# Patient Record
Sex: FEMALE | Race: WHITE | NOT HISPANIC OR LATINO | Employment: UNEMPLOYED | URBAN - METROPOLITAN AREA
[De-identification: names, ages, dates, MRNs, and addresses within clinical notes are randomized per-mention and may not be internally consistent; named-entity substitution may affect disease eponyms.]

---

## 2017-03-13 ENCOUNTER — ALLSCRIPTS OFFICE VISIT (OUTPATIENT)
Dept: OTHER | Facility: OTHER | Age: 37
End: 2017-03-13

## 2017-03-13 DIAGNOSIS — N83.201 CYST OF RIGHT OVARY: ICD-10-CM

## 2017-03-13 DIAGNOSIS — N94.6 DYSMENORRHEA: ICD-10-CM

## 2017-03-13 DIAGNOSIS — N83.202 CYST OF LEFT OVARY: ICD-10-CM

## 2018-01-02 ENCOUNTER — ALLSCRIPTS OFFICE VISIT (OUTPATIENT)
Dept: OTHER | Facility: OTHER | Age: 38
End: 2018-01-02

## 2018-01-02 DIAGNOSIS — Z12.31 ENCOUNTER FOR SCREENING MAMMOGRAM FOR MALIGNANT NEOPLASM OF BREAST: ICD-10-CM

## 2018-01-03 NOTE — PROGRESS NOTES
Assessment   1  Encounter for cervical Pap smear with pelvic exam (V76 2,V72 31) (Z01 419)   2  Encounter for preventive health examination (V70 0) (Z00 00)   3  Female pelvic pain (625 9) (R10 2)   4  Encounter for screening mammogram for breast cancer (V76 12) (Z12 31)   5  Cysts of both ovaries (620 2) (N83 201,N83 202)    Plan   Cysts of both ovaries, Dysmenorrhea, Female pelvic pain    · Norethindrone Acetate 5 MG Oral Tablet   Rx By: Wesley Muse; Dispense: 30 Days ; #:30 Tablet; Refill: 11; For: Cysts of both ovaries, Dysmenorrhea, Female pelvic pain; JUN = N; Sent To: [facility]/PHARMACY #[id number] Cysts of both ovaries, Female pelvic pain    · Norethindrone Acetate 5 MG Oral Tablet; TAKE 1/2 TABLET DAILY   Rx By: Wesley Muse; Dispense: 30 Days ; #:30 Tablet; Refill: 2;For: Cysts of both ovaries, Female pelvic pain; JUN = N; Verified Transmission to [facility]/PHARMACY #[id number]; Last Updated By: System, Yassets; 1/2/2018 4:35:58 PM  Encounter for cervical Pap smear with pelvic exam    · (Q) THINPREP PAP REFLEX HPV mRNA E6/E7; Status:Active - Retrospective By    Protocol Authorization; Requested CRISTOFER:[id number]; Perform:[facility]; UCN:[id number]; Last Updated By:Sonia [facility]; 1/2/2018 4:26:01 PM;Ordered;For:Encounter for cervical Pap smear with pelvic exam; Ordered By:Riedel, C William;  : ON NORETHINDRONE  Encounter for screening mammogram for breast cancer    · MAMMO SCREENING BILATERAL W 3D & CAD; Status:Hold For - Scheduling; Requested    for:02Jan2018; Perform:[facility]; QGI:[id number];BHMPGCF;PLO:[id number] for screening mammogram for breast cancer; Ordered By:Riedel, C [place];    Discussion/Summary   health maintenance visit healthy adult female Currently, she eats an adequate diet and has an inadequate exercise regimen   the risks and benefits of cervical cancer screening were discussed cervical cancer screening is current Pap test was done today Pap test with reflex HPV testing was done today Breast cancer screening: the risks and benefits of breast cancer screening were discussed, self breast exam technique was taught, monthly self breast exam was advised and mammogram is current  Colorectal cancer screening: the risks and benefits of colorectal cancer screening were discussed and colorectal cancer screening is current  Osteoporosis screening: the risks and benefits of osteoporosis screening were discussed  Advice and education were given regarding nutrition, aerobic exercise and weight loss  Patient discussion: discussed with the patient, 30 minute visit, greater than half of the time was spent on counseling  As noted above patient here for annual exam Pap pelvic and breast exam  Her exam today was normal the  She has no pelvic pain symptoms or bleeding symptoms or concerns  Discussed recommendation to decrease norethindrone down to 2 5 mg a day patient agrees that she will try this  Prescriptions authorized follow-up in 3 months and p r n  no other concerns addressed today  Chief Complaint   1  Visit For: Preventive General Multisystem Exam  PATIENT IS HERE FOR ANNUAL GYN EXAM, PATIENT HAS NO CONCERNS/COMPLAINTS AT THIS TIME      History of Present Illness   HPI: 78-year-old female  1 para 1 here for annual exam Pap pelvic and breast exam  Patient has had a history of ovarian cyst and pelvic pain in the past  Currently she is on norethindrone 5 mg daily to maintain control or pain symptoms and avoid recurrent ovarian cysts  Patient is not currently sexually active, states she has no pelvic pain symptoms or blue any bleeding symptoms occasional spotting  GYN HM, Adult Female [facility]: The patient is being seen for a health maintenance and gynecology evaluation  The last health maintenance visit was 2016 year(s) ago  General Health: The patient's health since the last visit is described as good  She has regular dental visits  -- She denies vision problems  -- She denies hearing loss  Immunizations status: up to date  Lifestyle:  She does not have a healthy diet  -- She has weight concerns  -- She does not exercise regularly  -- She does not use tobacco -- She denies alcohol use  -- She denies drug use  Reproductive health: the patient is perimenopausal     Screening: cancer screening reviewed and current  Active Problems   1  Cysts of both ovaries (620 2) (N83 201,N83 202)   2  Dysmenorrhea (625 3) (N94 6)   3  Encounter for cervical Pap smear with pelvic exam (V76 2,V72 31) (Z01 419)   4  Female pelvic pain (625 9) (R10 2)   5  Post-operative state (V45 89) (Z98 890)    Past Medical History    · No pertinent past medical history    Surgical History    · History of  Section   · History of Laparoscopy (Diagnostic)    Family History   Mother    · Family history of hypertension (V17 49) (Z82 49)  Father    · Family history of hypertension (V17 49) (Z82 49)   · Family history of High cholesterol    Social History    · Caffeine use (V49 89) (F15 90)   · Current every day smoker (305 1) (F17 200)   · No alcohol use   · No drug use   · One child    Current Meds    1  Ibuprofen 800 MG Oral Tablet; TAKE 1 TABLET 3 TIMES DAILY WITH FOOD AS     NEEDED; Therapy: 98Rho2136 to (Evaluate:95Fwj9044)  Requested for: 08Baq1238; Last     Rx:[id number] Ordered   2  Norethindrone Acetate 5 MG Oral Tablet; TAKE 1 TABLET DAILY; Therapy: 94HYB3109 to (Evaluate:52Xui0950)  Requested for: 52Ufk7268; Last     Rx:[id number] Ordered   3  TraMADol HCl - 50 MG Oral Tablet; TAKE 1 TO 2 TABLETS EVERY 6 HOURS AS     NEEDED FOR PAIN;     Therapy: 08Frg5308 to (Evaluate:80Caw1904); Last Rx:[id number] Ordered    Allergies   1   No Known Drug Allergies  2  Eggs    Vitals    Recorded: 18ZPR3195 58:88HK   Systolic 109, RUE, Sitting   Diastolic 86, RUE, Sitting   Weight 160 lb 8 oz   BMI Calculated 31 35   BSA Calculated 1 7   LMP ON NORETHINDRONE     Physical Exam        Constitutional      General appearance: No acute distress, well appearing and well nourished  Neck      Neck: Normal, supple, trachea midline, no masses  Thyroid: Normal, no thyromegaly  Pulmonary      Respiratory effort: No increased work of breathing or signs of respiratory distress  Auscultation of lungs: Clear to auscultation  Cardiovascular      Auscultation of heart: Normal rate and rhythm, normal S1 and S2, no murmurs  Peripheral vascular exam: Normal pulses Throughout  Genitourinary      External genitalia: Normal and no lesions appreciated  Vagina: Normal, no lesions or dryness appreciated  Urethra: Normal        Urethral meatus: Normal        Bladder: Normal, soft, non-tender and no prolapse or masses appreciated  Cervix: Normal, no palpable masses  Uterus: Normal, non-tender, not enlarged, and no palpable masses  Adnexa/parametria: Normal, non-tender and no fullness or masses appreciated  -- No pelvic masses  Anus, perineum, and rectum: Normal sphincter tone, no masses, and no prolapse  Chest      Breasts: Normal and no dimpling or skin changes noted  Abdomen      Abdomen: Normal, non-tender, and no organomegaly noted  Liver and spleen: No hepatomegaly or splenomegaly  Examination for hernias: No hernias appreciated  Lymphatic      Palpation of lymph nodes in neck, axillae, groin and/or other locations: No lymphadenopathy or masses noted  Skin      Skin and subcutaneous tissue: Normal skin turgor and no rashes         Palpation of skin and subcutaneous tissue: Normal        Psychiatric      Orientation to person, place, and time: Normal        Mood and affect: Normal        Signatures    Electronically signed by : Maricruz Rose DO; Jan 2 2018  7:03PM EST                       (Author)

## 2018-01-04 LAB
ADDITIONAL INFORMATION (HISTORICAL): NORMAL
ADEQUACY: (HISTORICAL): NORMAL
CYTOTECHNOLOGIST: (HISTORICAL): NORMAL
INTERPRETATION (HISTORICAL): NORMAL
LMP (HISTORICAL): NORMAL
PREV. BX: (HISTORICAL): NORMAL
PREV. PAP (HISTORICAL): NORMAL
REVIEWED BY (HISTORICAL): NORMAL
SOURCE (HISTORICAL): NORMAL

## 2018-01-13 VITALS
HEIGHT: 60 IN | BODY MASS INDEX: 34.55 KG/M2 | DIASTOLIC BLOOD PRESSURE: 85 MMHG | SYSTOLIC BLOOD PRESSURE: 120 MMHG | WEIGHT: 176 LBS

## 2018-01-16 NOTE — MISCELLANEOUS
Provider Comments  Provider Comments:   PATIENT NO SHOWED FOR FOLLOW UP APPOINTMENT TODAY, I CALLED AND LEFT MESSAGE ON PATIENTS VOICE MAIL TO CALL OFFICE      Signatures   Electronically signed by : Keyonna Enriquez, ; Nov 29 2016  1:44PM EST                       (Author)

## 2018-01-22 VITALS — DIASTOLIC BLOOD PRESSURE: 86 MMHG | BODY MASS INDEX: 31.35 KG/M2 | SYSTOLIC BLOOD PRESSURE: 120 MMHG | WEIGHT: 160.5 LBS

## 2018-07-25 DIAGNOSIS — Z87.42 HISTORY OF DYSMENORRHEA: Primary | ICD-10-CM

## 2018-10-02 ENCOUNTER — OFFICE VISIT (OUTPATIENT)
Dept: FAMILY MEDICINE CLINIC | Facility: CLINIC | Age: 38
End: 2018-10-02
Payer: COMMERCIAL

## 2018-10-02 ENCOUNTER — TELEPHONE (OUTPATIENT)
Dept: FAMILY MEDICINE CLINIC | Facility: CLINIC | Age: 38
End: 2018-10-02

## 2018-10-02 ENCOUNTER — APPOINTMENT (OUTPATIENT)
Dept: LAB | Facility: IMAGING CENTER | Age: 38
End: 2018-10-02
Payer: COMMERCIAL

## 2018-10-02 ENCOUNTER — TRANSCRIBE ORDERS (OUTPATIENT)
Dept: ADMINISTRATIVE | Facility: HOSPITAL | Age: 38
End: 2018-10-02

## 2018-10-02 VITALS
WEIGHT: 149.2 LBS | BODY MASS INDEX: 26.44 KG/M2 | HEART RATE: 73 BPM | DIASTOLIC BLOOD PRESSURE: 90 MMHG | OXYGEN SATURATION: 98 % | HEIGHT: 63 IN | TEMPERATURE: 99.1 F | RESPIRATION RATE: 16 BRPM | SYSTOLIC BLOOD PRESSURE: 122 MMHG

## 2018-10-02 DIAGNOSIS — G63 NEUROPATHY ASSOCIATED WITH POLYNEUROPATHY, ORGANOMEGALY, ENDOCRINOPATHY, MONOCLONAL GAMMOPATHY, AND SKIN CHANGES SYNDROME (HCC): ICD-10-CM

## 2018-10-02 DIAGNOSIS — L70.0 ACNE VULGARIS: ICD-10-CM

## 2018-10-02 DIAGNOSIS — G63 NEUROPATHY ASSOCIATED WITH POLYNEUROPATHY, ORGANOMEGALY, ENDOCRINOPATHY, MONOCLONAL GAMMOPATHY, AND SKIN CHANGES SYNDROME (HCC): Primary | ICD-10-CM

## 2018-10-02 DIAGNOSIS — E88.09 NEUROPATHY ASSOCIATED WITH POLYNEUROPATHY, ORGANOMEGALY, ENDOCRINOPATHY, MONOCLONAL GAMMOPATHY, AND SKIN CHANGES SYNDROME (HCC): Primary | ICD-10-CM

## 2018-10-02 DIAGNOSIS — E88.09 NEUROPATHY ASSOCIATED WITH POLYNEUROPATHY, ORGANOMEGALY, ENDOCRINOPATHY, MONOCLONAL GAMMOPATHY, AND SKIN CHANGES SYNDROME (HCC): ICD-10-CM

## 2018-10-02 PROBLEM — J45.20 MILD INTERMITTENT ASTHMA WITHOUT COMPLICATION: Status: ACTIVE | Noted: 2018-10-02

## 2018-10-02 PROBLEM — I10 ESSENTIAL HYPERTENSION: Status: ACTIVE | Noted: 2018-10-02

## 2018-10-02 PROBLEM — G62.9 NEUROPATHY: Status: ACTIVE | Noted: 2018-10-02

## 2018-10-02 PROBLEM — N83.209 CYSTIC DISEASE OF OVARIES: Status: ACTIVE | Noted: 2018-10-02

## 2018-10-02 PROBLEM — F41.9 ANXIETY: Status: ACTIVE | Noted: 2018-10-02

## 2018-10-02 LAB
ALBUMIN SERPL BCP-MCNC: 3.9 G/DL (ref 3.5–5)
ALP SERPL-CCNC: 93 U/L (ref 46–116)
ALT SERPL W P-5'-P-CCNC: 59 U/L (ref 12–78)
ANION GAP SERPL CALCULATED.3IONS-SCNC: 6 MMOL/L (ref 4–13)
AST SERPL W P-5'-P-CCNC: 39 U/L (ref 5–45)
BASOPHILS # BLD AUTO: 0.02 THOUSANDS/ΜL (ref 0–0.1)
BASOPHILS NFR BLD AUTO: 0 % (ref 0–1)
BILIRUB SERPL-MCNC: 0.56 MG/DL (ref 0.2–1)
BUN SERPL-MCNC: 12 MG/DL (ref 5–25)
CALCIUM SERPL-MCNC: 9.2 MG/DL (ref 8.3–10.1)
CHLORIDE SERPL-SCNC: 107 MMOL/L (ref 100–108)
CO2 SERPL-SCNC: 26 MMOL/L (ref 21–32)
CREAT SERPL-MCNC: 0.83 MG/DL (ref 0.6–1.3)
EOSINOPHIL # BLD AUTO: 0.1 THOUSAND/ΜL (ref 0–0.61)
EOSINOPHIL NFR BLD AUTO: 2 % (ref 0–6)
ERYTHROCYTE [DISTWIDTH] IN BLOOD BY AUTOMATED COUNT: 13.6 % (ref 11.6–15.1)
ERYTHROCYTE [SEDIMENTATION RATE] IN BLOOD: 21 MM/HOUR (ref 0–20)
GFR SERPL CREATININE-BSD FRML MDRD: 88 ML/MIN/1.73SQ M
GLUCOSE P FAST SERPL-MCNC: 95 MG/DL (ref 65–99)
HCT VFR BLD AUTO: 43 % (ref 34.8–46.1)
HGB BLD-MCNC: 14.2 G/DL (ref 11.5–15.4)
IMM GRANULOCYTES # BLD AUTO: 0.01 THOUSAND/UL (ref 0–0.2)
IMM GRANULOCYTES NFR BLD AUTO: 0 % (ref 0–2)
LYMPHOCYTES # BLD AUTO: 1.98 THOUSANDS/ΜL (ref 0.6–4.47)
LYMPHOCYTES NFR BLD AUTO: 40 % (ref 14–44)
MCH RBC QN AUTO: 31.9 PG (ref 26.8–34.3)
MCHC RBC AUTO-ENTMCNC: 33 G/DL (ref 31.4–37.4)
MCV RBC AUTO: 97 FL (ref 82–98)
MONOCYTES # BLD AUTO: 0.74 THOUSAND/ΜL (ref 0.17–1.22)
MONOCYTES NFR BLD AUTO: 15 % (ref 4–12)
NEUTROPHILS # BLD AUTO: 2.08 THOUSANDS/ΜL (ref 1.85–7.62)
NEUTS SEG NFR BLD AUTO: 43 % (ref 43–75)
NRBC BLD AUTO-RTO: 0 /100 WBCS
PLATELET # BLD AUTO: 238 THOUSANDS/UL (ref 149–390)
PMV BLD AUTO: 9.9 FL (ref 8.9–12.7)
POTASSIUM SERPL-SCNC: 4.2 MMOL/L (ref 3.5–5.3)
PROT SERPL-MCNC: 7.9 G/DL (ref 6.4–8.2)
RBC # BLD AUTO: 4.45 MILLION/UL (ref 3.81–5.12)
SODIUM SERPL-SCNC: 139 MMOL/L (ref 136–145)
WBC # BLD AUTO: 4.93 THOUSAND/UL (ref 4.31–10.16)

## 2018-10-02 PROCEDURE — 86038 ANTINUCLEAR ANTIBODIES: CPT

## 2018-10-02 PROCEDURE — 99204 OFFICE O/P NEW MOD 45 MIN: CPT | Performed by: NURSE PRACTITIONER

## 2018-10-02 PROCEDURE — 36415 COLL VENOUS BLD VENIPUNCTURE: CPT

## 2018-10-02 PROCEDURE — 85652 RBC SED RATE AUTOMATED: CPT

## 2018-10-02 PROCEDURE — 86618 LYME DISEASE ANTIBODY: CPT

## 2018-10-02 PROCEDURE — 86039 ANTINUCLEAR ANTIBODIES (ANA): CPT

## 2018-10-02 PROCEDURE — 85025 COMPLETE CBC W/AUTO DIFF WBC: CPT

## 2018-10-02 PROCEDURE — 80053 COMPREHEN METABOLIC PANEL: CPT

## 2018-10-02 RX ORDER — GABAPENTIN 300 MG/1
300 CAPSULE ORAL 3 TIMES DAILY
Qty: 90 CAPSULE | Refills: 1 | Status: SHIPPED | OUTPATIENT
Start: 2018-10-02 | End: 2018-12-28 | Stop reason: SDUPTHER

## 2018-10-02 RX ORDER — IBUPROFEN 800 MG/1
800 TABLET ORAL EVERY 6 HOURS PRN
Qty: 90 TABLET | Refills: 1 | Status: SHIPPED | OUTPATIENT
Start: 2018-10-02 | End: 2018-12-04 | Stop reason: SDUPTHER

## 2018-10-02 RX ORDER — TETRACYCLINE HYDROCHLORIDE 250 MG/1
250 CAPSULE ORAL DAILY
Qty: 30 CAPSULE | Refills: 1 | Status: SHIPPED | OUTPATIENT
Start: 2018-10-02 | End: 2018-10-16

## 2018-10-02 NOTE — ASSESSMENT & PLAN NOTE
Within the last several months patient has had a sudden onset of severe cystic acne  The sister was able to resolve a lot of it with over-the-counter medication and very conscientious cleaning  Will put the patient on daily tetracyclines and benzyl peroxide to facial wash

## 2018-10-02 NOTE — TELEPHONE ENCOUNTER
Pharmacy called they do not have benzoyl peroxide 4 % in stock  They wanted to know if it could be changed to 5 %   [phone]

## 2018-10-02 NOTE — ASSESSMENT & PLAN NOTE
Within the last couple years this patient has developed multiple multiple issues that include but are not limited to cystic ovaries, adult onset of cystic acne,neuropathy multiple joint pain, weakness, memory impairment, strange behavior, slurring of speech, had fogginess and severe periodontal disease  Will do testing to include blood work, EMG, MRI and referred to Neurology for a workup for possible MS  Will renew her Motrin 800 mg for pain and increase her Neurontin from 100 mg to 300 mg up to 3 times a day

## 2018-10-02 NOTE — PROGRESS NOTES
Assessment/Plan:    Cystic acne  Within the last several months patient has had a sudden onset of severe cystic acne  The sister was able to resolve a lot of it with over-the-counter medication and very conscientious cleaning  Will put the patient on daily tetracyclines and benzyl peroxide to facial wash  Neuropathy associated with polyneuropathy, organomegaly, endocrinopathy, monoclonal gammopathy, and skin changes syndrome (HCC)  Within the last couple years this patient has developed multiple multiple issues that include but are not limited to cystic ovaries, adult onset of cystic acne,neuropathy multiple joint pain, weakness, memory impairment, strange behavior, slurring of speech, had fogginess and severe periodontal disease  Will do testing to include blood work, EMG, MRI and referred to Neurology for a workup for possible MS  Will renew her Motrin 800 mg for pain and increase her Neurontin from 100 mg to 300 mg up to 3 times a day  Diagnoses and all orders for this visit:    Neuropathy associated with polyneuropathy, organomegaly, endocrinopathy, monoclonal gammopathy, and skin changes syndrome (HCC)  -     EMG 2 limb lower extremity; Future  -     CBC and differential; Future  -     Comprehensive metabolic panel; Future  -     Sedimentation rate, automated; Future  -     DAMIEN Screen w/ Reflex to Titer/Pattern; Future  -     Lyme Ab/Western Blot Reflex; Future  -     MRI brain wo contrast; Future  -     Ambulatory referral to Neurology; Future  -     gabapentin (NEURONTIN) 300 mg capsule; Take 1 capsule (300 mg total) by mouth 3 (three) times a day  -     ibuprofen (MOTRIN) 800 mg tablet; Take 1 tablet (800 mg total) by mouth every 6 (six) hours as needed for mild pain    Acne vulgaris  -     tetracycline (ACHROMYCIN,SUMYCIN) 250 MG capsule; Take 1 capsule (250 mg total) by mouth daily for 30 days  -     benzoyl peroxide (BREVOXYL) 4 % external liquid;  Apply topically 2 (two) times a day Subjective:      Patient ID: Milena Canales is a 34 y o  female chronic pain    HPI  Has been going to doctors for joint pain but no one tries to find a causes  Feels her body and mind are slower, general body pain with hand numbness  Speech is slurred  Has some good days  Refuses to drive due to poor focus  Leg give out after standing for a few seconds  Sister is with her and states she has seen a huge change in the patient in a short time  The following portions of the patient's history were reviewed and updated as appropriate:   She  has a past medical history of Arthritis; bleeding disorder; and Nephrolithiasis  She   Patient Active Problem List    Diagnosis Date Noted    Cystic acne 10/02/2018    Anxiety 10/02/2018    Neuropathy associated with polyneuropathy, organomegaly, endocrinopathy, monoclonal gammopathy, and skin changes syndrome ([address] ) 10/02/2018    Mild intermittent asthma without complication     Cystic disease of ovaries 10/02/2018    Essential hypertension 10/02/2018     She  has a past surgical history that includes  section; [place] tooth extraction; Diagnostic laparoscopy; pr lap,diagnostic abdomen (N/A, 2016); and pr lap,rmv  adnexal structure (Bilateral, 2016)  Her family history is not on file  She  reports that she has been smoking  She has a 23 00 pack-year smoking history  She has never used smokeless tobacco  She reports that she uses drugs, including Marijuana  She reports that she does not drink alcohol    Current Outpatient Prescriptions   Medication Sig Dispense Refill    albuterol (PROVENTIL HFA,VENTOLIN HFA) 90 mcg/act inhaler 2 puffs po q 4-6 hours prn coughing, wheezing or shortness of breath      amLODIPine (NORVASC) 5 mg tablet Take 5 mg by mouth daily  5    fluticasone-salmeterol (ADVAIR DISKUS) 250-50 mcg/dose inhaler Inhale 1 puff      ibuprofen (MOTRIN) 800 mg tablet Take 1 tablet (800 mg total) by mouth every 6 (six) hours as needed for mild pain 90 tablet 1    norethindrone (AYGESTIN) 5 mg tablet TAKE 1/2 TABLET BY MOUTH DAILY 30 tablet 3    omeprazole (PriLOSEC) 20 mg delayed release capsule 1 CAPSULE 30 MINUTES BEFORE FIRST MEAL OF THE DAY EVERY DAY  12    sertraline (ZOLOFT) 100 mg tablet TAKE 1 TABLET DAILY WITH 50MG  5    benzoyl peroxide (BREVOXYL) 4 % external liquid Apply topically 2 (two) times a day 170 1 g 0    gabapentin (NEURONTIN) 300 mg capsule Take 1 capsule (300 mg total) by mouth 3 (three) times a day 90 capsule 1    norethindrone-ethinyl estradiol (JUNEL FE 1/20) 1-20 MG-MCG per tablet Take 1 tablet by mouth daily Indications: pt unsure of dosage   tetracycline (ACHROMYCIN,SUMYCIN) 250 MG capsule Take 1 capsule (250 mg total) by mouth daily for 30 days 30 capsule 1    traMADol (ULTRAM) 50 mg tablet Take 50 mg by mouth every 6 (six) hours as needed for moderate pain (1 or 2 tablets)  No current facility-administered medications for this visit  Current Outpatient Prescriptions on File Prior to Visit   Medication Sig    albuterol (PROVENTIL HFA,VENTOLIN HFA) 90 mcg/act inhaler 2 puffs po q 4-6 hours prn coughing, wheezing or shortness of breath    amLODIPine (NORVASC) 5 mg tablet Take 5 mg by mouth daily    fluticasone-salmeterol (ADVAIR DISKUS) 250-50 mcg/dose inhaler Inhale 1 puff    norethindrone (AYGESTIN) 5 mg tablet TAKE 1/2 TABLET BY MOUTH DAILY    omeprazole (PriLOSEC) 20 mg delayed release capsule 1 CAPSULE 30 MINUTES BEFORE FIRST MEAL OF THE DAY EVERY DAY    sertraline (ZOLOFT) 100 mg tablet TAKE 1 TABLET DAILY WITH 50MG   [DISCONTINUED] ibuprofen (MOTRIN) 800 mg tablet Take 800 mg by mouth every 6 (six) hours as needed for mild pain   norethindrone-ethinyl estradiol (JUNEL FE 1/20) 1-20 MG-MCG per tablet Take 1 tablet by mouth daily Indications: pt unsure of dosage      traMADol (ULTRAM) 50 mg tablet Take 50 mg by mouth every 6 (six) hours as needed for moderate pain (1 or 2 tablets)   [DISCONTINUED] gabapentin (NEURONTIN) 100 mg capsule TAKE 1 CAPSULE DAILY     No current facility-administered medications on file prior to visit  She is allergic to eggs or egg-derived products       Review of Systems   Constitutional: Positive for activity change, appetite change and fatigue  HENT: Negative  Teeth became infected in her 30's and had to have them all removed  Eyes: Positive for visual disturbance  Respiratory: Positive for cough and shortness of breath  Cardiovascular: Negative  Endocrine: Negative  Genitourinary: Negative  Musculoskeletal: Positive for arthralgias, back pain, gait problem and myalgias  Skin:        Sudden onset of adult cystic acne with deep scarring  Allergic/Immunologic: Positive for food allergies  Neurological: Positive for speech difficulty, weakness and numbness  Hematological: Bruises/bleeds easily  Psychiatric/Behavioral: The patient is nervous/anxious  Objective:      /90   Pulse 73   Temp 99 1 °F (37 3 °C) (Tympanic)   Resp 16   Ht 5' 3" (1 6 m)   Wt 67 7 kg (149 lb 3 2 oz)   SpO2 98%   BMI 26 43 kg/m²          Physical Exam   Constitutional: She is oriented to person, place, and time  She appears well-developed and well-nourished  She appears distressed  Having difficulty with talking about what she is feeling  Sister had to talk for her  HENT:   Head: Normocephalic  Right Ear: External ear normal    Left Ear: External ear normal    Nose: Nose normal    Mouth/Throat: Oropharynx is clear and moist    Eyes: Pupils are equal, round, and reactive to light  Conjunctivae and EOM are normal    Neck: Normal range of motion  Neck supple  Cardiovascular: Normal rate, regular rhythm, normal heart sounds and intact distal pulses  Pulmonary/Chest: Effort normal and breath sounds normal    Abdominal: Soft  Bowel sounds are normal    Musculoskeletal:   Tenderness in her back   Has wide spread muscle/joint pain  Lymphadenopathy:     She has no cervical adenopathy  Neurological: She is alert and oriented to person, place, and time  She has normal reflexes  Coordination (Difficulty with maneuvering in the exam room  ) abnormal    Poor ability to think without prompting at times  Decrease sensation in hand and feet  Psychiatric: Her mood appears anxious  Her speech is delayed  She is slowed  Cognition and memory are impaired  She expresses inappropriate judgment  She exhibits a depressed mood  She expresses suicidal ideation  She expresses no suicidal plans  Sister states she has so much pain, the patient was thinking of harming herself  The sister is now with the patient daily

## 2018-10-03 LAB
ANA HOMOGEN SER QL IF: NORMAL
ANA HOMOGEN TITR SER: NORMAL {TITER}
RYE IGE QN: POSITIVE

## 2018-10-04 ENCOUNTER — TELEPHONE (OUTPATIENT)
Dept: FAMILY MEDICINE CLINIC | Facility: CLINIC | Age: 38
End: 2018-10-04

## 2018-10-04 LAB
B BURGDOR IGG SER IA-ACNC: 0.35
B BURGDOR IGM SER IA-ACNC: 0.18

## 2018-10-05 DIAGNOSIS — M25.50 ARTHRALGIA, UNSPECIFIED JOINT: Primary | ICD-10-CM

## 2018-10-09 ENCOUNTER — TRANSCRIBE ORDERS (OUTPATIENT)
Dept: ADMINISTRATIVE | Facility: HOSPITAL | Age: 38
End: 2018-10-09

## 2018-10-09 ENCOUNTER — OFFICE VISIT (OUTPATIENT)
Dept: FAMILY MEDICINE CLINIC | Facility: CLINIC | Age: 38
End: 2018-10-09
Payer: COMMERCIAL

## 2018-10-09 ENCOUNTER — APPOINTMENT (OUTPATIENT)
Dept: LAB | Facility: IMAGING CENTER | Age: 38
End: 2018-10-09
Payer: COMMERCIAL

## 2018-10-09 VITALS
HEART RATE: 68 BPM | OXYGEN SATURATION: 99 % | HEIGHT: 63 IN | WEIGHT: 148.8 LBS | RESPIRATION RATE: 16 BRPM | SYSTOLIC BLOOD PRESSURE: 138 MMHG | TEMPERATURE: 99.1 F | DIASTOLIC BLOOD PRESSURE: 94 MMHG | BODY MASS INDEX: 26.36 KG/M2

## 2018-10-09 DIAGNOSIS — E88.09 NEUROPATHY ASSOCIATED WITH POLYNEUROPATHY, ORGANOMEGALY, ENDOCRINOPATHY, MONOCLONAL GAMMOPATHY, AND SKIN CHANGES SYNDROME (HCC): ICD-10-CM

## 2018-10-09 DIAGNOSIS — L70.0 CYSTIC ACNE: Primary | ICD-10-CM

## 2018-10-09 DIAGNOSIS — M25.50 ARTHRALGIA, UNSPECIFIED JOINT: Primary | ICD-10-CM

## 2018-10-09 DIAGNOSIS — G63 NEUROPATHY ASSOCIATED WITH POLYNEUROPATHY, ORGANOMEGALY, ENDOCRINOPATHY, MONOCLONAL GAMMOPATHY, AND SKIN CHANGES SYNDROME (HCC): ICD-10-CM

## 2018-10-09 LAB — CRP SERPL QL: <3 MG/L

## 2018-10-09 PROCEDURE — 85732 THROMBOPLASTIN TIME PARTIAL: CPT

## 2018-10-09 PROCEDURE — 36415 COLL VENOUS BLD VENIPUNCTURE: CPT

## 2018-10-09 PROCEDURE — 86430 RHEUMATOID FACTOR TEST QUAL: CPT

## 2018-10-09 PROCEDURE — 85705 THROMBOPLASTIN INHIBITION: CPT

## 2018-10-09 PROCEDURE — 85613 RUSSELL VIPER VENOM DILUTED: CPT

## 2018-10-09 PROCEDURE — 86140 C-REACTIVE PROTEIN: CPT

## 2018-10-09 PROCEDURE — 85670 THROMBIN TIME PLASMA: CPT

## 2018-10-09 PROCEDURE — 99213 OFFICE O/P EST LOW 20 MIN: CPT | Performed by: NURSE PRACTITIONER

## 2018-10-09 RX ORDER — NYSTATIN AND TRIAMCINOLONE ACETONIDE 100000; 1 [USP'U]/G; MG/G
OINTMENT TOPICAL 2 TIMES DAILY
Qty: 30 G | Refills: 2 | Status: SHIPPED | OUTPATIENT
Start: 2018-10-09 | End: 2019-11-29

## 2018-10-09 RX ORDER — CLINDAMYCIN PHOSPHATE 10 MG/G
GEL TOPICAL 2 TIMES DAILY
Qty: 30 G | Refills: 5 | Status: SHIPPED | OUTPATIENT
Start: 2018-10-09 | End: 2019-11-29

## 2018-10-09 NOTE — PROGRESS NOTES
Assessment/Plan:      Diagnoses and all orders for this visit:    Cystic acne  Comments:  Skin a mildly improved  Will order clindagel 1% BID  Orders:  -     clindamycin (CLINDAGEL) 1 % gel; Apply topically 2 (two) times a day  -     nystatin-triamcinolone (MYCOLOG-II) ointment; Apply topically 2 (two) times a day    Neuropathy associated with polyneuropathy, organomegaly, endocrinopathy, monoclonal gammopathy, and skin changes syndrome (HCC)  Comments:  Has set up EMG, will call neuro but I feel rheum will still need to see pt  Will see in early December  Will call her with the next set of lab results  Subjective:     Patient ID: Paul Jack is a 34 y o  female f/u of labs  HPI  States she has started to set up her appointment and will get additional labs this week  EMG is in January  Feeling better after sleeping for 14 hours  Review of Systems   Constitutional: Positive for fatigue  HENT: Positive for congestion  Respiratory: Negative  Cardiovascular: Negative  Musculoskeletal: Positive for arthralgias, joint swelling and myalgias  Skin: Positive for rash (acne is drying  )  Allergic/Immunologic: Positive for food allergies  Neurological: Negative  Hematological: Negative for adenopathy  Psychiatric/Behavioral: The patient is nervous/anxious  Objective:     Physical Exam   Constitutional: She is oriented to person, place, and time  She appears well-developed and well-nourished  HENT:   Head: Normocephalic  Right Ear: External ear normal    Left Ear: External ear normal    Nose: Nose normal    Mouth/Throat: Oropharynx is clear and moist    Bilateral ear canals are erythemic but not tender  Eyes: Conjunctivae and EOM are normal    Neck: Normal range of motion  Neck supple  Cardiovascular: Normal rate, regular rhythm and normal heart sounds  Pulmonary/Chest: Effort normal and breath sounds normal    Musculoskeletal:   Generalized joint and muscle pain  Lymphadenopathy:     She has no cervical adenopathy  Neurological: She is alert and oriented to person, place, and time  Skin: Skin is warm and dry  Acne mildly improved  Psychiatric: She has a normal mood and affect   Her behavior is normal  Judgment and thought content normal

## 2018-10-10 LAB — RHEUMATOID FACT SER QL LA: NEGATIVE

## 2018-10-11 ENCOUNTER — HOSPITAL ENCOUNTER (OUTPATIENT)
Dept: RADIOLOGY | Age: 38
Discharge: HOME | End: 2018-10-11
Payer: COMMERCIAL

## 2018-10-11 DIAGNOSIS — G63 NEUROPATHY ASSOCIATED WITH POLYNEUROPATHY, ORGANOMEGALY, ENDOCRINOPATHY, MONOCLONAL GAMMOPATHY, AND SKIN CHANGES SYNDROME (HCC): ICD-10-CM

## 2018-10-11 DIAGNOSIS — E88.09 NEUROPATHY ASSOCIATED WITH POLYNEUROPATHY, ORGANOMEGALY, ENDOCRINOPATHY, MONOCLONAL GAMMOPATHY, AND SKIN CHANGES SYNDROME (HCC): ICD-10-CM

## 2018-10-11 LAB
APTT SCREEN TO CONFIRM RATIO: 0.88 RATIO (ref 0–1.4)
CONFIRM APTT/NORMAL: 40.5 SEC (ref 0–55)
LA PPP-IMP: NORMAL
SCREEN APTT: 37.8 SEC (ref 0–51.9)
SCREEN DRVVT: 30.6 SEC (ref 0–47)
THROMBIN TIME: 16.8 SEC (ref 0–23)

## 2018-10-11 PROCEDURE — 70551 MRI BRAIN STEM W/O DYE: CPT

## 2018-10-16 ENCOUNTER — OFFICE VISIT (OUTPATIENT)
Dept: FAMILY MEDICINE CLINIC | Facility: CLINIC | Age: 38
End: 2018-10-16
Payer: COMMERCIAL

## 2018-10-16 VITALS
SYSTOLIC BLOOD PRESSURE: 112 MMHG | TEMPERATURE: 98.5 F | HEART RATE: 75 BPM | RESPIRATION RATE: 16 BRPM | WEIGHT: 152.6 LBS | HEIGHT: 63 IN | BODY MASS INDEX: 27.04 KG/M2 | DIASTOLIC BLOOD PRESSURE: 72 MMHG | OXYGEN SATURATION: 97 %

## 2018-10-16 DIAGNOSIS — E88.09 NEUROPATHY ASSOCIATED WITH POLYNEUROPATHY, ORGANOMEGALY, ENDOCRINOPATHY, MONOCLONAL GAMMOPATHY, AND SKIN CHANGES SYNDROME (HCC): Primary | ICD-10-CM

## 2018-10-16 DIAGNOSIS — G63 NEUROPATHY ASSOCIATED WITH POLYNEUROPATHY, ORGANOMEGALY, ENDOCRINOPATHY, MONOCLONAL GAMMOPATHY, AND SKIN CHANGES SYNDROME (HCC): Primary | ICD-10-CM

## 2018-10-16 PROCEDURE — 99214 OFFICE O/P EST MOD 30 MIN: CPT | Performed by: NURSE PRACTITIONER

## 2018-10-16 NOTE — PROGRESS NOTES
Answers for HPI/ROS submitted by the patient on 10/15/2018   Neurologic complaint  altered mental status: Yes  clumsiness: Yes  focal sensory loss: Yes  focal weakness: Yes  loss of balance: Yes  memory loss: Yes  near-syncope: Yes  slurred speech: Yes  syncope: No  visual change: Yes  weakness: Yes  Chronicity: chronic  Onset: more than 1 month ago  Onset quality: suddenly  Progression since onset: waxing and waning  Focality: facial, right-sided, lower extremity, upper extremity  abdominal pain: No  auditory change: No  aura: Yes  back pain: Yes  bladder incontinence: No  bowel incontinence: No  chest pain: No  confusion: Yes  diaphoresis: Yes  dizziness: Yes  fatigue: Yes  fever: No  headaches: Yes  light-headedness: Yes  nausea: No  neck pain: Yes  palpitations: No  shortness of breath: No  vertigo: No  vomiting: No  Treatments tried: medication  Improvement on treatment: mild

## 2018-10-16 NOTE — PROGRESS NOTES
Assessment/Plan:      Diagnoses and all orders for this visit:    Neuropathy associated with polyneuropathy, organomegaly, endocrinopathy, monoclonal gammopathy, and skin changes syndrome (HCC)  Comments:  Testing neg except for DAMIEN  Still has not made appt with rehum and neuro  Will do U/S of pancrease  To f/u after testing is complete  Orders:  -     US right upper quadrant; Future          Subjective:     Patient ID: Yovany Aguila is a 34 y o  female here for f/u of MRI and labs  HPI  Still having the same symptoms without worsening  Face is still breaking out  Having nausea with vomiting at times  Review of Systems   Constitutional: Positive for diaphoresis and fatigue  Negative for fever  Respiratory: Negative for shortness of breath  Cardiovascular: Negative for chest pain and palpitations  Gastrointestinal: Negative for abdominal pain, nausea and vomiting  Musculoskeletal: Positive for back pain and neck pain  Neurological: Positive for dizziness, weakness, light-headedness and headaches  Negative for syncope  Psychiatric/Behavioral: Positive for confusion  Objective:     Physical Exam   Constitutional: She is oriented to person, place, and time  She appears well-developed and well-nourished  HENT:   Head: Normocephalic  Eyes: Conjunctivae and EOM are normal    Neck: Normal range of motion  Neck supple  Cardiovascular: Normal rate, regular rhythm and normal heart sounds  Pulmonary/Chest: Effort normal and breath sounds normal    Abdominal: Soft  Bowel sounds are normal    Musculoskeletal: Normal range of motion  Neurological: She is alert and oriented to person, place, and time  She has normal strength  A sensory deficit is present  She displays a negative Romberg sign  Skin: Skin is warm and dry  Rash (moderate facial acne ) noted  Psychiatric: She has a normal mood and affect   Her behavior is normal

## 2018-10-18 ENCOUNTER — HOSPITAL ENCOUNTER (OUTPATIENT)
Dept: RADIOLOGY | Facility: IMAGING CENTER | Age: 38
Discharge: HOME | End: 2018-10-18
Payer: COMMERCIAL

## 2018-10-18 DIAGNOSIS — G63 NEUROPATHY ASSOCIATED WITH POLYNEUROPATHY, ORGANOMEGALY, ENDOCRINOPATHY, MONOCLONAL GAMMOPATHY, AND SKIN CHANGES SYNDROME (HCC): ICD-10-CM

## 2018-10-18 DIAGNOSIS — E88.09 NEUROPATHY ASSOCIATED WITH POLYNEUROPATHY, ORGANOMEGALY, ENDOCRINOPATHY, MONOCLONAL GAMMOPATHY, AND SKIN CHANGES SYNDROME (HCC): ICD-10-CM

## 2018-10-18 PROCEDURE — 76705 ECHO EXAM OF ABDOMEN: CPT

## 2018-10-19 ENCOUNTER — TELEPHONE (OUTPATIENT)
Dept: FAMILY MEDICINE CLINIC | Facility: CLINIC | Age: 38
End: 2018-10-19

## 2018-10-19 DIAGNOSIS — G43.A1 INTRACTABLE CYCLICAL VOMITING, PRESENCE OF NAUSEA NOT SPECIFIED: Primary | ICD-10-CM

## 2018-10-25 ENCOUNTER — OFFICE VISIT (OUTPATIENT)
Dept: GASTROENTEROLOGY | Facility: AMBULARY SURGERY CENTER | Age: 38
End: 2018-10-25
Payer: COMMERCIAL

## 2018-10-25 VITALS
WEIGHT: 153.6 LBS | DIASTOLIC BLOOD PRESSURE: 86 MMHG | RESPIRATION RATE: 18 BRPM | TEMPERATURE: 97.9 F | HEIGHT: 60 IN | BODY MASS INDEX: 30.15 KG/M2 | HEART RATE: 64 BPM | SYSTOLIC BLOOD PRESSURE: 110 MMHG

## 2018-10-25 DIAGNOSIS — K59.09 OTHER CONSTIPATION: ICD-10-CM

## 2018-10-25 DIAGNOSIS — G43.A1 INTRACTABLE CYCLICAL VOMITING, PRESENCE OF NAUSEA NOT SPECIFIED: Primary | ICD-10-CM

## 2018-10-25 DIAGNOSIS — R11.15 NON-INTRACTABLE CYCLICAL VOMITING WITH NAUSEA: ICD-10-CM

## 2018-10-25 PROBLEM — K21.9 GERD (GASTROESOPHAGEAL REFLUX DISEASE): Status: ACTIVE | Noted: 2018-10-25

## 2018-10-25 PROBLEM — K59.00 CONSTIPATION: Status: ACTIVE | Noted: 2018-10-25

## 2018-10-25 PROBLEM — R11.2 NAUSEA AND VOMITING: Status: ACTIVE | Noted: 2018-10-25

## 2018-10-25 PROCEDURE — 99244 OFF/OP CNSLTJ NEW/EST MOD 40: CPT | Performed by: INTERNAL MEDICINE

## 2018-10-25 NOTE — LETTER
October 25, 2018     MONICA Nick  [address] [facility] [address]  [place] [address]    Patient: Zachery Morrow   YOB: 1980   Date of Visit: 10/25/2018       Dear Dr Diamond Edwards: Thank you for referring Willie Salas to me for evaluation  Below are my notes for this consultation  If you have questions, please do not hesitate to call me  I look forward to following your patient along with you  Sincerely,        Emy Forrest MD        CC: No Recipients  Emy Forrest MD  10/25/2018  9:40 PM  Sign at close encounter  Consultation - [address] [facility]  Melia Valdesjohan Henson 34 y o  female MRN: [id number]          Assessment & Plan:    24-year-old female with a variety symptoms including recurrent nausea, vomiting, reflux, abdominal pain, constipation  1   Recurrent nausea and vomiting:  Most likely secondary to cannabis hyperemesis still the patient is reluctant to accept this diagnosis she still uses cannabis coils and compounds  -she has stopped smoking marijuana  -inform her that she should stop taking can was products and stop smoking  -continue with PPI therapy which she is taking, recommended she take over-the-counter ranitidine 150 mg at bedtime  -we will proceed with an upper endoscopy to evaluate for peptic ulcer disease, esophagitis  -patient has had CT scans an outpatient which have been unremarkable  -we will check a gastric emptying scan    2    Constipation and abdominal pain:  Likely functional  -we will proceed with colonoscopy to exclude underlying bowel disease  -laboratory studies including CBC and albumin were normal  -mildly elevated sed rate, just above the normal cut off, will evaluate for underlying inflammatory bowel disease  -we will check a celiac panel  -make further recommendations after endoscopic evaluation    I discussed with her and her sister the risks of the procedure including bleeding, surgery, perforation  _____________________________________________________________        CC:  Reflux, vomiting, nausea    HPI:  Melia Alicia is a 34 y  o female who was referred for evaluation of nausea, vomiting, vague symptoms  As you know this is a 42-year-old female who reports longstanding history of multiple symptoms, has a history of recurrent nausea vomiting was diagnosed with cyclic vomiting syndrome in the past suspected due to cannabis however the patient denies this  She reports that she has had frequent regurgitation which stops several months ago after she stop drinking energy drinks  She has continued reflux symptoms though she takes PPI therapy several times per day and has been doing so for several years  She complains of multiple vague complaints including pain and cross her extremities her neck her back  She reports a longstanding history of constipation with a bowel movement every 4 days with hard stools  She has decreased appetite, weight loss, frequent regurgitation  She is complains of numbness in her hands and feet  The patient reports she has had extensive workup was diagnosed with possible autoimmune disease  She reports that she does take ibuprofen at times  She continues to use marijuana cannabis or chills  She smokes about a pack a day  Denies any alcohol  Patient presents today with her sister, she is a somewhat vague historian  ROS:  The remainder of the ROS was negative except for the pertinent positives mentioned in HPI           Allergies: Eggs or egg-derived products    Medications:   Current Outpatient Prescriptions:     albuterol (PROVENTIL HFA,VENTOLIN HFA) 90 mcg/act inhaler, 2 puffs po q 4-6 hours prn coughing, wheezing or shortness of breath, Disp: , Rfl:     amLODIPine (NORVASC) 5 mg tablet, Take 5 mg by mouth daily, Disp: , Rfl: 5    clindamycin (CLINDAGEL) 1 % gel, Apply topically 2 (two) times a day, Disp: 30 g, Rfl: 5   fluticasone-salmeterol (ADVAIR DISKUS) 250-50 mcg/dose inhaler, Inhale 1 puff, Disp: , Rfl:     gabapentin (NEURONTIN) 300 mg capsule, Take 1 capsule (300 mg total) by mouth 3 (three) times a day, Disp: 90 capsule, Rfl: 1    ibuprofen (MOTRIN) 800 mg tablet, Take 1 tablet (800 mg total) by mouth every 6 (six) hours as needed for mild pain, Disp: 90 tablet, Rfl: 1    norethindrone (AYGESTIN) 5 mg tablet, TAKE 1/2 TABLET BY MOUTH DAILY, Disp: 30 tablet, Rfl: 3    norethindrone-ethinyl estradiol (JUNEL ) 1-20 MG-MCG per tablet, Take 1 tablet by mouth daily Indications: pt unsure of dosage  , Disp: , Rfl:     nystatin-triamcinolone (MYCOLOG-II) ointment, Apply topically 2 (two) times a day, Disp: 30 g, Rfl: 2    omeprazole (PriLOSEC) 20 mg delayed release capsule, 1 CAPSULE 30 MINUTES BEFORE FIRST MEAL OF THE DAY EVERY DAY, Disp: , Rfl: 12    sertraline (ZOLOFT) 100 mg tablet, TAKE 1 TABLET DAILY WITH 50MG  , Disp: , Rfl: 5    Na Sulfate-K Sulfate-Mg Sulf (SUPREP BOWEL PREP KIT) 17 5-3 13-1 6 GM/180ML SOLN, Take 1 Bottle by mouth once for 1 dose, Disp: 1 Bottle, Rfl: 0'    Past Medical History:   Diagnosis Date    Arthritis     OA  b/l knees    Hx of bleeding disorder     dysmennorrhea-dx lap today 2016       Past Surgical History:   Procedure Laterality Date     SECTION      DIAGNOSTIC LAPAROSCOPY      HI LAP,DIAGNOSTIC ABDOMEN N/A 2016    Procedure: LAPAROSCOPY DIAGNOSTIC DAVID;  Surgeon: Sherry Keith DO;  Location: [facility];  Service: Gynecology    HI LAP,RMV  ADNEXAL STRUCTURE Bilateral 2016    Procedure: CYSTECTOMY  OVARIAN;  Surgeon: Sherry Keith DO;  [facility];  Service: Gynecology    WISDOM TOOTH EXTRACTION         No family history on file  reports that she has been smoking  She has a 23 00 pack-year smoking history  She has never used smokeless tobacco  She reports that she uses drugs, including Marijuana   She reports that she does not drink alcohol            Physical Exam:     /86 (BP Location: Left arm, Patient Position: Sitting, Cuff Size: Standard)   Pulse 64   Temp 97 9 °F (36 6 °C) (Tympanic)   Resp 18   Ht 5' (1 524 m)   Wt 69 7 kg (153 lb 9 6 oz)   BMI 30 00 kg/m²      Gen: wn/wd, NAD, disheveled, edentulous, multiple excoriations on her face  HEENT: anicteric, MMM, no cervical LAD  CVS: RRR, no m/r/g  CHEST: CTA b/l  ABD: +BS, soft, NT,ND, no hepatosplenomegaly  EXT: no c/c/e  NEURO: aaox3  SKIN: NO rashes

## 2018-10-26 ENCOUNTER — ANESTHESIA EVENT (OUTPATIENT)
Dept: PERIOP | Facility: AMBULARY SURGERY CENTER | Age: 38
End: 2018-10-26
Payer: COMMERCIAL

## 2018-10-26 NOTE — PROGRESS NOTES
Consultation -  [facility]  Melia Pearson 34 y o  female MRN: [id number]          Assessment & Plan:    27-year-old female with a variety symptoms including recurrent nausea, vomiting, reflux, abdominal pain, constipation  1   Recurrent nausea and vomiting:  Most likely secondary to cannabis hyperemesis still the patient is reluctant to accept this diagnosis she still uses cannabis coils and compounds  -she has stopped smoking marijuana  -inform her that she should stop taking can was products and stop smoking  -continue with PPI therapy which she is taking, recommended she take over-the-counter ranitidine 150 mg at bedtime  -we will proceed with an upper endoscopy to evaluate for peptic ulcer disease, esophagitis  -patient has had CT scans an outpatient which have been unremarkable  -we will check a gastric emptying scan    2  Constipation and abdominal pain:  Likely functional  -we will proceed with colonoscopy to exclude underlying bowel disease  -laboratory studies including CBC and albumin were normal  -mildly elevated sed rate, just above the normal cut off, will evaluate for underlying inflammatory bowel disease  -we will check a celiac panel  -make further recommendations after endoscopic evaluation    I discussed with her and her sister the risks of the procedure including bleeding, surgery, perforation  _____________________________________________________________        CC:  Reflux, vomiting, nausea    HPI:  Melia Cervantes is a 34 y  o female who was referred for evaluation of nausea, vomiting, vague symptoms  As you know this is a 27-year-old female who reports longstanding history of multiple symptoms, has a history of recurrent nausea vomiting was diagnosed with cyclic vomiting syndrome in the past suspected due to cannabis however the patient denies this    She reports that she has had frequent regurgitation which stops several months ago after she stop drinking energy drinks  She has continued reflux symptoms though she takes PPI therapy several times per day and has been doing so for several years  She complains of multiple vague complaints including pain and cross her extremities her neck her back  She reports a longstanding history of constipation with a bowel movement every 4 days with hard stools  She has decreased appetite, weight loss, frequent regurgitation  She is complains of numbness in her hands and feet  The patient reports she has had extensive workup was diagnosed with possible autoimmune disease  She reports that she does take ibuprofen at times  She continues to use marijuana cannabis or chills  She smokes about a pack a day  Denies any alcohol  Patient presents today with her sister, she is a somewhat vague historian  ROS:  The remainder of the ROS was negative except for the pertinent positives mentioned in HPI  Allergies: Eggs or egg-derived products    Medications:   Current Outpatient Prescriptions:     albuterol (PROVENTIL HFA,VENTOLIN HFA) 90 mcg/act inhaler, 2 puffs po q 4-6 hours prn coughing, wheezing or shortness of breath, Disp: , Rfl:     amLODIPine (NORVASC) 5 mg tablet, Take 5 mg by mouth daily, Disp: , Rfl: 5    clindamycin (CLINDAGEL) 1 % gel, Apply topically 2 (two) times a day, Disp: 30 g, Rfl: 5    fluticasone-salmeterol (ADVAIR DISKUS) 250-50 mcg/dose inhaler, Inhale 1 puff, Disp: , Rfl:     gabapentin (NEURONTIN) 300 mg capsule, Take 1 capsule (300 mg total) by mouth 3 (three) times a day, Disp: 90 capsule, Rfl: 1    ibuprofen (MOTRIN) 800 mg tablet, Take 1 tablet (800 mg total) by mouth every 6 (six) hours as needed for mild pain, Disp: 90 tablet, Rfl: 1    norethindrone (AYGESTIN) 5 mg tablet, TAKE 1/2 TABLET BY MOUTH DAILY, Disp: 30 tablet, Rfl: 3    norethindrone-ethinyl estradiol (JUNEL FE 1/20) 1-20 MG-MCG per tablet, Take 1 tablet by mouth daily Indications: pt unsure of dosage  , Disp: , Rfl:     nystatin-triamcinolone (MYCOLOG-II) ointment, Apply topically 2 (two) times a day, Disp: 30 g, Rfl: 2    omeprazole (PriLOSEC) 20 mg delayed release capsule, 1 CAPSULE 30 MINUTES BEFORE FIRST MEAL OF THE DAY EVERY DAY, Disp: , Rfl: 12    sertraline (ZOLOFT) 100 mg tablet, TAKE 1 TABLET DAILY WITH 50MG  , Disp: , Rfl: 5    Na Sulfate-K Sulfate-Mg Sulf (SUPREP BOWEL PREP KIT) 17 5-3 13-1 6 GM/180ML SOLN, Take 1 Bottle by mouth once for 1 dose, Disp: 1 Bottle, Rfl: 0'    Past Medical History:   Diagnosis Date    Arthritis     OA  b/l knees    Hx of bleeding disorder     dysmennorrhea-dx lap today 2016       Past Surgical History:   Procedure Laterality Date     SECTION      DIAGNOSTIC LAPAROSCOPY      NM LAP,DIAGNOSTIC ABDOMEN N/A 2016    Procedure: LAPAROSCOPY DIAGNOSTIC DAVID;  Surgeon: Nohemy Dean DO;  [facility];  Service: Gynecology    NM LAP,RMV  ADNEXAL STRUCTURE Bilateral 2016    Procedure: CYSTECTOMY  OVARIAN;  Surgeon: Nohemy Dean DO;  [facility];  Service: Gynecology    WISDOM TOOTH EXTRACTION         No family history on file  reports that she has been smoking  She has a 23 00 pack-year smoking history  She has never used smokeless tobacco  She reports that she uses drugs, including Marijuana  She reports that she does not drink alcohol            Physical Exam:     /86 (BP Location: Left arm, Patient Position: Sitting, Cuff Size: Standard)   Pulse 64   Temp 97 9 °F (36 6 °C) (Tympanic)   Resp 18   Ht 5' (1 524 m)   Wt 69 7 kg (153 lb 9 6 oz)   BMI 30 00 kg/m²     Gen: wn/wd, NAD, disheveled, edentulous, multiple excoriations on her face  HEENT: anicteric, MMM, no cervical LAD  CVS: RRR, no m/r/g  CHEST: CTA b/l  ABD: +BS, soft, NT,ND, no hepatosplenomegaly  EXT: no c/c/e  NEURO: aaox3  SKIN: NO rashes

## 2018-11-02 ENCOUNTER — TELEPHONE (OUTPATIENT)
Dept: FAMILY MEDICINE CLINIC | Facility: CLINIC | Age: 38
End: 2018-11-02

## 2018-11-07 ENCOUNTER — ANESTHESIA (OUTPATIENT)
Dept: PERIOP | Facility: AMBULARY SURGERY CENTER | Age: 38
End: 2018-11-07
Payer: COMMERCIAL

## 2018-11-07 ENCOUNTER — HOSPITAL ENCOUNTER (OUTPATIENT)
Facility: AMBULARY SURGERY CENTER | Age: 38
Setting detail: OUTPATIENT SURGERY
Discharge: HOME | End: 2018-11-07
Attending: INTERNAL MEDICINE | Admitting: INTERNAL MEDICINE
Payer: COMMERCIAL

## 2018-11-07 VITALS
HEIGHT: 60 IN | WEIGHT: 153 LBS | DIASTOLIC BLOOD PRESSURE: 89 MMHG | TEMPERATURE: 97.3 F | SYSTOLIC BLOOD PRESSURE: 130 MMHG | HEART RATE: 65 BPM | BODY MASS INDEX: 30.04 KG/M2 | OXYGEN SATURATION: 96 % | RESPIRATION RATE: 20 BRPM

## 2018-11-07 DIAGNOSIS — R11.15 INTRACTABLE CYCLICAL VOMITING WITH NAUSEA: Primary | ICD-10-CM

## 2018-11-07 DIAGNOSIS — R11.15 NON-INTRACTABLE CYCLICAL VOMITING WITH NAUSEA: ICD-10-CM

## 2018-11-07 DIAGNOSIS — G43.A1 INTRACTABLE CYCLICAL VOMITING, PRESENCE OF NAUSEA NOT SPECIFIED: ICD-10-CM

## 2018-11-07 DIAGNOSIS — K59.09 OTHER CONSTIPATION: ICD-10-CM

## 2018-11-07 PROCEDURE — 88305 TISSUE EXAM BY PATHOLOGIST: CPT | Performed by: PATHOLOGY

## 2018-11-07 PROCEDURE — 43239 EGD BIOPSY SINGLE/MULTIPLE: CPT | Performed by: INTERNAL MEDICINE

## 2018-11-07 PROCEDURE — 88342 IMHCHEM/IMCYTCHM 1ST ANTB: CPT | Performed by: PATHOLOGY

## 2018-11-07 PROCEDURE — 45330 DIAGNOSTIC SIGMOIDOSCOPY: CPT | Performed by: INTERNAL MEDICINE

## 2018-11-07 RX ORDER — RANITIDINE 300 MG/1
300 TABLET ORAL
Qty: 30 TABLET | Refills: 3 | Status: SHIPPED | OUTPATIENT
Start: 2018-11-07 | End: 2019-01-24 | Stop reason: SDUPTHER

## 2018-11-07 RX ORDER — SODIUM CHLORIDE 9 MG/ML
125 INJECTION, SOLUTION INTRAVENOUS CONTINUOUS
Status: DISCONTINUED | OUTPATIENT
Start: 2018-11-07 | End: 2018-11-07 | Stop reason: HOSPADM

## 2018-11-07 RX ORDER — PROPOFOL 10 MG/ML
INJECTION, EMULSION INTRAVENOUS AS NEEDED
Status: DISCONTINUED | OUTPATIENT
Start: 2018-11-07 | End: 2018-11-07 | Stop reason: SURG

## 2018-11-07 RX ORDER — OMEPRAZOLE 20 MG/1
40 CAPSULE, DELAYED RELEASE ORAL 2 TIMES DAILY
Qty: 60 CAPSULE | Refills: 3 | Status: SHIPPED | OUTPATIENT
Start: 2018-11-07 | End: 2018-12-28 | Stop reason: SDUPTHER

## 2018-11-07 RX ADMIN — SODIUM CHLORIDE: 0.9 INJECTION, SOLUTION INTRAVENOUS at 09:49

## 2018-11-07 RX ADMIN — SODIUM CHLORIDE 125 ML/HR: 0.9 INJECTION, SOLUTION INTRAVENOUS at 09:46

## 2018-11-07 RX ADMIN — PROPOFOL 50 MG: 10 INJECTION, EMULSION INTRAVENOUS at 09:59

## 2018-11-07 RX ADMIN — PROPOFOL 50 MG: 10 INJECTION, EMULSION INTRAVENOUS at 09:55

## 2018-11-07 RX ADMIN — PROPOFOL 100 MG: 10 INJECTION, EMULSION INTRAVENOUS at 09:51

## 2018-11-07 NOTE — OP NOTE
Colonoscopy Procedure Note    Procedure: Colonoscopy    Sedation: Monitored anesthesia care, check anesthesia records      ASA Class: 2    INDICATIONS:  Abdominal pain and constipation    POST-OP DIAGNOSIS: See the impression below    Procedure Details     Prior colonoscopy: No prior colonoscopy  Informed consent was obtained for the procedure, including sedation  Risks of perforation, hemorrhage, adverse drug reaction and aspiration were discussed  The patient was placed in the left lateral decubitus position  Based on the pre-procedure assessment, including review of the patient's medical history, medications, allergies, and review of systems, she had been deemed to be an appropriate candidate for conscious sedation; she was therefore sedated with the medications listed below  The patient was monitored continuously with telemetry, pulse oximetry, blood pressure monitoring, and direct observations  A rectal examination was performed  The colonoscope was inserted into the rectum and advanced under direct vision to the descending colon  The quality of the colonic preparation was unsatisfactory  A careful inspection was made as the colonoscope was withdrawn, including a retroflexed view of the rectum; findings and interventions are described below  Findings:    Poor prep, solid stool noted upon insertion of the scope, tried to advance beyond this and additional solid stool seen in the sigmoid, descending colon at which point the procedure was aborted  The visualized portions of the mucosa were normal            Complications: None; patient tolerated the procedure well  Impression:    Poor prep with solid stool seen in the sigmoid and descending colon    Procedure was aborted at that point    Recommendations:    Discharge home  Resume regular diet  Resume home medications  Recommend taking MiraLax 1 cap full twice daily  Can consider Linzess  Follow-up in the office  Call with any abdominal pain, bleeding, fevers

## 2018-11-07 NOTE — ANESTHESIA PREPROCEDURE EVALUATION
Review of Systems/Medical History  Patient summary reviewed        Cardiovascular  Exercise tolerance (METS): >4,  Hypertension ,    Pulmonary  Smoker , Tobacco cessation counseling given Cumulative Pack Years: 22, Asthma , well controlled/ stable ,        GI/Hepatic    GERD well controlled,        Negative  ROS Kidney stones,        Endo/Other  Negative endo/other ROS      GYN  Negative gynecology ROS          Hematology  Negative hematology ROS      Musculoskeletal  Negative musculoskeletal ROS   Arthritis     Neurology  Negative neurology ROS      Psychology   Anxiety, Depression ,              Physical Exam    Airway    Mallampati score: II  TM Distance: >3 FB  Neck ROM: full     Dental   upper dentures and lower dentures,     Cardiovascular  Cardiovascular exam normal    Pulmonary  Pulmonary exam normal     Other Findings        Anesthesia Plan  ASA Score- 2     Anesthesia Type- IV sedation with anesthesia with ASA Monitors  Additional Monitors:   Airway Plan:         Plan Factors-    Induction- intravenous  Postoperative Plan-     Informed Consent- Anesthetic plan and risks discussed with patient  I personally reviewed this patient with the CRNA  Discussed and agreed on the Anesthesia Plan with the CRNA  Juany Camarena

## 2018-11-07 NOTE — DISCHARGE INSTRUCTIONS
How to Stop Smoking   AMBULATORY CARE:   You will improve your health and the health of others around you  if you stop smoking  Your risk for heart and lung disease, cancer, stroke, heart attack, and vision problems will also decrease  You can benefit from quitting no matter how long you have smoked  Prepare to stop smoking:  Nicotine is a highly addictive drug found in cigarettes  Withdrawal symptoms can happen when you stop smoking and make it hard to quit  These include anxiety, depression, irritability, trouble sleeping, and increased appetite  You increase your chances of success if you prepare to quit  · Set a quit date  Nicolasa Balling a date that is within the next 2 weeks  Do not pick a day that you think may be stressful or busy  Write down the day or [place] it on your calender  · Tell friends and family that you plan to quit  Explain that you may have withdrawal symptoms when you try to quit  Ask them to support you  They may be able to encourage you and help reduce your stress to make it easier for you to quit  · Make a list of your reasons for quitting  Put the list somewhere you will see it every day, such as your refrigerator  You can look at the list when you have a craving  · Remove all tobacco and nicotine products from your home, car, and workplace  Also, remove anything else that will tempt you to smoke, such as lighters, matches, or ashtrays  Clean your car, home, and places at work that smell like smoke  The smell of smoke can trigger a craving  · Identify triggers that make you want to smoke  This may include activities, feelings, or people  Also write down 1 way you can deal with each of your triggers  For example, if you want to smoke as soon as you wake up, plan another activity during this time, such as exercise  · Make a plan for how you will quit  Learn about the tools that can help you quit, such as medicine, counseling, or nicotine replacement therapy   Choose at least 2 options to help you quit  Tools to help you stop smoking:   · Counseling  from a trained healthcare provider can provide you with support and skills to quit smoking  The provider will also teach you to manage your withdrawal symptoms and cravings  You may receive counseling from one counselor, in group therapy, or through phone therapy called a quit line  · Nicotine replacement therapy (NRT)  such as nicotine patches, gum, or lozenges may help reduce your nicotine cravings  You may get these without a doctor's order  Do not use e-cigarettes or smokeless tobacco in place of cigarettes or to help you quit  They still contain nicotine  · Prescription medicines  such as nasal sprays or nicotine inhalers may help reduce your withdrawal symptoms  Other medicines may also be used to reduce your urge to smoke  Ask your healthcare provider about these medicines  You may need to start certain medicines 2 weeks before your quit date for them to work well  · Hypnosis  is a practice that helps guide you through thoughts and feelings  Hypnosis may help decrease your cravings and make you more willing to quit  · Acupuncture therapy  uses very thin needles to balance energy channels in the body  This is thought to help decrease cravings and symptoms of nicotine withdrawal      · Support groups  let you talk to others who are trying to quit or have already quit  It may be helpful to speak with others about how they quit  Manage your cravings:   · Avoid situations, people, and places that tempt you to smoke  Go to nonsmoking places, such as libraries or restaurants  Understand what tempts you and try to avoid these things  · Keep your hands busy  Hold things such as a stress ball or pen  · Put candy or toothpicks in your mouth  Keep lollipops, sugarless gum, or toothpicks with you at all times  · Do not have alcohol or caffeine  These drinks may tempt you to smoke   Drink healthy liquids such as water or juice instead  · Reward yourself when you resist your cravings  Rewards will motivate you and help you stay positive  · Do an activity that distracts you from your craving  Examples include going for a walk, exercising, or cleaning  Prevent weight gain after you quit:  You may gain a few pounds after you quit smoking  It is healthier for you to gain a few pounds than to continue to smoke  The following can help you prevent weight gain:  · Eat healthy foods  These include fruits, vegetables, whole-grain breads, low-fat dairy products, beans, lean meats, and fish  Eat healthy snacks, such as low-fat yogurt, if you get hungry between meals  · Drink water before, during, and between meals  This will make your stomach feel full and help prevent you from overeating  Ask your healthcare provider how much liquid to drink each day and which liquids are best for you  · Exercise  Take a walk or do some kind of exercise every day  Ask your healthcare provider what exercise is right for you  This may help reduce your cravings and reduce stress  For more support and information:   · [employer]APP  Phone: 1- 992 - 175-7336  Web Address: www [employer]  © 2017 [address] [employer] Information is for End User's use only and may not be sold, redistributed or otherwise used for commercial purposes  All illustrations and images included in CareNotes® are the copyrighted property of [employer]  or Milo Acevedo  The above information is an  only  It is not intended as medical advice for individual conditions or treatments  Talk to your doctor, nurse or pharmacist before following any medical regimen to see if it is safe and effective for you    Discharge home  Resume regular diet  Take pantoprazole 40 mg twice daily  Ranitidine 300 mg at bedtime  Follow up biopsy results  Recommend MiraLax 1 capsule twice daily  Call with any abdominal pain, bleeding, fevers  Follow-up in the office with the PA in 2 months

## 2018-11-07 NOTE — OP NOTE
ESOPHAGOGASTRODUODENOSCOPY     PROCEDURE: EGD     SEDATION: Monitored anesthesia care, check anesthesia records     ASA Class: 2     INDICATIONS:  Nausea, vomiting, abdominal pain     CONSENT:  Informed consent was obtained for the procedure, including sedation after explaining the risks and benefits of the procedure  Risks including but not limited to bleeding, perforation, infection, and missed lesion      PREPARATION:   Telemetry, pulse oximetry, blood pressure were monitored throughout the procedure  Patient was identified by myself both verbally and by visual inspection of ID band      DESCRIPTION:   Patient was placed in the left lateral decubitus position and was sedated with the above medication  The gastroscope was introduced in to the oropharynx and the esophagus was intubated under direct visualization  Scope was passed down the esophagus up to 2nd part of the duodenum  A careful inspection was made as the gastroscope was withdrawn, including a retroflexed view of the stomach; findings and interventions are described below       FINDINGS:     #1  Esophagus- grade C erosive esophagitis     #2  Stomach- mild antral gastritis biopsies taken for H pylori  Normal retroflexion     #3  Duodenum- normal duodenum, biopsies         IMPRESSIONS:       Normal duodenum, biopsied  Gastritis biopsied  Normal retroflexion  Grade C erosive esophagitis      RECOMMENDATIONS:      Discharge home  Resume regular diet  Twice daily PPI therapy and H2 blockers in the evening   Follow up biopsy results  Call with any abdominal pain, bleeding, fevers     COMPLICATIONS:  None; patient tolerated the procedure well            DISPOSITION: PACU           CONDITION: Stable

## 2018-11-07 NOTE — ANESTHESIA POSTPROCEDURE EVALUATION
Post-Op Assessment Note      CV Status:  Stable    Mental Status:  Alert and awake    Hydration Status:  Stable and euvolemic    PONV Controlled:  Controlled    Airway Patency:  Patent and adequate    Post Op Vitals Reviewed: Yes          Staff: CRNA           /66 (11/07/18 1004)    Temp      Pulse 75 (11/07/18 1004)   Resp 20 (11/07/18 1004)    SpO2 100 % (11/07/18 1004)

## 2018-11-07 NOTE — H&P
History and Physical - [facility]  Melia Lalo Navarro 34 y o  female MRN: [id number]    HPI: Jose Spain is a 34y o  year old female who presents with nausea/vomiting, abddominal pain and constipation  Review of Systems    Historical Information   Past Medical History:   Diagnosis Date    Arthritis     OA  b/l knees    Hx of bleeding disorder     dysmennorrhea-dx lap today 2016     Past Surgical History:   Procedure Laterality Date     SECTION      DIAGNOSTIC LAPAROSCOPY      AL LAP,DIAGNOSTIC ABDOMEN N/A 2016    Procedure: LAPAROSCOPY DIAGNOSTIC DAVID;  Surgeon: Maricruz Rose DO;  [facility];  Service: Gynecology    AL LAP,RMV  ADNEXAL STRUCTURE Bilateral 2016    Procedure: CYSTECTOMY  OVARIAN;  Surgeon: Maricruz Rose DO;  [facility];  Service: Gynecology    WISDOM TOOTH EXTRACTION       Social History   History   Alcohol Use No     Comment: per year     History   Drug Use    Types: Marijuana     History   Smoking Status    Current Every Day Smoker    Packs/day: 1 00    Years: 23 00   Smokeless Tobacco    Never Used     No family history on file      Meds/Allergies     Prescriptions Prior to Admission   Medication    albuterol (PROVENTIL HFA,VENTOLIN HFA) 90 mcg/act inhaler    amLODIPine (NORVASC) 5 mg tablet    clindamycin (CLINDAGEL) 1 % gel    fluticasone-salmeterol (ADVAIR DISKUS) 250-50 mcg/dose inhaler    gabapentin (NEURONTIN) 300 mg capsule    ibuprofen (MOTRIN) 800 mg tablet    Na Sulfate-K Sulfate-Mg Sulf (SUPREP BOWEL PREP KIT) 17 5-3 13-1 6 GM/180ML SOLN    norethindrone (AYGESTIN) 5 mg tablet    norethindrone-ethinyl estradiol (JUNEL FE 1/20) 1-20 MG-MCG per tablet    nystatin-triamcinolone (MYCOLOG-II) ointment    omeprazole (PriLOSEC) 20 mg delayed release capsule    sertraline (ZOLOFT) 100 mg tablet       Allergies   Allergen Reactions    Eggs Or Egg-Derived Products Other (See Comments)     abd pain       Objective     There were no vitals taken for this visit  PHYSICAL EXAM    Gen: NAD  CV: RRR  CHEST: Clear  ABD: soft, NT/ND  EXT: no edema  Neuro: AAO      ASSESSMENT/PLAN:  This is a 34y o  year old female here for nausea/vomiting, abddominal pain and constipation         PLAN:   Procedure: egd/colonoscopy

## 2018-11-09 DIAGNOSIS — L70.0 ACNE VULGARIS: ICD-10-CM

## 2018-11-09 RX ORDER — BENZOYL PEROXIDE 50 MG/ML
LIQUID TOPICAL
Refills: 0 | OUTPATIENT
Start: 2018-11-09

## 2018-11-12 ENCOUNTER — TELEPHONE (OUTPATIENT)
Dept: GASTROENTEROLOGY | Facility: AMBULARY SURGERY CENTER | Age: 38
End: 2018-11-12

## 2018-11-12 NOTE — TELEPHONE ENCOUNTER
Left message on patients phone to call the office back for results   Recall set for follow up in 6-8 weeks

## 2018-11-12 NOTE — TELEPHONE ENCOUNTER
----- Message from Armando García MD sent at 11/12/2018 10:49 AM EST -----  Please inform the patient biopsies from her small intestine were negative for celiac sprue, biopsies from stomach were negative for H pylori  Please have her continue acid medications as prescribed  She had a very poor prep, incomplete colonoscopy  Please have her follow up in the office with 1 of the PA in 6 to 8 weeks  If he has continued constipation I would recommend Linzess  Please call in to find out if her bowel movements have improved otherwise we can prescribe Linzess

## 2018-11-26 ENCOUNTER — TELEPHONE (OUTPATIENT)
Dept: GASTROENTEROLOGY | Facility: AMBULARY SURGERY CENTER | Age: 38
End: 2018-11-26

## 2018-11-26 ENCOUNTER — PATIENT MESSAGE (OUTPATIENT)
Dept: GASTROENTEROLOGY | Facility: AMBULARY SURGERY CENTER | Age: 38
End: 2018-11-26

## 2018-11-26 ENCOUNTER — TELEPHONE (OUTPATIENT)
Dept: FAMILY MEDICINE CLINIC | Facility: CLINIC | Age: 38
End: 2018-11-26

## 2018-11-26 NOTE — TELEPHONE ENCOUNTER
These are very specialized tests that I can't find  She will need to go to GI for the testing  For this, I can't help her

## 2018-11-26 NOTE — TELEPHONE ENCOUNTER
----- Message from Melia Castillo sent at 11/26/2018  9:24 AM EST -----  Regarding: Test Results Question  Contact: [phone]  Please order the blood panel for leaky gut  I called last week to speak to [address] but still haven't received any msg or phone call  I don't care who orders it, it needs to be done immediately  I'm tired of asking for something so simple

## 2018-11-26 NOTE — TELEPHONE ENCOUNTER
----- Message from Melia Hurt sent at 11/26/2018 12:20 PM EST -----  Regarding: RE: Test Results Question  Contact: [phone]  Did you consult with Dr Alayna Abreu before responding? Probably not, because I believe in my situation he will order the test     ----- Message -----  From: Jon España PA-C  Sent: 11/26/18, 11:55 AM  To: Melia Lira  Subject: RE: Test Results Question    Hany Melia,    I am a physician assistant and work with Dr Alayna Abreu, I have reviewed your recent EGD and colonoscopy  I would recommend you continue the omeprazole twice daily as well as the zantac  If you are having persistent issues I would recommend you follow-up in the office  Leaky gut is not a diagnosis generally recognized by the GI community because of lack of and poor data regarding this  It is not something we would be working up  Jon España PA-C        ----- Message -----     From: Melia Hurt     Sent: 11/26/2018  9:21 AM EST       To: Armando García MD  Subject: Test Results Question    I have contact my Primary Care doctor Karan Bryant of [facility] multiple times to order the blood panel for leaky gut but they are telling me to have you order it  I don't care who orders it, it needs to be done immediately  [facility] in [place]  Please send an electronic referral to them and please let me know when they have it so I can get this done

## 2018-12-03 DIAGNOSIS — F32.A ANXIETY AND DEPRESSION: ICD-10-CM

## 2018-12-03 DIAGNOSIS — I10 ESSENTIAL HYPERTENSION: Primary | ICD-10-CM

## 2018-12-03 DIAGNOSIS — F41.9 ANXIETY AND DEPRESSION: ICD-10-CM

## 2018-12-04 DIAGNOSIS — G63 NEUROPATHY ASSOCIATED WITH POLYNEUROPATHY, ORGANOMEGALY, ENDOCRINOPATHY, MONOCLONAL GAMMOPATHY, AND SKIN CHANGES SYNDROME (HCC): ICD-10-CM

## 2018-12-04 DIAGNOSIS — E88.09 NEUROPATHY ASSOCIATED WITH POLYNEUROPATHY, ORGANOMEGALY, ENDOCRINOPATHY, MONOCLONAL GAMMOPATHY, AND SKIN CHANGES SYNDROME (HCC): ICD-10-CM

## 2018-12-05 RX ORDER — AMLODIPINE BESYLATE 5 MG/1
TABLET ORAL
Qty: 30 TABLET | Refills: 5 | Status: SHIPPED | OUTPATIENT
Start: 2018-12-05 | End: 2019-03-04 | Stop reason: SDUPTHER

## 2018-12-05 RX ORDER — IBUPROFEN 800 MG/1
800 TABLET ORAL EVERY 6 HOURS PRN
Qty: 90 TABLET | Refills: 0 | Status: SHIPPED | OUTPATIENT
Start: 2018-12-05 | End: 2018-12-28 | Stop reason: SDUPTHER

## 2018-12-09 ENCOUNTER — HOSPITAL ENCOUNTER (OUTPATIENT)
Dept: NUCLEAR MEDICINE | Facility: HOSPITAL | Age: 38
Discharge: HOME | End: 2018-12-09
Payer: COMMERCIAL

## 2018-12-09 DIAGNOSIS — G43.A1 INTRACTABLE CYCLICAL VOMITING, PRESENCE OF NAUSEA NOT SPECIFIED: ICD-10-CM

## 2018-12-09 PROCEDURE — A9541 TC99M SULFUR COLLOID: HCPCS

## 2018-12-09 PROCEDURE — 78264 GASTRIC EMPTYING IMG STUDY: CPT

## 2018-12-21 ENCOUNTER — OFFICE VISIT (OUTPATIENT)
Dept: FAMILY MEDICINE CLINIC | Facility: CLINIC | Age: 38
End: 2018-12-21
Payer: COMMERCIAL

## 2018-12-21 VITALS
DIASTOLIC BLOOD PRESSURE: 80 MMHG | HEART RATE: 78 BPM | WEIGHT: 160.4 LBS | BODY MASS INDEX: 31.49 KG/M2 | RESPIRATION RATE: 16 BRPM | HEIGHT: 60 IN | SYSTOLIC BLOOD PRESSURE: 120 MMHG | TEMPERATURE: 98.1 F | OXYGEN SATURATION: 98 %

## 2018-12-21 DIAGNOSIS — J45.20 MILD INTERMITTENT ASTHMA WITHOUT COMPLICATION: ICD-10-CM

## 2018-12-21 DIAGNOSIS — K21.9 GASTROESOPHAGEAL REFLUX DISEASE WITHOUT ESOPHAGITIS: ICD-10-CM

## 2018-12-21 DIAGNOSIS — L70.0 CYSTIC ACNE: Primary | ICD-10-CM

## 2018-12-21 PROCEDURE — 3008F BODY MASS INDEX DOCD: CPT | Performed by: NURSE PRACTITIONER

## 2018-12-21 PROCEDURE — 4004F PT TOBACCO SCREEN RCVD TLK: CPT | Performed by: NURSE PRACTITIONER

## 2018-12-21 PROCEDURE — 99213 OFFICE O/P EST LOW 20 MIN: CPT | Performed by: NURSE PRACTITIONER

## 2018-12-21 NOTE — PROGRESS NOTES
Assessment/Plan:      Diagnoses and all orders for this visit:    Cystic acne  Comments: Will refer to Dermatology for treatment  Orders:  -     Ambulatory referral to Dermatology; Future    Mild intermittent asthma without complication  -     fluticasone-salmeterol (ADVAIR DISKUS) 250-50 mcg/dose inhaler; Inhale 1 puff 2 (two) times a day    Gastroesophageal reflux disease without esophagitis  Comments:  EGD was normal   Instructed to discuss leaky gut syndrome GI at her next visit          Subjective:     Patient ID: Silvia Mayer is a 34 y o  female here for f/u    HPI  Went to GI and everything is normal   Gabapentin is working and so is the Ibuprofen  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  Review of Systems   Constitutional: Negative  Respiratory: Negative  Cardiovascular: Negative  Gastrointestinal: Negative  Musculoskeletal: Positive for arthralgias ( bilateral knee pain)  Skin: Positive for wound (Cyst on the jaw line)  Negative for rash  Allergic/Immunologic: Negative  Neurological: Negative  Hematological: Negative  Objective:     Physical Exam   Constitutional: She is oriented to person, place, and time  She appears well-developed and well-nourished  HENT:   Head: Normocephalic  Eyes: Conjunctivae and EOM are normal    Neck: Normal range of motion  Neck supple  Cardiovascular: Normal rate and normal heart sounds  Pulmonary/Chest: Effort normal and breath sounds normal    Abdominal: Soft  Bowel sounds are normal    Musculoskeletal: She exhibits tenderness  Deformity: knee pain with movement and standing  Neurological: She is oriented to person, place, and time  Skin: Skin is warm and dry  Lesion (Heart cystic lesions noted along the jaw line  They are erythema neck and tender to touch) noted  Psychiatric: She has a normal mood and affect   Her behavior is normal

## 2018-12-28 DIAGNOSIS — E88.09 NEUROPATHY ASSOCIATED WITH POLYNEUROPATHY, ORGANOMEGALY, ENDOCRINOPATHY, MONOCLONAL GAMMOPATHY, AND SKIN CHANGES SYNDROME (HCC): ICD-10-CM

## 2018-12-28 DIAGNOSIS — R11.15 INTRACTABLE CYCLICAL VOMITING WITH NAUSEA: ICD-10-CM

## 2018-12-28 DIAGNOSIS — G63 NEUROPATHY ASSOCIATED WITH POLYNEUROPATHY, ORGANOMEGALY, ENDOCRINOPATHY, MONOCLONAL GAMMOPATHY, AND SKIN CHANGES SYNDROME (HCC): ICD-10-CM

## 2018-12-28 DIAGNOSIS — Z87.42 HISTORY OF DYSMENORRHEA: ICD-10-CM

## 2018-12-28 DIAGNOSIS — F41.9 ANXIETY: Primary | ICD-10-CM

## 2018-12-28 RX ORDER — GABAPENTIN 300 MG/1
CAPSULE ORAL
Qty: 90 CAPSULE | Refills: 0 | Status: SHIPPED | OUTPATIENT
Start: 2018-12-28 | End: 2019-02-15 | Stop reason: SDUPTHER

## 2018-12-28 RX ORDER — SERTRALINE HYDROCHLORIDE 100 MG/1
TABLET, FILM COATED ORAL
Qty: 30 TABLET | Refills: 0 | Status: SHIPPED | OUTPATIENT
Start: 2018-12-28 | End: 2019-01-24 | Stop reason: SDUPTHER

## 2018-12-28 RX ORDER — IBUPROFEN 800 MG/1
TABLET ORAL
Qty: 90 TABLET | Refills: 0 | Status: SHIPPED | OUTPATIENT
Start: 2018-12-28 | End: 2019-01-24 | Stop reason: SDUPTHER

## 2018-12-28 RX ORDER — OMEPRAZOLE 20 MG/1
40 CAPSULE, DELAYED RELEASE ORAL 2 TIMES DAILY
Qty: 60 CAPSULE | Refills: 1 | Status: SHIPPED | OUTPATIENT
Start: 2018-12-28 | End: 2019-02-15 | Stop reason: SDUPTHER

## 2019-01-03 ENCOUNTER — HOSPITAL ENCOUNTER (OUTPATIENT)
Dept: NEUROLOGY | Facility: AMBULATORY SURGERY CENTER | Age: 39
Discharge: HOME | End: 2019-01-03
Payer: COMMERCIAL

## 2019-01-03 DIAGNOSIS — G63 NEUROPATHY ASSOCIATED WITH POLYNEUROPATHY, ORGANOMEGALY, ENDOCRINOPATHY, MONOCLONAL GAMMOPATHY, AND SKIN CHANGES SYNDROME (HCC): ICD-10-CM

## 2019-01-03 DIAGNOSIS — E88.09 NEUROPATHY ASSOCIATED WITH POLYNEUROPATHY, ORGANOMEGALY, ENDOCRINOPATHY, MONOCLONAL GAMMOPATHY, AND SKIN CHANGES SYNDROME (HCC): ICD-10-CM

## 2019-01-03 PROCEDURE — 95886 MUSC TEST DONE W/N TEST COMP: CPT | Performed by: PSYCHIATRY & NEUROLOGY

## 2019-01-03 PROCEDURE — 95910 NRV CNDJ TEST 7-8 STUDIES: CPT | Performed by: PSYCHIATRY & NEUROLOGY

## 2019-01-07 ENCOUNTER — APPOINTMENT (OUTPATIENT)
Dept: LAB | Facility: IMAGING CENTER | Age: 39
End: 2019-01-07
Payer: COMMERCIAL

## 2019-01-07 ENCOUNTER — TRANSCRIBE ORDERS (OUTPATIENT)
Dept: ADMINISTRATIVE | Facility: HOSPITAL | Age: 39
End: 2019-01-07

## 2019-01-07 DIAGNOSIS — G43.A1 INTRACTABLE CYCLICAL VOMITING, PRESENCE OF NAUSEA NOT SPECIFIED: ICD-10-CM

## 2019-01-07 DIAGNOSIS — R11.15 NON-INTRACTABLE CYCLICAL VOMITING WITH NAUSEA: ICD-10-CM

## 2019-01-07 DIAGNOSIS — K59.09 OTHER CONSTIPATION: ICD-10-CM

## 2019-01-07 LAB
FERRITIN SERPL-MCNC: 15 NG/ML (ref 8–388)
FOLATE SERPL-MCNC: 4.2 NG/ML (ref 3.1–17.5)
IRON SATN MFR SERPL: 16 %
IRON SERPL-MCNC: 75 UG/DL (ref 50–170)
TIBC SERPL-MCNC: 466 UG/DL (ref 250–450)
TSH SERPL DL<=0.05 MIU/L-ACNC: 0.64 UIU/ML (ref 0.36–3.74)
VIT B12 SERPL-MCNC: 287 PG/ML (ref 100–900)

## 2019-01-07 PROCEDURE — 82728 ASSAY OF FERRITIN: CPT

## 2019-01-07 PROCEDURE — 83550 IRON BINDING TEST: CPT

## 2019-01-07 PROCEDURE — 83516 IMMUNOASSAY NONANTIBODY: CPT

## 2019-01-07 PROCEDURE — 86255 FLUORESCENT ANTIBODY SCREEN: CPT

## 2019-01-07 PROCEDURE — 83540 ASSAY OF IRON: CPT

## 2019-01-07 PROCEDURE — 82607 VITAMIN B-12: CPT

## 2019-01-07 PROCEDURE — 36415 COLL VENOUS BLD VENIPUNCTURE: CPT

## 2019-01-07 PROCEDURE — 82784 ASSAY IGA/IGD/IGG/IGM EACH: CPT

## 2019-01-07 PROCEDURE — 84443 ASSAY THYROID STIM HORMONE: CPT

## 2019-01-07 PROCEDURE — 82746 ASSAY OF FOLIC ACID SERUM: CPT

## 2019-01-08 ENCOUNTER — TELEPHONE (OUTPATIENT)
Dept: GASTROENTEROLOGY | Facility: CLINIC | Age: 39
End: 2019-01-08

## 2019-01-08 ENCOUNTER — TELEPHONE (OUTPATIENT)
Dept: FAMILY MEDICINE CLINIC | Facility: CLINIC | Age: 39
End: 2019-01-08

## 2019-01-08 LAB
ENDOMYSIUM IGA SER QL: NEGATIVE
GLIADIN PEPTIDE IGA SER-ACNC: 6 UNITS (ref 0–19)
GLIADIN PEPTIDE IGG SER-ACNC: 7 UNITS (ref 0–19)
IGA SERPL-MCNC: 168 MG/DL (ref 87–352)
TTG IGA SER-ACNC: <2 U/ML (ref 0–3)
TTG IGG SER-ACNC: <2 U/ML (ref 0–5)

## 2019-01-08 NOTE — TELEPHONE ENCOUNTER
----- Message from Farideh Francisco MD sent at 1/8/2019 10:27 AM EST -----  Please inform patient most her laboratory studies including thyroid function, B12, folate levels were pretty normal     Please make sure that she has office follow-up  Please have her call if she has worsening constipation or recurrent nausea vomiting

## 2019-01-09 ENCOUNTER — TELEPHONE (OUTPATIENT)
Dept: GASTROENTEROLOGY | Facility: CLINIC | Age: 39
End: 2019-01-09

## 2019-01-09 NOTE — TELEPHONE ENCOUNTER
----- Message from Melia Friedman sent at 1/9/2019  2:11 PM EST -----  Regarding: Test Results Question  Contact: [phone]  Do I need to schedule an appointment in order for you to order the [facility] Gut panel? Im still waiting on my results for my EMG from my primary care doctor

## 2019-01-09 NOTE — TELEPHONE ENCOUNTER
Please overbook pt appt     [facility], with Dr Garcia Median  On !/23/19 at 10:30 am  This will be a follow up appt to discuss leaky gut per pt

## 2019-01-09 NOTE — TELEPHONE ENCOUNTER
I had lvm for pt on 11/26/18 to make appt to see Dr Mariana Hernandez Per Karin Mendes  Pt requesting "Leaky gut" panel again  Patient should schedule an appt correct? ?

## 2019-01-10 ENCOUNTER — TELEPHONE (OUTPATIENT)
Dept: GASTROENTEROLOGY | Facility: CLINIC | Age: 39
End: 2019-01-10

## 2019-01-10 NOTE — LETTER
January 10, 2019    [place] [id number]      Dear Ms Henriquez Sit: We have attempted to reach you regarding your results  Upon receipt of this letter if you can call our office back          Sincerely,  Pankaj Pro, [address] [facility]  [phone]

## 2019-01-10 NOTE — TELEPHONE ENCOUNTER
----- Message from Darvin Rivas MD sent at 1/9/2019  1:58 PM EST -----  Please inform the patient that celiac panel is also normal

## 2019-01-11 ENCOUNTER — TELEPHONE (OUTPATIENT)
Dept: FAMILY MEDICINE CLINIC | Facility: CLINIC | Age: 39
End: 2019-01-11

## 2019-01-11 NOTE — TELEPHONE ENCOUNTER
DEBBI CALLED HER BACK AND TOLD HER HER EMG RESULTS WERE NORMAL  SHE DOES NOT BELIEVE HER AND WANTS YOU TO CALL AND TELL HER THAT  SHE WAS VERY UPSET AND IRATE

## 2019-01-24 DIAGNOSIS — R11.15 INTRACTABLE CYCLICAL VOMITING WITH NAUSEA: ICD-10-CM

## 2019-01-24 DIAGNOSIS — F41.9 ANXIETY: ICD-10-CM

## 2019-01-24 DIAGNOSIS — E88.09 NEUROPATHY ASSOCIATED WITH POLYNEUROPATHY, ORGANOMEGALY, ENDOCRINOPATHY, MONOCLONAL GAMMOPATHY, AND SKIN CHANGES SYNDROME (HCC): ICD-10-CM

## 2019-01-24 DIAGNOSIS — G63 NEUROPATHY ASSOCIATED WITH POLYNEUROPATHY, ORGANOMEGALY, ENDOCRINOPATHY, MONOCLONAL GAMMOPATHY, AND SKIN CHANGES SYNDROME (HCC): ICD-10-CM

## 2019-01-24 RX ORDER — RANITIDINE 300 MG/1
300 TABLET ORAL
Qty: 30 TABLET | Refills: 0 | Status: SHIPPED | OUTPATIENT
Start: 2019-01-24 | End: 2019-03-04 | Stop reason: SDUPTHER

## 2019-01-24 RX ORDER — IBUPROFEN 800 MG/1
TABLET ORAL
Qty: 90 TABLET | Refills: 0 | Status: SHIPPED | OUTPATIENT
Start: 2019-01-24 | End: 2019-03-11 | Stop reason: SDUPTHER

## 2019-01-24 RX ORDER — SERTRALINE HYDROCHLORIDE 100 MG/1
TABLET, FILM COATED ORAL
Qty: 30 TABLET | Refills: 3 | Status: SHIPPED | OUTPATIENT
Start: 2019-01-24 | End: 2019-08-09 | Stop reason: SDUPTHER

## 2019-02-15 DIAGNOSIS — G63 NEUROPATHY ASSOCIATED WITH POLYNEUROPATHY, ORGANOMEGALY, ENDOCRINOPATHY, MONOCLONAL GAMMOPATHY, AND SKIN CHANGES SYNDROME (HCC): ICD-10-CM

## 2019-02-15 DIAGNOSIS — E88.09 NEUROPATHY ASSOCIATED WITH POLYNEUROPATHY, ORGANOMEGALY, ENDOCRINOPATHY, MONOCLONAL GAMMOPATHY, AND SKIN CHANGES SYNDROME (HCC): ICD-10-CM

## 2019-02-15 DIAGNOSIS — R11.15 INTRACTABLE CYCLICAL VOMITING WITH NAUSEA: ICD-10-CM

## 2019-02-15 RX ORDER — OMEPRAZOLE 20 MG/1
40 CAPSULE, DELAYED RELEASE ORAL 2 TIMES DAILY
Qty: 60 CAPSULE | Refills: 1 | Status: SHIPPED | OUTPATIENT
Start: 2019-02-15 | End: 2019-03-04 | Stop reason: SDUPTHER

## 2019-02-15 RX ORDER — GABAPENTIN 300 MG/1
CAPSULE ORAL
Qty: 90 CAPSULE | Refills: 0 | Status: SHIPPED | OUTPATIENT
Start: 2019-02-15 | End: 2019-03-04 | Stop reason: SDUPTHER

## 2019-02-19 ENCOUNTER — TELEPHONE (OUTPATIENT)
Dept: FAMILY MEDICINE CLINIC | Facility: CLINIC | Age: 39
End: 2019-02-19

## 2019-03-04 ENCOUNTER — TELEPHONE (OUTPATIENT)
Dept: FAMILY MEDICINE CLINIC | Facility: CLINIC | Age: 39
End: 2019-03-04

## 2019-03-04 DIAGNOSIS — R11.15 INTRACTABLE CYCLICAL VOMITING WITH NAUSEA: ICD-10-CM

## 2019-03-04 DIAGNOSIS — Z87.42 HISTORY OF DYSMENORRHEA: ICD-10-CM

## 2019-03-04 DIAGNOSIS — F41.9 ANXIETY AND DEPRESSION: ICD-10-CM

## 2019-03-04 DIAGNOSIS — G63 NEUROPATHY ASSOCIATED WITH POLYNEUROPATHY, ORGANOMEGALY, ENDOCRINOPATHY, MONOCLONAL GAMMOPATHY, AND SKIN CHANGES SYNDROME (HCC): ICD-10-CM

## 2019-03-04 DIAGNOSIS — F32.A ANXIETY AND DEPRESSION: ICD-10-CM

## 2019-03-04 DIAGNOSIS — I10 ESSENTIAL HYPERTENSION: ICD-10-CM

## 2019-03-04 DIAGNOSIS — E88.09 NEUROPATHY ASSOCIATED WITH POLYNEUROPATHY, ORGANOMEGALY, ENDOCRINOPATHY, MONOCLONAL GAMMOPATHY, AND SKIN CHANGES SYNDROME (HCC): ICD-10-CM

## 2019-03-04 RX ORDER — RANITIDINE 300 MG/1
300 TABLET ORAL
Qty: 30 TABLET | Refills: 0 | Status: SHIPPED | OUTPATIENT
Start: 2019-03-04 | End: 2019-04-25 | Stop reason: SDUPTHER

## 2019-03-04 RX ORDER — OMEPRAZOLE 20 MG/1
40 CAPSULE, DELAYED RELEASE ORAL 2 TIMES DAILY
Qty: 60 CAPSULE | Refills: 0 | Status: SHIPPED | OUTPATIENT
Start: 2019-03-04 | End: 2019-04-25 | Stop reason: SDUPTHER

## 2019-03-04 RX ORDER — AMLODIPINE BESYLATE 5 MG/1
5 TABLET ORAL DAILY
Qty: 30 TABLET | Refills: 5 | Status: SHIPPED | OUTPATIENT
Start: 2019-03-04 | End: 2019-11-29

## 2019-03-04 RX ORDER — GABAPENTIN 300 MG/1
300 CAPSULE ORAL 3 TIMES DAILY
Qty: 90 CAPSULE | Refills: 0 | Status: SHIPPED | OUTPATIENT
Start: 2019-03-04 | End: 2019-04-25 | Stop reason: SDUPTHER

## 2019-03-04 NOTE — TELEPHONE ENCOUNTER
[facility]mark requesting refill of    Gabapentin 300mg cap  Sertraline HCL 50mg tab  Ibuprofen 800mg tab  Omeprazole DR 20mg cap  Ranitidine 300mg tab  Morethindrone 5mg tab    [facility] pharmacy  [facility] [address]: [phone]  F: [phone]

## 2019-03-11 DIAGNOSIS — G63 NEUROPATHY ASSOCIATED WITH POLYNEUROPATHY, ORGANOMEGALY, ENDOCRINOPATHY, MONOCLONAL GAMMOPATHY, AND SKIN CHANGES SYNDROME (HCC): ICD-10-CM

## 2019-03-11 DIAGNOSIS — E88.09 NEUROPATHY ASSOCIATED WITH POLYNEUROPATHY, ORGANOMEGALY, ENDOCRINOPATHY, MONOCLONAL GAMMOPATHY, AND SKIN CHANGES SYNDROME (HCC): ICD-10-CM

## 2019-03-11 RX ORDER — IBUPROFEN 800 MG/1
800 TABLET ORAL EVERY 6 HOURS PRN
Qty: 90 TABLET | Refills: 0 | Status: SHIPPED | OUTPATIENT
Start: 2019-03-11 | End: 2019-04-15 | Stop reason: SDUPTHER

## 2019-03-11 RX ORDER — IBUPROFEN 800 MG/1
TABLET ORAL
Qty: 90 TABLET | Refills: 0 | Status: SHIPPED | OUTPATIENT
Start: 2019-03-11 | End: 2019-03-11 | Stop reason: SDUPTHER

## 2019-03-28 ENCOUNTER — OFFICE VISIT (OUTPATIENT)
Dept: FAMILY MEDICINE CLINIC | Facility: CLINIC | Age: 39
End: 2019-03-28
Payer: COMMERCIAL

## 2019-03-28 VITALS
DIASTOLIC BLOOD PRESSURE: 82 MMHG | TEMPERATURE: 97.8 F | HEART RATE: 72 BPM | OXYGEN SATURATION: 98 % | SYSTOLIC BLOOD PRESSURE: 120 MMHG | BODY MASS INDEX: 32.1 KG/M2 | WEIGHT: 163.5 LBS | RESPIRATION RATE: 16 BRPM | HEIGHT: 60 IN

## 2019-03-28 DIAGNOSIS — Z23 IMMUNIZATION DUE: ICD-10-CM

## 2019-03-28 DIAGNOSIS — F41.9 ANXIETY: Primary | ICD-10-CM

## 2019-03-28 PROCEDURE — 99213 OFFICE O/P EST LOW 20 MIN: CPT | Performed by: FAMILY MEDICINE

## 2019-03-28 RX ORDER — OMEPRAZOLE 20 MG/1
20 CAPSULE, DELAYED RELEASE ORAL
COMMUNITY
Start: 2018-01-02 | End: 2019-03-28

## 2019-03-28 RX ORDER — HYDROXYZINE PAMOATE 25 MG/1
25 CAPSULE ORAL 3 TIMES DAILY PRN
Qty: 30 CAPSULE | Refills: 0 | Status: SHIPPED | OUTPATIENT
Start: 2019-03-28 | End: 2019-06-12 | Stop reason: SDUPTHER

## 2019-03-28 NOTE — PROGRESS NOTES
Assessment/Plan:     Diagnoses and all orders for this visit:    Anxiety  Comments:  I am going to start on Vistaril 25 mg t i d  p r n  She was told she can take 2 tablets at bedtime to help her with sleeping  It was discussed about side effect  Orders:  -     hydrOXYzine pamoate (VISTARIL) 25 mg capsule; Take 1 capsule (25 mg total) by mouth 3 (three) times a day as needed for itching    Immunization due  -     PREFERRED: influenza vaccine, 4041-7422, quadrivalent, recombinant, PF, 0 5 mL (FLUBLOK)    Other orders  -     Discontinue: omeprazole (PriLOSEC) 20 mg delayed release capsule; Take 20 mg by mouth          There are no Patient Instructions on file for this visit  Return if symptoms worsen or fail to improve  Subjective:      Patient ID: Silvia Mayer is a 37 y o  female  Chief Complaint   Patient presents with    Depression    Anxiety       She is here today with complaint of increased anxiety started this morning  She has been having hard time with her daughter behavior between home and school  She Has been taking Zoloft 150 mg  She stated that usually help but her anxiety got worse she can stop crying  She also complained of having problems with sleeping at night  The following portions of the patient's history were reviewed and updated as appropriate: allergies, current medications, past family history, past medical history, past social history, past surgical history and problem list     Review of Systems   Constitutional: Negative for chills and fever  HENT: Negative for trouble swallowing  Eyes: Negative for visual disturbance  Respiratory: Negative for cough and shortness of breath  Cardiovascular: Negative for chest pain, palpitations and leg swelling  Gastrointestinal: Negative for abdominal pain, constipation and diarrhea  Endocrine: Negative for cold intolerance and heat intolerance  Genitourinary: Negative for difficulty urinating and dysuria  Musculoskeletal: Negative for gait problem  Skin: Negative for rash  Neurological: Negative for dizziness, tremors, seizures and headaches  Hematological: Negative for adenopathy  Psychiatric/Behavioral: Positive for sleep disturbance  Negative for behavioral problems  The patient is nervous/anxious  Current Outpatient Medications   Medication Sig Dispense Refill    albuterol (PROVENTIL HFA,VENTOLIN HFA) 90 mcg/act inhaler 2 puffs po q 4-6 hours prn coughing, wheezing or shortness of breath      amLODIPine (NORVASC) 5 mg tablet Take 1 tablet (5 mg total) by mouth daily 30 tablet 5    clindamycin (CLINDAGEL) 1 % gel Apply topically 2 (two) times a day 30 g 5    gabapentin (NEURONTIN) 300 mg capsule Take 1 capsule (300 mg total) by mouth 3 (three) times a day 90 capsule 0    ibuprofen (MOTRIN) 800 mg tablet Take 1 tablet (800 mg total) by mouth every 6 (six) hours as needed for mild pain 90 tablet 0    norethindrone (AYGESTIN) 5 mg tablet Take 0 5 tablets (2 5 mg total) by mouth daily 30 tablet 0    nystatin-triamcinolone (MYCOLOG-II) ointment Apply topically 2 (two) times a day 30 g 2    omeprazole (PriLOSEC) 20 mg delayed release capsule Take 2 capsules (40 mg total) by mouth 2 (two) times a day 60 capsule 0    ranitidine (ZANTAC) 300 MG tablet Take 1 tablet (300 mg total) by mouth daily at bedtime 30 tablet 0    sertraline (ZOLOFT) 100 mg tablet TAKE 1 TABLET DAILY WITH 50MG  30 tablet 3    sertraline (ZOLOFT) 50 mg tablet Take 1 tablet daily 30 tablet 2    fluticasone-salmeterol (ADVAIR DISKUS) 250-50 mcg/dose inhaler Inhale 1 puff 2 (two) times a day (Patient not taking: Reported on 3/28/2019) 1 Inhaler 5    hydrOXYzine pamoate (VISTARIL) 25 mg capsule Take 1 capsule (25 mg total) by mouth 3 (three) times a day as needed for itching 30 capsule 0     No current facility-administered medications for this visit          Objective:    /82 (BP Location: Left arm, Patient Position: Sitting, Cuff Size: Adult)   Pulse 72   Temp 97 8 °F (36 6 °C) (Tympanic)   Resp 16   Ht 5' (1 524 m)   Wt 74 2 kg (163 lb 8 oz)   SpO2 98%   BMI 31 93 kg/m²        Physical Exam   Constitutional: She is oriented to person, place, and time  She appears well-developed and well-nourished  HENT:   Head: Normocephalic and atraumatic  Eyes: Pupils are equal, round, and reactive to light  EOM are normal    Neck: Normal range of motion  Neck supple  Cardiovascular: Normal rate, regular rhythm and normal heart sounds  Pulmonary/Chest: Effort normal and breath sounds normal    Abdominal: Soft  Bowel sounds are normal    Musculoskeletal: Normal range of motion  She exhibits no edema  Lymphadenopathy:     She has no cervical adenopathy  Neurological: She is alert and oriented to person, place, and time  No cranial nerve deficit  Skin: Skin is warm  Psychiatric: She has a normal mood and affect  Nursing note and vitals reviewed               Camelia Ashton MD

## 2019-03-29 ENCOUNTER — OFFICE VISIT (OUTPATIENT)
Dept: FAMILY MEDICINE CLINIC | Facility: CLINIC | Age: 39
End: 2019-03-29
Payer: COMMERCIAL

## 2019-03-29 VITALS
DIASTOLIC BLOOD PRESSURE: 86 MMHG | RESPIRATION RATE: 16 BRPM | HEIGHT: 60 IN | TEMPERATURE: 97.7 F | WEIGHT: 163 LBS | HEART RATE: 84 BPM | BODY MASS INDEX: 32 KG/M2 | SYSTOLIC BLOOD PRESSURE: 116 MMHG | OXYGEN SATURATION: 97 %

## 2019-03-29 DIAGNOSIS — G63 NEUROPATHY ASSOCIATED WITH POLYNEUROPATHY, ORGANOMEGALY, ENDOCRINOPATHY, MONOCLONAL GAMMOPATHY, AND SKIN CHANGES SYNDROME (HCC): ICD-10-CM

## 2019-03-29 DIAGNOSIS — E88.09 NEUROPATHY ASSOCIATED WITH POLYNEUROPATHY, ORGANOMEGALY, ENDOCRINOPATHY, MONOCLONAL GAMMOPATHY, AND SKIN CHANGES SYNDROME (HCC): ICD-10-CM

## 2019-03-29 DIAGNOSIS — F41.9 ANXIETY: ICD-10-CM

## 2019-03-29 DIAGNOSIS — Z12.31 ENCOUNTER FOR SCREENING MAMMOGRAM FOR MALIGNANT NEOPLASM OF BREAST: Primary | ICD-10-CM

## 2019-03-29 PROCEDURE — 4004F PT TOBACCO SCREEN RCVD TLK: CPT | Performed by: NURSE PRACTITIONER

## 2019-03-29 PROCEDURE — 99214 OFFICE O/P EST MOD 30 MIN: CPT | Performed by: NURSE PRACTITIONER

## 2019-03-29 PROCEDURE — 3008F BODY MASS INDEX DOCD: CPT | Performed by: NURSE PRACTITIONER

## 2019-03-29 NOTE — PROGRESS NOTES
Assessment/Plan:      Diagnoses and all orders for this visit:    Encounter for screening mammogram for malignant neoplasm of breast  Comments: Will order mammogram for screening  Orders:  -     Mammo screening bilateral w cad; Future    Anxiety  Comments: Will con't with the hydroxyzine  Will refer to psych for emotion help and support  Orders:  -     Ambulatory referral to Psychiatry; Future    Neuropathy associated with polyneuropathy, organomegaly, endocrinopathy, monoclonal gammopathy, and skin changes syndrome (HCC)  Comments:  Con't to have wide spread pain  Will refer rheum for eval and treat  Orders:  -     Ambulatory referral to Rheumatology; Future          Subjective:     Patient ID: Wilson Strong is a 37 y o  female  HPI  Yesterday she suddenly started to cry, shaking and not knowing what was going one  She feels it was a panic attack  Her daughter has been being mouthy and treating her poorly  The new medication she got yesterday really helped  Review of Systems   Constitutional: Positive for fatigue  Respiratory: Negative  Cardiovascular: Negative  Musculoskeletal: Positive for arthralgias and back pain  Skin:        Acne break out  Allergic/Immunologic: Positive for food allergies  Neurological: Negative  Hematological: Negative  Psychiatric/Behavioral: Positive for decreased concentration and dysphoric mood  The patient is nervous/anxious  Objective:     Physical Exam   Constitutional: She is oriented to person, place, and time  She appears well-developed and well-nourished  HENT:   Head: Normocephalic  Eyes: Conjunctivae and EOM are normal    Neck: Normal range of motion  Neck supple  Cardiovascular: Normal rate, regular rhythm and normal heart sounds  Pulmonary/Chest: Effort normal and breath sounds normal    Musculoskeletal: She exhibits tenderness (right lower back  )  Neurological: She is alert and oriented to person, place, and time  Skin: Skin is warm and dry  Multiple open sore on her face  Psychiatric: Judgment and thought content normal  Her mood appears anxious  Her affect is inappropriate  Her speech is rapid and/or pressured  She is hyperactive  Cognition and memory are normal  She exhibits a depressed mood  Has poor support to help her with her daughter, who has autism

## 2019-04-15 DIAGNOSIS — E88.09 NEUROPATHY ASSOCIATED WITH POLYNEUROPATHY, ORGANOMEGALY, ENDOCRINOPATHY, MONOCLONAL GAMMOPATHY, AND SKIN CHANGES SYNDROME (HCC): ICD-10-CM

## 2019-04-15 DIAGNOSIS — G63 NEUROPATHY ASSOCIATED WITH POLYNEUROPATHY, ORGANOMEGALY, ENDOCRINOPATHY, MONOCLONAL GAMMOPATHY, AND SKIN CHANGES SYNDROME (HCC): ICD-10-CM

## 2019-04-16 DIAGNOSIS — G63 NEUROPATHY ASSOCIATED WITH POLYNEUROPATHY, ORGANOMEGALY, ENDOCRINOPATHY, MONOCLONAL GAMMOPATHY, AND SKIN CHANGES SYNDROME (HCC): ICD-10-CM

## 2019-04-16 DIAGNOSIS — E88.09 NEUROPATHY ASSOCIATED WITH POLYNEUROPATHY, ORGANOMEGALY, ENDOCRINOPATHY, MONOCLONAL GAMMOPATHY, AND SKIN CHANGES SYNDROME (HCC): ICD-10-CM

## 2019-04-16 RX ORDER — IBUPROFEN 800 MG/1
800 TABLET ORAL EVERY 6 HOURS PRN
Qty: 90 TABLET | Refills: 1 | Status: SHIPPED | OUTPATIENT
Start: 2019-04-16 | End: 2019-11-29

## 2019-04-16 RX ORDER — IBUPROFEN 800 MG/1
800 TABLET ORAL EVERY 6 HOURS PRN
Qty: 90 TABLET | Refills: 1 | Status: SHIPPED | OUTPATIENT
Start: 2019-04-16 | End: 2019-04-16 | Stop reason: SDUPTHER

## 2019-04-25 DIAGNOSIS — G63 NEUROPATHY ASSOCIATED WITH POLYNEUROPATHY, ORGANOMEGALY, ENDOCRINOPATHY, MONOCLONAL GAMMOPATHY, AND SKIN CHANGES SYNDROME (HCC): ICD-10-CM

## 2019-04-25 DIAGNOSIS — E88.09 NEUROPATHY ASSOCIATED WITH POLYNEUROPATHY, ORGANOMEGALY, ENDOCRINOPATHY, MONOCLONAL GAMMOPATHY, AND SKIN CHANGES SYNDROME (HCC): ICD-10-CM

## 2019-04-25 DIAGNOSIS — R11.15 INTRACTABLE CYCLICAL VOMITING WITH NAUSEA: ICD-10-CM

## 2019-04-26 RX ORDER — RANITIDINE 300 MG/1
300 TABLET ORAL
Qty: 30 TABLET | Refills: 0 | Status: SHIPPED | OUTPATIENT
Start: 2019-04-26 | End: 2019-05-27 | Stop reason: SDUPTHER

## 2019-04-26 RX ORDER — GABAPENTIN 300 MG/1
CAPSULE ORAL
Qty: 90 CAPSULE | Refills: 0 | Status: SHIPPED | OUTPATIENT
Start: 2019-04-26 | End: 2019-05-27 | Stop reason: SDUPTHER

## 2019-04-26 RX ORDER — OMEPRAZOLE 20 MG/1
40 CAPSULE, DELAYED RELEASE ORAL 2 TIMES DAILY
Qty: 60 CAPSULE | Refills: 0 | Status: SHIPPED | OUTPATIENT
Start: 2019-04-26 | End: 2019-05-15 | Stop reason: SDUPTHER

## 2019-04-30 ENCOUNTER — TELEPHONE (OUTPATIENT)
Dept: FAMILY MEDICINE CLINIC | Facility: CLINIC | Age: 39
End: 2019-04-30

## 2019-05-15 DIAGNOSIS — R11.15 INTRACTABLE CYCLICAL VOMITING WITH NAUSEA: ICD-10-CM

## 2019-05-15 RX ORDER — OMEPRAZOLE 20 MG/1
40 CAPSULE, DELAYED RELEASE ORAL 2 TIMES DAILY
Qty: 60 CAPSULE | Refills: 0 | Status: SHIPPED | OUTPATIENT
Start: 2019-05-15 | End: 2019-05-27 | Stop reason: SDUPTHER

## 2019-05-27 DIAGNOSIS — F41.9 ANXIETY AND DEPRESSION: ICD-10-CM

## 2019-05-27 DIAGNOSIS — G63 NEUROPATHY ASSOCIATED WITH POLYNEUROPATHY, ORGANOMEGALY, ENDOCRINOPATHY, MONOCLONAL GAMMOPATHY, AND SKIN CHANGES SYNDROME (HCC): ICD-10-CM

## 2019-05-27 DIAGNOSIS — F32.A ANXIETY AND DEPRESSION: ICD-10-CM

## 2019-05-27 DIAGNOSIS — Z87.42 HISTORY OF DYSMENORRHEA: ICD-10-CM

## 2019-05-27 DIAGNOSIS — E88.09 NEUROPATHY ASSOCIATED WITH POLYNEUROPATHY, ORGANOMEGALY, ENDOCRINOPATHY, MONOCLONAL GAMMOPATHY, AND SKIN CHANGES SYNDROME (HCC): ICD-10-CM

## 2019-05-27 DIAGNOSIS — R11.15 INTRACTABLE CYCLICAL VOMITING WITH NAUSEA: ICD-10-CM

## 2019-05-28 RX ORDER — GABAPENTIN 300 MG/1
CAPSULE ORAL
Qty: 90 CAPSULE | Refills: 0 | Status: SHIPPED | OUTPATIENT
Start: 2019-05-28 | End: 2019-08-09 | Stop reason: SDUPTHER

## 2019-05-28 RX ORDER — OMEPRAZOLE 20 MG/1
40 CAPSULE, DELAYED RELEASE ORAL 2 TIMES DAILY
Qty: 60 CAPSULE | Refills: 0 | Status: SHIPPED | OUTPATIENT
Start: 2019-05-28 | End: 2019-06-12 | Stop reason: SDUPTHER

## 2019-05-28 RX ORDER — RANITIDINE 300 MG/1
300 TABLET ORAL
Qty: 30 TABLET | Refills: 0 | Status: SHIPPED | OUTPATIENT
Start: 2019-05-28 | End: 2019-11-29

## 2019-06-12 DIAGNOSIS — F41.9 ANXIETY: ICD-10-CM

## 2019-06-12 DIAGNOSIS — R11.15 INTRACTABLE CYCLICAL VOMITING WITH NAUSEA: ICD-10-CM

## 2019-06-12 RX ORDER — OMEPRAZOLE 20 MG/1
40 CAPSULE, DELAYED RELEASE ORAL 2 TIMES DAILY
Qty: 60 CAPSULE | Refills: 0 | Status: SHIPPED | OUTPATIENT
Start: 2019-06-12 | End: 2019-06-17 | Stop reason: SDUPTHER

## 2019-06-12 RX ORDER — HYDROXYZINE PAMOATE 25 MG/1
25 CAPSULE ORAL 3 TIMES DAILY PRN
Qty: 30 CAPSULE | Refills: 0 | OUTPATIENT
Start: 2019-06-12

## 2019-06-12 RX ORDER — HYDROXYZINE PAMOATE 25 MG/1
25 CAPSULE ORAL 3 TIMES DAILY PRN
Qty: 30 CAPSULE | Refills: 0 | Status: SHIPPED | OUTPATIENT
Start: 2019-06-12 | End: 2019-11-29 | Stop reason: SDUPTHER

## 2019-06-12 RX ORDER — OMEPRAZOLE 20 MG/1
40 CAPSULE, DELAYED RELEASE ORAL 2 TIMES DAILY
Qty: 60 CAPSULE | Refills: 0 | Status: CANCELLED | OUTPATIENT
Start: 2019-06-12

## 2019-06-17 ENCOUNTER — TELEPHONE (OUTPATIENT)
Dept: FAMILY MEDICINE CLINIC | Facility: CLINIC | Age: 39
End: 2019-06-17

## 2019-06-17 DIAGNOSIS — R11.15 INTRACTABLE CYCLICAL VOMITING WITH NAUSEA: ICD-10-CM

## 2019-06-18 RX ORDER — OMEPRAZOLE 20 MG/1
40 CAPSULE, DELAYED RELEASE ORAL 2 TIMES DAILY
Qty: 60 CAPSULE | Refills: 0 | Status: SHIPPED | OUTPATIENT
Start: 2019-06-18 | End: 2019-11-29 | Stop reason: SDUPTHER

## 2019-06-25 DIAGNOSIS — F41.9 ANXIETY: ICD-10-CM

## 2019-06-26 RX ORDER — HYDROXYZINE PAMOATE 25 MG/1
25 CAPSULE ORAL 3 TIMES DAILY PRN
Qty: 30 CAPSULE | Refills: 0 | Status: SHIPPED | OUTPATIENT
Start: 2019-06-26 | End: 2019-10-07 | Stop reason: SDUPTHER

## 2019-08-09 DIAGNOSIS — E88.09 NEUROPATHY ASSOCIATED WITH POLYNEUROPATHY, ORGANOMEGALY, ENDOCRINOPATHY, MONOCLONAL GAMMOPATHY, AND SKIN CHANGES SYNDROME (HCC): ICD-10-CM

## 2019-08-09 DIAGNOSIS — G63 NEUROPATHY ASSOCIATED WITH POLYNEUROPATHY, ORGANOMEGALY, ENDOCRINOPATHY, MONOCLONAL GAMMOPATHY, AND SKIN CHANGES SYNDROME (HCC): ICD-10-CM

## 2019-08-09 DIAGNOSIS — F41.9 ANXIETY: ICD-10-CM

## 2019-08-09 RX ORDER — SERTRALINE HYDROCHLORIDE 100 MG/1
TABLET, FILM COATED ORAL
Qty: 30 TABLET | Refills: 2 | Status: SHIPPED | OUTPATIENT
Start: 2019-08-09 | End: 2019-11-29 | Stop reason: SDUPTHER

## 2019-08-11 DIAGNOSIS — G63 NEUROPATHY ASSOCIATED WITH POLYNEUROPATHY, ORGANOMEGALY, ENDOCRINOPATHY, MONOCLONAL GAMMOPATHY, AND SKIN CHANGES SYNDROME (HCC): ICD-10-CM

## 2019-08-11 DIAGNOSIS — E88.09 NEUROPATHY ASSOCIATED WITH POLYNEUROPATHY, ORGANOMEGALY, ENDOCRINOPATHY, MONOCLONAL GAMMOPATHY, AND SKIN CHANGES SYNDROME (HCC): ICD-10-CM

## 2019-08-11 RX ORDER — GABAPENTIN 300 MG/1
300 CAPSULE ORAL 3 TIMES DAILY
Qty: 90 CAPSULE | Refills: 0 | Status: SHIPPED | OUTPATIENT
Start: 2019-08-11 | End: 2019-12-03 | Stop reason: SDUPTHER

## 2019-08-11 RX ORDER — GABAPENTIN 300 MG/1
CAPSULE ORAL
Qty: 90 CAPSULE | Refills: 0 | Status: SHIPPED | OUTPATIENT
Start: 2019-08-11 | End: 2019-08-11 | Stop reason: SDUPTHER

## 2019-09-11 ENCOUNTER — TELEPHONE (OUTPATIENT)
Dept: FAMILY MEDICINE CLINIC | Facility: CLINIC | Age: 39
End: 2019-09-11

## 2019-09-11 NOTE — TELEPHONE ENCOUNTER
Pt called because she needs another PPL form done for this quarter  We just did one in 5/2019   She is scheduled for an ov on 9/12 which she will discuss with domenico

## 2019-09-12 ENCOUNTER — OFFICE VISIT (OUTPATIENT)
Dept: FAMILY MEDICINE CLINIC | Facility: CLINIC | Age: 39
End: 2019-09-12
Payer: COMMERCIAL

## 2019-09-12 ENCOUNTER — TELEPHONE (OUTPATIENT)
Dept: FAMILY MEDICINE CLINIC | Facility: CLINIC | Age: 39
End: 2019-09-12

## 2019-09-12 VITALS
HEART RATE: 88 BPM | OXYGEN SATURATION: 99 % | RESPIRATION RATE: 16 BRPM | TEMPERATURE: 98.7 F | WEIGHT: 193.6 LBS | DIASTOLIC BLOOD PRESSURE: 80 MMHG | BODY MASS INDEX: 38.01 KG/M2 | HEIGHT: 60 IN | SYSTOLIC BLOOD PRESSURE: 128 MMHG

## 2019-09-12 DIAGNOSIS — F41.9 ANXIETY AND DEPRESSION: Primary | ICD-10-CM

## 2019-09-12 DIAGNOSIS — F32.A ANXIETY AND DEPRESSION: Primary | ICD-10-CM

## 2019-09-12 DIAGNOSIS — I10 ESSENTIAL HYPERTENSION: ICD-10-CM

## 2019-09-12 DIAGNOSIS — R60.0 BILATERAL LOWER EXTREMITY EDEMA: ICD-10-CM

## 2019-09-12 DIAGNOSIS — R06.02 SHORTNESS OF BREATH: ICD-10-CM

## 2019-09-12 DIAGNOSIS — Z13.220 LIPID SCREENING: ICD-10-CM

## 2019-09-12 DIAGNOSIS — I83.893 VARICOSE VEINS OF BOTH LEGS WITH EDEMA: ICD-10-CM

## 2019-09-12 PROBLEM — J45.909 ASTHMA: Status: ACTIVE | Noted: 2018-01-02

## 2019-09-12 PROBLEM — F41.0 PANIC ATTACK AS REACTION TO STRESS: Status: ACTIVE | Noted: 2019-03-28

## 2019-09-12 PROBLEM — F43.0 PANIC ATTACK AS REACTION TO STRESS: Status: ACTIVE | Noted: 2019-03-28

## 2019-09-12 PROCEDURE — 3079F DIAST BP 80-89 MM HG: CPT | Performed by: PHYSICIAN ASSISTANT

## 2019-09-12 PROCEDURE — 3008F BODY MASS INDEX DOCD: CPT | Performed by: PHYSICIAN ASSISTANT

## 2019-09-12 PROCEDURE — 99214 OFFICE O/P EST MOD 30 MIN: CPT | Performed by: PHYSICIAN ASSISTANT

## 2019-09-12 PROCEDURE — 3074F SYST BP LT 130 MM HG: CPT | Performed by: PHYSICIAN ASSISTANT

## 2019-09-12 RX ORDER — QUETIAPINE FUMARATE 25 MG/1
25 TABLET, FILM COATED ORAL
Qty: 30 TABLET | Refills: 0 | Status: SHIPPED | OUTPATIENT
Start: 2019-09-12 | End: 2019-10-06 | Stop reason: SDUPTHER

## 2019-09-12 NOTE — TELEPHONE ENCOUNTER
----- Message from Melia Mccartybertrenay Caban sent at 9/12/2019  1:57 PM EDT -----  Regarding: RE:Your Recent Visit  Contact: [phone]  Could  you please have [employer] submit the medical certification form to [employer]? I did call the office as soon as I got home and left a voicemail for [employer] but I havent heard anything back yet  Im getting extremely stressed  My electric was shut off yesterday morning and all my refrigerated stuff had to be trashed bc it has spoiled or defrosted  Please, as soon as possible, please    ----- Message -----  From: Kun Hoover PA-C  Sent: 9/12/2019 12:15 PM EDT  To: Pelon Moore  Subject: Your Recent Visit  Melia Atkinson, you have a new After Visit Summary in [facility]  Please click on visits and then your most recent appointment, to view your After Visit Summary  If you are experiencing any technical issues please call our patient service desk at [phone]) option [phone]

## 2019-09-12 NOTE — PROGRESS NOTES
Assessment/Plan:    -phone number for crisis has been given to the patient  -she has been advised to go the emergency room with any suicidal ideations  -I did recommend that Vi call Dr Tucker Peres is office so the patient can get establish with Psychiatry/counseling  Patient did agree   -reduce Zoloft 150 mg down to 100 mg daily  -start Seroquel 25 mg daily at bedtime  -proceed with fasting blood work over the next 2 days  -hold amlodipine 5 mg daily which may be contributing to the edema of the lower extremities   -also told that the varicosities could contribute to the edema if indeed the swelling does not resolve when discontinuing the amlodipine  -she has been advised to make an appointment with Elizabeth Davis for next week     Diagnoses and all orders for this visit:    Anxiety and depression  -     QUEtiapine (SEROquel) 25 mg tablet; Take 1 tablet (25 mg total) by mouth daily at bedtime  -     Microalbumin / creatinine urine ratio; Future  -     CBC and differential  -     Comprehensive metabolic panel  -     TSH, 3rd generation with Free T4 reflex  -     NT-BNP PRO; Future  -     Lipid panel    Essential hypertension  -     Microalbumin / creatinine urine ratio; Future  -     CBC and differential  -     Comprehensive metabolic panel  -     TSH, 3rd generation with Free T4 reflex  -     NT-BNP PRO; Future  -     Lipid panel    Bilateral lower extremity edema  -     Microalbumin / creatinine urine ratio; Future  -     CBC and differential  -     Comprehensive metabolic panel  -     TSH, 3rd generation with Free T4 reflex  -     NT-BNP PRO; Future  -     Lipid panel    Varicose veins of both legs with edema  -     Microalbumin / creatinine urine ratio; Future  -     CBC and differential  -     Comprehensive metabolic panel  -     TSH, 3rd generation with Free T4 reflex  -     NT-BNP PRO; Future  -     Lipid panel    Shortness of breath  -     Microalbumin / creatinine urine ratio;  Future  -     CBC and differential  - Comprehensive metabolic panel  -     TSH, 3rd generation with Free T4 reflex  -     NT-BNP PRO; Future  -     Lipid panel    Lipid screening  -     Microalbumin / creatinine urine ratio; Future  -     CBC and differential  -     Comprehensive metabolic panel  -     TSH, 3rd generation with Free T4 reflex  -     NT-BNP PRO; Future  -     Lipid panel          Subjective:      Patient ID: Berlin Ng is a 37 y o  female  Patient presents today for evaluation of swelling of both of her lower legs over the past several months  She does notice occasional shortness of breath  She has been on amlodipine for about a year  She states that she gets intermittent chest pain especially when she is really anxious  She started becoming teary-eyed during the office visit  She states that she just wants to sleep but she does not have any plan to harm herself at this time  She is currently on Zoloft 150 mg daily with no benefit  She states has been awhile since she has seen anybody for depression or anxiety  She does not go to counseling  She states that Shilpa Burt diagnosed her with an autoimmune disorder because of certain symptoms she has with no specific diagnosis  She states that she has been under lot of stress lately  She does care for an autistic daughter  She states that all she eats all day is ice cream       The following portions of the patient's history were reviewed and updated as appropriate:   She  has a past medical history of Anxiety, Arthritis, Depression, bleeding disorder, Hypertension, Kidney stone, and Seasonal allergies    She   Patient Active Problem List    Diagnosis Date Noted    Lipid screening 09/12/2019    Bilateral lower extremity edema 09/12/2019    Varicose veins of both legs with edema 09/12/2019    Shortness of breath 09/12/2019    Encounter for screening mammogram for malignant neoplasm of breast 03/29/2019    Panic attack as reaction to stress 03/28/2019    Numbness and tingling of both legs below knees     Nausea and vomiting 10/25/2018    GERD (gastroesophageal reflux disease) 10/25/2018    Constipation 10/25/2018    Other constipation 10/25/2018    Cystic acne 10/02/2018    Neuropathy associated with polyneuropathy, organomegaly, endocrinopathy, monoclonal gammopathy, and skin changes syndrome ([facility] [address] ) 10/02/2018    Mild intermittent asthma without complication     Cystic disease of ovaries 10/02/2018    Essential hypertension 10/02/2018    Anxiety and depression 2017    Dysmenorrhea 2016    Pain in joint involving lower leg 2013    Dysthymic disorder 2013     She  has a past surgical history that includes  section; Anita tooth extraction; Diagnostic laparoscopy; pr lap,diagnostic abdomen (N/A, 2016); pr lap,rmv  adnexal structure (Bilateral, 2016); Dilation and curettage of uterus (); and pr colonoscopy flx dx w/collj spec when pfrmd (N/A, 2018)  Her family history is not on file  She  reports that she has been smoking  She has a 23 00 pack-year smoking history  She has never used smokeless tobacco  She reports that she has current or past drug history  Drug: Marijuana  She reports that she does not drink alcohol    Current Outpatient Medications   Medication Sig Dispense Refill    albuterol (PROVENTIL HFA,VENTOLIN HFA) 90 mcg/act inhaler 2 puffs po q 4-6 hours prn coughing, wheezing or shortness of breath      amLODIPine (NORVASC) 5 mg tablet Take 1 tablet (5 mg total) by mouth daily 30 tablet 5    clindamycin (CLINDAGEL) 1 % gel Apply topically 2 (two) times a day 30 g 5    gabapentin (NEURONTIN) 300 mg capsule Take 1 capsule (300 mg total) by mouth 3 (three) times a day 90 capsule 0    hydrOXYzine pamoate (VISTARIL) 25 mg capsule Take 1 capsule (25 mg total) by mouth 3 (three) times a day as needed for itching 30 capsule 0    hydrOXYzine pamoate (VISTARIL) 25 mg capsule TAKE 1 CAPSULE (25 MG TOTAL) BY MOUTH 3 (THREE) TIMES A DAY AS NEEDED FOR ITCHING 30 capsule 0    ibuprofen (MOTRIN) 800 mg tablet Take 1 tablet (800 mg total) by mouth every 6 (six) hours as needed for mild pain 90 tablet 1    norethindrone (AYGESTIN) 5 mg tablet TAKE 0 5 TABLETS (2 5 MG TOTAL) BY MOUTH DAILY 30 tablet 0    nystatin-triamcinolone (MYCOLOG-II) ointment Apply topically 2 (two) times a day 30 g 2    omeprazole (PriLOSEC) 20 mg delayed release capsule Take 2 capsules (40 mg total) by mouth 2 (two) times a day 60 capsule 0    QUEtiapine (SEROquel) 25 mg tablet Take 1 tablet (25 mg total) by mouth daily at bedtime 30 tablet 0    ranitidine (ZANTAC) 300 MG tablet TAKE 1 TABLET (300 MG TOTAL) BY MOUTH DAILY AT BEDTIME 30 tablet 0    sertraline (ZOLOFT) 100 mg tablet TAKE 1 TABLET DAILY WITH 50MG  30 tablet 2    sertraline (ZOLOFT) 50 mg tablet TAKE 1 TABLET BY MOUTH EVERY DAY 30 tablet 1     No current facility-administered medications for this visit        Current Outpatient Medications on File Prior to Visit   Medication Sig    albuterol (PROVENTIL HFA,VENTOLIN HFA) 90 mcg/act inhaler 2 puffs po q 4-6 hours prn coughing, wheezing or shortness of breath    amLODIPine (NORVASC) 5 mg tablet Take 1 tablet (5 mg total) by mouth daily    clindamycin (CLINDAGEL) 1 % gel Apply topically 2 (two) times a day    gabapentin (NEURONTIN) 300 mg capsule Take 1 capsule (300 mg total) by mouth 3 (three) times a day    hydrOXYzine pamoate (VISTARIL) 25 mg capsule Take 1 capsule (25 mg total) by mouth 3 (three) times a day as needed for itching    hydrOXYzine pamoate (VISTARIL) 25 mg capsule TAKE 1 CAPSULE (25 MG TOTAL) BY MOUTH 3 (THREE) TIMES A DAY AS NEEDED FOR ITCHING    ibuprofen (MOTRIN) 800 mg tablet Take 1 tablet (800 mg total) by mouth every 6 (six) hours as needed for mild pain    norethindrone (AYGESTIN) 5 mg tablet TAKE 0 5 TABLETS (2 5 MG TOTAL) BY MOUTH DAILY    nystatin-triamcinolone (MYCOLOG-II) ointment Apply topically 2 (two) times a day    omeprazole (PriLOSEC) 20 mg delayed release capsule Take 2 capsules (40 mg total) by mouth 2 (two) times a day    ranitidine (ZANTAC) 300 MG tablet TAKE 1 TABLET (300 MG TOTAL) BY MOUTH DAILY AT BEDTIME    sertraline (ZOLOFT) 100 mg tablet TAKE 1 TABLET DAILY WITH 50MG   sertraline (ZOLOFT) 50 mg tablet TAKE 1 TABLET BY MOUTH EVERY DAY     No current facility-administered medications on file prior to visit  She is allergic to eggs or egg-derived products       Review of Systems   Respiratory: Positive for shortness of breath  Cardiovascular: Positive for leg swelling  Negative for chest pain  Objective:      /80 (BP Location: Right arm, Patient Position: Sitting, Cuff Size: Large)   Pulse 88   Temp 98 7 °F (37 1 °C) (Tympanic)   Resp 16   Ht 5' (1 524 m)   Wt 87 8 kg (193 lb 9 6 oz)   SpO2 99%   BMI 37 81 kg/m²          Physical Exam   Constitutional: She appears well-developed and well-nourished  No distress  Patient is teary-eyed throughout the evaluation   HENT:   Head: Normocephalic and atraumatic  Right Ear: External ear normal    Left Ear: External ear normal    Mouth/Throat: Oropharynx is clear and moist  No oropharyngeal exudate  Neck: Neck supple  Cardiovascular: Normal rate, regular rhythm and normal heart sounds  No murmur heard  Pulmonary/Chest: Effort normal and breath sounds normal  No respiratory distress  She has no wheezes  She has no rales  Musculoskeletal: She exhibits edema (She does have mild edema both lower extremities  She does have mild to moderate varicosities  There is no calf tenderness  Negative Homans bilaterally  )  Lymphadenopathy:     She has no cervical adenopathy

## 2019-09-16 ENCOUNTER — TELEPHONE (OUTPATIENT)
Dept: FAMILY MEDICINE CLINIC | Facility: CLINIC | Age: 39
End: 2019-09-16

## 2019-09-16 NOTE — TELEPHONE ENCOUNTER
Hello, We talked to Shilpa Burt and we fill these forms out as a courtesy we are not obligated to fill them out so we only will do it once a year  Tiesha Billy is sorry but its office policy  I did cancel out you appointments and you will need to come into office to fill out record release once you find a new provider   Sorry again and best regards

## 2019-09-16 NOTE — TELEPHONE ENCOUNTER
----- Message from Melia Senior sent at 9/16/2019 12:16 PM EDT -----  Regarding: RE: RE: Visit Follow-Up Question  Contact: [phone]  Once a year? Where does it state that? Inconvenience? You have no idea  It's always a different answer  You leave me with no alternative  Cancel any and all of my appointments bc I need a doctor that is willing to work with me  THIS is not that!     ----- Message -----  From: Jen Cole  Sent: 9/16/19, 8:07 AM  To: Melia LALA Pankaj  Subject: RE: Visit Follow-Up Question    Froms are filled out 1 time a year  Sorry for rhe inconvenient     ----- Message -----  From: Romulo Ireland  Sent: 9/13/2019   4:58 PM EDT  To: [facility]  Subject: Visit Follow-Up Question                         Ivelisse Mckenzie,  I got your msg  I called back, but you were @ lunch  Called back again and you were gone for the day  I ask for something as simple as this form and now you no longer complete the [employer] Medical Certification Forms? ? What?? If you all cant help me with something as simple as this, youre forcing me to find a doctor that will  BUT its perfectly ok for me to pay my $40 every single time I walk through the door?!?? Im so fed up with the hoops I have to jump through for everything

## 2019-10-03 ENCOUNTER — TELEPHONE (OUTPATIENT)
Dept: FAMILY MEDICINE CLINIC | Facility: CLINIC | Age: 39
End: 2019-10-03

## 2019-10-03 NOTE — TELEPHONE ENCOUNTER
L/m letting patient know that her PPL paperwork was completed and faxed to them and that per our policy, this is a one time courtesy

## 2019-10-06 DIAGNOSIS — F32.A ANXIETY AND DEPRESSION: ICD-10-CM

## 2019-10-06 DIAGNOSIS — F41.9 ANXIETY AND DEPRESSION: ICD-10-CM

## 2019-10-06 RX ORDER — QUETIAPINE FUMARATE 25 MG/1
TABLET, FILM COATED ORAL
Qty: 30 TABLET | Refills: 0 | Status: SHIPPED | OUTPATIENT
Start: 2019-10-06 | End: 2019-11-01 | Stop reason: SDUPTHER

## 2019-10-07 DIAGNOSIS — F41.9 ANXIETY AND DEPRESSION: ICD-10-CM

## 2019-10-07 DIAGNOSIS — F32.A ANXIETY AND DEPRESSION: ICD-10-CM

## 2019-10-07 DIAGNOSIS — F41.9 ANXIETY: ICD-10-CM

## 2019-10-09 RX ORDER — HYDROXYZINE PAMOATE 25 MG/1
25 CAPSULE ORAL 3 TIMES DAILY PRN
Qty: 30 CAPSULE | Refills: 0 | Status: SHIPPED | OUTPATIENT
Start: 2019-10-09 | End: 2019-11-29 | Stop reason: SDUPTHER

## 2019-11-01 DIAGNOSIS — F32.A ANXIETY AND DEPRESSION: ICD-10-CM

## 2019-11-01 DIAGNOSIS — F41.9 ANXIETY AND DEPRESSION: ICD-10-CM

## 2019-11-01 RX ORDER — QUETIAPINE FUMARATE 25 MG/1
TABLET, FILM COATED ORAL
Qty: 30 TABLET | Refills: 0 | Status: SHIPPED | OUTPATIENT
Start: 2019-11-01 | End: 2019-11-27 | Stop reason: SDUPTHER

## 2019-11-11 DIAGNOSIS — F41.9 ANXIETY: ICD-10-CM

## 2019-11-12 RX ORDER — HYDROXYZINE PAMOATE 25 MG/1
25 CAPSULE ORAL 3 TIMES DAILY PRN
Qty: 30 CAPSULE | Refills: 0 | OUTPATIENT
Start: 2019-11-12

## 2019-11-27 DIAGNOSIS — F32.A ANXIETY AND DEPRESSION: ICD-10-CM

## 2019-11-27 DIAGNOSIS — F41.9 ANXIETY AND DEPRESSION: ICD-10-CM

## 2019-11-27 RX ORDER — QUETIAPINE FUMARATE 25 MG/1
TABLET, FILM COATED ORAL
Qty: 30 TABLET | Refills: 0 | Status: SHIPPED | OUTPATIENT
Start: 2019-11-27 | End: 2019-11-29 | Stop reason: SDUPTHER

## 2019-11-29 ENCOUNTER — OFFICE VISIT (OUTPATIENT)
Dept: FAMILY MEDICINE CLINIC | Facility: CLINIC | Age: 39
End: 2019-11-29
Payer: COMMERCIAL

## 2019-11-29 VITALS
HEIGHT: 60 IN | TEMPERATURE: 98.3 F | WEIGHT: 201 LBS | RESPIRATION RATE: 16 BRPM | HEART RATE: 106 BPM | DIASTOLIC BLOOD PRESSURE: 90 MMHG | BODY MASS INDEX: 39.46 KG/M2 | OXYGEN SATURATION: 95 % | SYSTOLIC BLOOD PRESSURE: 132 MMHG

## 2019-11-29 DIAGNOSIS — Z12.31 BREAST CANCER SCREENING BY MAMMOGRAM: ICD-10-CM

## 2019-11-29 DIAGNOSIS — F41.9 ANXIETY AND DEPRESSION: ICD-10-CM

## 2019-11-29 DIAGNOSIS — F32.A ANXIETY AND DEPRESSION: ICD-10-CM

## 2019-11-29 DIAGNOSIS — I83.893 VARICOSE VEINS OF BOTH LEGS WITH EDEMA: ICD-10-CM

## 2019-11-29 DIAGNOSIS — F41.9 ANXIETY: ICD-10-CM

## 2019-11-29 DIAGNOSIS — G63 NEUROPATHY ASSOCIATED WITH POLYNEUROPATHY, ORGANOMEGALY, ENDOCRINOPATHY, MONOCLONAL GAMMOPATHY, AND SKIN CHANGES SYNDROME (HCC): Primary | ICD-10-CM

## 2019-11-29 DIAGNOSIS — M25.561 CHRONIC PAIN OF BOTH KNEES: ICD-10-CM

## 2019-11-29 DIAGNOSIS — M25.562 CHRONIC PAIN OF BOTH KNEES: ICD-10-CM

## 2019-11-29 DIAGNOSIS — J45.40 MODERATE PERSISTENT ASTHMA WITHOUT COMPLICATION: ICD-10-CM

## 2019-11-29 DIAGNOSIS — I10 ESSENTIAL HYPERTENSION: ICD-10-CM

## 2019-11-29 DIAGNOSIS — Z23 IMMUNIZATION DUE: ICD-10-CM

## 2019-11-29 DIAGNOSIS — G89.29 CHRONIC PAIN OF BOTH KNEES: ICD-10-CM

## 2019-11-29 DIAGNOSIS — Z11.4 ENCOUNTER FOR SCREENING FOR HIV: ICD-10-CM

## 2019-11-29 DIAGNOSIS — K21.9 GASTROESOPHAGEAL REFLUX DISEASE WITHOUT ESOPHAGITIS: ICD-10-CM

## 2019-11-29 DIAGNOSIS — E88.09 NEUROPATHY ASSOCIATED WITH POLYNEUROPATHY, ORGANOMEGALY, ENDOCRINOPATHY, MONOCLONAL GAMMOPATHY, AND SKIN CHANGES SYNDROME (HCC): Primary | ICD-10-CM

## 2019-11-29 PROBLEM — L70.0 CYSTIC ACNE: Status: RESOLVED | Noted: 2018-10-02 | Resolved: 2019-11-29

## 2019-11-29 PROBLEM — R11.2 NAUSEA AND VOMITING: Status: RESOLVED | Noted: 2018-10-25 | Resolved: 2019-11-29

## 2019-11-29 PROBLEM — J45.20 MILD INTERMITTENT ASTHMA WITHOUT COMPLICATION: Status: RESOLVED | Noted: 2018-10-02 | Resolved: 2019-11-29

## 2019-11-29 PROCEDURE — 4004F PT TOBACCO SCREEN RCVD TLK: CPT | Performed by: PHYSICIAN ASSISTANT

## 2019-11-29 PROCEDURE — 99214 OFFICE O/P EST MOD 30 MIN: CPT | Performed by: PHYSICIAN ASSISTANT

## 2019-11-29 PROCEDURE — 3008F BODY MASS INDEX DOCD: CPT | Performed by: PHYSICIAN ASSISTANT

## 2019-11-29 PROCEDURE — 90471 IMMUNIZATION ADMIN: CPT | Performed by: PHYSICIAN ASSISTANT

## 2019-11-29 PROCEDURE — 90732 PPSV23 VACC 2 YRS+ SUBQ/IM: CPT | Performed by: PHYSICIAN ASSISTANT

## 2019-11-29 RX ORDER — OMEPRAZOLE 20 MG/1
40 CAPSULE, DELAYED RELEASE ORAL DAILY
Qty: 90 CAPSULE | Refills: 0 | Status: SHIPPED | OUTPATIENT
Start: 2019-11-29 | End: 2019-11-29 | Stop reason: SDUPTHER

## 2019-11-29 RX ORDER — SERTRALINE HYDROCHLORIDE 100 MG/1
TABLET, FILM COATED ORAL
Qty: 90 TABLET | Refills: 1 | Status: SHIPPED | OUTPATIENT
Start: 2019-11-29 | End: 2020-03-09 | Stop reason: SDUPTHER

## 2019-11-29 RX ORDER — FLUTICASONE FUROATE AND VILANTEROL 100; 25 UG/1; UG/1
1 POWDER RESPIRATORY (INHALATION) DAILY
Qty: 1 INHALER | Refills: 5 | Status: SHIPPED | OUTPATIENT
Start: 2019-11-29 | End: 2020-03-09 | Stop reason: SDUPTHER

## 2019-11-29 RX ORDER — QUETIAPINE FUMARATE 50 MG/1
25 TABLET, FILM COATED ORAL
Qty: 90 TABLET | Refills: 1 | Status: SHIPPED | OUTPATIENT
Start: 2019-11-29 | End: 2019-12-05 | Stop reason: SDUPTHER

## 2019-11-29 RX ORDER — LISINOPRIL 20 MG/1
20 TABLET ORAL DAILY
Qty: 90 TABLET | Refills: 1 | Status: SHIPPED | OUTPATIENT
Start: 2019-11-29 | End: 2020-03-09 | Stop reason: SDUPTHER

## 2019-11-29 RX ORDER — FAMOTIDINE 20 MG/1
20 TABLET, FILM COATED ORAL DAILY
Qty: 90 TABLET | Refills: 1 | Status: SHIPPED | OUTPATIENT
Start: 2019-11-29 | End: 2021-01-14

## 2019-11-29 RX ORDER — HYDROXYZINE PAMOATE 25 MG/1
25 CAPSULE ORAL 3 TIMES DAILY PRN
Qty: 30 CAPSULE | Refills: 1 | Status: SHIPPED | OUTPATIENT
Start: 2019-11-29 | End: 2020-03-09 | Stop reason: SDUPTHER

## 2019-11-29 RX ORDER — OMEPRAZOLE 20 MG/1
20 CAPSULE, DELAYED RELEASE ORAL DAILY
Qty: 90 CAPSULE | Refills: 1 | Status: SHIPPED | OUTPATIENT
Start: 2019-11-29 | End: 2020-03-09 | Stop reason: SDUPTHER

## 2019-11-29 NOTE — PROGRESS NOTES
Assessment/Plan:    BMI Counseling: Body mass index is 39 26 kg/m²  The BMI is above normal  Nutrition recommendations include reducing portion sizes, decreasing overall calorie intake and decreasing soda and/or juice intake  Exercise recommendations include exercising 3-5 times per week  -increase Seroquel from 25 mg at bedtime up to 50 mg at bedtime  -continue Zoloft 150 mg daily  -she has been encouraged to check with her insurance for Psychiatry/counseling  -she has been advised to make an appointment with her GI specialist, Dr Brittany Gonsales  -patient has been advised to proceed with blood work fasting and she did consent to HIV screen  -hold ranitidine since it has been removed from the market  Start Pepcid/Famotidine 20 mg daily  -continue omeprazole 20 mg daily  -refer to Neurology for the neuropathy  -proceed with x-rays of both knees including AP weight-bearing  -refer to Orthopedic surgery for bilateral knee pain  -Pneumovax today  -start lisinopril 20 mg daily  -start Breo 1 inhalation daily  Rinse mouth after use since utilizing rescue inhaler more than twice a week  -recommend continued 30 minutes appointments in the future until conditions are stable  -routine follow-up in 3 months    M*Modal software was used to dictate this note  It may contain errors with dictating incorrect words/spelling  Please contact provider directly for any questions  Diagnoses and all orders for this visit:    Neuropathy associated with polyneuropathy, organomegaly, endocrinopathy, monoclonal gammopathy, and skin changes syndrome ([facility] [address] )  -     Ambulatory referral to Neurology; Future  -     Microalbumin / creatinine urine ratio; Future  -     Comprehensive metabolic panel  -     Lipid panel  -     TSH, 3rd generation with Free T4 reflex    Essential hypertension  -     lisinopril (ZESTRIL) 20 mg tablet; Take 1 tablet (20 mg total) by mouth daily  -     Microalbumin / creatinine urine ratio;  Future  -     Comprehensive metabolic panel  -     Lipid panel  -     TSH, 3rd generation with Free T4 reflex    Anxiety and depression  -     QUEtiapine (SEROquel) 50 mg tablet; Take 0 5 tablets (25 mg total) by mouth daily at bedtime  -     sertraline (ZOLOFT) 50 mg tablet; Take 1 tablet (50 mg total) by mouth daily  -     Microalbumin / creatinine urine ratio; Future  -     Comprehensive metabolic panel  -     Lipid panel  -     TSH, 3rd generation with Free T4 reflex    Breast cancer screening by mammogram  -     Mammo screening bilateral w cad; Future    Gastroesophageal reflux disease without esophagitis  -     Discontinue: omeprazole (PriLOSEC) 20 mg delayed release capsule; Take 2 capsules (40 mg total) by mouth daily  -     omeprazole (PriLOSEC) 20 mg delayed release capsule; Take 1 capsule (20 mg total) by mouth daily  -     Ambulatory referral to Gastroenterology; Future  -     famotidine (PEPCID) 20 mg tablet; Take 1 tablet (20 mg total) by mouth daily  -     Microalbumin / creatinine urine ratio; Future  -     Comprehensive metabolic panel  -     Lipid panel  -     TSH, 3rd generation with Free T4 reflex    Anxiety  Comments:  I am going to start on Vistaril 25 mg t i d  p r n  She was told she can take 2 tablets at bedtime to help her with sleeping  It was discussed about side effect  Orders:  -     hydrOXYzine pamoate (VISTARIL) 25 mg capsule; Take 1 capsule (25 mg total) by mouth 3 (three) times a day as needed for anxiety  -     sertraline (ZOLOFT) 100 mg tablet; Take 1 tablet daily  -     Microalbumin / creatinine urine ratio; Future  -     Comprehensive metabolic panel  -     Lipid panel  -     TSH, 3rd generation with Free T4 reflex    Chronic pain of both knees  -     Ambulatory referral to Orthopedic Surgery; Future  -     XR knee 3 vw right non injury; Future  -     XR knee 3 vw left non injury; Future  -     Microalbumin / creatinine urine ratio;  Future  -     Comprehensive metabolic panel  -     Lipid panel  -     TSH, 3rd generation with Free T4 reflex    Moderate persistent asthma without complication  -     fluticasone-vilanterol (BREO ELLIPTA) 100-25 mcg/inh inhaler; Inhale 1 puff daily Rinse mouth after use  -     Albuterol Sulfate (PROAIR RESPICLICK) 910 (90 Base) MCG/ACT AEPB; 2 inhalations every 6 hours as needed  -     Microalbumin / creatinine urine ratio; Future  -     Comprehensive metabolic panel  -     Lipid panel  -     TSH, 3rd generation with Free T4 reflex    Varicose veins of both legs with edema  -     Microalbumin / creatinine urine ratio; Future  -     Comprehensive metabolic panel  -     Lipid panel  -     TSH, 3rd generation with Free T4 reflex    Anxiety  -     hydrOXYzine pamoate (VISTARIL) 25 mg capsule; Take 1 capsule (25 mg total) by mouth 3 (three) times a day as needed for anxiety  -     sertraline (ZOLOFT) 100 mg tablet; Take 1 tablet daily  -     Microalbumin / creatinine urine ratio; Future  -     Comprehensive metabolic panel  -     Lipid panel  -     TSH, 3rd generation with Free T4 reflex    Encounter for screening for HIV  -     HIV 1/2 AG-AB combo; Future          Subjective:      Patient ID: Gertrudis Lockwood is a 37 y o  female  Patient presents today for routine follow-up  She states that Adri Barlow diagnosed her with the neuropathy/polyneuropathy but the patient never followed up with Neurology as advised  She still gets burning in her feet  She even saw Podiatry with no specific explanation for her symptoms  She also states that she continues with chronic knee pain  She used to see Dr Светлана Boyd who gave her multiple injections  She has not had any recent x-rays  She would prefer to see a different specialist   She has not seen him in several years  She did stop the amlodipine as advised  She states that the swelling in her legs still reoccurs intermittently even though she stop the medication  She did not follow-up for her blood pressure as advised    She would be willing to try different blood pressure medication  She states that she does not have much of an appetite although she has been gaining weight  She continues to have GI issues  She is no longer on omeprazole 40 mg daily but 20 mg daily  She continues to take ranitidine because she has at home  She did not realize that the medication was taken off the market  She has not seen a GI specialist in over a year  She never did see Psychiatry or counseling for her anxiety/depression  She denies any suicidal ideations  She did start the Seroquel 25 mg as I recommended several months ago but she never did follow up as advised  She does not feel as though the medication is benefitting her  It does cause drowsiness allowing her to sleep better though  She continues on the Zoloft 150 mg daily  The following portions of the patient's history were reviewed and updated as appropriate:   She  has a past medical history of Anxiety, Arthritis, Depression, bleeding disorder, Hypertension, Kidney stone, and Seasonal allergies    She   Patient Active Problem List    Diagnosis Date Noted    Moderate persistent asthma without complication 35/54/9821    Encounter for screening for HIV 11/29/2019    Lipid screening 09/12/2019    Bilateral lower extremity edema 09/12/2019    Varicose veins of both legs with edema 09/12/2019    Shortness of breath 09/12/2019    Encounter for screening mammogram for malignant neoplasm of breast 03/29/2019    Panic attack as reaction to stress 03/28/2019    Numbness and tingling of both legs below knees     GERD (gastroesophageal reflux disease) 10/25/2018    Constipation 10/25/2018    Other constipation 10/25/2018    Neuropathy associated with polyneuropathy, organomegaly, endocrinopathy, monoclonal gammopathy, and skin changes syndrome ([facility] [address] ) 10/02/2018    Cystic disease of ovaries 10/02/2018    Essential hypertension 10/02/2018    Anxiety and depression 03/08/2017    Dysmenorrhea 2016    Chronic pain of both knees 2013    Dysthymic disorder 2013     She  has a past surgical history that includes  section; Glade tooth extraction; Diagnostic laparoscopy; pr lap,diagnostic abdomen (N/A, 2016); pr lap,rmv  adnexal structure (Bilateral, 2016); Dilation and curettage of uterus (); and pr colonoscopy flx dx w/collj spec when pfrmd (N/A, 2018)  Her family history is not on file  She  reports that she has been smoking  She has a 23 00 pack-year smoking history  She has never used smokeless tobacco  She reports that she has current or past drug history  Drug: Marijuana  She reports that she does not drink alcohol  Current Outpatient Medications   Medication Sig Dispense Refill    gabapentin (NEURONTIN) 300 mg capsule Take 1 capsule (300 mg total) by mouth 3 (three) times a day 90 capsule 0    hydrOXYzine pamoate (VISTARIL) 25 mg capsule Take 1 capsule (25 mg total) by mouth 3 (three) times a day as needed for anxiety 30 capsule 1    norethindrone (AYGESTIN) 5 mg tablet TAKE 0 5 TABLETS (2 5 MG TOTAL) BY MOUTH DAILY 30 tablet 0    omeprazole (PriLOSEC) 20 mg delayed release capsule Take 1 capsule (20 mg total) by mouth daily 90 capsule 1    QUEtiapine (SEROquel) 50 mg tablet Take 0 5 tablets (25 mg total) by mouth daily at bedtime 90 tablet 1    sertraline (ZOLOFT) 100 mg tablet Take 1 tablet daily 90 tablet 1    sertraline (ZOLOFT) 50 mg tablet Take 1 tablet (50 mg total) by mouth daily 90 tablet 1    Albuterol Sulfate (PROAIR RESPICLICK) 735 (90 Base) MCG/ACT AEPB 2 inhalations every 6 hours as needed 1 each 0    famotidine (PEPCID) 20 mg tablet Take 1 tablet (20 mg total) by mouth daily 90 tablet 1    fluticasone-vilanterol (BREO ELLIPTA) 100-25 mcg/inh inhaler Inhale 1 puff daily Rinse mouth after use   1 Inhaler 5    lisinopril (ZESTRIL) 20 mg tablet Take 1 tablet (20 mg total) by mouth daily 90 tablet 1     No current facility-administered medications for this visit  Current Outpatient Medications on File Prior to Visit   Medication Sig    gabapentin (NEURONTIN) 300 mg capsule Take 1 capsule (300 mg total) by mouth 3 (three) times a day    norethindrone (AYGESTIN) 5 mg tablet TAKE 0 5 TABLETS (2 5 MG TOTAL) BY MOUTH DAILY    [DISCONTINUED] albuterol (PROVENTIL HFA,VENTOLIN HFA) 90 mcg/act inhaler 2 puffs po q 4-6 hours prn coughing, wheezing or shortness of breath    [DISCONTINUED] clindamycin (CLINDAGEL) 1 % gel Apply topically 2 (two) times a day    [DISCONTINUED] hydrOXYzine pamoate (VISTARIL) 25 mg capsule Take 1 capsule (25 mg total) by mouth 3 (three) times a day as needed for itching    [DISCONTINUED] ibuprofen (MOTRIN) 800 mg tablet Take 1 tablet (800 mg total) by mouth every 6 (six) hours as needed for mild pain    [DISCONTINUED] nystatin-triamcinolone (MYCOLOG-II) ointment Apply topically 2 (two) times a day    [DISCONTINUED] omeprazole (PriLOSEC) 20 mg delayed release capsule Take 2 capsules (40 mg total) by mouth 2 (two) times a day    [DISCONTINUED] QUEtiapine (SEROquel) 25 mg tablet TAKE 1 TABLET BY MOUTH DAILY AT BEDTIME    [DISCONTINUED] ranitidine (ZANTAC) 300 MG tablet TAKE 1 TABLET (300 MG TOTAL) BY MOUTH DAILY AT BEDTIME    [DISCONTINUED] sertraline (ZOLOFT) 100 mg tablet TAKE 1 TABLET DAILY WITH 50MG   [DISCONTINUED] sertraline (ZOLOFT) 50 mg tablet TAKE 1 TABLET BY MOUTH EVERY DAY    [DISCONTINUED] amLODIPine (NORVASC) 5 mg tablet Take 1 tablet (5 mg total) by mouth daily (Patient not taking: Reported on 11/29/2019)    [DISCONTINUED] hydrOXYzine pamoate (VISTARIL) 25 mg capsule TAKE 1 CAPSULE (25 MG TOTAL) BY MOUTH 3 (THREE) TIMES A DAY AS NEEDED FOR ITCHING     No current facility-administered medications on file prior to visit  She is allergic to eggs or egg-derived products       Review of Systems   Constitutional: Positive for appetite change, fatigue and unexpected weight change  Respiratory: Positive for cough  Cardiovascular: Positive for leg swelling  Negative for chest pain  Gastrointestinal: Positive for abdominal pain and nausea  Negative for vomiting  Musculoskeletal:        As stated in HPI   Neurological:        As stated in HPI   Psychiatric/Behavioral:        As stated in HPI         Objective:      /90 (BP Location: Left arm, Patient Position: Sitting, Cuff Size: Adult)   Pulse (!) 106   Temp 98 3 °F (36 8 °C) (Tympanic)   Resp 16   Ht 5' (1 524 m)   Wt 91 2 kg (201 lb)   SpO2 95%   BMI 39 26 kg/m²          Physical Exam   Constitutional: She appears well-developed and well-nourished  No distress  HENT:   Head: Normocephalic and atraumatic  Right Ear: External ear normal    Left Ear: External ear normal    Mouth/Throat: Oropharynx is clear and moist  No oropharyngeal exudate  Neck: Neck supple  Cardiovascular: Normal rate, regular rhythm and normal heart sounds  No murmur heard  Pulmonary/Chest: Effort normal and breath sounds normal  No respiratory distress  She has no wheezes  She has no rales  Abdominal: Soft  Bowel sounds are normal  There is no tenderness  Musculoskeletal: She exhibits no edema  Lymphadenopathy:     She has no cervical adenopathy  Neurological: She is alert  Skin: Skin is warm

## 2019-12-01 DIAGNOSIS — Z87.42 HISTORY OF DYSMENORRHEA: ICD-10-CM

## 2019-12-03 DIAGNOSIS — J45.40 MODERATE PERSISTENT ASTHMA WITHOUT COMPLICATION: ICD-10-CM

## 2019-12-03 DIAGNOSIS — F41.9 ANXIETY AND DEPRESSION: ICD-10-CM

## 2019-12-03 DIAGNOSIS — G63 NEUROPATHY ASSOCIATED WITH POLYNEUROPATHY, ORGANOMEGALY, ENDOCRINOPATHY, MONOCLONAL GAMMOPATHY, AND SKIN CHANGES SYNDROME (HCC): ICD-10-CM

## 2019-12-03 DIAGNOSIS — F32.A ANXIETY AND DEPRESSION: ICD-10-CM

## 2019-12-03 DIAGNOSIS — E88.09 NEUROPATHY ASSOCIATED WITH POLYNEUROPATHY, ORGANOMEGALY, ENDOCRINOPATHY, MONOCLONAL GAMMOPATHY, AND SKIN CHANGES SYNDROME (HCC): ICD-10-CM

## 2019-12-03 RX ORDER — QUETIAPINE FUMARATE 50 MG/1
25 TABLET, FILM COATED ORAL
Qty: 90 TABLET | Refills: 0 | Status: CANCELLED | OUTPATIENT
Start: 2019-12-03

## 2019-12-03 NOTE — TELEPHONE ENCOUNTER
Please see attached message to script  Pt last seen 11/29/19   Sent to [facility] to review and approve she will not be on breo as its to expensive

## 2019-12-05 RX ORDER — QUETIAPINE FUMARATE 50 MG/1
50 TABLET, FILM COATED ORAL
Qty: 90 TABLET | Refills: 3 | Status: SHIPPED | OUTPATIENT
Start: 2019-12-05 | End: 2020-03-09 | Stop reason: SDUPTHER

## 2019-12-05 RX ORDER — GABAPENTIN 300 MG/1
300 CAPSULE ORAL 3 TIMES DAILY
Qty: 90 CAPSULE | Refills: 3 | Status: SHIPPED | OUTPATIENT
Start: 2019-12-05 | End: 2020-03-09 | Stop reason: SDUPTHER

## 2019-12-05 NOTE — TELEPHONE ENCOUNTER
Please call her insurance to find out what corticosteroid inhalers are covered  I did not discontinue her gabapentin but it was filled 3 months ago by Dr wang, for only 1 month  I did send a prescription for 300 mg 3 times daily  Is she taking it as prescribed? I did change the dosage on the Seroquel to 50 mg once daily  I apologize because I did not change the dosage in 2 separate areas when it was readjusted

## 2019-12-06 RX ORDER — FLUTICASONE FUROATE AND VILANTEROL 100; 25 UG/1; UG/1
1 POWDER RESPIRATORY (INHALATION) DAILY
Qty: 1 INHALER | Refills: 0 | OUTPATIENT
Start: 2019-12-06

## 2019-12-06 NOTE — TELEPHONE ENCOUNTER
Pt is requesting Ibuprofen 800mg TID as that is what she has been on and she uses it with gabapentin to control her pain  Please advise

## 2019-12-06 NOTE — TELEPHONE ENCOUNTER
The ibuprofen 800 mg along with her high dose of Zoloft increases her risk for GI bleed  She really needs to see pain management for further treatment recommendations

## 2019-12-06 NOTE — TELEPHONE ENCOUNTER
I spoke with [facility] and Jeremiah Sow is covered by insurance, that is her copay  Pharmacist states they inform pt to call there insurance and check for a "cheaper" alternative if there is one  L/m for pt to call [facility] to see if she checked with her insurance

## 2019-12-06 NOTE — TELEPHONE ENCOUNTER
Pt states she will check with insurance re: breo alternative  Pt is aware Gabapentin 300mg tid  was sent to pharmacy and seroquel was updated to 50mg  Qd

## 2019-12-09 DIAGNOSIS — Z87.42 HISTORY OF DYSMENORRHEA: ICD-10-CM

## 2019-12-09 DIAGNOSIS — M25.561 CHRONIC PAIN OF BOTH KNEES: Primary | ICD-10-CM

## 2019-12-09 DIAGNOSIS — G89.29 CHRONIC PAIN OF BOTH KNEES: Primary | ICD-10-CM

## 2019-12-09 DIAGNOSIS — M25.562 CHRONIC PAIN OF BOTH KNEES: Primary | ICD-10-CM

## 2019-12-09 NOTE — TELEPHONE ENCOUNTER
Pt sent an email requested a doctor to doctor referral for pain management  She also said we needed to call office  I called [facility] at [phone] to discuss whatever they need  According to their office information given by patient was already sent to provider to review before scheduling her   I did put referral into her chart and pt is aware

## 2019-12-09 NOTE — TELEPHONE ENCOUNTER
[facility] the [facility] in [place]  Please correct and resend to [facility]  norethindrone (AYGESTIN) 5 mg tablet [    Patient Comment:  This was never filled because it was sent to the [facility] and not to [facility] in [place]     Preferred pharmacy: [id number]

## 2019-12-10 NOTE — TELEPHONE ENCOUNTER
Please give her a phone number to establish with gynecology  Laura Isaac is [phone]  Her last Pap smear was done in January 2018    I will refill the medication over the next 1-2 months until she is able to establish with the gynecologist

## 2020-01-23 ENCOUNTER — HOSPITAL ENCOUNTER (EMERGENCY)
Facility: HOSPITAL | Age: 40
Discharge: HOME | End: 2020-01-23
Attending: EMERGENCY MEDICINE
Payer: COMMERCIAL

## 2020-01-23 ENCOUNTER — APPOINTMENT (EMERGENCY)
Dept: RADIOLOGY | Facility: HOSPITAL | Age: 40
End: 2020-01-23
Payer: COMMERCIAL

## 2020-01-23 VITALS
TEMPERATURE: 98 F | BODY MASS INDEX: 39.06 KG/M2 | DIASTOLIC BLOOD PRESSURE: 87 MMHG | RESPIRATION RATE: 18 BRPM | WEIGHT: 200 LBS | SYSTOLIC BLOOD PRESSURE: 144 MMHG | OXYGEN SATURATION: 99 % | HEART RATE: 80 BPM

## 2020-01-23 DIAGNOSIS — N20.0 RENAL CALCULI: ICD-10-CM

## 2020-01-23 DIAGNOSIS — R11.10 VOMITING: ICD-10-CM

## 2020-01-23 DIAGNOSIS — R10.9 ABDOMINAL PAIN: Primary | ICD-10-CM

## 2020-01-23 DIAGNOSIS — R11.0 NAUSEA: ICD-10-CM

## 2020-01-23 DIAGNOSIS — K44.9 HIATAL HERNIA: ICD-10-CM

## 2020-01-23 LAB
ALBUMIN SERPL BCP-MCNC: 4.1 G/DL (ref 3.5–5)
ALP SERPL-CCNC: 85 U/L (ref 46–116)
ALT SERPL W P-5'-P-CCNC: 29 U/L (ref 12–78)
ANION GAP SERPL CALCULATED.3IONS-SCNC: 7 MMOL/L (ref 4–13)
AST SERPL W P-5'-P-CCNC: 16 U/L (ref 5–45)
BACTERIA UR QL AUTO: ABNORMAL /HPF
BASOPHILS # BLD AUTO: 0.02 THOUSANDS/ΜL (ref 0–0.1)
BASOPHILS NFR BLD AUTO: 0 % (ref 0–1)
BILIRUB SERPL-MCNC: 0.58 MG/DL (ref 0.2–1)
BILIRUB UR QL STRIP: NEGATIVE
BUN SERPL-MCNC: 17 MG/DL (ref 5–25)
CALCIUM SERPL-MCNC: 10 MG/DL (ref 8.3–10.1)
CHLORIDE SERPL-SCNC: 103 MMOL/L (ref 100–108)
CLARITY UR: CLEAR
CO2 SERPL-SCNC: 26 MMOL/L (ref 21–32)
COLOR UR: YELLOW
COLOR, POC: NORMAL
CREAT SERPL-MCNC: 1 MG/DL (ref 0.6–1.3)
EOSINOPHIL # BLD AUTO: 0.01 THOUSAND/ΜL (ref 0–0.61)
EOSINOPHIL NFR BLD AUTO: 0 % (ref 0–6)
ERYTHROCYTE [DISTWIDTH] IN BLOOD BY AUTOMATED COUNT: 15 % (ref 11.6–15.1)
EXT PREG TEST URINE: NEGATIVE
EXT. CONTROL ED NAV: NORMAL
GFR SERPL CREATININE-BSD FRML MDRD: 70 ML/MIN/1.73SQ M
GLUCOSE SERPL-MCNC: 116 MG/DL (ref 65–140)
GLUCOSE UR STRIP-MCNC: NEGATIVE MG/DL
HCT VFR BLD AUTO: 38.9 % (ref 34.8–46.1)
HGB BLD-MCNC: 13.1 G/DL (ref 11.5–15.4)
HGB UR QL STRIP.AUTO: ABNORMAL
HYALINE CASTS #/AREA URNS LPF: ABNORMAL /LPF
IMM GRANULOCYTES # BLD AUTO: 0.05 THOUSAND/UL (ref 0–0.2)
IMM GRANULOCYTES NFR BLD AUTO: 1 % (ref 0–2)
KETONES UR STRIP-MCNC: NEGATIVE MG/DL
LEUKOCYTE ESTERASE UR QL STRIP: NEGATIVE
LIPASE SERPL-CCNC: 303 U/L (ref 73–393)
LYMPHOCYTES # BLD AUTO: 2.45 THOUSANDS/ΜL (ref 0.6–4.47)
LYMPHOCYTES NFR BLD AUTO: 24 % (ref 14–44)
MCH RBC QN AUTO: 31 PG (ref 26.8–34.3)
MCHC RBC AUTO-ENTMCNC: 33.7 G/DL (ref 31.4–37.4)
MCV RBC AUTO: 92 FL (ref 82–98)
MONOCYTES # BLD AUTO: 1.39 THOUSAND/ΜL (ref 0.17–1.22)
MONOCYTES NFR BLD AUTO: 13 % (ref 4–12)
NEUTROPHILS # BLD AUTO: 6.49 THOUSANDS/ΜL (ref 1.85–7.62)
NEUTS SEG NFR BLD AUTO: 62 % (ref 43–75)
NITRITE UR QL STRIP: NEGATIVE
NON-SQ EPI CELLS URNS QL MICRO: ABNORMAL /HPF
NRBC BLD AUTO-RTO: 0 /100 WBCS
PH UR STRIP.AUTO: 7 [PH] (ref 4.5–8)
PLATELET # BLD AUTO: 249 THOUSANDS/UL (ref 149–390)
PMV BLD AUTO: 10.3 FL (ref 8.9–12.7)
POTASSIUM SERPL-SCNC: 3.7 MMOL/L (ref 3.5–5.3)
PROT SERPL-MCNC: 8.5 G/DL (ref 6.4–8.2)
PROT UR STRIP-MCNC: NEGATIVE MG/DL
RBC # BLD AUTO: 4.22 MILLION/UL (ref 3.81–5.12)
RBC #/AREA URNS AUTO: ABNORMAL /HPF
SODIUM SERPL-SCNC: 136 MMOL/L (ref 136–145)
SP GR UR STRIP.AUTO: 1.01 (ref 1–1.03)
UROBILINOGEN UR QL STRIP.AUTO: 0.2 E.U./DL
WBC # BLD AUTO: 10.41 THOUSAND/UL (ref 4.31–10.16)
WBC #/AREA URNS AUTO: ABNORMAL /HPF

## 2020-01-23 PROCEDURE — 96375 TX/PRO/DX INJ NEW DRUG ADDON: CPT

## 2020-01-23 PROCEDURE — 83690 ASSAY OF LIPASE: CPT | Performed by: EMERGENCY MEDICINE

## 2020-01-23 PROCEDURE — 85025 COMPLETE CBC W/AUTO DIFF WBC: CPT | Performed by: EMERGENCY MEDICINE

## 2020-01-23 PROCEDURE — 99284 EMERGENCY DEPT VISIT MOD MDM: CPT

## 2020-01-23 PROCEDURE — 81001 URINALYSIS AUTO W/SCOPE: CPT

## 2020-01-23 PROCEDURE — 36415 COLL VENOUS BLD VENIPUNCTURE: CPT | Performed by: EMERGENCY MEDICINE

## 2020-01-23 PROCEDURE — 96361 HYDRATE IV INFUSION ADD-ON: CPT

## 2020-01-23 PROCEDURE — 96374 THER/PROPH/DIAG INJ IV PUSH: CPT

## 2020-01-23 PROCEDURE — 74177 CT ABD & PELVIS W/CONTRAST: CPT

## 2020-01-23 PROCEDURE — 80053 COMPREHEN METABOLIC PANEL: CPT | Performed by: EMERGENCY MEDICINE

## 2020-01-23 PROCEDURE — 81025 URINE PREGNANCY TEST: CPT | Performed by: EMERGENCY MEDICINE

## 2020-01-23 PROCEDURE — 99284 EMERGENCY DEPT VISIT MOD MDM: CPT | Performed by: EMERGENCY MEDICINE

## 2020-01-23 RX ORDER — ONDANSETRON 2 MG/ML
1 INJECTION INTRAMUSCULAR; INTRAVENOUS ONCE
Status: COMPLETED | OUTPATIENT
Start: 2020-01-23 | End: 2020-01-23

## 2020-01-23 RX ORDER — ONDANSETRON 2 MG/ML
4 INJECTION INTRAMUSCULAR; INTRAVENOUS ONCE
Status: COMPLETED | OUTPATIENT
Start: 2020-01-23 | End: 2020-01-23

## 2020-01-23 RX ORDER — METOCLOPRAMIDE HYDROCHLORIDE 5 MG/ML
10 INJECTION INTRAMUSCULAR; INTRAVENOUS ONCE
Status: COMPLETED | OUTPATIENT
Start: 2020-01-23 | End: 2020-01-23

## 2020-01-23 RX ORDER — ONDANSETRON 4 MG/1
4 TABLET, FILM COATED ORAL EVERY 6 HOURS
Qty: 12 TABLET | Refills: 0 | OUTPATIENT
Start: 2020-01-23 | End: 2020-01-25

## 2020-01-23 RX ADMIN — IOHEXOL 100 ML: 350 INJECTION, SOLUTION INTRAVENOUS at 21:49

## 2020-01-23 RX ADMIN — ONDANSETRON 4 MG: 2 INJECTION INTRAMUSCULAR; INTRAVENOUS at 22:50

## 2020-01-23 RX ADMIN — SODIUM CHLORIDE 1000 ML: 0.9 INJECTION, SOLUTION INTRAVENOUS at 20:26

## 2020-01-23 RX ADMIN — METOCLOPRAMIDE 10 MG: 5 INJECTION, SOLUTION INTRAMUSCULAR; INTRAVENOUS at 20:26

## 2020-01-24 NOTE — ED NOTES
Patient transported to [facility], [address] [place] [address]  01/23/20 [id number]

## 2020-01-24 NOTE — ED ATTENDING ATTESTATION
1/23/2020  Danielle Alves DO, saw and evaluated the patient  I have discussed the patient with the resident/non-physician practitioner and agree with the resident's/non-physician practitioner's findings, Plan of Care, and MDM as documented in the resident's/non-physician practitioner's note, except where noted  All available labs and Radiology studies were reviewed  I was present for key portions of any procedure(s) performed by the resident/non-physician practitioner and I was immediately available to provide assistance  At this point I agree with the current assessment done in the Emergency Department  I have conducted an independent evaluation of this patient a history and physical is as follows:    Patient presents via EMS for complaint of sudden onset of epigastric abdominal pain associated with nausea and intractable vomiting since 0300 hours 2 days ago  Symptoms awoke her from sleep  She has been unable to keep down food or drink or her medications since this began  She has not taken anything for the pain  She reports a h/o GERD for which she thinks she takes Prilosec and Pepcid  Patient is tearful and unable to think clearly" rendering her a poor historian at this time  She denies of hematemesis  She has not had any diarrhea, measured or sensed fever or other systemic symptoms  She has had no prior abdominal surgeries and no h/o acute pancreatitis, biliary dyskinesia or ovarian cysts  Her last bowel movement was 2 days ago and is described as normal   She did receive 4 mg of Zofran from EMS without relief  No hematochezia, melena or hematemesis  No change in symptoms w/BM or voiding  No recent travel or similar sick contacts  ROS: Denies f/c, CP, SOB, diarrhea or dysuria  12 system ROS o/w negative       PE: NAD, appears mildly uncomfortable, alert, crying; PERRL, EOMI; MMM, no posterior oropharyngeal exudate, edema or erythema; HRR, no murmur; lungs CTA w/o w/r/r, POx 99% on RA (nl); abdomen s/nd, moderate epigastric TTP w/o r/g/r or other peritoneal signs, nl BS in all 4 quadrants, obese; (-) LE edema, FROM extremities x4; skin p/w/d; CNs appear GI/NF, oriented  DDx: Abdominal pain/n/v - gastritis/GERD/PUD/DUD, acute pancreatitis, biliary dyskinesia, less likely atypical cardiac presentation, doubt  etiology, acute appendicitis, bowel obstruction or ischemia  A/P: Will check CT a/p, abdominal labs, treat symptoms, reevaluate for further work up and disposition            ED Course         Critical Care Time  Procedures

## 2020-01-24 NOTE — ED PROVIDER NOTES
History  Chief Complaint   Patient presents with    Nausea     N+V x 2 days  4mg zofran IVP admin in field without improvement in pt condition     37year old female with hx HTN, kidney stones, presents to ED for nausea and vomiting  Patient says that she has had nausea and vomiting since yesterday  She says she has vomited 10 times today, nonbloody, nonbilious  She has also had periumbilical pain since yesterday, sharp, nonradiating, 8/10 severity  Says she has not tolerated a oral intake today  Reports subjective fevers  She denies chest pain, shortness of breath, diaphoresis, diarrhea, constipation, urinary symptoms, vaginal symptoms, any other symptoms or complaints  No history of abdominal surgeries  No history of obstruction  Prior to Admission Medications   Prescriptions Last Dose Informant Patient Reported? Taking?    Albuterol Sulfate (PROAIR RESPICLICK) 129 (90 Base) MCG/ACT AEPB   No No   Si inhalations every 6 hours as needed   QUEtiapine (SEROquel) 50 mg tablet   No No   Sig: Take 1 tablet (50 mg total) by mouth daily at bedtime   famotidine (PEPCID) 20 mg tablet   No No   Sig: Take 1 tablet (20 mg total) by mouth daily   fluticasone-vilanterol (BREO ELLIPTA) 100-25 mcg/inh inhaler   No No   Sig: Inhale 1 puff daily Rinse mouth after use    gabapentin (NEURONTIN) 300 mg capsule   No No   Sig: Take 1 capsule (300 mg total) by mouth 3 (three) times a day   hydrOXYzine pamoate (VISTARIL) 25 mg capsule   No No   Sig: Take 1 capsule (25 mg total) by mouth 3 (three) times a day as needed for anxiety   lisinopril (ZESTRIL) 20 mg tablet   No No   Sig: Take 1 tablet (20 mg total) by mouth daily   norethindrone (AYGESTIN) 5 mg tablet   No No   Sig: Take 0 5 tablets (2 5 mg total) by mouth daily   omeprazole (PriLOSEC) 20 mg delayed release capsule   No No   Sig: Take 1 capsule (20 mg total) by mouth daily   sertraline (ZOLOFT) 100 mg tablet   No No   Sig: Take 1 tablet daily   sertraline (ZOLOFT) 50 mg tablet   No No   Sig: Take 1 tablet (50 mg total) by mouth daily      Facility-Administered Medications: None       Past Medical History:   Diagnosis Date    Anxiety     Arthritis     OA  b/l knees    Depression     Hx of bleeding disorder     dysmennorrhea-dx lap today 2016    Hypertension     Kidney stone     Seasonal allergies        Past Surgical History:   Procedure Laterality Date     SECTION      DIAGNOSTIC LAPAROSCOPY      DILATION AND CURETTAGE OF UTERUS      AR COLONOSCOPY FLX DX W/COLLJ SPEC WHEN PFRMD N/A 2018    Procedure: EGD AND COLONOSCOPY;  Surgeon: Noam Martinez MD;  Location: [facility]; Service: Gastroenterology    AR LAP,DIAGNOSTIC ABDOMEN N/A 2016    Procedure: LAPAROSCOPY DIAGNOSTIC DAVID;  Surgeon: Eduardo Oliveira DO;  Location: [facility];  Service: Gynecology    AR LAP,RMV  ADNEXAL STRUCTURE Bilateral 2016    Procedure: CYSTECTOMY  OVARIAN;  Surgeon: Eduardo Oliveira DO;  Location: [facility];  Service: Gynecology    WISDOM TOOTH EXTRACTION         History reviewed  No pertinent family history  I have reviewed and agree with the history as documented  Social History     Tobacco Use    Smoking status: Current Every Day Smoker     Packs/day: 1 00     Years: 23 00     Pack years: 23 00    Smokeless tobacco: Never Used   Substance Use Topics    Alcohol use: No    Drug use: Yes     Types: Marijuana        Review of Systems   Constitutional: Positive for chills  HENT: Negative  Negative for rhinorrhea  Eyes: Negative  Respiratory: Negative  Negative for cough and shortness of breath  Cardiovascular: Negative  Negative for chest pain and leg swelling  Gastrointestinal: Positive for abdominal pain, nausea and vomiting  Negative for diarrhea  Genitourinary: Negative  Negative for dysuria, flank pain and frequency  Musculoskeletal: Negative  Negative for back pain and neck pain  Skin: Negative    Negative for rash  Neurological: Negative  Negative for light-headedness and headaches  All other systems reviewed and are negative  Physical Exam  ED Triage Vitals [01/23/20 2016]   Temperature Pulse Respirations Blood Pressure SpO2   98 °F (36 7 °C) 80 18 144/87 99 %      Temp Source Heart Rate Source Patient Position - Orthostatic VS BP Location FiO2 (%)   Oral Monitor Sitting Right arm --      Pain Score       No Pain             Orthostatic Vital Signs  Vitals:    01/23/20 2016   BP: 144/87   Pulse: 80   Patient Position - Orthostatic VS: Sitting       Physical Exam   Constitutional: She is oriented to person, place, and time  She appears well-developed and well-nourished  No distress  HENT:   Head: Normocephalic and atraumatic  Mouth/Throat: Oropharynx is clear and moist    Eyes: EOM are normal    Neck: Normal range of motion  Neck supple  Cardiovascular: Normal rate, regular rhythm, normal heart sounds and intact distal pulses  Exam reveals no gallop and no friction rub  No murmur heard  Pulmonary/Chest: Effort normal and breath sounds normal  No respiratory distress  She has no wheezes  She has no rales  Abdominal: Soft  There is tenderness in the periumbilical area  There is no rigidity, no rebound, no guarding and no CVA tenderness  Musculoskeletal: She exhibits no edema or tenderness  Neurological: She is alert and oriented to person, place, and time  No cranial nerve deficit  Clear fluent speech   Skin: Skin is warm and dry  Capillary refill takes less than 2 seconds  Psychiatric: She has a normal mood and affect  Nursing note and vitals reviewed        ED Medications  Medications   ondansetron (FOR EMS ONLY) (ZOFRAN) 4 mg/2 mL injection 4 mg (0 mg Does not apply Given to EMS 1/23/20 2022)   sodium chloride 0 9 % bolus 1,000 mL (0 mL Intravenous Stopped 1/23/20 2148)   metoclopramide (REGLAN) injection 10 mg (10 mg Intravenous Given 1/23/20 2026)   iohexol (OMNIPAQUE) 350 MG/ML injection (MULTI-DOSE) 100 mL (100 mL Intravenous Given 1/23/20 2149)   ondansetron (ZOFRAN) injection 4 mg (4 mg Intravenous Given 1/23/20 2250)       Diagnostic Studies  Results Reviewed     Procedure Component Value Units Date/Time    Urine Microscopic [[id number]]  (Abnormal) Collected:  01/23/20 2257    Lab Status:  Final result Specimen:  Urine, Clean Catch Updated:  01/23/20 2346     RBC, UA None Seen /hpf      WBC, UA 2-4 /hpf      Epithelial Cells None Seen /hpf      Bacteria, UA Occasional /hpf      Hyaline Casts, UA None Seen /lpf     POCT pregnancy, urine [[id number]]  (Normal) Resulted:  01/23/20 2300    Lab Status:  Final result Updated:  01/23/20 2300     EXT PREG TEST UR (Ref: Negative) negative     Control valid    POCT urinalysis dipstick [[id number]]  (Normal) Resulted:  01/23/20 2259    Lab Status:  Final result Updated:  01/23/20 2300     Color, UA see results    Urine Macroscopic, POC [[id number]]  (Abnormal) Collected:  01/23/20 2257    Lab Status:  Final result Specimen:  Urine Updated:  01/23/20 2255     Color, UA Yellow     Clarity, UA Clear     pH, UA 7 0     Leukocytes, UA Negative     Nitrite, UA Negative     Protein, UA Negative mg/dl      Glucose, UA Negative mg/dl      Ketones, UA Negative mg/dl      Urobilinogen, UA 0 2 E U /dl      Bilirubin, UA Negative     Blood, UA Trace     Specific [place], UA 1 010    Narrative:       CLINITEK RESULT    CMP [[id number]]  (Abnormal) Collected:  01/23/20 2026    Lab Status:  Final result Specimen:  Blood from Arm, Right Updated:  01/23/20 2118     Sodium 136 mmol/L      Potassium 3 7 mmol/L      Chloride 103 mmol/L      CO2 26 mmol/L      ANION GAP 7 mmol/L      BUN 17 mg/dL      Creatinine 1 00 mg/dL      Glucose 116 mg/dL      Calcium 10 0 mg/dL      AST 16 U/L      ALT 29 U/L      Alkaline Phosphatase 85 U/L      Total Protein 8 5 g/dL      Albumin 4 1 g/dL      Total Bilirubin 0 58 mg/dL      eGFR 70 ml/min/1 73sq m     Narrative:       Consolidated [employer] guidelines for Chronic Kidney Disease (CKD):     Stage 1 with normal or high GFR (GFR > 90 mL/min/1 73 square meters)    Stage 2 Mild CKD (GFR = 60-89 mL/min/1 73 square meters)    Stage 3A Moderate CKD (GFR = 45-59 mL/min/1 73 square meters)    Stage 3B Moderate CKD (GFR = 30-44 mL/min/1 73 square meters)    Stage 4 Severe CKD (GFR = 15-29 mL/min/1 73 square meters)    Stage 5 End Stage CKD (GFR <15 mL/min/1 73 square meters)  Note: GFR calculation is accurate only with a steady state creatinine    Lipase [[id number]]  (Normal) Collected:  01/23/20 2026    Lab Status:  Final result Specimen:  Blood from Arm, Right Updated:  01/23/20 2118     Lipase 303 u/L     CBC and differential [[id number]]  (Abnormal) Collected:  01/23/20 2026    Lab Status:  Final result Specimen:  Blood from Arm, Right Updated:  01/23/20 2042     WBC 10 41 Thousand/uL      RBC 4 22 Million/uL      Hemoglobin 13 1 g/dL      Hematocrit 38 9 %      MCV 92 fL      MCH 31 0 pg      MCHC 33 7 g/dL      RDW 15 0 %      MPV 10 3 fL      Platelets 745 Thousands/uL      nRBC 0 /100 WBCs      Neutrophils Relative 62 %      Immat GRANS % 1 %      Lymphocytes Relative 24 %      Monocytes Relative 13 %      Eosinophils Relative 0 %      Basophils Relative 0 %      Neutrophils Absolute 6 49 Thousands/µL      Immature Grans Absolute 0 05 Thousand/uL      Lymphocytes Absolute 2 45 Thousands/µL      Monocytes Absolute 1 39 Thousand/µL      Eosinophils Absolute 0 01 Thousand/µL      Basophils Absolute 0 02 Thousands/µL                  CT abdomen pelvis with contrast   Final Result by Osei Waller MD (01/23 2237)         1  No evidence of pyelonephritis or obstructive uropathy  Left renal nonobstructing 2 mm calculus  2   No evidence of bowel obstruction, colitis, diverticulitis or appendicitis  3   Small hiatal hernia  Suggestion of mild esophagitis at the gastroesophageal junction              Workstation performed: [id number]               Procedures  Procedures      ED Course                               MDM  Number of Diagnoses or Management Options  Abdominal pain:   Hiatal hernia:   Nausea:   Renal calculi:   Vomiting:   Diagnosis management comments: Patient presenting for periumbilical abdominal pain and nausea and vomiting  Within the differential consider gastritis, gastroenteritis, obstruction, appendicitis, pancreatitis, pregnancy related etiology  Will obtain abdominal labs and CT to evaluate  Final assessment: CT shows hiatal hernia, which patient already knew about, as well as 2 mm renal non obstructing stone  Workup otherwise reassuring  Discussed these findings with patient  Patient is asymptomatic on re-exam and requesting to return home Strict ED return precautions provided should symptoms worsen and patient can otherwise follow up outpatient  Patient expresses an understanding and agreement with the plan and remains in good condition for discharge          Amount and/or Complexity of Data Reviewed  Clinical lab tests: reviewed  Tests in the radiology section of CPT®: reviewed          Disposition  Final diagnoses:   Renal calculi   Nausea   Vomiting   Abdominal pain   Hiatal hernia     Time reflects when diagnosis was documented in both MDM as applicable and the Disposition within this note     Time User Action Codes Description Comment    1/23/2020 10:55 PM Minor, Manuela Add [N20 0] Renal calculi     1/23/2020 10:55 PM Minor, Manuela Add [R11 0] Nausea     1/23/2020 10:55 PM Minor, Manuela Add [R11 10] Vomiting     1/23/2020 10:55 PM Minor, Manuela Add [R10 9] Abdominal pain     1/23/2020 10:55 PM Minor, Manuela Modify [N20 0] Renal calculi     1/23/2020 10:55 PM Minor, Manuela Modify [R10 9] Abdominal pain     1/23/2020 10:55 PM Minor, Manuela Add [K44 9] Hiatal hernia       ED Disposition     ED Disposition Condition Date/Time Comment    Discharge Stable Thu Jan 23, 2020 11:28 PM Melia Gonzalez discharge to home/self care              Follow-up Information     Follow up With Specialties Details Why Contact Info Additional Information    Mickie Cheema PA-C Family Medicine, Physician Assistant Call in 1 day To make an appointment [place] Emergency Department Emergency Medicine Go to  If symptoms worsen [address]  [phone]  ED, [address], [address]   [phone]          Discharge Medication List as of 1/23/2020 11:28 PM      CONTINUE these medications which have NOT CHANGED    Details   Albuterol Sulfate (PROAIR RESPICLICK) 158 (90 Base) MCG/ACT AEPB 2 inhalations every 6 hours as needed, Normal      famotidine (PEPCID) 20 mg tablet Take 1 tablet (20 mg total) by mouth daily, Starting Fri 11/29/2019, Normal      fluticasone-vilanterol (BREO ELLIPTA) 100-25 mcg/inh inhaler Inhale 1 puff daily Rinse mouth after use , Starting Fri 11/29/2019, Normal      gabapentin (NEURONTIN) 300 mg capsule Take 1 capsule (300 mg total) by mouth 3 (three) times a day, Starting Thu 12/5/2019, Normal      hydrOXYzine pamoate (VISTARIL) 25 mg capsule Take 1 capsule (25 mg total) by mouth 3 (three) times a day as needed for anxiety, Starting Fri 11/29/2019, Normal      lisinopril (ZESTRIL) 20 mg tablet Take 1 tablet (20 mg total) by mouth daily, Starting Fri 11/29/2019, Normal      norethindrone (AYGESTIN) 5 mg tablet Take 0 5 tablets (2 5 mg total) by mouth daily, Starting Tue 12/10/2019, Normal      omeprazole (PriLOSEC) 20 mg delayed release capsule Take 1 capsule (20 mg total) by mouth daily, Starting Fri 11/29/2019, Normal      QUEtiapine (SEROquel) 50 mg tablet Take 1 tablet (50 mg total) by mouth daily at bedtime, Starting Thu 12/5/2019, Normal      !! sertraline (ZOLOFT) 100 mg tablet Take 1 tablet daily, Normal      !! sertraline (ZOLOFT) 50 mg tablet Take 1 tablet (50 mg total) by mouth daily, Starting Fri 11/29/2019, Normal       !! - Potential duplicate medications found  Please discuss with provider  No discharge procedures on file  ED Provider  Attending physically available and evaluated Alesia Jensen  I managed the patient along with the ED Attending      Electronically Signed by         Efrain Lennox, MD  01/26/20 [id number] Jong Gusman MD  01/26/20 2970

## 2020-01-25 ENCOUNTER — HOSPITAL ENCOUNTER (EMERGENCY)
Facility: HOSPITAL | Age: 40
Discharge: HOME | End: 2020-01-25
Attending: EMERGENCY MEDICINE | Admitting: EMERGENCY MEDICINE
Payer: COMMERCIAL

## 2020-01-25 VITALS
TEMPERATURE: 97.7 F | OXYGEN SATURATION: 98 % | HEART RATE: 72 BPM | DIASTOLIC BLOOD PRESSURE: 67 MMHG | SYSTOLIC BLOOD PRESSURE: 123 MMHG | RESPIRATION RATE: 16 BRPM

## 2020-01-25 DIAGNOSIS — R11.2 NON-INTRACTABLE VOMITING WITH NAUSEA, UNSPECIFIED VOMITING TYPE: Primary | ICD-10-CM

## 2020-01-25 LAB
ALBUMIN SERPL BCP-MCNC: 4.6 G/DL (ref 3.5–5)
ALP SERPL-CCNC: 101 U/L (ref 46–116)
ALT SERPL W P-5'-P-CCNC: 39 U/L (ref 12–78)
ANION GAP SERPL CALCULATED.3IONS-SCNC: 13 MMOL/L (ref 4–13)
AST SERPL W P-5'-P-CCNC: 27 U/L (ref 5–45)
BACTERIA UR QL AUTO: ABNORMAL /HPF
BASOPHILS # BLD AUTO: 0.02 THOUSANDS/ΜL (ref 0–0.1)
BASOPHILS NFR BLD AUTO: 0 % (ref 0–1)
BILIRUB SERPL-MCNC: 0.79 MG/DL (ref 0.2–1)
BILIRUB UR QL STRIP: ABNORMAL
BUN SERPL-MCNC: 13 MG/DL (ref 5–25)
CALCIUM SERPL-MCNC: 10.2 MG/DL (ref 8.3–10.1)
CHLORIDE SERPL-SCNC: 97 MMOL/L (ref 100–108)
CLARITY UR: CLEAR
CO2 SERPL-SCNC: 27 MMOL/L (ref 21–32)
COLOR UR: YELLOW
COLOR, POC: YELLOW
CREAT SERPL-MCNC: 1 MG/DL (ref 0.6–1.3)
EOSINOPHIL # BLD AUTO: 0.06 THOUSAND/ΜL (ref 0–0.61)
EOSINOPHIL NFR BLD AUTO: 1 % (ref 0–6)
ERYTHROCYTE [DISTWIDTH] IN BLOOD BY AUTOMATED COUNT: 14.5 % (ref 11.6–15.1)
GFR SERPL CREATININE-BSD FRML MDRD: 70 ML/MIN/1.73SQ M
GLUCOSE SERPL-MCNC: 106 MG/DL (ref 65–140)
GLUCOSE UR STRIP-MCNC: NEGATIVE MG/DL
HCT VFR BLD AUTO: 46.4 % (ref 34.8–46.1)
HGB BLD-MCNC: 15.4 G/DL (ref 11.5–15.4)
HGB UR QL STRIP.AUTO: NEGATIVE
IMM GRANULOCYTES # BLD AUTO: 0.04 THOUSAND/UL (ref 0–0.2)
IMM GRANULOCYTES NFR BLD AUTO: 1 % (ref 0–2)
KETONES UR STRIP-MCNC: ABNORMAL MG/DL
LEUKOCYTE ESTERASE UR QL STRIP: NEGATIVE
LIPASE SERPL-CCNC: 533 U/L (ref 73–393)
LYMPHOCYTES # BLD AUTO: 2.74 THOUSANDS/ΜL (ref 0.6–4.47)
LYMPHOCYTES NFR BLD AUTO: 33 % (ref 14–44)
MCH RBC QN AUTO: 31.2 PG (ref 26.8–34.3)
MCHC RBC AUTO-ENTMCNC: 33.2 G/DL (ref 31.4–37.4)
MCV RBC AUTO: 94 FL (ref 82–98)
MONOCYTES # BLD AUTO: 1.13 THOUSAND/ΜL (ref 0.17–1.22)
MONOCYTES NFR BLD AUTO: 14 % (ref 4–12)
NEUTROPHILS # BLD AUTO: 4.4 THOUSANDS/ΜL (ref 1.85–7.62)
NEUTS SEG NFR BLD AUTO: 51 % (ref 43–75)
NITRITE UR QL STRIP: NEGATIVE
NON-SQ EPI CELLS URNS QL MICRO: ABNORMAL /HPF
NRBC BLD AUTO-RTO: 0 /100 WBCS
PH UR STRIP.AUTO: 6.5 [PH] (ref 4.5–8)
PLATELET # BLD AUTO: 308 THOUSANDS/UL (ref 149–390)
PMV BLD AUTO: 10.3 FL (ref 8.9–12.7)
POTASSIUM SERPL-SCNC: 3.4 MMOL/L (ref 3.5–5.3)
PROT SERPL-MCNC: 9.6 G/DL (ref 6.4–8.2)
PROT UR STRIP-MCNC: ABNORMAL MG/DL
RBC # BLD AUTO: 4.94 MILLION/UL (ref 3.81–5.12)
RBC #/AREA URNS AUTO: ABNORMAL /HPF
SODIUM SERPL-SCNC: 137 MMOL/L (ref 136–145)
SP GR UR STRIP.AUTO: >=1.03 (ref 1–1.03)
UROBILINOGEN UR QL STRIP.AUTO: 1 E.U./DL
WBC # BLD AUTO: 8.39 THOUSAND/UL (ref 4.31–10.16)
WBC #/AREA URNS AUTO: ABNORMAL /HPF

## 2020-01-25 PROCEDURE — 85025 COMPLETE CBC W/AUTO DIFF WBC: CPT | Performed by: EMERGENCY MEDICINE

## 2020-01-25 PROCEDURE — 36415 COLL VENOUS BLD VENIPUNCTURE: CPT | Performed by: EMERGENCY MEDICINE

## 2020-01-25 PROCEDURE — 99284 EMERGENCY DEPT VISIT MOD MDM: CPT | Performed by: EMERGENCY MEDICINE

## 2020-01-25 PROCEDURE — 99284 EMERGENCY DEPT VISIT MOD MDM: CPT

## 2020-01-25 PROCEDURE — 83690 ASSAY OF LIPASE: CPT | Performed by: EMERGENCY MEDICINE

## 2020-01-25 PROCEDURE — 81001 URINALYSIS AUTO W/SCOPE: CPT

## 2020-01-25 PROCEDURE — 96374 THER/PROPH/DIAG INJ IV PUSH: CPT

## 2020-01-25 PROCEDURE — 96361 HYDRATE IV INFUSION ADD-ON: CPT

## 2020-01-25 PROCEDURE — 80053 COMPREHEN METABOLIC PANEL: CPT | Performed by: EMERGENCY MEDICINE

## 2020-01-25 RX ORDER — ONDANSETRON 4 MG/1
4 TABLET, ORALLY DISINTEGRATING ORAL EVERY 8 HOURS PRN
Qty: 10 TABLET | Refills: 0 | Status: SHIPPED | OUTPATIENT
Start: 2020-01-25 | End: 2021-01-14

## 2020-01-25 RX ORDER — METOCLOPRAMIDE HYDROCHLORIDE 5 MG/ML
10 INJECTION INTRAMUSCULAR; INTRAVENOUS ONCE
Status: COMPLETED | OUTPATIENT
Start: 2020-01-25 | End: 2020-01-25

## 2020-01-25 RX ADMIN — METOCLOPRAMIDE 10 MG: 5 INJECTION, SOLUTION INTRAMUSCULAR; INTRAVENOUS at 12:02

## 2020-01-25 RX ADMIN — SODIUM CHLORIDE 1000 ML: 0.9 INJECTION, SOLUTION INTRAVENOUS at 12:02

## 2020-01-25 NOTE — ED PROVIDER NOTES
History  Chief Complaint   Patient presents with    Abdominal Pain     abdominal pain and vomiting , patient states "since they prescribed me that blood pressure medication"  denies blood or dark colored vomit     70-year-old female with a history of hypertension, GERD, asthma, neuropathy presents to the emergency department with ongoing nausea and vomiting  Patient was evaluated on January 23rd for this complaint  She had labs and a CT of her abdomen and pelvis  Her CT shows hiatal hernia and possible esophagitis  She states she was given Zofran which is not helping  She continues to have vomiting  She states she still has the epigastric pain, however it is improved  No fevers or diarrhea  Patient does admit to smoking marijuana on a daily basis for her neuropathic pain but believes this is not causing her symptoms  She believes it may be due to her multiple medications, although she has been on these medications for quite some time and no recent medication adjustment  History provided by:  Patient   used: No    Vomiting   Severity:  Unable to specify  Duration:  3 days  Timing:  Intermittent  Quality:  Stomach contents  Progression:  Unchanged  Chronicity:  New  Recent urination:  Normal  Relieved by:  Nothing  Worsened by:  Nothing  Ineffective treatments:  Antiemetics  Associated symptoms: abdominal pain    Associated symptoms: no arthralgias, no chills, no cough, no diarrhea, no fever, no headaches, no myalgias, no sore throat and no URI    Abdominal pain:     Location:  Epigastric    Quality: burning      Onset quality:  Gradual    Duration:  3 days    Timing:  Intermittent    Progression:  Improving  Risk factors: no alcohol use, no diabetes, not pregnant, no sick contacts, no suspect food intake and no travel to endemic areas        Prior to Admission Medications   Prescriptions Last Dose Informant Patient Reported? Taking?    Albuterol Sulfate (PROAIR RESPICLICK) 173 (54 Base) MCG/ACT AEPB   No Yes   Si inhalations every 6 hours as needed   QUEtiapine (SEROquel) 50 mg tablet   No Yes   Sig: Take 1 tablet (50 mg total) by mouth daily at bedtime   famotidine (PEPCID) 20 mg tablet   No Yes   Sig: Take 1 tablet (20 mg total) by mouth daily   fluticasone-vilanterol (BREO ELLIPTA) 100-25 mcg/inh inhaler   No Yes   Sig: Inhale 1 puff daily Rinse mouth after use    gabapentin (NEURONTIN) 300 mg capsule   No Yes   Sig: Take 1 capsule (300 mg total) by mouth 3 (three) times a day   hydrOXYzine pamoate (VISTARIL) 25 mg capsule   No Yes   Sig: Take 1 capsule (25 mg total) by mouth 3 (three) times a day as needed for anxiety   lisinopril (ZESTRIL) 20 mg tablet   No Yes   Sig: Take 1 tablet (20 mg total) by mouth daily   norethindrone (AYGESTIN) 5 mg tablet   No Yes   Sig: Take 0 5 tablets (2 5 mg total) by mouth daily   omeprazole (PriLOSEC) 20 mg delayed release capsule   No Yes   Sig: Take 1 capsule (20 mg total) by mouth daily   ondansetron (ZOFRAN) 4 mg tablet   No Yes   Sig: Take 1 tablet (4 mg total) by mouth every 6 (six) hours   sertraline (ZOLOFT) 100 mg tablet   No Yes   Sig: Take 1 tablet daily   sertraline (ZOLOFT) 50 mg tablet   No Yes   Sig: Take 1 tablet (50 mg total) by mouth daily      Facility-Administered Medications: None       Past Medical History:   Diagnosis Date    Anxiety     Arthritis     OA  b/l knees    Depression     Hx of bleeding disorder     dysmennorrhea-dx lap today 2016    Hypertension     Kidney stone     Seasonal allergies        Past Surgical History:   Procedure Laterality Date     SECTION      DIAGNOSTIC LAPAROSCOPY      DILATION AND CURETTAGE OF UTERUS      WA COLONOSCOPY FLX DX W/COLLJ SPEC WHEN PFRMD N/A 2018    Procedure: EGD AND COLONOSCOPY;  Surgeon: Margot Olson MD;  [facility];   Service: Gastroenterology    WA LAP,DIAGNOSTIC ABDOMEN N/A 2016    Procedure: LAPAROSCOPY DIAGNOSTIC DAVID;  Surgeon: Caren Raphael Jolly Albarran DO;  [facility];  Service: Gynecology    NM LAP,RMV  ADNEXAL STRUCTURE Bilateral 8/12/2016    Procedure: CYSTECTOMY  OVARIAN;  Surgeon: Mike Rios DO;  [facility];  Service: Gynecology    WISDOM TOOTH EXTRACTION         History reviewed  No pertinent family history  I have reviewed and agree with the history as documented  Social History     Tobacco Use    Smoking status: Current Every Day Smoker     Packs/day: 1 00     Years: 23 00     Pack years: 23 00    Smokeless tobacco: Never Used   Substance Use Topics    Alcohol use: No    Drug use: Yes     Types: Marijuana        Review of Systems   Constitutional: Negative  Negative for chills, diaphoresis, fatigue and fever  HENT: Negative  Negative for congestion, rhinorrhea and sore throat  Eyes: Negative  Negative for discharge, redness and itching  Respiratory: Negative  Negative for apnea, cough, chest tightness, shortness of breath and wheezing  Cardiovascular: Negative for chest pain, palpitations and leg swelling  Gastrointestinal: Positive for abdominal pain, nausea and vomiting  Negative for abdominal distention, blood in stool, constipation and diarrhea  Endocrine: Negative  Genitourinary: Negative  Negative for flank pain, frequency and urgency  Musculoskeletal: Negative  Negative for arthralgias, back pain and myalgias  Skin: Negative  Allergic/Immunologic: Negative  Neurological: Negative  Negative for dizziness, syncope, weakness, light-headedness, numbness and headaches  Hematological: Negative  All other systems reviewed and are negative  Physical Exam  Physical Exam   Constitutional: She is oriented to person, place, and time  She appears well-developed and well-nourished  Non-toxic appearance  She does not have a sickly appearance  She does not appear ill  No distress  HENT:   Head: Normocephalic and atraumatic     Right Ear: External ear normal    Left Ear: External ear normal    Mouth/Throat: Oropharynx is clear and moist  No oropharyngeal exudate  Eyes: Pupils are equal, round, and reactive to light  Conjunctivae are normal  Right eye exhibits no discharge  Left eye exhibits no discharge  No scleral icterus  Cardiovascular: Normal rate, regular rhythm and normal heart sounds  Exam reveals no gallop and no friction rub  No murmur heard  Pulmonary/Chest: Effort normal and breath sounds normal  No stridor  No respiratory distress  She has no wheezes  She has no rales  She exhibits no tenderness  Abdominal: Soft  Bowel sounds are normal  She exhibits no distension and no mass  There is no tenderness  No hernia  Neurological: She is alert and oriented to person, place, and time  She has normal reflexes  She exhibits normal muscle tone  Skin: Skin is warm and dry  No rash noted  She is not diaphoretic  No erythema  No pallor  Psychiatric: She has a normal mood and affect  Nursing note and vitals reviewed        Vital Signs  ED Triage Vitals [01/25/20 1146]   Temperature Pulse Respirations Blood Pressure SpO2   97 7 °F (36 5 °C) 83 18 126/92 100 %      Temp Source Heart Rate Source Patient Position - Orthostatic VS BP Location FiO2 (%)   Oral Monitor Sitting Left arm --      Pain Score       5           Vitals:    01/25/20 1146 01/25/20 1245   BP: 126/92 123/67   Pulse: 83 72   Patient Position - Orthostatic VS: Sitting Lying         Visual Acuity      ED Medications  Medications   sodium chloride 0 9 % bolus 1,000 mL (1,000 mL Intravenous New Bag 1/25/20 1202)   metoclopramide (REGLAN) injection 10 mg (10 mg Intravenous Given 1/25/20 1202)       Diagnostic Studies  Results Reviewed     Procedure Component Value Units Date/Time    Urine Microscopic [[id number]]  (Abnormal) Collected:  01/25/20 1219    Lab Status:  Final result Specimen:  Urine, Clean Catch Updated:  01/25/20 1242     RBC, UA 1-2 /hpf      WBC, UA 1-2 /hpf      Epithelial Cells Occasional /hpf      Bacteria, UA Occasional /hpf     Comprehensive metabolic panel [[id number]]  (Abnormal) Collected:  01/25/20 1200    Lab Status:  Final result Specimen:  Blood from Arm, Left Updated:  01/25/20 1227     Sodium 137 mmol/L      Potassium 3 4 mmol/L      Chloride 97 mmol/L      CO2 27 mmol/L      ANION GAP 13 mmol/L      BUN 13 mg/dL      Creatinine 1 00 mg/dL      Glucose 106 mg/dL      Calcium 10 2 mg/dL      AST 27 U/L      ALT 39 U/L      Alkaline Phosphatase 101 U/L      Total Protein 9 6 g/dL      Albumin 4 6 g/dL      Total Bilirubin 0 79 mg/dL      eGFR 70 ml/min/1 73sq m     Narrative:       National Kidney Disease Foundation guidelines for Chronic Kidney Disease (CKD):     Stage 1 with normal or high GFR (GFR > 90 mL/min/1 73 square meters)    Stage 2 Mild CKD (GFR = 60-89 mL/min/1 73 square meters)    Stage 3A Moderate CKD (GFR = 45-59 mL/min/1 73 square meters)    Stage 3B Moderate CKD (GFR = 30-44 mL/min/1 73 square meters)    Stage 4 Severe CKD (GFR = 15-29 mL/min/1 73 square meters)    Stage 5 End Stage CKD (GFR <15 mL/min/1 73 square meters)  Note: GFR calculation is accurate only with a steady state creatinine    Lipase [[id number]]  (Abnormal) Collected:  01/25/20 1200    Lab Status:  Final result Specimen:  Blood from Arm, Left Updated:  01/25/20 1227     Lipase 533 u/L     POCT urinalysis dipstick [[id number]]  (Abnormal) Resulted:  01/25/20 1223    Lab Status:  Final result Specimen:  Urine Updated:  01/25/20 1223     Color, UA yellow    Urine Macroscopic, POC [[id number]]  (Abnormal) Collected:  01/25/20 1219    Lab Status:  Final result Specimen:  Urine Updated:  01/25/20 1221     Color, UA Yellow     Clarity, UA Clear     pH, UA 6 5     Leukocytes, UA Negative     Nitrite, UA Negative     Protein, UA 30 (1+) mg/dl      Glucose, UA Negative mg/dl      Ketones, UA 15 (1+) mg/dl      Urobilinogen, UA 1 0 E U /dl      Bilirubin, UA Interference- unable to analyze     Blood, UA Negative Specific Gravity, UA >=1 030    Narrative:       CLINITEK RESULT    CBC and differential [[id number]]  (Abnormal) Collected:  01/25/20 1200    Lab Status:  Final result Specimen:  Blood from Arm, Left Updated:  01/25/20 1208     WBC 8 39 Thousand/uL      RBC 4 94 Million/uL      Hemoglobin 15 4 g/dL      Hematocrit 46 4 %      MCV 94 fL      MCH 31 2 pg      MCHC 33 2 g/dL      RDW 14 5 %      MPV 10 3 fL      Platelets 965 Thousands/uL      nRBC 0 /100 WBCs      Neutrophils Relative 51 %      Immat GRANS % 1 %      Lymphocytes Relative 33 %      Monocytes Relative 14 %      Eosinophils Relative 1 %      Basophils Relative 0 %      Neutrophils Absolute 4 40 Thousands/µL      Immature Grans Absolute 0 04 Thousand/uL      Lymphocytes Absolute 2 74 Thousands/µL      Monocytes Absolute 1 13 Thousand/µL      Eosinophils Absolute 0 06 Thousand/µL      Basophils Absolute 0 02 Thousands/µL                  No orders to display              Procedures  Procedures         ED Course  ED Course as of Jan 25 1307   Sat Jan 25, 2020   1305 Patient feeling much better  She does not want to try a p o  Challenge  She states she wants to go home  She is stable for discharge  MDM  Number of Diagnoses or Management Options  Diagnosis management comments: 61-year-old female presents with ongoing nausea and vomiting for the last 3 days  No known ill contacts, bad food exposure  She also complains of epigastric pain, although this is improving  She was seen at another ED on January 23rd and had a full workup including a CT of the abdomen and pelvis which showed hiatal hernia and esophagitis  She was given Zofran to take at home which she states is not helping  She denies fever or diarrhea  She does admit to smoking marijuana but she believes this is not the cause of her nausea and vomiting  She thinks this may have something to do with her medications    On exam she is alert in no acute distress  She has very mild epigastric tenderness on exam without peritoneal signs  She does not appear clinically dehydrated  I did review her medications and some of them can cause nausea and vomiting  Will recheck labs, give IV fluids, Reglan  Will reassess  Will try p o  Challenge       Amount and/or Complexity of Data Reviewed  Clinical lab tests: ordered and reviewed  Review and summarize past medical records: yes  Independent visualization of images, tracings, or specimens: yes          Disposition  Final diagnoses:   Non-intractable vomiting with nausea, unspecified vomiting type     Time reflects when diagnosis was documented in both MDM as applicable and the Disposition within this note     Time User Action Codes Description Comment    1/25/2020  1:06 PM Tank Askew Add [R11 2] Non-intractable vomiting with nausea, unspecified vomiting type       ED Disposition     ED Disposition Condition Date/Time Comment    Discharge Good Sat Jan 25, 2020  1:05 PM Melia Cardona discharge to home/self care  Follow-up Information     Follow up With Specialties Details Why Contact Info    Mickie Cheema PA-C Family Medicine, Physician Assistant Schedule an appointment as soon as possible for a visit in 2 days  [place] [id number]  [phone]            Patient's Medications   Discharge Prescriptions    ONDANSETRON (ZOFRAN-ODT) 4 MG DISINTEGRATING TABLET    Take 1 tablet (4 mg total) by mouth every 8 (eight) hours as needed for nausea or vomiting       Start Date: 1/25/2020 End Date: --       Order Dose: 4 mg       Quantity: 10 tablet    Refills: 0     No discharge procedures on file      ED Provider  Electronically Signed by           Army Isacc DO  01/25/20 6262

## 2020-02-26 ENCOUNTER — TELEPHONE (OUTPATIENT)
Dept: NEUROLOGY | Facility: CLINIC | Age: 40
End: 2020-02-26

## 2020-02-26 NOTE — TELEPHONE ENCOUNTER
Called and left message for patient regarding appointment in May  Dr Aziza Alcaraz will be out of the office and appointment will need to be rescheduled   I offered appointments for 2/26,2/27, or 2/28

## 2020-03-06 DIAGNOSIS — Z87.42 HISTORY OF DYSMENORRHEA: ICD-10-CM

## 2020-03-09 ENCOUNTER — OFFICE VISIT (OUTPATIENT)
Dept: FAMILY MEDICINE CLINIC | Facility: CLINIC | Age: 40
End: 2020-03-09
Payer: COMMERCIAL

## 2020-03-09 VITALS
SYSTOLIC BLOOD PRESSURE: 130 MMHG | TEMPERATURE: 98 F | BODY MASS INDEX: 35.44 KG/M2 | OXYGEN SATURATION: 98 % | HEIGHT: 60 IN | WEIGHT: 180.5 LBS | HEART RATE: 97 BPM | RESPIRATION RATE: 16 BRPM | DIASTOLIC BLOOD PRESSURE: 82 MMHG

## 2020-03-09 DIAGNOSIS — F41.9 ANXIETY AND DEPRESSION: ICD-10-CM

## 2020-03-09 DIAGNOSIS — G63 NEUROPATHY ASSOCIATED WITH POLYNEUROPATHY, ORGANOMEGALY, ENDOCRINOPATHY, MONOCLONAL GAMMOPATHY, AND SKIN CHANGES SYNDROME (HCC): ICD-10-CM

## 2020-03-09 DIAGNOSIS — K21.9 GASTROESOPHAGEAL REFLUX DISEASE WITHOUT ESOPHAGITIS: ICD-10-CM

## 2020-03-09 DIAGNOSIS — J45.40 MODERATE PERSISTENT ASTHMA WITHOUT COMPLICATION: Primary | ICD-10-CM

## 2020-03-09 DIAGNOSIS — I10 ESSENTIAL HYPERTENSION: ICD-10-CM

## 2020-03-09 DIAGNOSIS — F32.A ANXIETY AND DEPRESSION: ICD-10-CM

## 2020-03-09 DIAGNOSIS — F41.9 ANXIETY: ICD-10-CM

## 2020-03-09 DIAGNOSIS — Z87.42 HISTORY OF DYSMENORRHEA: ICD-10-CM

## 2020-03-09 DIAGNOSIS — E88.09 NEUROPATHY ASSOCIATED WITH POLYNEUROPATHY, ORGANOMEGALY, ENDOCRINOPATHY, MONOCLONAL GAMMOPATHY, AND SKIN CHANGES SYNDROME (HCC): ICD-10-CM

## 2020-03-09 PROCEDURE — 3008F BODY MASS INDEX DOCD: CPT | Performed by: PHYSICIAN ASSISTANT

## 2020-03-09 PROCEDURE — 3075F SYST BP GE 130 - 139MM HG: CPT | Performed by: PHYSICIAN ASSISTANT

## 2020-03-09 PROCEDURE — 4004F PT TOBACCO SCREEN RCVD TLK: CPT | Performed by: PHYSICIAN ASSISTANT

## 2020-03-09 PROCEDURE — 99214 OFFICE O/P EST MOD 30 MIN: CPT | Performed by: PHYSICIAN ASSISTANT

## 2020-03-09 PROCEDURE — 3079F DIAST BP 80-89 MM HG: CPT | Performed by: PHYSICIAN ASSISTANT

## 2020-03-09 RX ORDER — GABAPENTIN 300 MG/1
300 CAPSULE ORAL 3 TIMES DAILY
Qty: 90 CAPSULE | Refills: 3 | Status: SHIPPED | OUTPATIENT
Start: 2020-03-09 | End: 2020-12-10 | Stop reason: SDUPTHER

## 2020-03-09 RX ORDER — LISINOPRIL 20 MG/1
20 TABLET ORAL DAILY
Qty: 90 TABLET | Refills: 1 | Status: SHIPPED | OUTPATIENT
Start: 2020-03-09 | End: 2021-01-14 | Stop reason: SDUPTHER

## 2020-03-09 RX ORDER — OMEPRAZOLE 20 MG/1
20 CAPSULE, DELAYED RELEASE ORAL DAILY
Qty: 90 CAPSULE | Refills: 1 | Status: SHIPPED | OUTPATIENT
Start: 2020-03-09 | End: 2021-01-14 | Stop reason: SDUPTHER

## 2020-03-09 RX ORDER — QUETIAPINE FUMARATE 25 MG/1
50 TABLET, FILM COATED ORAL
Qty: 90 TABLET | Refills: 0 | Status: SHIPPED | OUTPATIENT
Start: 2020-03-09 | End: 2021-01-12

## 2020-03-09 RX ORDER — FLUTICASONE FUROATE AND VILANTEROL 100; 25 UG/1; UG/1
1 POWDER RESPIRATORY (INHALATION) DAILY
Qty: 1 INHALER | Refills: 5 | Status: SHIPPED | OUTPATIENT
Start: 2020-03-09 | End: 2021-01-14

## 2020-03-09 RX ORDER — SERTRALINE HYDROCHLORIDE 100 MG/1
TABLET, FILM COATED ORAL
Qty: 90 TABLET | Refills: 1 | Status: SHIPPED | OUTPATIENT
Start: 2020-03-09 | End: 2021-01-14 | Stop reason: SDUPTHER

## 2020-03-09 RX ORDER — HYDROXYZINE PAMOATE 25 MG/1
25 CAPSULE ORAL 3 TIMES DAILY PRN
Qty: 30 CAPSULE | Refills: 1 | Status: SHIPPED | OUTPATIENT
Start: 2020-03-09 | End: 2020-05-04 | Stop reason: SDUPTHER

## 2020-03-09 NOTE — PROGRESS NOTES
Assessment/Plan:    -patient has been advised to ask the front staff to print out the referrals that I gave her in November for Neurology, Pain Management, orthopedic surgery, Gastroenterology   -she will make a follow-up appointment with her gynecologist   I will order her hormone therapy in the meantime until she sees a specialist  -refer to [facility]  -I will reduce her Seroquel down to 25 mg from 50 mg since she felt better on that dose  -continue Zoloft 100 mg daily  -she will restart her Aguilar Pretty  -she has been advised on preventative measures for corona virus  -recommend follow-up in July or August 2020    M*Modal software was used to dictate this note  It may contain errors with dictating incorrect words/spelling  Please contact provider directly for any questions  Diagnoses and all orders for this visit:    Moderate persistent asthma without complication  -     Albuterol Sulfate (ProAir RespiClick) 946 (90 Base) MCG/ACT AEPB; 2 inhalations every 6 hours as needed  -     fluticasone-vilanterol (BREO ELLIPTA) 100-25 mcg/inh inhaler; Inhale 1 puff daily Rinse mouth after use  Anxiety and depression  -     Ambulatory referral to Psychiatry; Future  -     QUEtiapine (SEROquel) 25 mg tablet; Take 2 tablets (50 mg total) by mouth daily at bedtime    Neuropathy associated with polyneuropathy, organomegaly, endocrinopathy, monoclonal gammopathy, and skin changes syndrome (HCC)  -     gabapentin (NEURONTIN) 300 mg capsule; Take 1 capsule (300 mg total) by mouth 3 (three) times a day    Anxiety  Comments:  I am going to start on Vistaril 25 mg t i d  p r n  She was told she can take 2 tablets at bedtime to help her with sleeping  It was discussed about side effect  Orders:  -     hydrOXYzine pamoate (VISTARIL) 25 mg capsule; Take 1 capsule (25 mg total) by mouth 3 (three) times a day as needed for anxiety  -     sertraline (ZOLOFT) 100 mg tablet;  Take 1 tablet daily    Gastroesophageal reflux disease without esophagitis  -     omeprazole (PriLOSEC) 20 mg delayed release capsule; Take 1 capsule (20 mg total) by mouth daily    Essential hypertension  -     lisinopril (ZESTRIL) 20 mg tablet; Take 1 tablet (20 mg total) by mouth daily    History of dysmenorrhea  -     norethindrone (AYGESTIN) 5 mg tablet; Take 0 5 tablets (2 5 mg total) by mouth daily          Subjective:      Patient ID: Veto Mobley is a 37 y o  female  Patient presents today for follow-up for anxiety/depression/knee pain/reflux symptoms  She also states that her asthma has been under good control because she is leave the house much  She may need her steroid inhaler because usually spring is not a good season for her  She has not had any wheezing, shortness of breath  She did not follow through with making all the appointments yet with the specialists that I gave her from November 2019  She states that she plans on making the appointments with the orthopedic surgeon, neurologist, GI specialist and her gynecologist   She missed her appointment with pain management but she states that she will make another appointment  She states that she did not have insurance over the last several months  She states that her  just got good insurance again  She has been out of her hormone supplement for the last several months  She states that her anxiety depression has not been good  She does not feel stabilized on her medications  She states that the 50 mg of the Seroquel that I prescribed was making her feel ill  She feels a little better when she backseat down to 25 mg daily  She continues on the Zoloft 100 mg daily  She states has been awhile since she seen a psychiatrist   She has not been able to find another psychiatrist   She denies any suicidal ideations        The following portions of the patient's history were reviewed and updated as appropriate:   She  has a past medical history of Anxiety, Arthritis, Depression, bleeding disorder, Hypertension, Kidney stone, and Seasonal allergies  She   Patient Active Problem List    Diagnosis Date Noted    Moderate persistent asthma without complication     Encounter for screening for HIV 2019    Lipid screening 2019    Bilateral lower extremity edema 2019    Varicose veins of both legs with edema 2019    Shortness of breath 2019    Encounter for screening mammogram for malignant neoplasm of breast 2019    Panic attack as reaction to stress 2019    Numbness and tingling of both legs below knees     GERD (gastroesophageal reflux disease) 10/25/2018    Constipation 10/25/2018    Other constipation 10/25/2018    Neuropathy associated with polyneuropathy, organomegaly, endocrinopathy, monoclonal gammopathy, and skin changes syndrome ([facility] [address] ) 10/02/2018    Cystic disease of ovaries 10/02/2018    Essential hypertension 10/02/2018    Anxiety and depression 2017    Dysmenorrhea 2016    Chronic pain of both knees 2013    Dysthymic disorder 2013     She  has a past surgical history that includes  section; [place] tooth extraction; Diagnostic laparoscopy; pr lap,diagnostic abdomen (N/A, 2016); pr lap,rmv  adnexal structure (Bilateral, 2016); Dilation and curettage of uterus (); and pr colonoscopy flx dx w/collj spec when pfrmd (N/A, 2018)  Her family history is not on file  She  reports that she has been smoking  She has a 23 00 pack-year smoking history  She has never used smokeless tobacco  She reports that she has current or past drug history  Drug: Marijuana  She reports that she does not drink alcohol    Current Outpatient Medications   Medication Sig Dispense Refill    Albuterol Sulfate (ProAir RespiClick) 596 (90 Base) MCG/ACT AEPB 2 inhalations every 6 hours as needed 1 each 0    famotidine (PEPCID) 20 mg tablet Take 1 tablet (20 mg total) by mouth daily 90 tablet 1    gabapentin (NEURONTIN) 300 mg capsule Take 1 capsule (300 mg total) by mouth 3 (three) times a day 90 capsule 3    hydrOXYzine pamoate (VISTARIL) 25 mg capsule Take 1 capsule (25 mg total) by mouth 3 (three) times a day as needed for anxiety 30 capsule 1    lisinopril (ZESTRIL) 20 mg tablet Take 1 tablet (20 mg total) by mouth daily 90 tablet 1    norethindrone (AYGESTIN) 5 mg tablet Take 0 5 tablets (2 5 mg total) by mouth daily 30 tablet 1    omeprazole (PriLOSEC) 20 mg delayed release capsule Take 1 capsule (20 mg total) by mouth daily 90 capsule 1    ondansetron (ZOFRAN-ODT) 4 mg disintegrating tablet Take 1 tablet (4 mg total) by mouth every 8 (eight) hours as needed for nausea or vomiting 10 tablet 0    QUEtiapine (SEROquel) 25 mg tablet Take 2 tablets (50 mg total) by mouth daily at bedtime 90 tablet 0    sertraline (ZOLOFT) 100 mg tablet Take 1 tablet daily 90 tablet 1    sertraline (ZOLOFT) 50 mg tablet Take 1 tablet (50 mg total) by mouth daily 90 tablet 1    fluticasone-vilanterol (BREO ELLIPTA) 100-25 mcg/inh inhaler Inhale 1 puff daily Rinse mouth after use  1 Inhaler 5     No current facility-administered medications for this visit        Current Outpatient Medications on File Prior to Visit   Medication Sig    famotidine (PEPCID) 20 mg tablet Take 1 tablet (20 mg total) by mouth daily    ondansetron (ZOFRAN-ODT) 4 mg disintegrating tablet Take 1 tablet (4 mg total) by mouth every 8 (eight) hours as needed for nausea or vomiting    sertraline (ZOLOFT) 50 mg tablet Take 1 tablet (50 mg total) by mouth daily    [DISCONTINUED] Albuterol Sulfate (PROAIR RESPICLICK) 083 (90 Base) MCG/ACT AEPB 2 inhalations every 6 hours as needed    [DISCONTINUED] gabapentin (NEURONTIN) 300 mg capsule Take 1 capsule (300 mg total) by mouth 3 (three) times a day    [DISCONTINUED] hydrOXYzine pamoate (VISTARIL) 25 mg capsule Take 1 capsule (25 mg total) by mouth 3 (three) times a day as needed for anxiety    [DISCONTINUED] lisinopril (ZESTRIL) 20 mg tablet Take 1 tablet (20 mg total) by mouth daily    [DISCONTINUED] norethindrone (AYGESTIN) 5 mg tablet Take 0 5 tablets (2 5 mg total) by mouth daily    [DISCONTINUED] omeprazole (PriLOSEC) 20 mg delayed release capsule Take 1 capsule (20 mg total) by mouth daily    [DISCONTINUED] QUEtiapine (SEROquel) 50 mg tablet Take 1 tablet (50 mg total) by mouth daily at bedtime    [DISCONTINUED] sertraline (ZOLOFT) 100 mg tablet Take 1 tablet daily    [DISCONTINUED] fluticasone-vilanterol (BREO ELLIPTA) 100-25 mcg/inh inhaler Inhale 1 puff daily Rinse mouth after use  (Patient not taking: Reported on 3/9/2020)     No current facility-administered medications on file prior to visit  She is allergic to eggs or egg-derived products       Review of Systems   Constitutional: Negative for fatigue  She states that she has had a reduced appetite  She states her weight fluctuates  Respiratory: Negative for cough, shortness of breath and wheezing  Gastrointestinal: Positive for abdominal pain  She was in the emergency room in January twice because of nausea vomiting   Musculoskeletal:        As stated in HPI   Psychiatric/Behavioral:        As stated in HPI         Objective:      /82 (BP Location: Left arm, Patient Position: Sitting, Cuff Size: Adult)   Pulse 97   Temp 98 °F (36 7 °C) (Tympanic)   Resp 16   Ht 5' (1 524 m)   Wt 81 9 kg (180 lb 8 oz)   SpO2 98%   BMI 35 25 kg/m²          Physical Exam   Constitutional: She appears well-developed and well-nourished  No distress  HENT:   Head: Normocephalic and atraumatic  Right Ear: External ear normal    Left Ear: External ear normal    Mouth/Throat: Oropharynx is clear and moist    Neck: Neck supple  No thyromegaly present  Cardiovascular: Normal rate, regular rhythm and normal heart sounds  Exam reveals no gallop and no friction rub  No murmur heard    Pulmonary/Chest: Effort normal and breath sounds normal  No respiratory distress  She has no wheezes  She has no rales  Abdominal: Soft  Bowel sounds are normal  She exhibits no mass  There is no tenderness  Musculoskeletal: She exhibits no deformity  Lymphadenopathy:     She has no cervical adenopathy  Neurological: She is alert  Skin: Skin is warm  Psychiatric: She has a normal mood and affect

## 2020-03-27 ENCOUNTER — TELEPHONE (OUTPATIENT)
Dept: PSYCHIATRY | Facility: CLINIC | Age: 40
End: 2020-03-27

## 2020-04-06 ENCOUNTER — TELEPHONE (OUTPATIENT)
Dept: PSYCHIATRY | Facility: CLINIC | Age: 40
End: 2020-04-06

## 2020-04-07 ENCOUNTER — TELEPHONE (OUTPATIENT)
Dept: NEUROLOGY | Facility: CLINIC | Age: 40
End: 2020-04-07

## 2020-05-04 DIAGNOSIS — Z87.42 HISTORY OF DYSMENORRHEA: ICD-10-CM

## 2020-05-04 DIAGNOSIS — F41.9 ANXIETY: ICD-10-CM

## 2020-05-06 ENCOUNTER — TELEPHONE (OUTPATIENT)
Dept: FAMILY MEDICINE CLINIC | Facility: CLINIC | Age: 40
End: 2020-05-06

## 2020-05-06 RX ORDER — HYDROXYZINE PAMOATE 25 MG/1
25 CAPSULE ORAL 3 TIMES DAILY PRN
Qty: 30 CAPSULE | Refills: 0 | Status: SHIPPED | OUTPATIENT
Start: 2020-05-06 | End: 2022-02-07 | Stop reason: SDUPTHER

## 2020-10-22 ENCOUNTER — TELEMEDICINE (OUTPATIENT)
Dept: FAMILY MEDICINE CLINIC | Facility: CLINIC | Age: 40
End: 2020-10-22
Payer: COMMERCIAL

## 2020-10-22 VITALS — TEMPERATURE: 99.6 F

## 2020-10-22 DIAGNOSIS — J06.9 ACUTE UPPER RESPIRATORY INFECTION: Primary | ICD-10-CM

## 2020-10-22 DIAGNOSIS — I10 ESSENTIAL HYPERTENSION: ICD-10-CM

## 2020-10-22 PROCEDURE — 3725F SCREEN DEPRESSION PERFORMED: CPT | Performed by: PHYSICIAN ASSISTANT

## 2020-10-22 PROCEDURE — 99214 OFFICE O/P EST MOD 30 MIN: CPT | Performed by: PHYSICIAN ASSISTANT

## 2020-10-22 PROCEDURE — 4004F PT TOBACCO SCREEN RCVD TLK: CPT | Performed by: PHYSICIAN ASSISTANT

## 2020-11-10 ENCOUNTER — TELEPHONE (OUTPATIENT)
Dept: FAMILY MEDICINE CLINIC | Facility: CLINIC | Age: 40
End: 2020-11-10

## 2020-12-04 DIAGNOSIS — G63 NEUROPATHY ASSOCIATED WITH POLYNEUROPATHY, ORGANOMEGALY, ENDOCRINOPATHY, MONOCLONAL GAMMOPATHY, AND SKIN CHANGES SYNDROME (HCC): ICD-10-CM

## 2020-12-04 DIAGNOSIS — E88.09 NEUROPATHY ASSOCIATED WITH POLYNEUROPATHY, ORGANOMEGALY, ENDOCRINOPATHY, MONOCLONAL GAMMOPATHY, AND SKIN CHANGES SYNDROME (HCC): ICD-10-CM

## 2020-12-08 NOTE — TELEPHONE ENCOUNTER
Please notify the patient because she is due for routine visit in the office  She has not been seen since March  She did have a telemedicine visit in October but it was for an acute upper respiratory infection

## 2020-12-10 ENCOUNTER — TELEMEDICINE (OUTPATIENT)
Dept: FAMILY MEDICINE CLINIC | Facility: CLINIC | Age: 40
End: 2020-12-10
Payer: COMMERCIAL

## 2020-12-10 ENCOUNTER — TRANSCRIBE ORDERS (OUTPATIENT)
Dept: ADMINISTRATIVE | Facility: HOSPITAL | Age: 40
End: 2020-12-10

## 2020-12-10 VITALS — WEIGHT: 180 LBS | TEMPERATURE: 98.5 F | BODY MASS INDEX: 35.34 KG/M2 | HEIGHT: 60 IN

## 2020-12-10 DIAGNOSIS — F32.A ANXIETY AND DEPRESSION: ICD-10-CM

## 2020-12-10 DIAGNOSIS — E88.09 NEUROPATHY ASSOCIATED WITH POLYNEUROPATHY, ORGANOMEGALY, ENDOCRINOPATHY, MONOCLONAL GAMMOPATHY, AND SKIN CHANGES SYNDROME (HCC): ICD-10-CM

## 2020-12-10 DIAGNOSIS — F41.9 ANXIETY AND DEPRESSION: ICD-10-CM

## 2020-12-10 DIAGNOSIS — Z12.31 BREAST CANCER SCREENING BY MAMMOGRAM: Primary | ICD-10-CM

## 2020-12-10 DIAGNOSIS — G63 NEUROPATHY ASSOCIATED WITH POLYNEUROPATHY, ORGANOMEGALY, ENDOCRINOPATHY, MONOCLONAL GAMMOPATHY, AND SKIN CHANGES SYNDROME (HCC): ICD-10-CM

## 2020-12-10 PROCEDURE — 4004F PT TOBACCO SCREEN RCVD TLK: CPT | Performed by: PHYSICIAN ASSISTANT

## 2020-12-10 PROCEDURE — 3008F BODY MASS INDEX DOCD: CPT | Performed by: PHYSICIAN ASSISTANT

## 2020-12-10 PROCEDURE — 99213 OFFICE O/P EST LOW 20 MIN: CPT | Performed by: PHYSICIAN ASSISTANT

## 2020-12-10 RX ORDER — GABAPENTIN 300 MG/1
300 CAPSULE ORAL 3 TIMES DAILY
Qty: 90 CAPSULE | Refills: 0 | Status: SHIPPED | OUTPATIENT
Start: 2020-12-10 | End: 2021-01-14 | Stop reason: SDUPTHER

## 2020-12-15 RX ORDER — GABAPENTIN 300 MG/1
CAPSULE ORAL
Qty: 90 CAPSULE | Refills: 3 | OUTPATIENT
Start: 2020-12-15

## 2020-12-21 DIAGNOSIS — Z87.42 HISTORY OF DYSMENORRHEA: ICD-10-CM

## 2020-12-23 DIAGNOSIS — Z87.42 HISTORY OF DYSMENORRHEA: ICD-10-CM

## 2021-01-04 DIAGNOSIS — F32.A ANXIETY AND DEPRESSION: ICD-10-CM

## 2021-01-04 DIAGNOSIS — F41.9 ANXIETY AND DEPRESSION: ICD-10-CM

## 2021-01-14 ENCOUNTER — OFFICE VISIT (OUTPATIENT)
Dept: FAMILY MEDICINE CLINIC | Facility: CLINIC | Age: 41
End: 2021-01-14
Payer: COMMERCIAL

## 2021-01-14 VITALS
TEMPERATURE: 98 F | HEIGHT: 60 IN | HEART RATE: 76 BPM | DIASTOLIC BLOOD PRESSURE: 76 MMHG | BODY MASS INDEX: 34.16 KG/M2 | RESPIRATION RATE: 18 BRPM | SYSTOLIC BLOOD PRESSURE: 118 MMHG | OXYGEN SATURATION: 100 % | WEIGHT: 174 LBS

## 2021-01-14 DIAGNOSIS — E88.09 NEUROPATHY ASSOCIATED WITH POLYNEUROPATHY, ORGANOMEGALY, ENDOCRINOPATHY, MONOCLONAL GAMMOPATHY, AND SKIN CHANGES SYNDROME (HCC): ICD-10-CM

## 2021-01-14 DIAGNOSIS — F41.9 ANXIETY: ICD-10-CM

## 2021-01-14 DIAGNOSIS — K21.9 GASTROESOPHAGEAL REFLUX DISEASE WITHOUT ESOPHAGITIS: ICD-10-CM

## 2021-01-14 DIAGNOSIS — I10 ESSENTIAL HYPERTENSION: Primary | ICD-10-CM

## 2021-01-14 DIAGNOSIS — J45.20 MILD INTERMITTENT ASTHMA WITHOUT COMPLICATION: ICD-10-CM

## 2021-01-14 DIAGNOSIS — G63 NEUROPATHY ASSOCIATED WITH POLYNEUROPATHY, ORGANOMEGALY, ENDOCRINOPATHY, MONOCLONAL GAMMOPATHY, AND SKIN CHANGES SYNDROME (HCC): ICD-10-CM

## 2021-01-14 DIAGNOSIS — F41.9 ANXIETY AND DEPRESSION: ICD-10-CM

## 2021-01-14 DIAGNOSIS — F32.A ANXIETY AND DEPRESSION: ICD-10-CM

## 2021-01-14 DIAGNOSIS — G47.00 INSOMNIA, UNSPECIFIED TYPE: ICD-10-CM

## 2021-01-14 DIAGNOSIS — J45.40 MODERATE PERSISTENT ASTHMA WITHOUT COMPLICATION: ICD-10-CM

## 2021-01-14 DIAGNOSIS — Z13.220 LIPID SCREENING: ICD-10-CM

## 2021-01-14 PROBLEM — J06.9 ACUTE UPPER RESPIRATORY INFECTION: Status: RESOLVED | Noted: 2020-10-22 | Resolved: 2021-01-14

## 2021-01-14 PROCEDURE — 99214 OFFICE O/P EST MOD 30 MIN: CPT | Performed by: PHYSICIAN ASSISTANT

## 2021-01-14 PROCEDURE — 3078F DIAST BP <80 MM HG: CPT | Performed by: PHYSICIAN ASSISTANT

## 2021-01-14 PROCEDURE — 3074F SYST BP LT 130 MM HG: CPT | Performed by: PHYSICIAN ASSISTANT

## 2021-01-14 RX ORDER — SERTRALINE HYDROCHLORIDE 100 MG/1
TABLET, FILM COATED ORAL
Qty: 90 TABLET | Refills: 1 | Status: SHIPPED | OUTPATIENT
Start: 2021-01-14 | End: 2021-06-24 | Stop reason: SDUPTHER

## 2021-01-14 RX ORDER — LISINOPRIL 20 MG/1
20 TABLET ORAL DAILY
Qty: 90 TABLET | Refills: 1 | Status: SHIPPED | OUTPATIENT
Start: 2021-01-14 | End: 2021-06-24 | Stop reason: SDUPTHER

## 2021-01-14 RX ORDER — QUETIAPINE FUMARATE 50 MG/1
50 TABLET, FILM COATED ORAL
Qty: 90 TABLET | Refills: 0 | Status: SHIPPED | OUTPATIENT
Start: 2021-01-14 | End: 2021-04-12

## 2021-01-14 RX ORDER — GABAPENTIN 300 MG/1
600 CAPSULE ORAL 2 TIMES DAILY
Qty: 180 CAPSULE | Refills: 5 | Status: SHIPPED | OUTPATIENT
Start: 2021-01-14 | End: 2021-06-24 | Stop reason: SDUPTHER

## 2021-01-14 RX ORDER — ALBUTEROL SULFATE 90 UG/1
POWDER, METERED RESPIRATORY (INHALATION)
Qty: 1 EACH | Refills: 0 | Status: SHIPPED | OUTPATIENT
Start: 2021-01-14 | End: 2021-06-24 | Stop reason: SDUPTHER

## 2021-01-14 RX ORDER — OMEPRAZOLE 20 MG/1
20 CAPSULE, DELAYED RELEASE ORAL DAILY
Qty: 90 CAPSULE | Refills: 1 | Status: SHIPPED | OUTPATIENT
Start: 2021-01-14 | End: 2021-06-24 | Stop reason: SDUPTHER

## 2021-01-14 NOTE — PROGRESS NOTES
Assessment/Plan:      Patient has been encouraged to make an appointment again with Psychiatry, gynecology, gastroenterology, orthopedic surgery, Neurology, Pain Management  She does have the phone numbers at home yet  Referrals were given in November 2019    -I did refill her medications  I did advise her that the hormone therapy should be given by Gynecology     -I did advised to proceed with fasting blood work over the next week   - patient refuses flu vaccine at this time because of egg allergy   -Recommend routine follow-up in June    BMI Counseling: Body mass index is 33 98 kg/m²  The BMI is above normal  Nutrition recommendations include reducing portion sizes and decreasing overall calorie intake  Exercise recommendations include exercising 3-5 times per week  M*Modal software was used to dictate this note  It may contain errors with dictating incorrect words/spelling  Please contact provider directly for any questions  Diagnoses and all orders for this visit:    Essential hypertension  -     lisinopril (ZESTRIL) 20 mg tablet; Take 1 tablet (20 mg total) by mouth daily  -     Microalbumin / creatinine urine ratio; Future  -     CBC and differential  -     Comprehensive metabolic panel  -     Lipid panel  -     TSH, 3rd generation with Free T4 reflex    Neuropathy associated with polyneuropathy, organomegaly, endocrinopathy, monoclonal gammopathy, and skin changes syndrome (HCC)  -     gabapentin (NEURONTIN) 300 mg capsule; Take 2 capsules (600 mg total) by mouth 2 (two) times a day  -     Microalbumin / creatinine urine ratio; Future  -     CBC and differential  -     Comprehensive metabolic panel  -     Lipid panel  -     TSH, 3rd generation with Free T4 reflex    Gastroesophageal reflux disease without esophagitis  -     omeprazole (PriLOSEC) 20 mg delayed release capsule; Take 1 capsule (20 mg total) by mouth daily  -     Microalbumin / creatinine urine ratio;  Future  -     CBC and differential  -     Comprehensive metabolic panel  -     Lipid panel  -     TSH, 3rd generation with Free T4 reflex    Mild intermittent asthma without complication  -     Albuterol Sulfate (ProAir RespiClick) 664 (90 Base) MCG/ACT AEPB; 2 inhalations every 6 hours as needed  -     Microalbumin / creatinine urine ratio; Future  -     CBC and differential  -     Comprehensive metabolic panel  -     Lipid panel  -     TSH, 3rd generation with Free T4 reflex    Anxiety and depression  -     sertraline (ZOLOFT) 50 mg tablet; Take 1 tablet (50 mg total) by mouth daily With 100 mg tablet (150mg total)  -     sertraline (ZOLOFT) 100 mg tablet; Take 1 tablet daily  With the 50 mg  Total 150 mg daily  -     Microalbumin / creatinine urine ratio; Future  -     CBC and differential  -     Comprehensive metabolic panel  -     Lipid panel  -     TSH, 3rd generation with Free T4 reflex    Insomnia, unspecified type  -     QUEtiapine (SEROquel) 50 mg tablet; Take 1 tablet (50 mg total) by mouth daily at bedtime As needed  -     Microalbumin / creatinine urine ratio; Future  -     CBC and differential  -     Comprehensive metabolic panel  -     Lipid panel  -     TSH, 3rd generation with Free T4 reflex    Anxiety  Comments:  I am going to start on Vistaril 25 mg t i d  p r n  She was told she can take 2 tablets at bedtime to help her with sleeping  It was discussed about side effect    Moderate persistent asthma without complication    Lipid screening  -     Lipid panel          Subjective:      Patient ID: Melia Crockett is a 41 y o  female  Patient presents today for follow-up of neuropathy, hypertension, anxiety / depression, insomnia  She states that she has not had the opportunity to get in with any the specialists because her 6year-old autistic daughter was at home  She states her daughter just went back to school  Patient has no concerns at this time  She  Just wants refills of her medications    She denies any chest pain, shortness of breath or swelling of her lower extremities  She states that her diet is very limited because of recurrent reflux symptoms but she did not make an appointment with Gastroenterology as recommended in March 2020  she takes Zoloft 150 mg daily  She states that her symptoms are not totally controlled  She states that she has tried the hydroxyzine and does not provide any benefit  She has not made an appointment yet with Psychiatry  She does have the phone number at home  she states her asthma has not been too bad  She infrequently utilizes her rescue inhaler  She states that she never got the [place] because she had a 50 dollar co-pay  The following portions of the patient's history were reviewed and updated as appropriate:   She  has a past medical history of Anxiety, Arthritis, Depression, bleeding disorder, Hypertension, Kidney stone, and Seasonal allergies    She   Patient Active Problem List    Diagnosis Date Noted    Insomnia 01/14/2021    Encounter for screening for HIV 11/29/2019    Lipid screening 09/12/2019    Bilateral lower extremity edema 09/12/2019    Varicose veins of both legs with edema 09/12/2019    Shortness of breath 09/12/2019    Encounter for screening mammogram for malignant neoplasm of breast 03/29/2019    Panic attack as reaction to stress 03/28/2019    Numbness and tingling of both legs below knees     GERD (gastroesophageal reflux disease) 10/25/2018    Constipation 10/25/2018    Other constipation 10/25/2018    Neuropathy associated with polyneuropathy, organomegaly, endocrinopathy, monoclonal gammopathy, and skin changes syndrome ([facility] ) 10/02/2018    Mild intermittent asthma without complication 20/73/6759    Cystic disease of ovaries 10/02/2018    Essential hypertension 10/02/2018    Anxiety and depression 03/08/2017    Dysmenorrhea 07/21/2016    Chronic pain of both knees 12/09/2013    Dysthymic disorder 12/09/2013     She  has a past surgical history that includes  section; [place] tooth extraction; Diagnostic laparoscopy; pr lap,diagnostic abdomen (N/A, 2016); pr lap,rmv  adnexal structure (Bilateral, 2016); Dilation and curettage of uterus (); and pr colonoscopy flx dx w/collj spec when pfrmd (N/A, 2018)  Her family history is not on file  She  reports that she has been smoking  She has a 23 00 pack-year smoking history  She has never used smokeless tobacco  She reports current drug use  Drug: Marijuana  She reports that she does not drink alcohol  Current Outpatient Medications   Medication Sig Dispense Refill    Albuterol Sulfate (ProAir RespiClick) 182 (90 Base) MCG/ACT AEPB 2 inhalations every 6 hours as needed 1 each 0    gabapentin (NEURONTIN) 300 mg capsule Take 2 capsules (600 mg total) by mouth 2 (two) times a day 180 capsule 5    hydrOXYzine pamoate (VISTARIL) 25 mg capsule Take 1 capsule (25 mg total) by mouth 3 (three) times a day as needed for anxiety 30 capsule 0    lisinopril (ZESTRIL) 20 mg tablet Take 1 tablet (20 mg total) by mouth daily 90 tablet 1    norethindrone (AYGESTIN) 5 mg tablet Take 0 5 tablets (2 5 mg total) by mouth daily 30 tablet 0    omeprazole (PriLOSEC) 20 mg delayed release capsule Take 1 capsule (20 mg total) by mouth daily 90 capsule 1    QUEtiapine (SEROquel) 50 mg tablet Take 1 tablet (50 mg total) by mouth daily at bedtime As needed 90 tablet 0    sertraline (ZOLOFT) 100 mg tablet Take 1 tablet daily  With the 50 mg  Total 150 mg daily 90 tablet 1    sertraline (ZOLOFT) 50 mg tablet Take 1 tablet (50 mg total) by mouth daily With 100 mg tablet (150mg total) 90 tablet 1     No current facility-administered medications for this visit        Current Outpatient Medications on File Prior to Visit   Medication Sig    hydrOXYzine pamoate (VISTARIL) 25 mg capsule Take 1 capsule (25 mg total) by mouth 3 (three) times a day as needed for anxiety    norethindrone (AYGESTIN) 5 mg tablet Take 0 5 tablets (2 5 mg total) by mouth daily    [DISCONTINUED] Albuterol Sulfate (ProAir RespiClick) 555 (90 Base) MCG/ACT AEPB 2 inhalations every 6 hours as needed    [DISCONTINUED] famotidine (PEPCID) 20 mg tablet Take 1 tablet (20 mg total) by mouth daily    [DISCONTINUED] fluticasone-vilanterol (BREO ELLIPTA) 100-25 mcg/inh inhaler Inhale 1 puff daily Rinse mouth after use   [DISCONTINUED] gabapentin (NEURONTIN) 300 mg capsule Take 1 capsule (300 mg total) by mouth 3 (three) times a day (Patient taking differently: Take 600 mg by mouth 2 (two) times a day )    [DISCONTINUED] lisinopril (ZESTRIL) 20 mg tablet Take 1 tablet (20 mg total) by mouth daily    [DISCONTINUED] omeprazole (PriLOSEC) 20 mg delayed release capsule Take 1 capsule (20 mg total) by mouth daily    [DISCONTINUED] QUEtiapine (SEROquel) 50 mg tablet Take 50 mg by mouth daily at bedtime    [DISCONTINUED] sertraline (ZOLOFT) 100 mg tablet Take 1 tablet daily (Patient taking differently: Take 1 tablet daily with a 50mg tablet (150mg total))    [DISCONTINUED] sertraline (ZOLOFT) 50 mg tablet Take 1 tablet (50 mg total) by mouth daily With 100 mg tablet (150mg total)    [DISCONTINUED] ondansetron (ZOFRAN-ODT) 4 mg disintegrating tablet Take 1 tablet (4 mg total) by mouth every 8 (eight) hours as needed for nausea or vomiting (Patient not taking: Reported on 1/14/2021)     No current facility-administered medications on file prior to visit  She is allergic to eggs or egg-derived products       Review of Systems   Constitutional: Negative for chills, fever and unexpected weight change  Respiratory: Negative for cough and shortness of breath  Cardiovascular: Negative for chest pain and leg swelling     Gastrointestinal:         As stated in HPI   Psychiatric/Behavioral:          As stated in HPI         Objective:      /76   Pulse 76   Temp 98 °F (36 7 °C)   Resp 18   Ht 5' (1 524 m)   Wt 78 9 kg (174 lb) SpO2 100%   BMI 33 98 kg/m²          Physical Exam  Constitutional:       General: She is not in acute distress  Appearance: Normal appearance  She is well-developed  She is obese  She is not ill-appearing, toxic-appearing or diaphoretic  HENT:      Head: Normocephalic and atraumatic  Right Ear: Tympanic membrane, ear canal and external ear normal       Left Ear: Tympanic membrane, ear canal and external ear normal    Neck:      Musculoskeletal: Neck supple  Thyroid: No thyromegaly  Cardiovascular:      Rate and Rhythm: Normal rate and regular rhythm  Heart sounds: Normal heart sounds  No murmur  No friction rub  No gallop  Pulmonary:      Effort: Pulmonary effort is normal  No respiratory distress  Breath sounds: Normal breath sounds  No wheezing, rhonchi or rales  Abdominal:      General: Bowel sounds are normal       Palpations: Abdomen is soft  There is no mass  Tenderness: There is no abdominal tenderness  Musculoskeletal:         General: No deformity  Lymphadenopathy:      Cervical: No cervical adenopathy  Skin:     General: Skin is warm  Neurological:      General: No focal deficit present  Mental Status: She is alert     Psychiatric:         Mood and Affect: Mood normal

## 2021-01-21 ENCOUNTER — OFFICE VISIT (OUTPATIENT)
Dept: FAMILY MEDICINE CLINIC | Facility: CLINIC | Age: 41
End: 2021-01-21
Payer: COMMERCIAL

## 2021-01-21 VITALS — BODY MASS INDEX: 34.16 KG/M2 | HEIGHT: 60 IN | WEIGHT: 174 LBS

## 2021-01-21 DIAGNOSIS — M25.562 CHRONIC PAIN OF BOTH KNEES: ICD-10-CM

## 2021-01-21 DIAGNOSIS — G89.29 CHRONIC PAIN OF BOTH KNEES: ICD-10-CM

## 2021-01-21 DIAGNOSIS — F41.9 ANXIETY AND DEPRESSION: ICD-10-CM

## 2021-01-21 DIAGNOSIS — M25.562 PAIN AND SWELLING OF LEFT KNEE: Primary | ICD-10-CM

## 2021-01-21 DIAGNOSIS — M25.462 PAIN AND SWELLING OF LEFT KNEE: Primary | ICD-10-CM

## 2021-01-21 DIAGNOSIS — G62.9 NEUROPATHY: ICD-10-CM

## 2021-01-21 DIAGNOSIS — M25.561 CHRONIC PAIN OF BOTH KNEES: ICD-10-CM

## 2021-01-21 DIAGNOSIS — F32.A ANXIETY AND DEPRESSION: ICD-10-CM

## 2021-01-21 PROCEDURE — 4004F PT TOBACCO SCREEN RCVD TLK: CPT | Performed by: NURSE PRACTITIONER

## 2021-01-21 PROCEDURE — 99214 OFFICE O/P EST MOD 30 MIN: CPT | Performed by: NURSE PRACTITIONER

## 2021-01-21 PROCEDURE — 3008F BODY MASS INDEX DOCD: CPT | Performed by: NURSE PRACTITIONER

## 2021-01-21 RX ORDER — PREDNISONE 50 MG/1
50 TABLET ORAL DAILY
Qty: 5 TABLET | Refills: 0 | Status: SHIPPED | OUTPATIENT
Start: 2021-01-21 | End: 2021-01-26

## 2021-01-21 NOTE — ASSESSMENT & PLAN NOTE
- Not well controlled  - Will re-order x-rays of bilateral knees  - Referral sent to Orthopedics  - Will continue to monitor

## 2021-01-21 NOTE — ASSESSMENT & PLAN NOTE
- Not well controlled  - Order placed for prednisone 50 mg daily for 5 days  - Referral sent for Ortho  - Advised patient to contact office if pain and swelling do not improve  - Will continue to monitor

## 2021-01-21 NOTE — PROGRESS NOTES
Virtual Regular Visit      Assessment/Plan:    Problem List Items Addressed This Visit        Nervous and Auditory    Neuropathy     - Well controlled on gabapentin  Continue same  Will continue to monitor  Other    Anxiety and depression     - Well controlled on Zoloft  Continue same  Will continue to monitor  Chronic pain of both knees     - Not well controlled  - Will re-order x-rays of bilateral knees  - Referral sent to Orthopedics  - Will continue to monitor  Relevant Orders    XR knee 3 vw left non injury    XR knee 3 vw right non injury    Ambulatory referral to Orthopedic Surgery    Pain and swelling of left knee - Primary     - Not well controlled  - Order placed for prednisone 50 mg daily for 5 days  - Referral sent for Ortho  - Advised patient to contact office if pain and swelling do not improve  - Will continue to monitor  Relevant Medications    predniSONE 50 mg tablet               Reason for visit is   Chief Complaint   Patient presents with    Knee Pain     left having hard time bending knee also swelling    Virtual Regular Visit        Encounter provider MONICA Palomino    Provider located at [address] [place] [address] [phone]  [place] [id number]      Recent Visits  Date Type Provider Dept   01/14/21 Office Visit Mickie Cheema PA-C [facility]   Showing recent visits within past 7 days and meeting all other requirements     Today's Visits  Date Type Provider Dept   01/21/21 Office Visit MONICA Palomino [facility]   Showing today's visits and meeting all other requirements     Future Appointments  No visits were found meeting these conditions  Showing future appointments within next 150 days and meeting all other requirements        The patient was identified by name and date of birth   Melia Conley was informed that this is a telemedicine visit and that the visit is being conducted through [facility] and patient was informed that this is not a secure, HIPAA-compliant platform  She agrees to proceed     My office door was closed  No one else was in the room  She acknowledged consent and understanding of privacy and security of the video platform  The patient has agreed to participate and understands they can discontinue the visit at any time  Patient is aware this is a billable service  Subjective  Melia Vu is a 41 y o  female    Patient presents today for virtual visit  She has complaints of left knee pain and swelling  She states that she got up to take her daughter to school yesterday and she experienced severe pain in her left knee and was unable to bend it  It was also swollen  She tried icing it with no relief  She stated she then took a hot bath and that seemed to help and she was able to slightly bend her knee  She does mention that she has a history of bilateral knee problems and was seen by orthopedics when she was younger and they anticipated her needing a double knee replacement at a young age  There were x-rays ordered in 2019 but she did not get them done  She takes Aleve for her knee pain as needed  Past Medical History:   Diagnosis Date    Anxiety     Arthritis     OA  b/l knees    Depression     Hx of bleeding disorder     dysmennorrhea-dx lap today 2016    Hypertension     Kidney stone     Seasonal allergies        Past Surgical History:   Procedure Laterality Date     SECTION      DIAGNOSTIC LAPAROSCOPY      DILATION AND CURETTAGE OF UTERUS      UT COLONOSCOPY FLX DX W/COLLJ SPEC WHEN PFRMD N/A 2018    Procedure: EGD AND COLONOSCOPY;  Surgeon: Noam Martinez MD;  Location: [facility];   Service: Gastroenterology    UT LAP,DIAGNOSTIC ABDOMEN N/A 2016    Procedure: LAPAROSCOPY DIAGNOSTIC DAVID;  Surgeon: Eduardo Oliveira DO;  Location: [facility];  Service: Gynecology    LA LAP,RMV  ADNEXAL STRUCTURE Bilateral 8/12/2016    Procedure: CYSTECTOMY  OVARIAN;  Surgeon: Eduardo Oliveira DO;  Location: [facility];  Service: Gynecology    WISDOM TOOTH EXTRACTION         Current Outpatient Medications   Medication Sig Dispense Refill    Albuterol Sulfate (ProAir RespiClick) 460 (90 Base) MCG/ACT AEPB 2 inhalations every 6 hours as needed 1 each 0    gabapentin (NEURONTIN) 300 mg capsule Take 2 capsules (600 mg total) by mouth 2 (two) times a day 180 capsule 5    hydrOXYzine pamoate (VISTARIL) 25 mg capsule Take 1 capsule (25 mg total) by mouth 3 (three) times a day as needed for anxiety 30 capsule 0    lisinopril (ZESTRIL) 20 mg tablet Take 1 tablet (20 mg total) by mouth daily 90 tablet 1    norethindrone (AYGESTIN) 5 mg tablet Take 0 5 tablets (2 5 mg total) by mouth daily 30 tablet 0    omeprazole (PriLOSEC) 20 mg delayed release capsule Take 1 capsule (20 mg total) by mouth daily 90 capsule 1    QUEtiapine (SEROquel) 50 mg tablet Take 1 tablet (50 mg total) by mouth daily at bedtime As needed 90 tablet 0    sertraline (ZOLOFT) 100 mg tablet Take 1 tablet daily  With the 50 mg  Total 150 mg daily 90 tablet 1    sertraline (ZOLOFT) 50 mg tablet Take 1 tablet (50 mg total) by mouth daily With 100 mg tablet (150mg total) 90 tablet 1    predniSONE 50 mg tablet Take 1 tablet (50 mg total) by mouth daily for 5 days 5 tablet 0     No current facility-administered medications for this visit  Allergies   Allergen Reactions    Eggs Or Egg-Derived Products Other (See Comments)     abd pain       Review of Systems   Constitutional: Negative for fatigue and fever  HENT: Negative for congestion and trouble swallowing  Eyes: Negative for visual disturbance  Respiratory: Negative for cough and shortness of breath  Cardiovascular: Negative for chest pain and palpitations  Gastrointestinal: Negative for abdominal pain and blood in stool  Endocrine: Negative for cold intolerance and heat intolerance  Genitourinary: Negative for difficulty urinating and dysuria  Musculoskeletal: Positive for arthralgias and joint swelling  Negative for gait problem  B/l knee pain, left knee swelling  Skin: Negative for rash  Neurological: Negative for dizziness, syncope and headaches  Hematological: Negative for adenopathy  Psychiatric/Behavioral: Negative for behavioral problems  Video Exam    Vitals:    01/21/21 0816   Weight: 78 9 kg (174 lb)   Height: 5' (1 524 m)       Physical Exam  Vitals signs and nursing note reviewed  Constitutional:       General: She is not in acute distress  Appearance: Normal appearance  HENT:      Head: Normocephalic and atraumatic  Right Ear: External ear normal       Left Ear: External ear normal       Nose: No congestion  Eyes:      Conjunctiva/sclera: Conjunctivae normal    Neck:      Musculoskeletal: Normal range of motion  Pulmonary:      Effort: Pulmonary effort is normal  No respiratory distress  Musculoskeletal:      Left knee: She exhibits decreased range of motion and swelling  Skin:     Findings: No rash  Neurological:      Mental Status: She is alert and oriented to person, place, and time  Psychiatric:         Mood and Affect: Mood normal          Behavior: Behavior normal           I spent 15 minutes directly with the patient during this visit      [address] 1940 acknowledges that she has consented to an online visit or consultation  She understands that the online visit is based solely on information provided by her, and that, in the absence of a face-to-face physical evaluation by the physician, the diagnosis she receives is both limited and provisional in terms of accuracy and completeness  This is not intended to replace a full medical face-to-face evaluation by the physician  [employer] understands and accepts these terms

## 2021-01-22 ENCOUNTER — HOSPITAL ENCOUNTER (OUTPATIENT)
Dept: RADIOLOGY | Facility: IMAGING CENTER | Age: 41
Discharge: HOME | End: 2021-01-22
Payer: COMMERCIAL

## 2021-01-22 DIAGNOSIS — M25.562 CHRONIC PAIN OF BOTH KNEES: ICD-10-CM

## 2021-01-22 DIAGNOSIS — G89.29 CHRONIC PAIN OF BOTH KNEES: ICD-10-CM

## 2021-01-22 DIAGNOSIS — M25.561 CHRONIC PAIN OF BOTH KNEES: ICD-10-CM

## 2021-01-22 PROCEDURE — 73562 X-RAY EXAM OF KNEE 3: CPT

## 2021-01-25 DIAGNOSIS — Z87.42 HISTORY OF DYSMENORRHEA: ICD-10-CM

## 2021-01-25 NOTE — TELEPHONE ENCOUNTER
Fax from [facility] for refill of Norethindrone 5mg    Last visit 1/21/21, next visit 6/15/21

## 2021-01-26 ENCOUNTER — TELEPHONE (OUTPATIENT)
Dept: FAMILY MEDICINE CLINIC | Facility: CLINIC | Age: 41
End: 2021-01-26

## 2021-01-26 NOTE — TELEPHONE ENCOUNTER
----- Message from Cj Jose PA-C sent at 1/25/2021 12:51 PM EST -----  Please let her know that her x-rays of both of her knees do reveal mild arthritis    Recommend follow-up with Orthopedic surgery

## 2021-02-10 ENCOUNTER — OFFICE VISIT (OUTPATIENT)
Dept: OBGYN CLINIC | Facility: OTHER | Age: 41
End: 2021-02-10
Payer: COMMERCIAL

## 2021-02-10 VITALS
DIASTOLIC BLOOD PRESSURE: 72 MMHG | HEIGHT: 60 IN | WEIGHT: 174 LBS | SYSTOLIC BLOOD PRESSURE: 110 MMHG | BODY MASS INDEX: 34.16 KG/M2 | HEART RATE: 73 BPM

## 2021-02-10 DIAGNOSIS — M23.92 INTERNAL DERANGEMENT OF LEFT KNEE: ICD-10-CM

## 2021-02-10 DIAGNOSIS — M25.561 CHRONIC PAIN OF BOTH KNEES: ICD-10-CM

## 2021-02-10 DIAGNOSIS — M22.2X2 PATELLOFEMORAL DISORDER OF LEFT KNEE: Primary | ICD-10-CM

## 2021-02-10 DIAGNOSIS — M25.562 CHRONIC PAIN OF BOTH KNEES: ICD-10-CM

## 2021-02-10 DIAGNOSIS — G89.29 CHRONIC PAIN OF BOTH KNEES: ICD-10-CM

## 2021-02-10 PROCEDURE — 99203 OFFICE O/P NEW LOW 30 MIN: CPT | Performed by: ORTHOPAEDIC SURGERY

## 2021-02-10 NOTE — PROGRESS NOTES
Orthopaedic Surgery - Office Note  Melia Aguilar (3 y o  female)   : 1980   MRN: [id number]  Encounter Date: 2/10/2021    Chief Complaint   Patient presents with    Left Knee - Pain    Right Knee - Pain       Assessment / Plan  Left knee pain 2/2 patellofemoral arthritis vs  Meniscal pathology    · Activity as tolerated  · Begin outpatient PT for Patellofemoral arthritis vs meniscal pathology  · Anti-inflammatories or Tylenol prn pain   · If pain fails to improve, we will consider injection vs referral for MRI   No follow-ups on file  XR left knee with Salo Carr view at next visit    History of Present Illness  Melia Aguilar is a 41 y o  female who presents for evaluation of left knee pain  Patient states that she has had left knee pain for several years  The pain is periodic and is not necessarily associated with a given activity  There are times when the knee feels locked and she is unable to move it through the entire arc of motion  She did sustain a fall down stairs roughly 10 days ago, but did not further injure the knee  She has been evaluate by an outside physician for the knee in the past who has administered CSI's  Last injection was several years ago  Denies numbness or tingling in the extremity  Review of Systems  Pertinent items are noted in HPI  All other systems were reviewed and are negative  Physical Exam  /72   Pulse 73   Ht 5' (1 524 m)   Wt 78 9 kg (174 lb)   BMI 33 98 kg/m²   Cons: Appears well  No apparent distress  Psych: Alert  Oriented x3  Mood and affect normal   Eyes: PERRLA, EOMI  Resp: Normal effort  No audible wheezing or stridor  CV: Palpable pulse  No discernable arrhythmia  No LE edema  Lymph:  No palpable cervical, axillary, or inguinal lymphadenopathy  Skin: Warm  No palpable masses  No visible lesions  Neuro: Normal muscle tone  Normal and symmetric DTR's  Left Knee Exam  Alignment:  slight genu valgus  Inspection:  No swelling  No edema  slight lateral  ecchymosis  Palpation:  Mild tenderness at medial and lateral joint lines  ROM:  Normal knee ROM  Strength:  5/5 quadriceps and hamstrings  Stability:  (-) Varus instability  (-) Valgus instability  (-) Lachman  Tests:  (+) Rula  Patella:  Patella tracks laterally  Neurovascular:  Sensation intact in DP/SP/Bond/Sa/T nerve distributions  Toes warm and perfused  Gait:  Normal     Studies Reviewed  XR of left knee - Mild degenerative changes with asymmetric tracking of the patella laterally    Procedures  No procedures today  Medical, Surgical, Family, and Social History  The patient's medical history, family history, and social history, were reviewed and updated as appropriate  Past Medical History:   Diagnosis Date    Anxiety     Arthritis     OA  b/l knees    Depression     Hx of bleeding disorder     dysmennorrhea-dx lap today 2016    Hypertension     Kidney stone     Seasonal allergies        Past Surgical History:   Procedure Laterality Date     SECTION      DIAGNOSTIC LAPAROSCOPY      DILATION AND CURETTAGE OF UTERUS      IA COLONOSCOPY FLX DX W/COLLJ SPEC WHEN PFRMD N/A 2018    Procedure: EGD AND COLONOSCOPY;  Surgeon: Alex Humphries MD;  Location: [facility]; Service: Gastroenterology    IA LAP,DIAGNOSTIC ABDOMEN N/A 2016    Procedure: LAPAROSCOPY DIAGNOSTIC DAVID;  Surgeon: Endy Marte DO;  Location: [facility];  Service: Gynecology    IA LAP,RMV  ADNEXAL STRUCTURE Bilateral 2016    Procedure: CYSTECTOMY  OVARIAN;  Surgeon: Endy Marte DO;  Location: [facility];  Service: Gynecology    WISDOM TOOTH EXTRACTION         History reviewed  No pertinent family history      Social History     Occupational History    Not on file   Tobacco Use    Smoking status: Current Every Day Smoker     Packs/day:      Years:      Pack years:     Smokeless tobacco: Never Used   Substance and Sexual Activity    Alcohol use: No    Drug use: Yes     Types: Marijuana    Sexual activity: Yes     Partners: Male       Allergies   Allergen Reactions    Eggs Or Egg-Derived Products Other (See Comments)     abd pain         Current Outpatient Medications:     Albuterol Sulfate (ProAir RespiClick) 270 (90 Base) MCG/ACT AEPB, 2 inhalations every 6 hours as needed, Disp: 1 each, Rfl: 0    gabapentin (NEURONTIN) 300 mg capsule, Take 2 capsules (600 mg total) by mouth 2 (two) times a day, Disp: 180 capsule, Rfl: 5    hydrOXYzine pamoate (VISTARIL) 25 mg capsule, Take 1 capsule (25 mg total) by mouth 3 (three) times a day as needed for anxiety, Disp: 30 capsule, Rfl: 0    lisinopril (ZESTRIL) 20 mg tablet, Take 1 tablet (20 mg total) by mouth daily, Disp: 90 tablet, Rfl: 1    norethindrone (AYGESTIN) 5 mg tablet, Take 0 5 tablets (2 5 mg total) by mouth daily, Disp: 30 tablet, Rfl: 0    omeprazole (PriLOSEC) 20 mg delayed release capsule, Take 1 capsule (20 mg total) by mouth daily, Disp: 90 capsule, Rfl: 1    QUEtiapine (SEROquel) 50 mg tablet, Take 1 tablet (50 mg total) by mouth daily at bedtime As needed, Disp: 90 tablet, Rfl: 0    sertraline (ZOLOFT) 100 mg tablet, Take 1 tablet daily  With the 50 mg    Total 150 mg daily, Disp: 90 tablet, Rfl: 1    sertraline (ZOLOFT) 50 mg tablet, Take 1 tablet (50 mg total) by mouth daily With 100 mg tablet (150mg total), Disp: 90 tablet, Rfl: 1      Kari Claudio MD    Scribe Attestation    I,:   am acting as a scribe while in the presence of the attending physician :       I,:   personally performed the services described in this documentation    as scribed in my presence :

## 2021-02-24 ENCOUNTER — ANNUAL EXAM (OUTPATIENT)
Dept: OBGYN CLINIC | Facility: CLINIC | Age: 41
End: 2021-02-24
Payer: COMMERCIAL

## 2021-02-24 VITALS
HEIGHT: 60 IN | SYSTOLIC BLOOD PRESSURE: 114 MMHG | WEIGHT: 172.8 LBS | BODY MASS INDEX: 33.92 KG/M2 | DIASTOLIC BLOOD PRESSURE: 72 MMHG

## 2021-02-24 DIAGNOSIS — Z87.42 HISTORY OF OVARIAN CYST: ICD-10-CM

## 2021-02-24 DIAGNOSIS — N64.4 PAIN OF RIGHT BREAST: ICD-10-CM

## 2021-02-24 DIAGNOSIS — Z92.89 H/O SCREENING MAMMOGRAPHY: ICD-10-CM

## 2021-02-24 DIAGNOSIS — F41.9 ANXIETY AND DEPRESSION: ICD-10-CM

## 2021-02-24 DIAGNOSIS — Z01.419 WOMEN'S ANNUAL ROUTINE GYNECOLOGICAL EXAMINATION: Primary | ICD-10-CM

## 2021-02-24 DIAGNOSIS — Z30.41 ENCOUNTER FOR SURVEILLANCE OF CONTRACEPTIVE PILLS: ICD-10-CM

## 2021-02-24 DIAGNOSIS — F32.A ANXIETY AND DEPRESSION: ICD-10-CM

## 2021-02-24 DIAGNOSIS — Z98.891 STATUS POST C-SECTION: ICD-10-CM

## 2021-02-24 DIAGNOSIS — E66.9 OBESITY (BMI 30.0-34.9): ICD-10-CM

## 2021-02-24 DIAGNOSIS — R10.2 PELVIC PAIN IN FEMALE: ICD-10-CM

## 2021-02-24 DIAGNOSIS — N80.9 ENDOMETRIOSIS DETERMINED BY LAPAROSCOPY: ICD-10-CM

## 2021-02-24 PROCEDURE — 4004F PT TOBACCO SCREEN RCVD TLK: CPT | Performed by: OBSTETRICS & GYNECOLOGY

## 2021-02-24 PROCEDURE — 3008F BODY MASS INDEX DOCD: CPT | Performed by: OBSTETRICS & GYNECOLOGY

## 2021-02-24 PROCEDURE — 3078F DIAST BP <80 MM HG: CPT | Performed by: OBSTETRICS & GYNECOLOGY

## 2021-02-24 PROCEDURE — 99396 PREV VISIT EST AGE 40-64: CPT | Performed by: OBSTETRICS & GYNECOLOGY

## 2021-02-24 RX ORDER — ACETAMINOPHEN AND CODEINE PHOSPHATE 120; 12 MG/5ML; MG/5ML
1 SOLUTION ORAL DAILY
Qty: 30 TABLET | Refills: 3 | Status: SHIPPED | OUTPATIENT
Start: 2021-02-24 | End: 2021-05-26 | Stop reason: SDUPTHER

## 2021-02-24 NOTE — PROGRESS NOTES
Assessment     Annual well-woman exam    History of pelvic endometriosis determined by laparoscopy    History of cyst    History of  section x1    History of anxiety and depression    Pain in right breast        Plan     Screening mammography    Continue norethindrone at a lower dose 0 35 mg daily    Follow-up 3 months p r n  All questions answered  Await pap smear results  Subjective  Here for annual exam     Melia Lin is a 41 y o  female  1 para 1 history of  section x1 who presents for annual exam  Periods are rare, lasting 2 days  Dysmenorrhea:mild, occurring throughout menses  Cyclic symptoms include pelvic pain  No intermenstrual bleeding, spotting, or discharge  The patient reports that there is not domestic violence in her life  Current contraception: oral progesterone-only contraceptive  History of abnormal Pap smear: no  Family history of uterine or ovarian cancer: no  Regular self breast exam: yes  History of abnormal mammogram: no  Family history of breast cancer: no  History of abnormal lipids: no  Menstrual History:  OB History    No obstetric history on file  Menarche age: 13  No LMP recorded  (Menstrual status: Birth Control)  Review of Systems  Pertinent items are noted in HPI        Objective  No acute distress   /72 (BP Location: Left arm, Patient Position: Sitting, Cuff Size: Standard)   Ht 5' (1 524 m)   Wt 78 4 kg (172 lb 12 8 oz)   BMI 33 75 kg/m²     General:   alert and oriented, in no acute distress, alert, appears stated age and cooperative   Heart: regular rate and rhythm, S1, S2 normal, no murmur, click, rub or gallop   Lungs: clear to auscultation bilaterally   Abdomen: soft, non-tender, without masses or organomegaly   Vulva: normal, Bartholin's, Urethra, Cactus's normal, female escutcheon   Vagina: normal mucosa, no palpable nodules   Cervix: no bleeding following Pap, no cervical motion tenderness, no lesions and nulliparous appearance   Uterus: normal size, mobile, non-tender, normal shape and consistency   Adnexa: normal adnexa and no mass, fullness, tenderness   Bilateral breast exam in the sitting and supine position with chaperone present, no visible or palpable breast lesions identified  No breast masses noted  No supraclavicular or axillary lymphadenopathy noted  No nipple discharge  Reviewed self-breast exam techniques     Rectal exam,  deferred

## 2021-03-01 LAB
CLINICAL INFO: NORMAL
CYTO CVX: NORMAL
CYTOLOGY CMNT CVX/VAG CYTO-IMP: NORMAL
DATE PREVIOUS BX: NORMAL
HPV E6+E7 MRNA CVX QL NAA+PROBE: NOT DETECTED
LMP START DATE: NORMAL
SL AMB PREV. PAP:: NORMAL
SPECIMEN SOURCE CVX/VAG CYTO: NORMAL

## 2021-03-05 ENCOUNTER — HOSPITAL ENCOUNTER (OUTPATIENT)
Dept: MAMMOGRAPHY | Facility: CLINIC | Age: 41
Discharge: HOME | End: 2021-03-05
Payer: COMMERCIAL

## 2021-03-05 ENCOUNTER — HOSPITAL ENCOUNTER (OUTPATIENT)
Dept: ULTRASOUND IMAGING | Facility: CLINIC | Age: 41
Discharge: HOME | End: 2021-03-05
Payer: COMMERCIAL

## 2021-03-05 VITALS — WEIGHT: 172 LBS | BODY MASS INDEX: 33.77 KG/M2 | HEIGHT: 60 IN

## 2021-03-05 DIAGNOSIS — N64.4 PAIN OF RIGHT BREAST: ICD-10-CM

## 2021-03-05 DIAGNOSIS — N64.4 BREAST PAIN: ICD-10-CM

## 2021-03-05 PROCEDURE — 77066 DX MAMMO INCL CAD BI: CPT

## 2021-03-05 PROCEDURE — 76642 ULTRASOUND BREAST LIMITED: CPT

## 2021-03-05 PROCEDURE — G0279 TOMOSYNTHESIS, MAMMO: HCPCS

## 2021-03-10 DIAGNOSIS — Z23 ENCOUNTER FOR IMMUNIZATION: ICD-10-CM

## 2021-04-08 PROBLEM — Z72.0 TOBACCO USE: Status: ACTIVE | Noted: 2021-04-08

## 2021-04-10 DIAGNOSIS — G47.00 INSOMNIA, UNSPECIFIED TYPE: ICD-10-CM

## 2021-04-12 RX ORDER — QUETIAPINE FUMARATE 50 MG/1
50 TABLET, FILM COATED ORAL
Qty: 90 TABLET | Refills: 0 | Status: SHIPPED | OUTPATIENT
Start: 2021-04-12 | End: 2021-06-24 | Stop reason: SDUPTHER

## 2021-05-26 ENCOUNTER — OFFICE VISIT (OUTPATIENT)
Dept: OBGYN CLINIC | Facility: CLINIC | Age: 41
End: 2021-05-26
Payer: COMMERCIAL

## 2021-05-26 VITALS
DIASTOLIC BLOOD PRESSURE: 76 MMHG | HEIGHT: 60 IN | SYSTOLIC BLOOD PRESSURE: 120 MMHG | BODY MASS INDEX: 33.14 KG/M2 | WEIGHT: 168.8 LBS

## 2021-05-26 DIAGNOSIS — I10 ESSENTIAL HYPERTENSION: ICD-10-CM

## 2021-05-26 DIAGNOSIS — N94.6 DYSMENORRHEA: ICD-10-CM

## 2021-05-26 DIAGNOSIS — N80.9 ENDOMETRIOSIS DETERMINED BY LAPAROSCOPY: ICD-10-CM

## 2021-05-26 DIAGNOSIS — E66.9 OBESITY (BMI 30.0-34.9): ICD-10-CM

## 2021-05-26 DIAGNOSIS — R10.2 PELVIC PAIN IN FEMALE: ICD-10-CM

## 2021-05-26 DIAGNOSIS — Z87.42 HISTORY OF OVARIAN CYST: ICD-10-CM

## 2021-05-26 DIAGNOSIS — F41.8 ANXIETY ASSOCIATED WITH DEPRESSION: ICD-10-CM

## 2021-05-26 PROCEDURE — 99212 OFFICE O/P EST SF 10 MIN: CPT | Performed by: OBSTETRICS & GYNECOLOGY

## 2021-05-26 PROCEDURE — 4004F PT TOBACCO SCREEN RCVD TLK: CPT | Performed by: OBSTETRICS & GYNECOLOGY

## 2021-05-26 PROCEDURE — 3074F SYST BP LT 130 MM HG: CPT | Performed by: OBSTETRICS & GYNECOLOGY

## 2021-05-26 PROCEDURE — 3078F DIAST BP <80 MM HG: CPT | Performed by: OBSTETRICS & GYNECOLOGY

## 2021-05-26 PROCEDURE — 3008F BODY MASS INDEX DOCD: CPT | Performed by: OBSTETRICS & GYNECOLOGY

## 2021-05-26 RX ORDER — ACETAMINOPHEN AND CODEINE PHOSPHATE 120; 12 MG/5ML; MG/5ML
1 SOLUTION ORAL DAILY
Qty: 84 TABLET | Refills: 3 | Status: SHIPPED | OUTPATIENT
Start: 2021-05-26 | End: 2022-04-12 | Stop reason: CLARIF

## 2021-05-26 NOTE — PROGRESS NOTES
Assessment/Plan:    Diagnoses and all orders for this visit:    Pelvic pain in female  -     norethindrone (MICRONOR) 0 35 MG tablet; Take 1 tablet (0 35 mg total) by mouth daily    History of ovarian cyst  -     norethindrone (MICRONOR) 0 35 MG tablet; Take 1 tablet (0 35 mg total) by mouth daily    Endometriosis determined by laparoscopy    Dysmenorrhea    Anxiety associated with depression    Essential hypertension    Obesity (BMI 30 0-34  9)          History of  section    Subjective: here for follow-up     Patient ID: Papo Burt is a 35 y o  female  HPI     41-year-old female  1 para 1 history of  section x1  She was recently seen in February this past year for annual exam   She had not been seen for several years  She does have a longstanding history of chronic pelvic pain dysmenorrhea and known pelvic endometriosis  She has had several laparoscopies for treatment of same  She was on norethindrone 5 mg daily which was controlling her pain in her bleeding  She no longer has menstrual cycles  We decrease her dose of norethindrone to 0 35 mg daily she continues to do well with this again having any pelvic pain symptoms and no menstrual cycles no bleeding whatsoever  Her Pap test was normal and negative HPV testing  She did have some screening laboratory studies ordered by her PCP she has not had these done yet, I encouraged her to get this done  She has not had a COVID vaccine I strongly encouraged her to get it and her 15year-old daughter to get it as well at her earliest convenience  Also encouraged her parents to get the vaccine as well  Overall Melia is much improved from her previous pain cycle complaints  Will recommend continue with current regimen follow up here next year for annual exam      Review of Systems   Respiratory: Negative  Cardiovascular: Negative  Gastrointestinal: Negative  Genitourinary: Negative  Neurological: Negative  Psychiatric/Behavioral: Negative  All other systems reviewed and are negative  Objective:  No acute distress   /76 (BP Location: Left arm, Patient Position: Sitting, Cuff Size: Standard)   Ht 5' (1 524 m)   Wt 76 6 kg (168 lb 12 8 oz)   BMI 32 97 kg/m²      Physical Exam  Vitals signs reviewed  Constitutional:       Appearance: Normal appearance  She is obese  Eyes:      Pupils: Pupils are equal, round, and reactive to light  Pulmonary:      Effort: Pulmonary effort is normal    Genitourinary:     Comments:  Pelvic exam deferred  Musculoskeletal: Normal range of motion  Neurological:      Mental Status: She is alert and oriented to person, place, and time  Psychiatric:         Mood and Affect: Mood normal          Behavior: Behavior normal          Thought Content: Thought content normal          Judgment: Judgment normal           Please note    In addition to the time spent discussing the findings and results of today's visit and exam, I spent approximately 15  minutes of face-to-face time with the patient, greater than 50% of which was spent in counseling and coordination of care for this patient

## 2021-06-08 ENCOUNTER — TELEPHONE (OUTPATIENT)
Dept: FAMILY MEDICINE CLINIC | Facility: CLINIC | Age: 41
End: 2021-06-08

## 2021-06-08 NOTE — TELEPHONE ENCOUNTER
Please call and remind her to proceed with the fasting lab orders in the system from January in preparation for her appointment on Tuesday June 15th arrival at 3:45 p m  thank you

## 2021-06-24 ENCOUNTER — OFFICE VISIT (OUTPATIENT)
Dept: FAMILY MEDICINE CLINIC | Facility: CLINIC | Age: 41
End: 2021-06-24
Payer: COMMERCIAL

## 2021-06-24 VITALS
WEIGHT: 170.4 LBS | OXYGEN SATURATION: 97 % | HEART RATE: 85 BPM | DIASTOLIC BLOOD PRESSURE: 80 MMHG | HEIGHT: 60 IN | BODY MASS INDEX: 33.45 KG/M2 | RESPIRATION RATE: 16 BRPM | TEMPERATURE: 98.9 F | SYSTOLIC BLOOD PRESSURE: 120 MMHG

## 2021-06-24 DIAGNOSIS — G47.00 INSOMNIA, UNSPECIFIED TYPE: ICD-10-CM

## 2021-06-24 DIAGNOSIS — F34.1 DYSTHYMIC DISORDER: ICD-10-CM

## 2021-06-24 DIAGNOSIS — I10 ESSENTIAL HYPERTENSION: ICD-10-CM

## 2021-06-24 DIAGNOSIS — K21.9 GASTROESOPHAGEAL REFLUX DISEASE WITHOUT ESOPHAGITIS: Primary | ICD-10-CM

## 2021-06-24 DIAGNOSIS — F41.9 ANXIETY AND DEPRESSION: ICD-10-CM

## 2021-06-24 DIAGNOSIS — F32.A ANXIETY AND DEPRESSION: ICD-10-CM

## 2021-06-24 DIAGNOSIS — J45.20 MILD INTERMITTENT ASTHMA WITHOUT COMPLICATION: ICD-10-CM

## 2021-06-24 DIAGNOSIS — G63 NEUROPATHY ASSOCIATED WITH POLYNEUROPATHY, ORGANOMEGALY, ENDOCRINOPATHY, MONOCLONAL GAMMOPATHY, AND SKIN CHANGES SYNDROME (HCC): ICD-10-CM

## 2021-06-24 DIAGNOSIS — E88.09 NEUROPATHY ASSOCIATED WITH POLYNEUROPATHY, ORGANOMEGALY, ENDOCRINOPATHY, MONOCLONAL GAMMOPATHY, AND SKIN CHANGES SYNDROME (HCC): ICD-10-CM

## 2021-06-24 PROCEDURE — 99051 MED SERV EVE/WKEND/HOLIDAY: CPT | Performed by: PHYSICIAN ASSISTANT

## 2021-06-24 PROCEDURE — 4004F PT TOBACCO SCREEN RCVD TLK: CPT | Performed by: PHYSICIAN ASSISTANT

## 2021-06-24 PROCEDURE — 3079F DIAST BP 80-89 MM HG: CPT | Performed by: PHYSICIAN ASSISTANT

## 2021-06-24 PROCEDURE — 99214 OFFICE O/P EST MOD 30 MIN: CPT | Performed by: PHYSICIAN ASSISTANT

## 2021-06-24 PROCEDURE — 3008F BODY MASS INDEX DOCD: CPT | Performed by: PHYSICIAN ASSISTANT

## 2021-06-24 PROCEDURE — 3074F SYST BP LT 130 MM HG: CPT | Performed by: PHYSICIAN ASSISTANT

## 2021-06-24 RX ORDER — SERTRALINE HYDROCHLORIDE 100 MG/1
TABLET, FILM COATED ORAL
Qty: 90 TABLET | Refills: 1 | Status: SHIPPED | OUTPATIENT
Start: 2021-06-24 | End: 2021-12-02 | Stop reason: SDUPTHER

## 2021-06-24 RX ORDER — OMEPRAZOLE 20 MG/1
20 CAPSULE, DELAYED RELEASE ORAL DAILY
Qty: 90 CAPSULE | Refills: 1 | Status: SHIPPED | OUTPATIENT
Start: 2021-06-24 | End: 2021-12-02 | Stop reason: SDUPTHER

## 2021-06-24 RX ORDER — DOXYCYCLINE HYCLATE 100 MG/1
CAPSULE ORAL
COMMUNITY
Start: 2021-06-17 | End: 2022-02-07

## 2021-06-24 RX ORDER — QUETIAPINE FUMARATE 50 MG/1
50 TABLET, FILM COATED ORAL
Qty: 90 TABLET | Refills: 1 | Status: SHIPPED | OUTPATIENT
Start: 2021-06-24 | End: 2021-12-02 | Stop reason: SDUPTHER

## 2021-06-24 RX ORDER — LISINOPRIL 20 MG/1
20 TABLET ORAL DAILY
Qty: 90 TABLET | Refills: 1 | Status: SHIPPED | OUTPATIENT
Start: 2021-06-24 | End: 2021-12-02 | Stop reason: SDUPTHER

## 2021-06-24 RX ORDER — ALBUTEROL SULFATE 90 UG/1
POWDER, METERED RESPIRATORY (INHALATION)
Qty: 1 EACH | Refills: 0 | Status: SHIPPED | OUTPATIENT
Start: 2021-06-24 | End: 2021-12-02 | Stop reason: SDUPTHER

## 2021-06-24 RX ORDER — GABAPENTIN 300 MG/1
600 CAPSULE ORAL 2 TIMES DAILY
Qty: 180 CAPSULE | Refills: 5 | Status: SHIPPED | OUTPATIENT
Start: 2021-06-24 | End: 2021-12-02 | Stop reason: SDUPTHER

## 2021-06-24 NOTE — PROGRESS NOTES
Assessment/Plan:     continue medications as advised   - she will proceed with the fasting lab orders in the system   - I did encourage her to get the COVID-19 vaccine and she states that she will think about it   -routine follow-up in November M*[employer] software was used to dictate this note  It may contain errors with dictating incorrect words/spelling  Please contact provider directly for any questions  Diagnoses and all orders for this visit:    Gastroesophageal reflux disease without esophagitis  -     omeprazole (PriLOSEC) 20 mg delayed release capsule; Take 1 capsule (20 mg total) by mouth daily    Mild intermittent asthma without complication  -     Albuterol Sulfate (ProAir RespiClick) 523 (90 Base) MCG/ACT AEPB; 2 inhalations every 6 hours as needed    Essential hypertension  -     lisinopril (ZESTRIL) 20 mg tablet; Take 1 tablet (20 mg total) by mouth daily    Dysthymic disorder    Anxiety and depression  -     sertraline (ZOLOFT) 100 mg tablet; Take 1 tablet daily  With the 50 mg  Total 150 mg daily  -     sertraline (ZOLOFT) 50 mg tablet; Take 1 tablet (50 mg total) by mouth daily With 100 mg tablet (150mg total)    Neuropathy associated with polyneuropathy, organomegaly, endocrinopathy, monoclonal gammopathy, and skin changes syndrome (HCC)  -     gabapentin (NEURONTIN) 300 mg capsule; Take 2 capsules (600 mg total) by mouth 2 (two) times a day    Insomnia, unspecified type  -     QUEtiapine (SEROquel) 50 mg tablet; Take 1 tablet (50 mg total) by mouth daily at bedtime As needed    Other orders  -     doxycycline hyclate (VIBRAMYCIN) 100 mg capsule          Subjective:      Patient ID: Kendal Quiñones is a 35 y o  female  Patient presents today for routine follow-up for reflux, asthma, hypertension, depression/ anxiety  She states overall symptoms are doing well at this time with treatment  She does not utilize her rescue inhaler frequently    She does realize that there was lab orders but did have the time to do them yet  She states she recently had a mass removed from her scalp almost 2 weeks ago and she is doing well  The following portions of the patient's history were reviewed and updated as appropriate:   She  has a past medical history of Anxiety, Arthritis, Depression, bleeding disorder, Hypertension, Kidney stone, and Seasonal allergies  She   Patient Active Problem List    Diagnosis Date Noted    Tobacco use 2021    Pain and swelling of left knee 2021    Insomnia 2021    Encounter for screening for HIV 2019    Lipid screening 2019    Bilateral lower extremity edema 2019    Varicose veins of both legs with edema 2019    Shortness of breath 2019    Encounter for screening mammogram for malignant neoplasm of breast 2019    Panic attack as reaction to stress 2019    Numbness and tingling of both legs below knees     GERD (gastroesophageal reflux disease) 10/25/2018    Constipation 10/25/2018    Other constipation 10/25/2018    Neuropathy 10/02/2018    Mild intermittent asthma without complication     Cystic disease of ovaries 10/02/2018    Essential hypertension 10/02/2018    Anxiety and depression 2017    Dysmenorrhea 2016    Chronic pain of both knees 2013    Dysthymic disorder 2013     She  has a past surgical history that includes  section; Mcdonald tooth extraction; Diagnostic laparoscopy; pr lap,diagnostic abdomen (N/A, 2016); pr lap,rmv  adnexal structure (Bilateral, 2016); Dilation and curettage of uterus (); and pr colonoscopy flx dx w/collj spec when pfrmd (N/A, 2018)  Her family history includes Aneurysm in her paternal grandmother; Autism in her daughter; Diabetes in her maternal grandfather; Lung cancer in her maternal grandmother; No Known Problems in her sister and sister  She  reports that she has been smoking   She has a 23 00 pack-year smoking history  She has never used smokeless tobacco  She reports current drug use  Drug: Marijuana  She reports that she does not drink alcohol  Current Outpatient Medications   Medication Sig Dispense Refill    Albuterol Sulfate (ProAir RespiClick) 928 (90 Base) MCG/ACT AEPB 2 inhalations every 6 hours as needed 1 each 0    doxycycline hyclate (VIBRAMYCIN) 100 mg capsule       gabapentin (NEURONTIN) 300 mg capsule Take 2 capsules (600 mg total) by mouth 2 (two) times a day 180 capsule 5    hydrOXYzine pamoate (VISTARIL) 25 mg capsule Take 1 capsule (25 mg total) by mouth 3 (three) times a day as needed for anxiety 30 capsule 0    lisinopril (ZESTRIL) 20 mg tablet Take 1 tablet (20 mg total) by mouth daily 90 tablet 1    norethindrone (MICRONOR) 0 35 MG tablet Take 1 tablet (0 35 mg total) by mouth daily 84 tablet 3    omeprazole (PriLOSEC) 20 mg delayed release capsule Take 1 capsule (20 mg total) by mouth daily 90 capsule 1    QUEtiapine (SEROquel) 50 mg tablet Take 1 tablet (50 mg total) by mouth daily at bedtime As needed 90 tablet 1    sertraline (ZOLOFT) 100 mg tablet Take 1 tablet daily  With the 50 mg  Total 150 mg daily 90 tablet 1    sertraline (ZOLOFT) 50 mg tablet Take 1 tablet (50 mg total) by mouth daily With 100 mg tablet (150mg total) 90 tablet 1     No current facility-administered medications for this visit       Current Outpatient Medications on File Prior to Visit   Medication Sig    doxycycline hyclate (VIBRAMYCIN) 100 mg capsule     hydrOXYzine pamoate (VISTARIL) 25 mg capsule Take 1 capsule (25 mg total) by mouth 3 (three) times a day as needed for anxiety    norethindrone (MICRONOR) 0 35 MG tablet Take 1 tablet (0 35 mg total) by mouth daily    [DISCONTINUED] Albuterol Sulfate (ProAir RespiClick) 897 (90 Base) MCG/ACT AEPB 2 inhalations every 6 hours as needed    [DISCONTINUED] gabapentin (NEURONTIN) 300 mg capsule Take 2 capsules (600 mg total) by mouth 2 (two) times a day    [DISCONTINUED] lisinopril (ZESTRIL) 20 mg tablet Take 1 tablet (20 mg total) by mouth daily    [DISCONTINUED] omeprazole (PriLOSEC) 20 mg delayed release capsule Take 1 capsule (20 mg total) by mouth daily    [DISCONTINUED] QUEtiapine (SEROquel) 50 mg tablet TAKE 1 TABLET (50 MG TOTAL) BY MOUTH DAILY AT BEDTIME AS NEEDED    [DISCONTINUED] sertraline (ZOLOFT) 100 mg tablet Take 1 tablet daily  With the 50 mg  Total 150 mg daily    [DISCONTINUED] sertraline (ZOLOFT) 50 mg tablet Take 1 tablet (50 mg total) by mouth daily With 100 mg tablet (150mg total)     No current facility-administered medications on file prior to visit  She is allergic to eggs or egg-derived products - food allergy       Review of Systems   Constitutional: Negative  Respiratory: Negative for cough and shortness of breath  Cardiovascular: Negative for chest pain and leg swelling  Skin:         As stated in HPI   Psychiatric/Behavioral:          As stated in HPI         Objective:      /80 (BP Location: Left arm, Patient Position: Sitting, Cuff Size: Standard)   Pulse 85   Temp 98 9 °F (37 2 °C) (Tympanic)   Resp 16   Ht 5' (1 524 m)   Wt 77 3 kg (170 lb 6 4 oz)   SpO2 97%   BMI 33 28 kg/m²          Physical Exam  Constitutional:       General: She is not in acute distress  Appearance: Normal appearance  She is well-developed  She is not ill-appearing, toxic-appearing or diaphoretic  HENT:      Head: Normocephalic and atraumatic  Right Ear: Tympanic membrane, ear canal and external ear normal       Left Ear: Tympanic membrane, ear canal and external ear normal    Neck:      Thyroid: No thyromegaly  Cardiovascular:      Rate and Rhythm: Normal rate and regular rhythm  Heart sounds: Normal heart sounds  No murmur heard  No friction rub  No gallop  Pulmonary:      Effort: Pulmonary effort is normal  No respiratory distress  Breath sounds: Normal breath sounds   No wheezing, rhonchi or rales  Abdominal:      General: Bowel sounds are normal       Palpations: Abdomen is soft  There is no mass  Tenderness: There is no abdominal tenderness  Musculoskeletal:         General: No deformity  Cervical back: Neck supple  Right lower leg: No edema  Left lower leg: No edema  Lymphadenopathy:      Cervical: No cervical adenopathy  Skin:     General: Skin is warm  Neurological:      General: No focal deficit present  Mental Status: She is alert  Psychiatric:         Mood and Affect: Mood normal          Behavior: Behavior normal          Thought Content:  Thought content normal          Judgment: Judgment normal

## 2021-08-16 ENCOUNTER — TELEPHONE (OUTPATIENT)
Dept: FAMILY MEDICINE CLINIC | Facility: CLINIC | Age: 41
End: 2021-08-16

## 2021-09-02 NOTE — TELEPHONE ENCOUNTER
Pt aware of results and I did give her the number to Dr Lorenzo Lewis to schedule Detail Level: Zone

## 2021-11-01 ENCOUNTER — TELEPHONE (OUTPATIENT)
Dept: FAMILY MEDICINE CLINIC | Facility: CLINIC | Age: 41
End: 2021-11-01

## 2021-12-02 ENCOUNTER — OFFICE VISIT (OUTPATIENT)
Dept: FAMILY MEDICINE CLINIC | Facility: CLINIC | Age: 41
End: 2021-12-02
Payer: COMMERCIAL

## 2021-12-02 VITALS
WEIGHT: 178.8 LBS | DIASTOLIC BLOOD PRESSURE: 80 MMHG | OXYGEN SATURATION: 99 % | SYSTOLIC BLOOD PRESSURE: 128 MMHG | HEIGHT: 60 IN | BODY MASS INDEX: 35.1 KG/M2 | HEART RATE: 60 BPM | RESPIRATION RATE: 16 BRPM | TEMPERATURE: 98.5 F

## 2021-12-02 DIAGNOSIS — F33.0 MILD EPISODE OF RECURRENT MAJOR DEPRESSIVE DISORDER (HCC): ICD-10-CM

## 2021-12-02 DIAGNOSIS — E88.09 NEUROPATHY ASSOCIATED WITH POLYNEUROPATHY, ORGANOMEGALY, ENDOCRINOPATHY, MONOCLONAL GAMMOPATHY, AND SKIN CHANGES SYNDROME (HCC): ICD-10-CM

## 2021-12-02 DIAGNOSIS — J45.20 MILD INTERMITTENT ASTHMA WITHOUT COMPLICATION: ICD-10-CM

## 2021-12-02 DIAGNOSIS — G89.29 CHRONIC PAIN OF BOTH KNEES: ICD-10-CM

## 2021-12-02 DIAGNOSIS — G63 NEUROPATHY ASSOCIATED WITH POLYNEUROPATHY, ORGANOMEGALY, ENDOCRINOPATHY, MONOCLONAL GAMMOPATHY, AND SKIN CHANGES SYNDROME (HCC): ICD-10-CM

## 2021-12-02 DIAGNOSIS — I10 ESSENTIAL HYPERTENSION: ICD-10-CM

## 2021-12-02 DIAGNOSIS — M25.562 CHRONIC PAIN OF BOTH KNEES: ICD-10-CM

## 2021-12-02 DIAGNOSIS — M25.561 CHRONIC PAIN OF BOTH KNEES: ICD-10-CM

## 2021-12-02 DIAGNOSIS — K21.9 GASTROESOPHAGEAL REFLUX DISEASE WITHOUT ESOPHAGITIS: Primary | ICD-10-CM

## 2021-12-02 DIAGNOSIS — G47.00 INSOMNIA, UNSPECIFIED TYPE: ICD-10-CM

## 2021-12-02 DIAGNOSIS — F41.9 ANXIETY: ICD-10-CM

## 2021-12-02 PROBLEM — F32.A ANXIETY AND DEPRESSION: Status: RESOLVED | Noted: 2017-03-08 | Resolved: 2021-12-02

## 2021-12-02 PROCEDURE — 4004F PT TOBACCO SCREEN RCVD TLK: CPT | Performed by: PHYSICIAN ASSISTANT

## 2021-12-02 PROCEDURE — 3074F SYST BP LT 130 MM HG: CPT | Performed by: PHYSICIAN ASSISTANT

## 2021-12-02 PROCEDURE — 3079F DIAST BP 80-89 MM HG: CPT | Performed by: PHYSICIAN ASSISTANT

## 2021-12-02 PROCEDURE — 99214 OFFICE O/P EST MOD 30 MIN: CPT | Performed by: PHYSICIAN ASSISTANT

## 2021-12-02 PROCEDURE — 3008F BODY MASS INDEX DOCD: CPT | Performed by: PHYSICIAN ASSISTANT

## 2021-12-02 PROCEDURE — 99051 MED SERV EVE/WKEND/HOLIDAY: CPT | Performed by: PHYSICIAN ASSISTANT

## 2021-12-02 RX ORDER — LISINOPRIL 20 MG/1
20 TABLET ORAL DAILY
Qty: 90 TABLET | Refills: 1 | Status: SHIPPED | OUTPATIENT
Start: 2021-12-02 | End: 2022-05-11 | Stop reason: SDUPTHER

## 2021-12-02 RX ORDER — OMEPRAZOLE 20 MG/1
20 CAPSULE, DELAYED RELEASE ORAL DAILY
Qty: 90 CAPSULE | Refills: 1 | Status: SHIPPED | OUTPATIENT
Start: 2021-12-02 | End: 2022-02-07

## 2021-12-02 RX ORDER — QUETIAPINE FUMARATE 50 MG/1
50 TABLET, FILM COATED ORAL
Qty: 90 TABLET | Refills: 1 | Status: SHIPPED | OUTPATIENT
Start: 2021-12-02 | End: 2022-02-07 | Stop reason: SDUPTHER

## 2021-12-02 RX ORDER — ALBUTEROL SULFATE 90 UG/1
POWDER, METERED RESPIRATORY (INHALATION)
Qty: 1 EACH | Refills: 0 | Status: SHIPPED | OUTPATIENT
Start: 2021-12-02 | End: 2021-12-06

## 2021-12-02 RX ORDER — GABAPENTIN 300 MG/1
600 CAPSULE ORAL 2 TIMES DAILY
Qty: 180 CAPSULE | Refills: 5 | Status: SHIPPED | OUTPATIENT
Start: 2021-12-02

## 2021-12-02 RX ORDER — SERTRALINE HYDROCHLORIDE 100 MG/1
TABLET, FILM COATED ORAL
Qty: 90 TABLET | Refills: 1 | Status: SHIPPED | OUTPATIENT
Start: 2021-12-02 | End: 2022-05-11 | Stop reason: SDUPTHER

## 2021-12-03 ENCOUNTER — TELEPHONE (OUTPATIENT)
Dept: FAMILY MEDICINE CLINIC | Facility: CLINIC | Age: 41
End: 2021-12-03

## 2021-12-06 DIAGNOSIS — J45.20 MILD INTERMITTENT ASTHMA WITHOUT COMPLICATION: Primary | ICD-10-CM

## 2021-12-06 RX ORDER — ALBUTEROL SULFATE 90 UG/1
2 AEROSOL, METERED RESPIRATORY (INHALATION) EVERY 6 HOURS PRN
Qty: 18 G | Refills: 0 | Status: SHIPPED | OUTPATIENT
Start: 2021-12-06

## 2021-12-07 ENCOUNTER — EVALUATION (OUTPATIENT)
Dept: PHYSICAL THERAPY | Facility: REHABILITATION | Age: 41
End: 2021-12-07
Payer: COMMERCIAL

## 2021-12-07 DIAGNOSIS — G89.29 CHRONIC PAIN OF BOTH KNEES: ICD-10-CM

## 2021-12-07 DIAGNOSIS — M25.561 CHRONIC PAIN OF BOTH KNEES: ICD-10-CM

## 2021-12-07 DIAGNOSIS — M25.562 CHRONIC PAIN OF BOTH KNEES: ICD-10-CM

## 2021-12-07 PROCEDURE — 97162 PT EVAL MOD COMPLEX 30 MIN: CPT | Performed by: PHYSICAL THERAPIST

## 2021-12-07 PROCEDURE — 97110 THERAPEUTIC EXERCISES: CPT | Performed by: PHYSICAL THERAPIST

## 2021-12-09 ENCOUNTER — OFFICE VISIT (OUTPATIENT)
Dept: PHYSICAL THERAPY | Facility: REHABILITATION | Age: 41
End: 2021-12-09
Payer: COMMERCIAL

## 2021-12-09 DIAGNOSIS — G89.29 CHRONIC PAIN OF BOTH KNEES: Primary | ICD-10-CM

## 2021-12-09 DIAGNOSIS — M25.562 CHRONIC PAIN OF BOTH KNEES: Primary | ICD-10-CM

## 2021-12-09 DIAGNOSIS — M25.561 CHRONIC PAIN OF BOTH KNEES: Primary | ICD-10-CM

## 2021-12-09 PROCEDURE — 97140 MANUAL THERAPY 1/> REGIONS: CPT | Performed by: PHYSICAL THERAPIST

## 2021-12-09 PROCEDURE — 97110 THERAPEUTIC EXERCISES: CPT | Performed by: PHYSICAL THERAPIST

## 2021-12-14 ENCOUNTER — OFFICE VISIT (OUTPATIENT)
Dept: PHYSICAL THERAPY | Facility: REHABILITATION | Age: 41
End: 2021-12-14
Payer: COMMERCIAL

## 2021-12-14 DIAGNOSIS — M25.561 CHRONIC PAIN OF BOTH KNEES: Primary | ICD-10-CM

## 2021-12-14 DIAGNOSIS — G89.29 CHRONIC PAIN OF BOTH KNEES: Primary | ICD-10-CM

## 2021-12-14 DIAGNOSIS — M25.562 CHRONIC PAIN OF BOTH KNEES: Primary | ICD-10-CM

## 2021-12-14 PROCEDURE — 97110 THERAPEUTIC EXERCISES: CPT

## 2021-12-14 PROCEDURE — 97140 MANUAL THERAPY 1/> REGIONS: CPT

## 2021-12-16 ENCOUNTER — OFFICE VISIT (OUTPATIENT)
Dept: PHYSICAL THERAPY | Facility: REHABILITATION | Age: 41
End: 2021-12-16
Payer: COMMERCIAL

## 2021-12-16 DIAGNOSIS — G89.29 CHRONIC PAIN OF BOTH KNEES: Primary | ICD-10-CM

## 2021-12-16 DIAGNOSIS — M25.562 CHRONIC PAIN OF BOTH KNEES: Primary | ICD-10-CM

## 2021-12-16 DIAGNOSIS — M25.561 CHRONIC PAIN OF BOTH KNEES: Primary | ICD-10-CM

## 2021-12-16 PROCEDURE — 97110 THERAPEUTIC EXERCISES: CPT

## 2021-12-16 PROCEDURE — 97140 MANUAL THERAPY 1/> REGIONS: CPT

## 2021-12-21 ENCOUNTER — OFFICE VISIT (OUTPATIENT)
Dept: PHYSICAL THERAPY | Facility: REHABILITATION | Age: 41
End: 2021-12-21
Payer: COMMERCIAL

## 2021-12-21 DIAGNOSIS — M25.562 CHRONIC PAIN OF BOTH KNEES: Primary | ICD-10-CM

## 2021-12-21 DIAGNOSIS — G89.29 CHRONIC PAIN OF BOTH KNEES: Primary | ICD-10-CM

## 2021-12-21 DIAGNOSIS — M25.561 CHRONIC PAIN OF BOTH KNEES: Primary | ICD-10-CM

## 2021-12-21 PROCEDURE — 97140 MANUAL THERAPY 1/> REGIONS: CPT

## 2021-12-21 PROCEDURE — 97110 THERAPEUTIC EXERCISES: CPT

## 2021-12-23 ENCOUNTER — OFFICE VISIT (OUTPATIENT)
Dept: PHYSICAL THERAPY | Facility: REHABILITATION | Age: 41
End: 2021-12-23
Payer: COMMERCIAL

## 2021-12-23 DIAGNOSIS — G89.29 CHRONIC PAIN OF BOTH KNEES: Primary | ICD-10-CM

## 2021-12-23 DIAGNOSIS — M25.561 CHRONIC PAIN OF BOTH KNEES: Primary | ICD-10-CM

## 2021-12-23 DIAGNOSIS — M25.562 CHRONIC PAIN OF BOTH KNEES: Primary | ICD-10-CM

## 2021-12-23 PROCEDURE — 97140 MANUAL THERAPY 1/> REGIONS: CPT

## 2021-12-23 PROCEDURE — 97110 THERAPEUTIC EXERCISES: CPT

## 2021-12-28 ENCOUNTER — OFFICE VISIT (OUTPATIENT)
Dept: PHYSICAL THERAPY | Facility: REHABILITATION | Age: 41
End: 2021-12-28
Payer: COMMERCIAL

## 2021-12-28 DIAGNOSIS — M25.562 CHRONIC PAIN OF BOTH KNEES: Primary | ICD-10-CM

## 2021-12-28 DIAGNOSIS — M25.561 CHRONIC PAIN OF BOTH KNEES: Primary | ICD-10-CM

## 2021-12-28 DIAGNOSIS — G89.29 CHRONIC PAIN OF BOTH KNEES: Primary | ICD-10-CM

## 2021-12-28 PROCEDURE — 97140 MANUAL THERAPY 1/> REGIONS: CPT

## 2021-12-28 PROCEDURE — 97110 THERAPEUTIC EXERCISES: CPT

## 2021-12-30 ENCOUNTER — OFFICE VISIT (OUTPATIENT)
Dept: PHYSICAL THERAPY | Facility: REHABILITATION | Age: 41
End: 2021-12-30
Payer: COMMERCIAL

## 2021-12-30 DIAGNOSIS — M25.561 CHRONIC PAIN OF BOTH KNEES: Primary | ICD-10-CM

## 2021-12-30 DIAGNOSIS — M25.562 CHRONIC PAIN OF BOTH KNEES: Primary | ICD-10-CM

## 2021-12-30 DIAGNOSIS — G89.29 CHRONIC PAIN OF BOTH KNEES: Primary | ICD-10-CM

## 2021-12-30 PROCEDURE — 97140 MANUAL THERAPY 1/> REGIONS: CPT

## 2021-12-30 PROCEDURE — 97110 THERAPEUTIC EXERCISES: CPT

## 2022-01-03 ENCOUNTER — OFFICE VISIT (OUTPATIENT)
Dept: PHYSICAL THERAPY | Facility: REHABILITATION | Age: 42
End: 2022-01-03
Payer: COMMERCIAL

## 2022-01-03 DIAGNOSIS — M25.561 CHRONIC PAIN OF BOTH KNEES: Primary | ICD-10-CM

## 2022-01-03 DIAGNOSIS — M25.562 CHRONIC PAIN OF BOTH KNEES: Primary | ICD-10-CM

## 2022-01-03 DIAGNOSIS — G89.29 CHRONIC PAIN OF BOTH KNEES: Primary | ICD-10-CM

## 2022-01-03 PROCEDURE — 97110 THERAPEUTIC EXERCISES: CPT | Performed by: PHYSICAL THERAPIST

## 2022-01-03 PROCEDURE — 97140 MANUAL THERAPY 1/> REGIONS: CPT | Performed by: PHYSICAL THERAPIST

## 2022-01-03 NOTE — PROGRESS NOTES
Daily Note     Today's date: 1/3/2022  Patient name: Catherine Fajardo  : 1980  MRN: [id number]  Referring provider: Yana De Luna PA-C  Dx:   Encounter Diagnosis     ICD-10-CM    1  Chronic pain of both knees  M25 561     M25 562     G89 29        Start Time: 0900  Stop Time: 0955  Total time in clinic (min): 55 minutes    Subjective: Patient offers no new complaints  Objective: See treatment diary below      Assessment: Patient continues to tolerate treatment well but is significantly challenged with lateral hip PREs  Continue to progress strengthening/stabilization, as tolerated  Plan: Continue per plan of care        Precautions: HTN, asthma      Daily Treatment Diary      Assessment  12/7  12/9  12/14  12/16  12/21  12/23  12/28  12/30  1/3     Eval/Reval                       FOTO         nv  perf       **   Manuals    roller R hamstrings/gastroc    B NT  B CC  B TE  B  CC  B CC  B TE  B TE  JAB     kinesio tape     L  CC B  CC B TE TE JAB                 Exercise Diary     bike      x5'  x5'  x5'  6'  6'  6'  6'      quad sets 5"x10  5"x10  5"x15  5"x20  w/slr  w/slr  w/ slr                  SLR 3"x10  3'x10  3"x15  3"x15  3"x20  3"x20  3"x20  3"x20 3"x20      bridges 5"x10  5"x10  5"x15  5"x20  5"x20  5"x20    5"x20  5"x20       hamstring stretch seated 30"x2  30"x2  long sit 30"x3 ea  long sit 30"x3 ea  long sit   30"x3 ea  long sit 30"x3 ea3  long sit 30"x3 ea3  long sit 30"x3 ea3  long sit 30"x3 ea      runners stretch NV  30"x2  30"x3 ea  30"x3 ea  30"x3 ea  30"x3 ea   30"x3 ea  30"x3 ea  30"x3 ea      clams    5"x10  5"x15 5"x15  5"x20  5"x20 ea  5"x20 ea  5"x20 ea  5"x20      hip abd      3"x10  3"x15  5"x20  5"x20 ea  5"x20  5"x20  5"x20       squats w/abd TB      nv  nv  np  GTB 5" x10  GTB  5"x10  nv  nv Modalities                                                 Access Code: [email or web address]

## 2022-01-05 ENCOUNTER — OFFICE VISIT (OUTPATIENT)
Dept: PHYSICAL THERAPY | Facility: REHABILITATION | Age: 42
End: 2022-01-05
Payer: COMMERCIAL

## 2022-01-05 DIAGNOSIS — M25.561 CHRONIC PAIN OF BOTH KNEES: Primary | ICD-10-CM

## 2022-01-05 DIAGNOSIS — M25.562 CHRONIC PAIN OF BOTH KNEES: Primary | ICD-10-CM

## 2022-01-05 DIAGNOSIS — G89.29 CHRONIC PAIN OF BOTH KNEES: Primary | ICD-10-CM

## 2022-01-05 PROCEDURE — 97140 MANUAL THERAPY 1/> REGIONS: CPT

## 2022-01-05 PROCEDURE — 97110 THERAPEUTIC EXERCISES: CPT

## 2022-01-05 NOTE — PROGRESS NOTES
Daily Note     Today's date: 2022  Patient name: Melony Contreras  : 1980  MRN: [id number]  Referring provider: Lisa Singer PA-C  Dx:   Encounter Diagnosis     ICD-10-CM    1  Chronic pain of both knees  M25 561     M25 562     G89 29        Start Time: 0559  Stop Time: 4363  Total time in clinic (min): 50 minutes    Subjective: No new complaints since LV  Patient reports that K-tape is effective and providing bilateral knee support  Objective: See treatment diary below      Assessment: Patient tolerated treatment well  Patient demonstrated good form t/o session with no notable increase bilateral knee pain post kinesio tape  Patient would benefit from continued PT for bilateral LE strengthening, and to increase (B) knee stability  Plan: Continue per plan of care        Precautions: HTN, asthma      Daily Treatment Diary      Assessment  12/7  12/9  12/14  12/16  12/21  12/23  12/28  12/30  1/3  1/5   Eval/Reval                       FOTO         nv  perf       **   Manuals    roller R hamstrings/gastroc    B NT  B CC  B TE  B  CC  B CC  B TE  B TE  JAB  LCQ   kinesio tape     L  CC B  CC B TE TE JAB CC                Exercise Diary     bike      x5'  x5'  x5'  6'  6'  6'  6'  6'    quad sets 5"x10  5"x10  5"x15  5"x20  w/slr  w/slr  w/ slr              --    SLR 3"x10  3'x10  3"x15  3"x15  3"x20  3"x20  3"x20  3"x20 3"x20  3"x20    bridges 5"x10  5"x10  5"x15  5"x20  5"x20  5"x20    5"x20  5"x20   5"x20    hamstring stretch seated 30"x2  30"x2  long sit 30"x3 ea  long sit 30"x3 ea  long sit   30"x3 ea  long sit 30"x3 ea3  long sit 30"x3 ea3  long sit 30"x3 ea3  long sit 30"x3 ea   long sit 30"x3 ea    runners stretch NV  30"x2  30"x3 ea  30"x3 ea  30"x3 ea  30"x3 ea   30"x3 ea  30"x3 ea  30"x3 ea  30"x3 ea    clams    5"x10  5"x15 5"x15  5"x20  5"x20 ea  5"x20 ea  5"x20 ea  5"x20   5"x20    hip abd      3"x10  3"x15  5"x20  5"x20 ea  5"x20  5"x20  5"x20   5"x20    squats w/abd TB      nv  nv np  GTB 5" x10  GTB  5"x10  nv  nv  NP - d/t pain                                                                                                                                                                           Modalities                                                 Access Code: [email or web address]

## 2022-01-11 ENCOUNTER — EVALUATION (OUTPATIENT)
Dept: PHYSICAL THERAPY | Facility: REHABILITATION | Age: 42
End: 2022-01-11
Payer: COMMERCIAL

## 2022-01-11 DIAGNOSIS — M25.562 CHRONIC PAIN OF BOTH KNEES: Primary | ICD-10-CM

## 2022-01-11 DIAGNOSIS — M25.561 CHRONIC PAIN OF BOTH KNEES: Primary | ICD-10-CM

## 2022-01-11 DIAGNOSIS — G89.29 CHRONIC PAIN OF BOTH KNEES: Primary | ICD-10-CM

## 2022-01-11 PROCEDURE — 97110 THERAPEUTIC EXERCISES: CPT | Performed by: PHYSICAL THERAPIST

## 2022-01-11 PROCEDURE — 97140 MANUAL THERAPY 1/> REGIONS: CPT | Performed by: PHYSICAL THERAPIST

## 2022-01-11 NOTE — PROGRESS NOTES
Daily Note     Today's date: 2022  Patient name: Parke Fothergill  : 1980  MRN: [id number]  Referring provider: Jayleen Merlos PA-C  Dx:   No diagnosis found  Subjective: Pain rates 2-3/10 with the tape on  Still has difficulty on the steps  Carrying the laundry basket up and down the cellar steps is especially painful  Objective: See treatment diary below  Palpation     Right   No tenderness    Tenderness   Left Knee   Tenderness in the lateral joint line, medial joint    Active Range of Motion   Left Knee   Normal active range of motion    Right Knee   Normal active range of motion    Mobility   Patellar Mobility:   Left Knee   WFL: medial, lateral, superior and inferior  Right Knee   WFL: medial, lateral, superior and inferior    Patellar Static Positioning   Left Knee: lateral tilt  Right Knee: lateral tilt    Strength/Myotome Testing     Left Knee   Flexion: 5  Prone flexion: 5    Right Knee   Flexion: 5  Prone flexion: 5    Additional Strength Details  Hip flex: L 5 R 5  Abd: L 4+ R 4+  Add: L 4+  R 4+  Ext: L4 R 4  ER: L4 R 4+  IR:  L5  R 5      General Comments:      Knee Comments  L-S ROM  Ext min limited  Remaining WNL w/R LBP with left lat flex    RFIS: produced post knee pain and numbness, no worse  HIEU: produced LBP worse        Assessment: Strength has improved however mild hip weakness persists    Patient would benefit from continued PT for bilateral LE strengthening, and to increase (B) knee stability  Pt taped her own knee today  Goals  STG: in 4 weeks  Pt performing HEP consistently  Pt able to walk around her home without pain met  LTG: by discharge  Increase strength 1/2 grade met  Walk in the community without significant pain met  Understands how much she can do without injury  Stairs without pain    Plan: Continue per plan of care        Precautions: HTN, asthma      Daily Treatment Diary      Assessment  12/30  1/3  1/5   Eval/Reval                     FOTO       nv  perf       **   Manuals    roller R hamstrings/gastroc B/L    B CC  B TE  B  CC  B CC  B TE  B TE  JAB  LCQ   kinesio tape     L  CC B  CC B TE TE JAB CC                Exercise Diary     bike 7'   x5'  x5'  x5'  6'  6'  6'  6'  6'    quad sets    5"x15  5"x20  w/slr  w/slr  w/ slr              --    SLR 3"x20 1#   3"x15  3"x15  3"x20  3"x20  3"x20  3"x20 3"x20  3"x20    bridges W/pink TB 2x10   5"x15  5"x20  5"x20  5"x20    5"x20  5"x20   5"x20    hamstring stretch seated Long sit 30" x3   long sit 30"x3 ea  long sit 30"x3 ea  long sit   30"x3 ea  long sit 30"x3 ea3  long sit 30"x3 ea3  long sit 30"x3 ea3  long sit 30"x3 ea   long sit 30"x3 ea    runners stretch 30"x3   30"x3 ea  30"x3 ea  30"x3 ea  30"x3 ea   30"x3 ea  30"x3 ea  30"x3 ea  30"x3 ea    clams  Pink TB 5"    5"x15 5"x15  5"x20  5"x20 ea  5"x20 ea  5"x20 ea  5"x20   5"x20    hip abd  5"x20    3"x10  3"x15  5"x20  5"x20 ea  5"x20  5"x20  5"x20   5"x20    squats w/abd TB Gr x10    nv  nv  np  GTB 5" x10  GTB  5"x10  nv  nv  NP - d/t pain    ecc lowering 4" lat x10                      leg press                        side step w/TB                        forward T w/counter support                                                                                               Modalities                                                 Access Code: [email or web address]

## 2022-01-13 ENCOUNTER — OFFICE VISIT (OUTPATIENT)
Dept: PHYSICAL THERAPY | Facility: REHABILITATION | Age: 42
End: 2022-01-13
Payer: COMMERCIAL

## 2022-01-13 DIAGNOSIS — M25.561 CHRONIC PAIN OF BOTH KNEES: Primary | ICD-10-CM

## 2022-01-13 DIAGNOSIS — M25.562 CHRONIC PAIN OF BOTH KNEES: Primary | ICD-10-CM

## 2022-01-13 DIAGNOSIS — G89.29 CHRONIC PAIN OF BOTH KNEES: Primary | ICD-10-CM

## 2022-01-13 PROCEDURE — 97140 MANUAL THERAPY 1/> REGIONS: CPT

## 2022-01-13 PROCEDURE — 97110 THERAPEUTIC EXERCISES: CPT

## 2022-01-13 NOTE — PROGRESS NOTES
Daily Note     Today's date: 2022  Patient name: Milena Canales  : 1980  MRN: [id number]  Referring provider: Candace Wise PA-C  Dx:   Encounter Diagnosis     ICD-10-CM    1  Chronic pain of both knees  M25 561     M25 562     G89 29                   Subjective: pt offered no new complaints  Objective: See treatment diary below      Assessment: Tolerated treatment well  Added exercises as listed with good response  Greatly challenged with performance of eccentric step exercise; unable to perform as prescribed  Patient demonstrated fatigue post treatment, exhibited good technique with therapeutic exercises and would benefit from continued PT      Plan: Continue per plan of care  Progress treatment as tolerated         Precautions: HTN, asthma      Daily Treatment Diary      Assessment 1/11 1/13  12/14  12/16  12/21  12/23  12/28  12/30  1/3  1/5   Eval/Reval                     FOTO       nv  perf       **   Manuals    roller R hamstrings/gastroc B/L  TE  B CC  B TE  B  CC  B CC  B TE  B TE  JAB  LCQ   kinesio tape  TE   L  CC B  CC B TE TE JAB CC                Exercise Diary     bike 7' 7'  x5'  x5'  x5'  6'  6'  6'  6'  6'    quad sets    5"x15  5"x20  w/slr  w/slr  w/ slr              --    SLR 3"x20 1# 3"x20 1#  3"x15  3"x15  3"x20  3"x20  3"x20  3"x20 3"x20  3"x20    bridges W/pink TB 2x10 W/pink TB 2x10  5"x15  5"x20  5"x20  5"x20    5"x20  5"x20   5"x20    hamstring stretch seated Long sit 30" x3 Long sit 30" x3  long sit 30"x3 ea  long sit 30"x3 ea  long sit   30"x3 ea  long sit 30"x3 ea3  long sit 30"x3 ea3  long sit 30"x3 ea3  long sit 30"x3 ea   long sit 30"x3 ea    runners stretch 30"x3 30"x3  30"x3 ea  30"x3 ea  30"x3 ea  30"x3 ea   30"x3 ea  30"x3 ea  30"x3 ea  30"x3 ea    clams  Pink TB 5"  W/pink TB 2x10  5"x15 5"x15  5"x20  5"x20 ea  5"x20 ea  5"x20 ea  5"x20   5"x20    hip abd  5"x20  1# 5"x10  3"x10  3"x15  5"x20  5"x20 ea  5"x20  5"x20  5"x20   5"x20    squats w/abd TB Gr x10  gtb 5"2x10  nv  nv  np  GTB 5" x10  GTB  5"x10  nv  nv  NP - d/t pain    ecc lowering 4" lat x10  x5 R  x3 L                    leg press                        side step w/TB    ytb x3 laps                    forward T w/counter support                                                                                               Modalities                                                 Access Code: [email or web address]

## 2022-01-18 ENCOUNTER — APPOINTMENT (OUTPATIENT)
Dept: PHYSICAL THERAPY | Facility: REHABILITATION | Age: 42
End: 2022-01-18
Payer: COMMERCIAL

## 2022-01-19 ENCOUNTER — HOSPITAL ENCOUNTER (EMERGENCY)
Facility: HOSPITAL | Age: 42
Discharge: HOME | End: 2022-01-19
Attending: EMERGENCY MEDICINE
Payer: COMMERCIAL

## 2022-01-19 ENCOUNTER — APPOINTMENT (EMERGENCY)
Dept: RADIOLOGY | Facility: HOSPITAL | Age: 42
End: 2022-01-19
Payer: COMMERCIAL

## 2022-01-19 VITALS
HEART RATE: 75 BPM | DIASTOLIC BLOOD PRESSURE: 79 MMHG | TEMPERATURE: 97 F | RESPIRATION RATE: 20 BRPM | BODY MASS INDEX: 35.15 KG/M2 | WEIGHT: 180 LBS | OXYGEN SATURATION: 100 % | SYSTOLIC BLOOD PRESSURE: 125 MMHG

## 2022-01-19 DIAGNOSIS — R11.2 NAUSEA & VOMITING: Primary | ICD-10-CM

## 2022-01-19 DIAGNOSIS — F41.9 ANXIETY: ICD-10-CM

## 2022-01-19 DIAGNOSIS — R10.9 ABDOMINAL PAIN: ICD-10-CM

## 2022-01-19 LAB
ALBUMIN SERPL BCP-MCNC: 4 G/DL (ref 3.5–5)
ALP SERPL-CCNC: 87 U/L (ref 46–116)
ALT SERPL W P-5'-P-CCNC: 28 U/L (ref 12–78)
ANION GAP SERPL CALCULATED.3IONS-SCNC: 10 MMOL/L (ref 4–13)
AST SERPL W P-5'-P-CCNC: 31 U/L (ref 5–45)
BASOPHILS # BLD AUTO: 0.01 THOUSANDS/ΜL (ref 0–0.1)
BASOPHILS NFR BLD AUTO: 0 % (ref 0–1)
BILIRUB SERPL-MCNC: 0.6 MG/DL (ref 0.2–1)
BUN SERPL-MCNC: 16 MG/DL (ref 5–25)
CALCIUM SERPL-MCNC: 9.8 MG/DL (ref 8.3–10.1)
CARDIAC TROPONIN I PNL SERPL HS: <2 NG/L (ref 8–18)
CHLORIDE SERPL-SCNC: 110 MMOL/L (ref 100–108)
CO2 SERPL-SCNC: 18 MMOL/L (ref 21–32)
CREAT SERPL-MCNC: 0.93 MG/DL (ref 0.6–1.3)
EOSINOPHIL # BLD AUTO: 0 THOUSAND/ΜL (ref 0–0.61)
EOSINOPHIL NFR BLD AUTO: 0 % (ref 0–6)
ERYTHROCYTE [DISTWIDTH] IN BLOOD BY AUTOMATED COUNT: 12.8 % (ref 11.6–15.1)
GFR SERPL CREATININE-BSD FRML MDRD: 75 ML/MIN/1.73SQ M
GLUCOSE SERPL-MCNC: 155 MG/DL (ref 65–140)
HCG SERPL QL: NEGATIVE
HCT VFR BLD AUTO: 38.1 % (ref 34.8–46.1)
HGB BLD-MCNC: 13.5 G/DL (ref 11.5–15.4)
IMM GRANULOCYTES # BLD AUTO: 0.04 THOUSAND/UL (ref 0–0.2)
IMM GRANULOCYTES NFR BLD AUTO: 0 % (ref 0–2)
LIPASE SERPL-CCNC: 127 U/L (ref 73–393)
LYMPHOCYTES # BLD AUTO: 1.16 THOUSANDS/ΜL (ref 0.6–4.47)
LYMPHOCYTES NFR BLD AUTO: 10 % (ref 14–44)
MCH RBC QN AUTO: 33.3 PG (ref 26.8–34.3)
MCHC RBC AUTO-ENTMCNC: 35.4 G/DL (ref 31.4–37.4)
MCV RBC AUTO: 94 FL (ref 82–98)
MONOCYTES # BLD AUTO: 0.52 THOUSAND/ΜL (ref 0.17–1.22)
MONOCYTES NFR BLD AUTO: 4 % (ref 4–12)
NEUTROPHILS # BLD AUTO: 10.05 THOUSANDS/ΜL (ref 1.85–7.62)
NEUTS SEG NFR BLD AUTO: 86 % (ref 43–75)
NRBC BLD AUTO-RTO: 0 /100 WBCS
PLATELET # BLD AUTO: 242 THOUSANDS/UL (ref 149–390)
PMV BLD AUTO: 10 FL (ref 8.9–12.7)
POTASSIUM SERPL-SCNC: 4 MMOL/L (ref 3.5–5.3)
PROT SERPL-MCNC: 8 G/DL (ref 6.4–8.2)
RBC # BLD AUTO: 4.06 MILLION/UL (ref 3.81–5.12)
SODIUM SERPL-SCNC: 138 MMOL/L (ref 136–145)
WBC # BLD AUTO: 11.78 THOUSAND/UL (ref 4.31–10.16)

## 2022-01-19 PROCEDURE — 99285 EMERGENCY DEPT VISIT HI MDM: CPT | Performed by: EMERGENCY MEDICINE

## 2022-01-19 PROCEDURE — 99284 EMERGENCY DEPT VISIT MOD MDM: CPT

## 2022-01-19 PROCEDURE — 36415 COLL VENOUS BLD VENIPUNCTURE: CPT | Performed by: EMERGENCY MEDICINE

## 2022-01-19 PROCEDURE — 80053 COMPREHEN METABOLIC PANEL: CPT | Performed by: EMERGENCY MEDICINE

## 2022-01-19 PROCEDURE — 85025 COMPLETE CBC W/AUTO DIFF WBC: CPT | Performed by: EMERGENCY MEDICINE

## 2022-01-19 PROCEDURE — 93005 ELECTROCARDIOGRAM TRACING: CPT

## 2022-01-19 PROCEDURE — G1004 CDSM NDSC: HCPCS

## 2022-01-19 PROCEDURE — 84484 ASSAY OF TROPONIN QUANT: CPT | Performed by: EMERGENCY MEDICINE

## 2022-01-19 PROCEDURE — 74177 CT ABD & PELVIS W/CONTRAST: CPT

## 2022-01-19 PROCEDURE — 96374 THER/PROPH/DIAG INJ IV PUSH: CPT

## 2022-01-19 PROCEDURE — 83690 ASSAY OF LIPASE: CPT | Performed by: EMERGENCY MEDICINE

## 2022-01-19 PROCEDURE — 84703 CHORIONIC GONADOTROPIN ASSAY: CPT | Performed by: EMERGENCY MEDICINE

## 2022-01-19 RX ORDER — KETOROLAC TROMETHAMINE 30 MG/ML
1 INJECTION, SOLUTION INTRAMUSCULAR; INTRAVENOUS ONCE
Status: COMPLETED | OUTPATIENT
Start: 2022-01-19 | End: 2022-01-19

## 2022-01-19 RX ORDER — ONDANSETRON 2 MG/ML
1 INJECTION INTRAMUSCULAR; INTRAVENOUS ONCE
Status: COMPLETED | OUTPATIENT
Start: 2022-01-19 | End: 2022-01-19

## 2022-01-19 RX ORDER — FENTANYL CITRATE 50 UG/ML
1 INJECTION, SOLUTION INTRAMUSCULAR; INTRAVENOUS ONCE
Status: COMPLETED | OUTPATIENT
Start: 2022-01-19 | End: 2022-01-19

## 2022-01-19 RX ORDER — HALOPERIDOL 5 MG/ML
5 INJECTION INTRAMUSCULAR ONCE
Status: COMPLETED | OUTPATIENT
Start: 2022-01-19 | End: 2022-01-19

## 2022-01-19 RX ADMIN — IOHEXOL 100 ML: 350 INJECTION, SOLUTION INTRAVENOUS at 17:00

## 2022-01-19 RX ADMIN — HALOPERIDOL LACTATE 5 MG: 5 INJECTION, SOLUTION INTRAMUSCULAR at 16:09

## 2022-01-19 NOTE — ED PROVIDER NOTES
History  Chief Complaint   Patient presents with    Vomiting     started at 7am, vomiting and cramping abd pain  64-year-old female history of anxiety, depression, reported cyclic vomiting syndrome presents with nausea vomiting and abdominal pain  Patient states that she has been in usual state of health, retired to bed last night with mild nausea, woke up this morning at about 7:00 a m  And has since vomited many times, too numerous to count  She states that the emesis is recently ingested food, whitish and foamy without blood or bile  Since she started vomiting she has had associated epigastric abdominal pain  She states that she tried to take a warm bath but it did not help  Denies cannabis use  Has not taken any medicine at home  Pre-hospital received fentanyl, Zofran, Toradol  She states in the past she has received Zofran with some relief  ROS  Constitutional: denies fevers, chills, sweats  Eyes: denies eye pain  ENT: denies ear, sinus, throat pain  CV: denies chest pain  Resp: denies cough, SOB  GI: denies diarrhea, bloody or dark stool  : denies difficulty urinating, flank pain  MSK: denies neck or back pain  Neuro: denies headache, new numbness, tingling, weakness  Allergy/Immuno: denies known drug allergies other than egg-derived products, recent illness, known sick contacts        Triage vital signs reviewed    Physical Exam  Const: alert, non-toxic appearing, no diaphoresis  Appears stated age, well-developed  Obese  Distressed  Eyes: no conjunctival injection, discharge or icterus  Head: normocephalic   Ears: auricles normal   Nose: normal  Mouth/throat: clear, mildly dry mm   Neck: normal ROM, supple and without rigidity   CV: normal rate   Extremities warm and well-perfused   Resp: breathing unlabored, non-stridulous   Abdomen: soft, non-distended, no guarding or rebound  MSK: no gross deformities appreciated  Skin: warm and dry with rapid capillary refill  Neuro: CNs II-XII grossly intact  Oriented x 4  Sensation and motor function of extremities grossly intact  Psych: pleasant, cooperative  Very anxious appearing            Prior to Admission Medications   Prescriptions Last Dose Informant Patient Reported? Taking? QUEtiapine (SEROquel) 50 mg tablet   No No   Sig: Take 1 tablet (50 mg total) by mouth daily at bedtime As needed   albuterol (ProAir HFA) 90 mcg/act inhaler   No No   Sig: Inhale 2 puffs every 6 (six) hours as needed for wheezing   doxycycline hyclate (VIBRAMYCIN) 100 mg capsule   Yes No   Patient not taking: Reported on 2021    gabapentin (NEURONTIN) 300 mg capsule   No No   Sig: Take 2 capsules (600 mg total) by mouth 2 (two) times a day   hydrOXYzine pamoate (VISTARIL) 25 mg capsule   No No   Sig: Take 1 capsule (25 mg total) by mouth 3 (three) times a day as needed for anxiety   lisinopril (ZESTRIL) 20 mg tablet   No No   Sig: Take 1 tablet (20 mg total) by mouth daily   norethindrone (MICRONOR) 0 35 MG tablet   No No   Sig: Take 1 tablet (0 35 mg total) by mouth daily   omeprazole (PriLOSEC) 20 mg delayed release capsule   No No   Sig: Take 1 capsule (20 mg total) by mouth daily   sertraline (ZOLOFT) 100 mg tablet   No No   Sig: Take 1 tablet daily  With the 50 mg    Total 150 mg daily   sertraline (ZOLOFT) 50 mg tablet   No No   Sig: Take 1 tablet (50 mg total) by mouth daily With 100 mg tablet (150mg total)      Facility-Administered Medications: None       Past Medical History:   Diagnosis Date    Anxiety     Arthritis     OA  b/l knees    Depression     Hx of bleeding disorder     dysmennorrhea-dx lap today 2016    Hypertension     Kidney stone     Seasonal allergies        Past Surgical History:   Procedure Laterality Date     SECTION      DIAGNOSTIC LAPAROSCOPY      DILATION AND CURETTAGE OF UTERUS      CT COLONOSCOPY FLX DX W/COLLJ SPEC WHEN PFRMD N/A 2018    Procedure: EGD AND COLONOSCOPY;  Surgeon: Ángel Espinoza MD;  Location: [facility]; Service: Gastroenterology    KS LAP,DIAGNOSTIC ABDOMEN N/A 8/12/2016    Procedure: LAPAROSCOPY DIAGNOSTIC DAVID;  Surgeon: Luis Felipe Tobin DO;  Location: [facility];  Service: Gynecology    KS LAP,RMV  ADNEXAL STRUCTURE Bilateral 8/12/2016    Procedure: CYSTECTOMY  OVARIAN;  Surgeon: Luis Felipe Tobin DO;  [facility];  Service: Gynecology    WISDOM TOOTH EXTRACTION         Family History   Problem Relation Age of Onset    No Known Problems Sister     Autism Daughter     Lung cancer Maternal Grandmother     Diabetes Maternal Grandfather     Aneurysm Paternal Grandmother     No Known Problems Sister      I have reviewed and agree with the history as documented      E-Cigarette/Vaping    E-Cigarette Use Former User      E-Cigarette/Vaping Substances    Nicotine No     THC No     CBD No     Flavoring No     Other No     Unknown No      Social History     Tobacco Use    Smoking status: Current Every Day Smoker     Packs/day: 1 00     Years: 23 00     Pack years: 23 00    Smokeless tobacco: Never Used   Vaping Use    Vaping Use: Former   Substance Use Topics    Alcohol use: No    Drug use: Yes     Types: Marijuana        Review of Systems    Physical Exam  ED Triage Vitals [01/19/22 1524]   Temperature Pulse Respirations Blood Pressure SpO2   (!) 97 °F (36 1 °C) 75 20 125/79 100 %      Temp Source Heart Rate Source Patient Position - Orthostatic VS BP Location FiO2 (%)   Tympanic Monitor Lying Left arm --      Pain Score       --             Orthostatic Vital Signs  Vitals:    01/19/22 1524   BP: 125/79   Pulse: 75   Patient Position - Orthostatic VS: Lying       Physical Exam    ED Medications  Medications   ketorolac (FOR EMS ONLY) (TORADOL) injection 30 mg (0 mg Does not apply Given to EMS 1/19/22 1525)   ondansetron (FOR EMS ONLY) (ZOFRAN) 4 mg/2 mL injection 4 mg (0 mg Does not apply Given to EMS 1/19/22 1525)   fentanyl citrate (PF) (FOR EMS ONLY) 100 mcg/2 mL injection 100 mcg (0 mcg Does not apply Given to EMS 1/19/22 1525)   haloperidol lactate (HALDOL) injection 5 mg (5 mg Intravenous Given 1/19/22 1609)   iohexol (OMNIPAQUE) 350 MG/ML injection (SINGLE-DOSE) 100 mL (100 mL Intravenous Given 1/19/22 1700)       Diagnostic Studies  Results Reviewed     Procedure Component Value Units Date/Time    High Sensitivity Troponin I Random [[id number]]  (Abnormal) Collected: 01/19/22 1602    Lab Status: Final result Specimen: Blood from Arm, Right Updated: 01/19/22 1654     HS TnI random <2 ng/L     hCG, qualitative pregnancy [[id number]]  (Normal) Collected: 01/19/22 1602    Lab Status: Final result Specimen: Blood from Arm, Right Updated: 01/19/22 1639     Preg, Serum Negative    Comprehensive metabolic panel [[id number]]  (Abnormal) Collected: 01/19/22 1602    Lab Status: Final result Specimen: Blood from Arm, Right Updated: 01/19/22 1639     Sodium 138 mmol/L      Potassium 4 0 mmol/L      Chloride 110 mmol/L      CO2 18 mmol/L      ANION GAP 10 mmol/L      BUN 16 mg/dL      Creatinine 0 93 mg/dL      Glucose 155 mg/dL      Calcium 9 8 mg/dL      AST 31 U/L      ALT 28 U/L      Alkaline Phosphatase 87 U/L      Total Protein 8 0 g/dL      Albumin 4 0 g/dL      Total Bilirubin 0 60 mg/dL      eGFR 75 ml/min/1 73sq m     Narrative:      [place] guidelines for Chronic Kidney Disease (CKD):     Stage 1 with normal or high GFR (GFR > 90 mL/min/1 73 square meters)    Stage 2 Mild CKD (GFR = 60-89 mL/min/1 73 square meters)    Stage 3A Moderate CKD (GFR = 45-59 mL/min/1 73 square meters)    Stage 3B Moderate CKD (GFR = 30-44 mL/min/1 73 square meters)    Stage 4 Severe CKD (GFR = 15-29 mL/min/1 73 square meters)    Stage 5 End Stage CKD (GFR <15 mL/min/1 73 square meters)  Note: GFR calculation is accurate only with a steady state creatinine    Lipase [[id number]]  (Normal) Collected: 01/19/22 1602    Lab Status: Final result Specimen: Blood from Arm, Right Updated: 01/19/22 1639     Lipase 127 u/L     CBC and differential [[id number]]  (Abnormal) Collected: 01/19/22 1602    Lab Status: Final result Specimen: Blood from Arm, Right Updated: 01/19/22 1611     WBC 11 78 Thousand/uL      RBC 4 06 Million/uL      Hemoglobin 13 5 g/dL      Hematocrit 38 1 %      MCV 94 fL      MCH 33 3 pg      MCHC 35 4 g/dL      RDW 12 8 %      MPV 10 0 fL      Platelets 755 Thousands/uL      nRBC 0 /100 WBCs      Neutrophils Relative 86 %      Immat GRANS % 0 %      Lymphocytes Relative 10 %      Monocytes Relative 4 %      Eosinophils Relative 0 %      Basophils Relative 0 %      Neutrophils Absolute 10 05 Thousands/µL      Immature Grans Absolute 0 04 Thousand/uL      Lymphocytes Absolute 1 16 Thousands/µL      Monocytes Absolute 0 52 Thousand/µL      Eosinophils Absolute 0 00 Thousand/µL      Basophils Absolute 0 01 Thousands/µL                  CT abdomen pelvis with contrast   Final Result by Rosendo Mejia MD (01/19 1739)      Mild diffuse colonic wall thickening consistent with colitis  Small hiatal hernia  Nonobstructing 3 mm left lower pole intrarenal calculus  No hydronephrosis  Workstation performed: [id number]               Procedures  Procedures      ED Course                                       MDM  Number of Diagnoses or Management Options  Abdominal pain  Anxiety  Nausea & vomiting  Diagnosis management comments: Refused to speak with medical student  Abd pain workup  Do think this is cyclic vomiting syndrome  Patient feeling much better after haldol and would like to return home  Workup unremarkable  PCP follow-up, return precautions discussed  Patient appreciative and in agreement with plan        Disposition  Final diagnoses:   Nausea & vomiting   Abdominal pain   Anxiety     Time reflects when diagnosis was documented in both MDM as applicable and the Disposition within this note     Time User Action Codes Description Comment    1/19/2022 5:43 PM Minor Niki Add [R11 2] Nausea & vomiting     1/19/2022  5:43 PM Jennifer Lincoln T Add [R10 9] Abdominal pain     1/19/2022  5:44 PM Minor Rodamberly Add [F41 9] Anxiety       ED Disposition     ED Disposition Condition Date/Time Comment    Discharge Stable Wed Jan 19, 2022  5:43 PM Melia Live discharge to home/self care              Follow-up Information     Follow up With Specialties Details Why Contact Info Additional Information    Mickie Cheema PA-C Family Medicine, Physician Assistant Schedule an appointment as soon as possible for a visit  For follow up regarding your symptoms [facility] [phone]       [address] [place] [address] 34 [facility] Emergency Medicine   [address]  958 [facility] 64 [facility], [address]          Discharge Medication List as of 1/19/2022  5:45 PM      CONTINUE these medications which have NOT CHANGED    Details   albuterol (ProAir HFA) 90 mcg/act inhaler Inhale 2 puffs every 6 (six) hours as needed for wheezing, Starting Mon 12/6/2021, Normal      doxycycline hyclate (VIBRAMYCIN) 100 mg capsule Starting Thu 6/17/2021, Historical Med      gabapentin (NEURONTIN) 300 mg capsule Take 2 capsules (600 mg total) by mouth 2 (two) times a day, Starting Thu 12/2/2021, Normal      hydrOXYzine pamoate (VISTARIL) 25 mg capsule Take 1 capsule (25 mg total) by mouth 3 (three) times a day as needed for anxiety, Starting Wed 5/6/2020, Normal      lisinopril (ZESTRIL) 20 mg tablet Take 1 tablet (20 mg total) by mouth daily, Starting Thu 12/2/2021, Normal      norethindrone (MICRONOR) 0 35 MG tablet Take 1 tablet (0 35 mg total) by mouth daily, Starting Wed 5/26/2021, Until Thu 12/2/2021, Normal      omeprazole (PriLOSEC) 20 mg delayed release capsule Take 1 capsule (20 mg total) by mouth daily, Starting Thu 12/2/2021, Normal QUEtiapine (SEROquel) 50 mg tablet Take 1 tablet (50 mg total) by mouth daily at bedtime As needed, Starting Thu 12/2/2021, Normal      !! sertraline (ZOLOFT) 100 mg tablet Take 1 tablet daily  With the 50 mg  Total 150 mg daily, Normal      !! sertraline (ZOLOFT) 50 mg tablet Take 1 tablet (50 mg total) by mouth daily With 100 mg tablet (150mg total), Starting Thu 12/2/2021, Normal       !! - Potential duplicate medications found  Please discuss with provider  No discharge procedures on file  PDMP Review     None           ED Provider  Attending physically available and evaluated Austin Conleyjax LENNON managed the patient along with the ED Attending      Electronically Signed by         Victoria Silva MD  01/20/22 4891

## 2022-01-20 LAB
ATRIAL RATE: 74 BPM
P AXIS: 64 DEGREES
PR INTERVAL: 140 MS
QRS AXIS: 52 DEGREES
QRSD INTERVAL: 76 MS
QT INTERVAL: 382 MS
QTC INTERVAL: 424 MS
T WAVE AXIS: 36 DEGREES
VENTRICULAR RATE: 74 BPM

## 2022-01-20 PROCEDURE — 93010 ELECTROCARDIOGRAM REPORT: CPT | Performed by: INTERNAL MEDICINE

## 2022-01-23 ENCOUNTER — HOSPITAL ENCOUNTER (EMERGENCY)
Facility: HOSPITAL | Age: 42
Discharge: HOME | End: 2022-01-24
Attending: EMERGENCY MEDICINE
Payer: COMMERCIAL

## 2022-01-23 VITALS
OXYGEN SATURATION: 95 % | WEIGHT: 169.31 LBS | HEIGHT: 60 IN | TEMPERATURE: 98.4 F | HEART RATE: 83 BPM | SYSTOLIC BLOOD PRESSURE: 136 MMHG | DIASTOLIC BLOOD PRESSURE: 94 MMHG | RESPIRATION RATE: 18 BRPM | BODY MASS INDEX: 33.24 KG/M2

## 2022-01-23 DIAGNOSIS — E87.1 HYPONATREMIA: ICD-10-CM

## 2022-01-23 DIAGNOSIS — R11.2 NAUSEA AND VOMITING: Primary | ICD-10-CM

## 2022-01-23 DIAGNOSIS — E87.6 HYPOKALEMIA: ICD-10-CM

## 2022-01-23 LAB
BASOPHILS # BLD AUTO: 0.04 THOUSANDS/ΜL (ref 0–0.1)
BASOPHILS NFR BLD AUTO: 0 % (ref 0–1)
EOSINOPHIL # BLD AUTO: 0.15 THOUSAND/ΜL (ref 0–0.61)
EOSINOPHIL NFR BLD AUTO: 1 % (ref 0–6)
ERYTHROCYTE [DISTWIDTH] IN BLOOD BY AUTOMATED COUNT: 12.1 % (ref 11.6–15.1)
HCT VFR BLD AUTO: 39.8 % (ref 34.8–46.1)
HGB BLD-MCNC: 14.3 G/DL (ref 11.5–15.4)
IMM GRANULOCYTES # BLD AUTO: 0.04 THOUSAND/UL (ref 0–0.2)
IMM GRANULOCYTES NFR BLD AUTO: 0 % (ref 0–2)
LYMPHOCYTES # BLD AUTO: 4.34 THOUSANDS/ΜL (ref 0.6–4.47)
LYMPHOCYTES NFR BLD AUTO: 35 % (ref 14–44)
MCH RBC QN AUTO: 33.6 PG (ref 26.8–34.3)
MCHC RBC AUTO-ENTMCNC: 35.9 G/DL (ref 31.4–37.4)
MCV RBC AUTO: 94 FL (ref 82–98)
MONOCYTES # BLD AUTO: 1.9 THOUSAND/ΜL (ref 0.17–1.22)
MONOCYTES NFR BLD AUTO: 16 % (ref 4–12)
NEUTROPHILS # BLD AUTO: 5.82 THOUSANDS/ΜL (ref 1.85–7.62)
NEUTS SEG NFR BLD AUTO: 48 % (ref 43–75)
NRBC BLD AUTO-RTO: 0 /100 WBCS
PLATELET # BLD AUTO: 227 THOUSANDS/UL (ref 149–390)
PMV BLD AUTO: 10.2 FL (ref 8.9–12.7)
RBC # BLD AUTO: 4.25 MILLION/UL (ref 3.81–5.12)
WBC # BLD AUTO: 12.29 THOUSAND/UL (ref 4.31–10.16)

## 2022-01-23 PROCEDURE — 99285 EMERGENCY DEPT VISIT HI MDM: CPT | Performed by: EMERGENCY MEDICINE

## 2022-01-23 PROCEDURE — 80053 COMPREHEN METABOLIC PANEL: CPT | Performed by: EMERGENCY MEDICINE

## 2022-01-23 PROCEDURE — 96361 HYDRATE IV INFUSION ADD-ON: CPT

## 2022-01-23 PROCEDURE — 96375 TX/PRO/DX INJ NEW DRUG ADDON: CPT

## 2022-01-23 PROCEDURE — 85025 COMPLETE CBC W/AUTO DIFF WBC: CPT | Performed by: EMERGENCY MEDICINE

## 2022-01-23 PROCEDURE — 99283 EMERGENCY DEPT VISIT LOW MDM: CPT

## 2022-01-23 PROCEDURE — 83690 ASSAY OF LIPASE: CPT | Performed by: EMERGENCY MEDICINE

## 2022-01-23 PROCEDURE — 36415 COLL VENOUS BLD VENIPUNCTURE: CPT | Performed by: EMERGENCY MEDICINE

## 2022-01-23 RX ORDER — HALOPERIDOL 5 MG/ML
5 INJECTION INTRAMUSCULAR ONCE
Status: COMPLETED | OUTPATIENT
Start: 2022-01-23 | End: 2022-01-23

## 2022-01-23 RX ADMIN — SODIUM CHLORIDE 1000 ML: 0.9 INJECTION, SOLUTION INTRAVENOUS at 23:31

## 2022-01-23 RX ADMIN — HALOPERIDOL LACTATE 5 MG: 5 INJECTION, SOLUTION INTRAMUSCULAR at 23:31

## 2022-01-24 LAB
ALBUMIN SERPL BCP-MCNC: 3.9 G/DL (ref 3.5–5)
ALP SERPL-CCNC: 98 U/L (ref 46–116)
ALT SERPL W P-5'-P-CCNC: 92 U/L (ref 12–78)
ANION GAP SERPL CALCULATED.3IONS-SCNC: 6 MMOL/L (ref 4–13)
AST SERPL W P-5'-P-CCNC: 56 U/L (ref 5–45)
BILIRUB SERPL-MCNC: 1.21 MG/DL (ref 0.2–1)
BUN SERPL-MCNC: 17 MG/DL (ref 5–25)
CALCIUM SERPL-MCNC: 9 MG/DL (ref 8.3–10.1)
CHLORIDE SERPL-SCNC: 92 MMOL/L (ref 100–108)
CO2 SERPL-SCNC: 32 MMOL/L (ref 21–32)
CREAT SERPL-MCNC: 0.98 MG/DL (ref 0.6–1.3)
GFR SERPL CREATININE-BSD FRML MDRD: 70 ML/MIN/1.73SQ M
GLUCOSE SERPL-MCNC: 102 MG/DL (ref 65–140)
LIPASE SERPL-CCNC: 242 U/L (ref 73–393)
POTASSIUM SERPL-SCNC: 2.5 MMOL/L (ref 3.5–5.3)
PROT SERPL-MCNC: 7.6 G/DL (ref 6.4–8.2)
SODIUM SERPL-SCNC: 130 MMOL/L (ref 136–145)

## 2022-01-24 PROCEDURE — 96366 THER/PROPH/DIAG IV INF ADDON: CPT

## 2022-01-24 PROCEDURE — 96365 THER/PROPH/DIAG IV INF INIT: CPT

## 2022-01-24 RX ORDER — POTASSIUM CHLORIDE 20 MEQ/1
60 TABLET, EXTENDED RELEASE ORAL ONCE
Status: COMPLETED | OUTPATIENT
Start: 2022-01-24 | End: 2022-01-24

## 2022-01-24 RX ORDER — POTASSIUM CHLORIDE 14.9 MG/ML
20 INJECTION INTRAVENOUS ONCE
Status: COMPLETED | OUTPATIENT
Start: 2022-01-24 | End: 2022-01-24

## 2022-01-24 RX ORDER — ONDANSETRON 4 MG/1
4 TABLET, ORALLY DISINTEGRATING ORAL EVERY 6 HOURS PRN
Qty: 20 TABLET | Refills: 0 | Status: SHIPPED | OUTPATIENT
Start: 2022-01-24

## 2022-01-24 RX ADMIN — POTASSIUM CHLORIDE 20 MEQ: 14.9 INJECTION, SOLUTION INTRAVENOUS at 00:26

## 2022-01-24 RX ADMIN — POTASSIUM CHLORIDE 60 MEQ: 1500 TABLET, EXTENDED RELEASE ORAL at 00:26

## 2022-01-24 NOTE — ED PROVIDER NOTES
History  Chief Complaint   Patient presents with    Vomiting     Vomiting for 5 days, dizziness, b/l calf cramping, facial twitching today  [id number] female with cyclic vomiting syndrome presents the emergency department for evaluation of continued nausea and vomiting  Patient states symptoms started approximately 6 days ago  On 01/19/2022 she presented to the [facility] as she also had significant epigastric abdominal pain  At that time, they performed a cardiac and abdominal workup which was unremarkable  CT scan the abdomen and pelvis demonstrated nonspecific colitis, but was otherwise unremarkable  Her symptoms improved following Haldol and she was discharged home  She states since being discharged from the emergency department she continues to have persistent nausea and vomiting  She states it is relieved by Zofran however she recently ran out  She states she no longer has the epigastric abdominal pain  No fevers or chills  No known sick contacts  No constipation or diarrhea  No urinary symptoms  Her only new symptom is bilateral lower extremity cramping  She does admit to marijuana use, but states last use was approximately 1 week ago  Prior to Admission Medications   Prescriptions Last Dose Informant Patient Reported? Taking?    QUEtiapine (SEROquel) 50 mg tablet   No No   Sig: Take 1 tablet (50 mg total) by mouth daily at bedtime As needed   albuterol (ProAir HFA) 90 mcg/act inhaler   No No   Sig: Inhale 2 puffs every 6 (six) hours as needed for wheezing   doxycycline hyclate (VIBRAMYCIN) 100 mg capsule   Yes No   Patient not taking: Reported on 12/2/2021    gabapentin (NEURONTIN) 300 mg capsule   No No   Sig: Take 2 capsules (600 mg total) by mouth 2 (two) times a day   hydrOXYzine pamoate (VISTARIL) 25 mg capsule   No No   Sig: Take 1 capsule (25 mg total) by mouth 3 (three) times a day as needed for anxiety   lisinopril (ZESTRIL) 20 mg tablet   No No   Sig: Take 1 tablet (20 mg total) by mouth daily   norethindrone (MICRONOR) 0 35 MG tablet   No No   Sig: Take 1 tablet (0 35 mg total) by mouth daily   omeprazole (PriLOSEC) 20 mg delayed release capsule   No No   Sig: Take 1 capsule (20 mg total) by mouth daily   sertraline (ZOLOFT) 100 mg tablet   No No   Sig: Take 1 tablet daily  With the 50 mg  Total 150 mg daily   sertraline (ZOLOFT) 50 mg tablet   No No   Sig: Take 1 tablet (50 mg total) by mouth daily With 100 mg tablet (150mg total)      Facility-Administered Medications: None       Past Medical History:   Diagnosis Date    Anxiety     Arthritis     OA  b/l knees    Depression     Hx of bleeding disorder     dysmennorrhea-dx lap today 2016    Hypertension     Kidney stone     Seasonal allergies        Past Surgical History:   Procedure Laterality Date     SECTION      DIAGNOSTIC LAPAROSCOPY      DILATION AND CURETTAGE OF UTERUS      IA COLONOSCOPY FLX DX W/COLLJ SPEC WHEN PFRMD N/A 2018    Procedure: EGD AND COLONOSCOPY;  Surgeon: Angel Roger MD;  Location: [facility]; Service: Gastroenterology    IA LAP,DIAGNOSTIC ABDOMEN N/A 2016    Procedure: LAPAROSCOPY DIAGNOSTIC DAVID;  Surgeon: Magaly Albert DO;  Location[facility];  Service: Gynecology    IA LAP,RMV  ADNEXAL STRUCTURE Bilateral 2016    Procedure: CYSTECTOMY  OVARIAN;  Surgeon: Magaly Albert DO;  [facility];  Service: Gynecology    WISDOM TOOTH EXTRACTION         Family History   Problem Relation Age of Onset    No Known Problems Sister     Autism Daughter     Lung cancer Maternal Grandmother     Diabetes Maternal Grandfather     Aneurysm Paternal Grandmother     No Known Problems Sister      I have reviewed and agree with the history as documented      E-Cigarette/Vaping    E-Cigarette Use Former User      E-Cigarette/Vaping Substances    Nicotine No     THC No     CBD No     Flavoring No     Other No     Unknown No      Social History     Tobacco Use    Smoking status: Current Every Day Smoker     Packs/day: 1 00     Years: 23 00     Pack years: 23 00    Smokeless tobacco: Never Used   Vaping Use    Vaping Use: Former   Substance Use Topics    Alcohol use: No    Drug use: Yes     Types: Marijuana        Review of Systems   Constitutional: Negative for chills and fever  HENT: Negative for ear pain and sore throat  Eyes: Negative for pain and visual disturbance  Respiratory: Negative for cough and shortness of breath  Cardiovascular: Negative for chest pain and palpitations  Gastrointestinal: Positive for nausea and vomiting  Negative for abdominal pain  Genitourinary: Negative for dysuria and hematuria  Musculoskeletal: Negative for arthralgias and back pain  Cramps   Skin: Negative for color change and rash  Neurological: Negative for seizures and syncope  All other systems reviewed and are negative  Physical Exam  ED Triage Vitals [01/23/22 2248]   Temperature Pulse Respirations Blood Pressure SpO2   98 4 °F (36 9 °C) 83 18 136/94 95 %      Temp Source Heart Rate Source Patient Position - Orthostatic VS BP Location FiO2 (%)   Oral Monitor Sitting Right arm --      Pain Score       No Pain             Orthostatic Vital Signs  Vitals:    01/23/22 2248   BP: 136/94   Pulse: 83   Patient Position - Orthostatic VS: Sitting       Physical Exam  Vitals and nursing note reviewed  Constitutional:       General: She is not in acute distress  HENT:      Head: Normocephalic and atraumatic  Right Ear: External ear normal       Left Ear: External ear normal       Nose: Nose normal    Eyes:      General: No scleral icterus  Extraocular Movements: Extraocular movements intact  Conjunctiva/sclera: Conjunctivae normal       Pupils: Pupils are equal, round, and reactive to light  Cardiovascular:      Rate and Rhythm: Normal rate and regular rhythm  Pulses: Normal pulses  Heart sounds:  No murmur heard  Pulmonary:      Effort: Pulmonary effort is normal  No respiratory distress  Breath sounds: Normal breath sounds  No wheezing  Abdominal:      General: Abdomen is flat  Bowel sounds are normal       Tenderness: There is no abdominal tenderness  There is no guarding or rebound  Musculoskeletal:         General: No tenderness or deformity  Normal range of motion  Cervical back: Normal range of motion and neck supple  Skin:     General: Skin is warm and dry  Capillary Refill: Capillary refill takes less than 2 seconds  Neurological:      General: No focal deficit present  Mental Status: She is alert and oriented to person, place, and time  Cranial Nerves: No cranial nerve deficit     Psychiatric:         Behavior: Behavior normal       Comments: anxious         ED Medications  Medications   sodium chloride 0 9 % bolus 1,000 mL (0 mL Intravenous Stopped 1/24/22 0324)   haloperidol lactate (HALDOL) injection 5 mg (5 mg Intravenous Given 1/23/22 2331)   potassium chloride (K-DUR,KLOR-CON) CR tablet 60 mEq (60 mEq Oral Given 1/24/22 0026)   potassium chloride 20 mEq IVPB (premix) (0 mEq Intravenous Stopped 1/24/22 0324)       Diagnostic Studies  Results Reviewed     Procedure Component Value Units Date/Time    Comprehensive metabolic panel [[id number]]  (Abnormal) Collected: 01/23/22 2330    Lab Status: Final result Specimen: Blood from Arm, Right Updated: 01/24/22 0011     Sodium 130 mmol/L      Potassium 2 5 mmol/L      Chloride 92 mmol/L      CO2 32 mmol/L      ANION GAP 6 mmol/L      BUN 17 mg/dL      Creatinine 0 98 mg/dL      Glucose 102 mg/dL      Calcium 9 0 mg/dL      AST 56 U/L      ALT 92 U/L      Alkaline Phosphatase 98 U/L      Total Protein 7 6 g/dL      Albumin 3 9 g/dL      Total Bilirubin 1 21 mg/dL      eGFR 70 ml/min/1 73sq m     Narrative:      [place] guidelines for Chronic Kidney Disease (CKD):     Stage 1 with normal or high GFR (GFR > 90 mL/min/1 73 square meters)    Stage 2 Mild CKD (GFR = 60-89 mL/min/1 73 square meters)    Stage 3A Moderate CKD (GFR = 45-59 mL/min/1 73 square meters)    Stage 3B Moderate CKD (GFR = 30-44 mL/min/1 73 square meters)    Stage 4 Severe CKD (GFR = 15-29 mL/min/1 73 square meters)    Stage 5 End Stage CKD (GFR <15 mL/min/1 73 square meters)  Note: GFR calculation is accurate only with a steady state creatinine    Lipase [[id number]]  (Normal) Collected: 01/23/22 2330    Lab Status: Final result Specimen: Blood from Arm, Right Updated: 01/24/22 0010     Lipase 242 u/L     CBC and differential [[id number]]  (Abnormal) Collected: 01/23/22 2330    Lab Status: Final result Specimen: Blood from Arm, Right Updated: 01/23/22 2346     WBC 12 29 Thousand/uL      RBC 4 25 Million/uL      Hemoglobin 14 3 g/dL      Hematocrit 39 8 %      MCV 94 fL      MCH 33 6 pg      MCHC 35 9 g/dL      RDW 12 1 %      MPV 10 2 fL      Platelets 160 Thousands/uL      nRBC 0 /100 WBCs      Neutrophils Relative 48 %      Immat GRANS % 0 %      Lymphocytes Relative 35 %      Monocytes Relative 16 %      Eosinophils Relative 1 %      Basophils Relative 0 %      Neutrophils Absolute 5 82 Thousands/µL      Immature Grans Absolute 0 04 Thousand/uL      Lymphocytes Absolute 4 34 Thousands/µL      Monocytes Absolute 1 90 Thousand/µL      Eosinophils Absolute 0 15 Thousand/µL      Basophils Absolute 0 04 Thousands/µL                  No orders to display         Procedures  Procedures      ED Course  ED Course as of 01/24/22 0933   Sun Jan 23, 2022   2300 Blood Pressure: 136/94   2300 Temperature: 98 4 °F (36 9 °C)   2300 Pulse: 83   2301 Respirations: 18   2301 SpO2: 95 %   2348 WBC(!): 12 29  Likely reactive in setting of vomiting    Mon Jan 24, 5021   4884 Cyclic vomiting, hypokalemia, discharged                                       MDM  Number of Diagnoses or Management Options  Hypokalemia  Hyponatremia  Nausea and vomiting  Diagnosis management comments: 75-year-old female presents the ED for evaluation of nausea and vomiting  On exam she is well-appearing in no acute distress  She is not actively vomiting  Abdomen is soft, nontender nondistended without rebound or guarding  Suspect patient's symptoms are likely secondary to cyclic vomiting syndrome  Will recheck abdominal workup, as patient has lower extremity cramping suspect that patient likely has hypokalemia  Will give L of fluids and Haldol  As patient had recent CT imaging, do not believe any further imaging is indicated at this time given normal abdominal examination  Symptoms improved following Haldol and fluids  Labs demonstrated mild leukocytosis likely reactive in the setting of vomiting  Potassium was 2 5  Will replete with 60 oral and 20 IV and discharged home with prescription for Zofran  She was given strict return precautions and advised to follow-up with gastroenterology  Disposition  Final diagnoses:   Nausea and vomiting   Hypokalemia   Hyponatremia     Time reflects when diagnosis was documented in both MDM as applicable and the Disposition within this note     Time User Action Codes Description Comment    1/24/2022 12:15 AM Check, Heather Ogden Add [R11 2] Nausea and vomiting     1/24/2022 12:15 AM Check, Heather Ogden Add [E87 6] Hypokalemia     1/24/2022  1:09 AM Check, Heather Ogden Add [E87 1] Hyponatremia       ED Disposition     ED Disposition Condition Date/Time Comment    Discharge Stable Mon Jan 24, 2022  1:09 AM Melia Charles discharge to home/self care              Follow-up Information     Follow up With Specialties Details Why Contact Info Additional [address] [facility] Emergency Department Emergency Medicine  If symptoms worsen [place] [id number]  [address] [place] [address] Emergency Department, [address] , [facility], [address] Johanna Dhillon [facility] Gastroenterology Schedule an appointment as soon as possible for a visit   [address] [facility]  [address] [facility] [address]  Adan Santos [address] [facility], [address], [address], [facility], [place], [address]   [phone]          Discharge Medication List as of 1/24/2022  1:13 AM      START taking these medications    Details   ondansetron (Zofran ODT) 4 mg disintegrating tablet Take 1 tablet (4 mg total) by mouth every 6 (six) hours as needed for nausea or vomiting, Starting Mon 1/24/2022, Normal         CONTINUE these medications which have NOT CHANGED    Details   albuterol (ProAir HFA) 90 mcg/act inhaler Inhale 2 puffs every 6 (six) hours as needed for wheezing, Starting Mon 12/6/2021, Normal      doxycycline hyclate (VIBRAMYCIN) 100 mg capsule Starting Thu 6/17/2021, Historical Med      gabapentin (NEURONTIN) 300 mg capsule Take 2 capsules (600 mg total) by mouth 2 (two) times a day, Starting Thu 12/2/2021, Normal      hydrOXYzine pamoate (VISTARIL) 25 mg capsule Take 1 capsule (25 mg total) by mouth 3 (three) times a day as needed for anxiety, Starting Wed 5/6/2020, Normal      lisinopril (ZESTRIL) 20 mg tablet Take 1 tablet (20 mg total) by mouth daily, Starting Thu 12/2/2021, Normal      norethindrone (MICRONOR) 0 35 MG tablet Take 1 tablet (0 35 mg total) by mouth daily, Starting Wed 5/26/2021, Until Thu 12/2/2021, Normal      omeprazole (PriLOSEC) 20 mg delayed release capsule Take 1 capsule (20 mg total) by mouth daily, Starting Thu 12/2/2021, Normal      QUEtiapine (SEROquel) 50 mg tablet Take 1 tablet (50 mg total) by mouth daily at bedtime As needed, Starting Thu 12/2/2021, Normal      !! sertraline (ZOLOFT) 100 mg tablet Take 1 tablet daily  With the 50 mg    Total 150 mg daily, Normal      !! sertraline (ZOLOFT) 50 mg tablet Take 1 tablet (50 mg total) by mouth daily With 100 mg tablet (150mg total), Starting Thu 12/2/2021, Normal       !! - Potential duplicate medications found  Please discuss with provider  No discharge procedures on file  PDMP Review     None           ED Provider  Attending physically available and evaluated Leda Whitley I managed the patient along with the ED Attending      Electronically Signed by         Manny Kasper MD  01/24/22 3039

## 2022-01-24 NOTE — ED ATTENDING ATTESTATION
1/23/2022  I, Kumar Dunn MD, saw and evaluated the patient  I have discussed the patient with the resident/non-physician practitioner and agree with the resident's/non-physician practitioner's findings, Plan of Care, and MDM as documented in the resident's/non-physician practitioner's note, except where noted  All available labs and Radiology studies were reviewed  I was present for key portions of any procedure(s) performed by the resident/non-physician practitioner and I was immediately available to provide assistance  At this point I agree with the current assessment done in the Emergency Department  I have conducted an independent evaluation of this patient a history and physical is as follows:  34 yo F with h/o cyclic vomiting, c/o recurrence of vomiting for 5 days, associated with dizziness, calf cramping, without abdominal pain  She was evaluated and treated 1/19/22 with labs and CT with no significant abnormalities  She was treated with antiemetics, with apparent improvement, although she says she was never improved  In ED today, VS are normal, she does not appear in distress, no focal abdominal pain  It is reasonable to repeat labs, replete fluids, and start with antiemetics targeting CVS   I do not consider imaging indicated at this point  Disposition according to findings and response to treatment      ED Course         Critical Care Time  Procedures

## 2022-01-25 ENCOUNTER — APPOINTMENT (OUTPATIENT)
Dept: PHYSICAL THERAPY | Facility: REHABILITATION | Age: 42
End: 2022-01-25
Payer: COMMERCIAL

## 2022-01-25 NOTE — PROGRESS NOTES
Daily Note     Today's date: 2022  Patient name: Alyson Elliott  : 1980  MRN: [id number]  Referring provider: Darline Davis PA-C  Dx:   No diagnosis found  Subjective: pt offered no new complaints  Objective: See treatment diary below      Assessment: Tolerated treatment well  Added exercises as listed with good response  Greatly challenged with performance of eccentric step exercise; unable to perform as prescribed  Patient demonstrated fatigue post treatment, exhibited good technique with therapeutic exercises and would benefit from continued PT      Plan: Continue per plan of care  Progress treatment as tolerated         Precautions: HTN, asthma      Daily Treatment Diary      Assessment 1/11 1/13  12/14  12/16  12/21  12/23  12/28  12/30  1/3  1/5   Eval/Reval                     FOTO       nv  perf       **   Manuals    roller R hamstrings/gastroc B/L  TE  B CC  B TE  B  CC  B CC  B TE  B TE  JAB  LCQ   kinesio tape  TE   L  CC B  CC B TE TE JAB CC                Exercise Diary     bike 7' 7'  x5'  x5'  x5'  6'  6'  6'  6'  6'    quad sets    5"x15  5"x20  w/slr  w/slr  w/ slr              --    SLR 3"x20 1# 3"x20 1#  3"x15  3"x15  3"x20  3"x20  3"x20  3"x20 3"x20  3"x20    bridges W/pink TB 2x10 W/pink TB 2x10  5"x15  5"x20  5"x20  5"x20    5"x20  5"x20   5"x20    hamstring stretch seated Long sit 30" x3 Long sit 30" x3  long sit 30"x3 ea  long sit 30"x3 ea  long sit   30"x3 ea  long sit 30"x3 ea3  long sit 30"x3 ea3  long sit 30"x3 ea3  long sit 30"x3 ea   long sit 30"x3 ea    runners stretch 30"x3 30"x3  30"x3 ea  30"x3 ea  30"x3 ea  30"x3 ea   30"x3 ea  30"x3 ea  30"x3 ea  30"x3 ea    clams  Pink TB 5"  W/pink TB 2x10  5"x15 5"x15  5"x20  5"x20 ea  5"x20 ea  5"x20 ea  5"x20   5"x20    hip abd  5"x20  1# 5"x10  3"x10  3"x15  5"x20  5"x20 ea  5"x20  5"x20  5"x20   5"x20    squats w/abd TB Gr x10  gtb 5"2x10  nv  nv  np  GTB 5" x10  GTB  5"x10  nv  nv  NP - d/t pain    ecc lowering 4" lat x10  x5 R  x3 L                    leg press                        side step w/TB    ytb x3 laps                    forward T w/counter support                                                                                               Modalities                                                 Access Code: [email or web address]

## 2022-01-25 NOTE — ED ATTENDING ATTESTATION
1/19/2022  Gatito LENNON DO, saw and evaluated the patient  I have discussed the patient with the resident/non-physician practitioner and agree with the resident's/non-physician practitioner's findings, Plan of Care, and MDM as documented in the resident's/non-physician practitioner's note, except where noted  All available labs and Radiology studies were reviewed  I was present for key portions of any procedure(s) performed by the resident/non-physician practitioner and I was immediately available to provide assistance  At this point I agree with the current assessment done in the Emergency Department  I have conducted an independent evaluation of this patient a history and physical is as follows:    35 yof with abd pain, cramping  Hx of cyclical vomiting from chart review  Started yesterday, multiple episodes of n/v nbnb  No other assoc sx other than epigastric pain   Exam normal  Plan labs, symptomatic tx    ED Course         Critical Care Time  Procedures

## 2022-01-27 ENCOUNTER — APPOINTMENT (OUTPATIENT)
Dept: PHYSICAL THERAPY | Facility: REHABILITATION | Age: 42
End: 2022-01-27
Payer: COMMERCIAL

## 2022-01-31 ENCOUNTER — TELEPHONE (OUTPATIENT)
Dept: FAMILY MEDICINE CLINIC | Facility: CLINIC | Age: 42
End: 2022-01-31

## 2022-01-31 NOTE — TELEPHONE ENCOUNTER
Please call and check on her  She went to the emergency room yesterday but when they called her to be seen she was no longer available  She went to emergency room on January 19th and the 23rd for nausea and vomiting  I would recommend a follow-up appointment here in the office for 30 minutes    Thank you

## 2022-02-04 ENCOUNTER — OFFICE VISIT (OUTPATIENT)
Dept: URGENT CARE | Age: 42
End: 2022-02-04
Payer: COMMERCIAL

## 2022-02-04 VITALS
BODY MASS INDEX: 34.55 KG/M2 | SYSTOLIC BLOOD PRESSURE: 122 MMHG | DIASTOLIC BLOOD PRESSURE: 84 MMHG | HEART RATE: 77 BPM | HEIGHT: 60 IN | TEMPERATURE: 96.4 F | WEIGHT: 176 LBS | RESPIRATION RATE: 18 BRPM | OXYGEN SATURATION: 97 %

## 2022-02-04 DIAGNOSIS — H60.503 ACUTE OTITIS EXTERNA OF BOTH EARS, UNSPECIFIED TYPE: Primary | ICD-10-CM

## 2022-02-04 PROCEDURE — G0382 LEV 3 HOSP TYPE B ED VISIT: HCPCS

## 2022-02-04 RX ORDER — CEPHALEXIN 500 MG/1
500 CAPSULE ORAL EVERY 6 HOURS SCHEDULED
Qty: 40 CAPSULE | Refills: 0 | Status: SHIPPED | OUTPATIENT
Start: 2022-02-04 | End: 2022-02-14

## 2022-02-04 RX ORDER — OFLOXACIN 3 MG/ML
10 SOLUTION AURICULAR (OTIC) DAILY
Qty: 5 ML | Refills: 0 | Status: SHIPPED | OUTPATIENT
Start: 2022-02-04 | End: 2022-03-03

## 2022-02-05 NOTE — PATIENT INSTRUCTIONS
Ear Infection   WHAT YOU NEED TO KNOW:   An ear infection is also called otitis media  Blocked or swollen eustachian tubes can cause an infection  Eustachian tubes connect the middle ear to the back of the nose and throat  They drain fluid from the middle ear  You may have a buildup of fluid in your ear  Germs build up in the fluid and infection develops  DISCHARGE INSTRUCTIONS:   Return to the emergency department if:   · You have clear fluid coming from your ear  · You have a stiff neck, headache, and a fever  Call your doctor if:   · You see blood or pus draining from your ear  · Your ear pain gets worse or does not go away, even after treatment  · The outside of your ear is red or swollen  · You are vomiting or have diarrhea  · You have questions or concerns about your condition or care  Medicines: You may  need any of the following:  · Acetaminophen  decreases pain and fever  It is available without a doctor's order  Ask how much to take and how often to take it  Follow directions  Read the labels of all other medicines you are using to see if they also contain acetaminophen, or ask your doctor or pharmacist  Acetaminophen can cause liver damage if not taken correctly  Do not use more than 4 grams (4,000 milligrams) total of acetaminophen in one day  · NSAIDs , such as ibuprofen, help decrease swelling, pain, and fever  This medicine is available with or without a doctor's order  NSAIDs can cause stomach bleeding or kidney problems in certain people  If you take blood thinner medicine, always ask your healthcare provider if NSAIDs are safe for you  Always read the medicine label and follow directions  · Ear drops  may contain medicine to decrease pain and inflammation  · Antibiotics  help treat a bacterial infection  · Take your medicine as directed  Contact your healthcare provider if you think your medicine is not helping or if you have side effects   Tell him or her if you are allergic to any medicine  Keep a list of the medicines, vitamins, and herbs you take  Include the amounts, and when and why you take them  Bring the list or the pill bottles to follow-up visits  Carry your medicine list with you in case of an emergency  Self-care:   · Apply heat  on your ear for 15 to 20 minutes, 3 to 4 times a day or as directed  You can apply heat with an electric heating pad, hot water bottle, or warm compress  Always put a cloth between your skin and the heat pack to prevent burns  Heat helps decrease pain  · Apply ice  on your ear for 15 to 20 minutes, 3 to 4 times a day for 2 days or as directed  Use an ice pack, or put crushed ice in a plastic bag  Cover it with a towel before you apply it to your ear  Ice decreases swelling and pain  Prevent an ear infection:   · Wash your hands often  to help prevent the spread of germs  Ask everyone in your house to wash their hands with soap and water  Ask them to wash after they use the bathroom or change a diaper  Remind them to wash before they prepare or eat food  · Stay away from people who are ill  Some germs spread easily and quickly through contact  Follow up with your doctor as directed:  Write down your questions so you remember to ask them during your visits  © Copyright [employer] 2021 Information is for End User's use only and may not be sold, redistributed or otherwise used for commercial purposes  All illustrations and images included in CareNotes® are the copyrighted property of [employer]  or Dwayne Mallory  The above information is an  only  It is not intended as medical advice for individual conditions or treatments  Talk to your doctor, nurse or pharmacist before following any medical regimen to see if it is safe and effective for you

## 2022-02-05 NOTE — PROGRESS NOTES
[facility]        NAME: Leda Whitley is a 35 y o  female  : 1980    MRN: [id number]  DATE: 2022  TIME: 8:24 PM    Assessment and Plan   Acute otitis externa of both ears, unspecified type [H60 503]  1  Acute otitis externa of both ears, unspecified type  ofloxacin (FLOXIN) 0 3 % otic solution    cephalexin (KEFLEX) 500 mg capsule    Bilateral ear canals coated with whitish discharge, TM unable to be visualized in either ear due to copious amount of drainage  Bilateral ear canals erythematous, and the left external ear visibly erythematous  Tragal tenderness noted bilaterally  Plan to treat as otitis externa with both antibiotic optic drops and oral Keflex  Patient Instructions   Take antibiotics as prescribed  Avoid hot saunas, swimming pools, getting water in the year  Follow up with PCP in 3-5 days  Proceed to  ER if symptoms worsen  Chief Complaint     Chief Complaint   Patient presents with    Earache     BILATERAL EAR PAIN AND THE LEFT SIDE IS CLOGGED IT'S BEEN FOR 3 DAYS   Headache         History of Present Illness       Patient is a 53-year-old female who presents for chief complaint of bilateral ear infection  Patient states that she began experiencing left ear pain around 3-4 days ago, and began experiencing right ear pain today  Past medical history significant for recurrent ear infections  She reports frontal and maxillary sinus tenderness, tinnitus, decreased hearing in the left ear  She also notes significant tenderness of the external ear  She denies chest pain, cough, shortness of breath, headache, dizziness, lightheadedness,otorrhea  Review of Systems   Review of Systems   Constitutional: Negative for activity change, appetite change, chills, fatigue and fever  HENT: Positive for ear pain, hearing loss, sinus pain and tinnitus  Negative for ear discharge, postnasal drip, rhinorrhea, sneezing, sore throat and trouble swallowing      Eyes: Negative for pain, discharge and redness  Respiratory: Negative for cough, chest tightness and shortness of breath  Cardiovascular: Negative for chest pain  Gastrointestinal: Negative for diarrhea and nausea  Endocrine: Negative  Genitourinary: Negative  Musculoskeletal: Negative for arthralgias, back pain and myalgias  Skin: Negative  Allergic/Immunologic: Negative  Neurological: Negative for dizziness, weakness, light-headedness and headaches  Hematological: Negative  Negative for adenopathy  Psychiatric/Behavioral: Negative  Current Medications       Current Outpatient Medications:     albuterol (ProAir HFA) 90 mcg/act inhaler, Inhale 2 puffs every 6 (six) hours as needed for wheezing, Disp: 18 g, Rfl: 0    gabapentin (NEURONTIN) 300 mg capsule, Take 2 capsules (600 mg total) by mouth 2 (two) times a day, Disp: 180 capsule, Rfl: 5    hydrOXYzine pamoate (VISTARIL) 25 mg capsule, Take 1 capsule (25 mg total) by mouth 3 (three) times a day as needed for anxiety, Disp: 30 capsule, Rfl: 0    lisinopril (ZESTRIL) 20 mg tablet, Take 1 tablet (20 mg total) by mouth daily, Disp: 90 tablet, Rfl: 1    omeprazole (PriLOSEC) 20 mg delayed release capsule, Take 1 capsule (20 mg total) by mouth daily, Disp: 90 capsule, Rfl: 1    ondansetron (Zofran ODT) 4 mg disintegrating tablet, Take 1 tablet (4 mg total) by mouth every 6 (six) hours as needed for nausea or vomiting, Disp: 20 tablet, Rfl: 0    QUEtiapine (SEROquel) 50 mg tablet, Take 1 tablet (50 mg total) by mouth daily at bedtime As needed, Disp: 90 tablet, Rfl: 1    sertraline (ZOLOFT) 100 mg tablet, Take 1 tablet daily  With the 50 mg    Total 150 mg daily, Disp: 90 tablet, Rfl: 1    sertraline (ZOLOFT) 50 mg tablet, Take 1 tablet (50 mg total) by mouth daily With 100 mg tablet (150mg total), Disp: 90 tablet, Rfl: 1    cephalexin (KEFLEX) 500 mg capsule, Take 1 capsule (500 mg total) by mouth every 6 (six) hours for 10 days, Disp: 40 capsule, Rfl: 0    doxycycline hyclate (VIBRAMYCIN) 100 mg capsule, , Disp: , Rfl:     norethindrone (MICRONOR) 0 35 MG tablet, Take 1 tablet (0 35 mg total) by mouth daily, Disp: 84 tablet, Rfl: 3    ofloxacin (FLOXIN) 0 3 % otic solution, Administer 10 drops into both ears daily, Disp: 5 mL, Rfl: 0    Current Allergies     Allergies as of 2022 - Reviewed 2022   Allergen Reaction Noted    Eggs or egg-derived products - food allergy Other (See Comments) 2016            The following portions of the patient's history were reviewed and updated as appropriate: allergies, current medications, past family history, past medical history, past social history, past surgical history and problem list      Past Medical History:   Diagnosis Date    Anxiety     Arthritis     OA  b/l knees    Depression     Hx of bleeding disorder     dysmennorrhea-dx lap today 2016    Hypertension     Kidney stone     Seasonal allergies        Past Surgical History:   Procedure Laterality Date     SECTION      DIAGNOSTIC LAPAROSCOPY      DILATION AND CURETTAGE OF UTERUS      AZ COLONOSCOPY FLX DX W/COLLJ SPEC WHEN PFRMD N/A 2018    Procedure: EGD AND COLONOSCOPY;  Surgeon: Cherylene Needy, MD;  [facility]: [facility]; Service: Gastroenterology    AZ LAP,DIAGNOSTIC ABDOMEN N/A 2016    Procedure: LAPAROSCOPY DIAGNOSTIC DAVID;  Surgeon: Preet Arias DO;  Location: [facility];  Service: Gynecology    AZ LAP,RMV  ADNEXAL STRUCTURE Bilateral 2016    Procedure: CYSTECTOMY  OVARIAN;  Surgeon: Preet Arias DO;  Location: [facility];  Service: Gynecology    WISDOM TOOTH EXTRACTION         Family History   Problem Relation Age of Onset    No Known Problems Sister     Autism Daughter     Lung cancer Maternal Grandmother     Diabetes Maternal Grandfather     Aneurysm Paternal Grandmother     No Known Problems Sister          Medications have been verified          Objective /84   Pulse 77   Temp (!) 96 4 °F (35 8 °C) (Temporal)   Resp 18   Ht 5' (1 524 m)   Wt 79 8 kg (176 lb)   LMP  (LMP Unknown)   SpO2 97%   BMI 34 37 kg/m²        Physical Exam     Physical Exam  Constitutional:       Appearance: Normal appearance  She is not ill-appearing  HENT:      Head: Normocephalic and atraumatic  Right Ear: Hearing normal  Drainage and tenderness present  Tympanic membrane is erythematous  Left Ear: Decreased hearing noted  Drainage, swelling and tenderness present  Tympanic membrane is erythematous  Ears:        Comments: Whitish discharge noted in bilateral ear canals  Tympanic membranes unable to be visualized bilaterally due to whitish drainage  Nose: Nose normal    Eyes:      Extraocular Movements: Extraocular movements intact  Conjunctiva/sclera: Conjunctivae normal       Pupils: Pupils are equal, round, and reactive to light  Cardiovascular:      Rate and Rhythm: Normal rate and regular rhythm  Pulmonary:      Effort: Pulmonary effort is normal  No respiratory distress  Breath sounds: Normal breath sounds  No wheezing or rhonchi  Musculoskeletal:         General: Normal range of motion  Cervical back: Normal range of motion and neck supple  No rigidity or tenderness  Skin:     General: Skin is warm and dry  Capillary Refill: Capillary refill takes less than 2 seconds  Neurological:      General: No focal deficit present  Mental Status: She is alert and oriented to person, place, and time     Psychiatric:         Mood and Affect: Mood normal          Behavior: Behavior normal

## 2022-02-07 ENCOUNTER — TELEPHONE (OUTPATIENT)
Dept: FAMILY MEDICINE CLINIC | Facility: CLINIC | Age: 42
End: 2022-02-07

## 2022-02-07 ENCOUNTER — OFFICE VISIT (OUTPATIENT)
Dept: FAMILY MEDICINE CLINIC | Facility: CLINIC | Age: 42
End: 2022-02-07
Payer: COMMERCIAL

## 2022-02-07 VITALS
TEMPERATURE: 97.4 F | OXYGEN SATURATION: 96 % | DIASTOLIC BLOOD PRESSURE: 78 MMHG | SYSTOLIC BLOOD PRESSURE: 120 MMHG | HEIGHT: 60 IN | WEIGHT: 174.6 LBS | HEART RATE: 79 BPM | BODY MASS INDEX: 34.28 KG/M2 | RESPIRATION RATE: 16 BRPM

## 2022-02-07 DIAGNOSIS — F41.9 ANXIETY: ICD-10-CM

## 2022-02-07 DIAGNOSIS — G47.00 INSOMNIA, UNSPECIFIED TYPE: ICD-10-CM

## 2022-02-07 DIAGNOSIS — F33.0 MILD EPISODE OF RECURRENT MAJOR DEPRESSIVE DISORDER (HCC): ICD-10-CM

## 2022-02-07 DIAGNOSIS — K21.9 GASTROESOPHAGEAL REFLUX DISEASE WITHOUT ESOPHAGITIS: Primary | ICD-10-CM

## 2022-02-07 PROCEDURE — 99214 OFFICE O/P EST MOD 30 MIN: CPT | Performed by: PHYSICIAN ASSISTANT

## 2022-02-07 PROCEDURE — 3725F SCREEN DEPRESSION PERFORMED: CPT | Performed by: PHYSICIAN ASSISTANT

## 2022-02-07 RX ORDER — PANTOPRAZOLE SODIUM 40 MG/1
40 TABLET, DELAYED RELEASE ORAL
Qty: 90 TABLET | Refills: 1 | Status: SHIPPED | OUTPATIENT
Start: 2022-02-07 | End: 2022-03-25

## 2022-02-07 RX ORDER — QUETIAPINE FUMARATE 100 MG/1
100 TABLET, FILM COATED ORAL
Qty: 90 TABLET | Refills: 0 | Status: SHIPPED | OUTPATIENT
Start: 2022-02-07 | End: 2022-03-25

## 2022-02-07 RX ORDER — SUCRALFATE 1 G/1
1 TABLET ORAL 4 TIMES DAILY
Qty: 40 TABLET | Refills: 0 | Status: SHIPPED | OUTPATIENT
Start: 2022-02-07 | End: 2022-03-25

## 2022-02-07 RX ORDER — HYDROXYZINE PAMOATE 25 MG/1
25 CAPSULE ORAL 3 TIMES DAILY PRN
Qty: 90 CAPSULE | Refills: 0 | Status: SHIPPED | OUTPATIENT
Start: 2022-02-07

## 2022-02-07 NOTE — TELEPHONE ENCOUNTER
Melia said she believes it is just from the medications and she rather talk to you about adjusting them before taking the next step and going to the emergency room

## 2022-02-07 NOTE — TELEPHONE ENCOUNTER
I did notice she scheduled an appointment through my chart for today 2/7 at 2:30 p m     When I entered her chart I did notice that she filled out a questionnaire which came up positive for suicidal ideations  I did call her cell phone which went straight to voicemail and I did advise her that if she is having suicidal ideation she should be going to [address] [place] [address] Emergency Room where they have behavior health  I did advise her to call the office here

## 2022-02-07 NOTE — PROGRESS NOTES
Assessment/Plan:    I did review the ER record from January along with the CT scan results from January 19th which did reveal colitis, 3 mm left-sided kidney stone, hiatal hernia    -I did recommend she make an appointment again with the GI specialist at [facility]  I did give her the phone number  -hold omeprazole 20 mg daily  -take Carafate 1 g 4 times daily for 10 days  -take pantoprazole 40 mg daily at bedtime on an empty stomach  -the importance of continuing to abstain from drinking caffeine along with avoiding spicy, acidic, citrus foods has been discussed    -reduce sertraline from 150 mg daily down to 100 mg daily  -increase Seroquel from 50 mg daily up to 100 mg at bedtime  -I did encourage her to call the phone number for behavior health on her insurance card for Psychiatry and counseling  She appears to be very resistant to starting counseling but I did advise her that she would have to find the right person to talk to   -continue hydroxyzine as needed  -I did advise her to go to [facility] Emergency Room if she does have any suicidal ideations  -follow-up here in 2 weeks    M[employer] software was used to dictate this note  It may contain errors with dictating incorrect words/spelling  Please contact provider directly for any questions  There are no diagnoses linked to this encounter  Subjective:      Patient ID: Austin Kruger is a 35 y o  female  Patient presents today for anxiety/depression, abdominal pain  She states that she does not want to be here anymore but does not have a plan to harm herself at this time  She states that she feels as though the sertraline 150 mg daily is no longer effective  She did start the Seroquel 50 mg at bedtime but she still not sleeping  She states that she feels as though therapy/counseling would not benefit her    She has not ever seen a psychiatrist   She states that her  is constantly bad during her because of all the doctor appointments  She states that she lost her last job because of being hospitalized  She is under his insurance  She does need a refill of the hydroxyzine also  She states that the abdominal pain persists  She has been having cyclical episodes of vomiting  She has not been vomiting over the last several days  She states that she went to the emergency room recently and had a CT scan of her abdomen which revealed colitis  She states that it has been over years since she seen a GI specialist but she did have multiple testing with no specific findings  She continues on the omeprazole 20 mg daily with no benefit  She states that she has not been eating over the last several days  She states that she has been drinking ice water  She was drinking a lot of iced tea but she states that she stopped over the last several weeks  She states that no one seems to be able to help her  She states that she she is very frustrated  She states even her autoimmune disorder which was put in the chart by Gage Ramon NP who was no longer practicing  She states that no one was able to find the cause of this disorder  Abdominal Pain  This is a recurrent problem  The current episode started 1 to 4 weeks ago  The onset quality is sudden  The problem occurs daily  The most recent episode lasted 3 days  The problem has been waxing and waning  The pain is located in the epigastric region  The pain is at a severity of 8/10  The quality of the pain is burning and cramping  The abdominal pain does not radiate  Associated symptoms include anorexia, belching, constipation, diarrhea, nausea, vomiting and weight loss  The pain is aggravated by coughing, eating, movement and vomiting  The pain is relieved by certain positions, liquids and vomiting  Prior diagnostic workup includes CT scan and ultrasound         The following portions of the patient's history were reviewed and updated as appropriate:   She  has a past medical history of Anxiety, Arthritis, Depression, bleeding disorder, Hypertension, Kidney stone, and Seasonal allergies  She   Patient Active Problem List    Diagnosis Date Noted    Mild episode of recurrent major depressive disorder ([facility] [address] ) 2021    Tobacco use 2021    Pain and swelling of left knee 2021    Insomnia 2021    Encounter for screening for HIV 2019    Lipid screening 2019    Bilateral lower extremity edema 2019    Varicose veins of both legs with edema 2019    Shortness of breath 2019    Encounter for screening mammogram for malignant neoplasm of breast 2019    Panic attack as reaction to stress 2019    Numbness and tingling of both legs below knees     GERD (gastroesophageal reflux disease) 10/25/2018    Constipation 10/25/2018    Other constipation 10/25/2018    Neuropathy 10/02/2018    Mild intermittent asthma without complication     Cystic disease of ovaries 10/02/2018    Essential hypertension 10/02/2018    Anxiety 2017    Dysmenorrhea 2016    Chronic pain of both knees 2013    Dysthymic disorder 2013     She  has a past surgical history that includes  section; [place] tooth extraction; Diagnostic laparoscopy; pr lap,diagnostic abdomen (N/A, 2016); pr lap,rmv  adnexal structure (Bilateral, 2016); Dilation and curettage of uterus (); and pr colonoscopy flx dx w/collj spec when pfrmd (N/A, 2018)  Her family history includes Aneurysm in her paternal grandmother; Autism in her daughter; Diabetes in her maternal grandfather; Lung cancer in her maternal grandmother; No Known Problems in her sister and sister  She  reports that she has been smoking  She has a 23 00 pack-year smoking history  She has never used smokeless tobacco  She reports current drug use  Drug: Marijuana  She reports that she does not drink alcohol    Current Outpatient Medications   Medication Sig Dispense Refill    albuterol (ProAir HFA) 90 mcg/act inhaler Inhale 2 puffs every 6 (six) hours as needed for wheezing 18 g 0    cephalexin (KEFLEX) 500 mg capsule Take 1 capsule (500 mg total) by mouth every 6 (six) hours for 10 days 40 capsule 0    gabapentin (NEURONTIN) 300 mg capsule Take 2 capsules (600 mg total) by mouth 2 (two) times a day 180 capsule 5    hydrOXYzine pamoate (VISTARIL) 25 mg capsule Take 1 capsule (25 mg total) by mouth 3 (three) times a day as needed for anxiety 30 capsule 0    lisinopril (ZESTRIL) 20 mg tablet Take 1 tablet (20 mg total) by mouth daily 90 tablet 1    ofloxacin (FLOXIN) 0 3 % otic solution Administer 10 drops into both ears daily 5 mL 0    omeprazole (PriLOSEC) 20 mg delayed release capsule Take 1 capsule (20 mg total) by mouth daily 90 capsule 1    ondansetron (Zofran ODT) 4 mg disintegrating tablet Take 1 tablet (4 mg total) by mouth every 6 (six) hours as needed for nausea or vomiting 20 tablet 0    QUEtiapine (SEROquel) 50 mg tablet Take 1 tablet (50 mg total) by mouth daily at bedtime As needed 90 tablet 1    sertraline (ZOLOFT) 100 mg tablet Take 1 tablet daily  With the 50 mg  Total 150 mg daily 90 tablet 1    sertraline (ZOLOFT) 50 mg tablet Take 1 tablet (50 mg total) by mouth daily With 100 mg tablet (150mg total) 90 tablet 1    doxycycline hyclate (VIBRAMYCIN) 100 mg capsule  (Patient not taking: Reported on 12/2/2021 )      norethindrone (MICRONOR) 0 35 MG tablet Take 1 tablet (0 35 mg total) by mouth daily 84 tablet 3     No current facility-administered medications for this visit       Current Outpatient Medications on File Prior to Visit   Medication Sig    albuterol (ProAir HFA) 90 mcg/act inhaler Inhale 2 puffs every 6 (six) hours as needed for wheezing    cephalexin (KEFLEX) 500 mg capsule Take 1 capsule (500 mg total) by mouth every 6 (six) hours for 10 days    gabapentin (NEURONTIN) 300 mg capsule Take 2 capsules (600 mg total) by mouth 2 (two) times a day    hydrOXYzine pamoate (VISTARIL) 25 mg capsule Take 1 capsule (25 mg total) by mouth 3 (three) times a day as needed for anxiety    lisinopril (ZESTRIL) 20 mg tablet Take 1 tablet (20 mg total) by mouth daily    ofloxacin (FLOXIN) 0 3 % otic solution Administer 10 drops into both ears daily    omeprazole (PriLOSEC) 20 mg delayed release capsule Take 1 capsule (20 mg total) by mouth daily    ondansetron (Zofran ODT) 4 mg disintegrating tablet Take 1 tablet (4 mg total) by mouth every 6 (six) hours as needed for nausea or vomiting    QUEtiapine (SEROquel) 50 mg tablet Take 1 tablet (50 mg total) by mouth daily at bedtime As needed    sertraline (ZOLOFT) 100 mg tablet Take 1 tablet daily  With the 50 mg  Total 150 mg daily    sertraline (ZOLOFT) 50 mg tablet Take 1 tablet (50 mg total) by mouth daily With 100 mg tablet (150mg total)    doxycycline hyclate (VIBRAMYCIN) 100 mg capsule  (Patient not taking: Reported on 12/2/2021 )    norethindrone (MICRONOR) 0 35 MG tablet Take 1 tablet (0 35 mg total) by mouth daily     No current facility-administered medications on file prior to visit  She is allergic to eggs or egg-derived products - food allergy       Review of Systems   Constitutional: Positive for weight loss  Gastrointestinal: Positive for abdominal pain, anorexia, constipation, diarrhea, nausea and vomiting  Psychiatric/Behavioral:        As stated in HPI         Objective:      /78 (BP Location: Left arm, Patient Position: Sitting, Cuff Size: Standard)   Pulse 79   Temp (!) 97 4 °F (36 3 °C) (Tympanic)   Resp 16   Ht 5' (1 524 m)   Wt 79 2 kg (174 lb 9 6 oz)   LMP  (LMP Unknown)   SpO2 96%   BMI 34 10 kg/m²          Physical Exam  Vitals reviewed  Constitutional:       General: She is not in acute distress  Appearance: Normal appearance  She is well-developed  She is not ill-appearing, toxic-appearing or diaphoretic     HENT:      Head: Normocephalic and atraumatic  Right Ear: Tympanic membrane, ear canal and external ear normal       Left Ear: Tympanic membrane, ear canal and external ear normal    Neck:      Thyroid: No thyromegaly  Cardiovascular:      Rate and Rhythm: Normal rate and regular rhythm  Heart sounds: Normal heart sounds  No murmur heard  No friction rub  No gallop  Pulmonary:      Effort: Pulmonary effort is normal  No respiratory distress  Breath sounds: Normal breath sounds  No wheezing, rhonchi or rales  Abdominal:      General: Bowel sounds are normal       Palpations: Abdomen is soft  There is no mass  Tenderness: There is abdominal tenderness (She does have tenderness in the epigastric region)  Musculoskeletal:         General: No deformity  Cervical back: Neck supple  Right lower leg: No edema  Left lower leg: No edema  Lymphadenopathy:      Cervical: No cervical adenopathy  Skin:     General: Skin is warm  Neurological:      General: No focal deficit present  Mental Status: She is alert  Psychiatric:         Mood and Affect: Mood normal          Behavior: Behavior normal          Thought Content:  Thought content normal          Judgment: Judgment normal

## 2022-02-08 ENCOUNTER — CONSULT (OUTPATIENT)
Dept: GASTROENTEROLOGY | Facility: AMBULARY SURGERY CENTER | Age: 42
End: 2022-02-08
Payer: COMMERCIAL

## 2022-02-08 VITALS
SYSTOLIC BLOOD PRESSURE: 118 MMHG | HEIGHT: 60 IN | BODY MASS INDEX: 34.16 KG/M2 | DIASTOLIC BLOOD PRESSURE: 76 MMHG | WEIGHT: 174 LBS

## 2022-02-08 DIAGNOSIS — G43.A1 INTRACTABLE CYCLICAL VOMITING ASSOCIATED WITH MIGRAINE: ICD-10-CM

## 2022-02-08 DIAGNOSIS — K21.9 GASTROESOPHAGEAL REFLUX DISEASE WITHOUT ESOPHAGITIS: Primary | ICD-10-CM

## 2022-02-08 DIAGNOSIS — K59.00 CONSTIPATION, UNSPECIFIED CONSTIPATION TYPE: ICD-10-CM

## 2022-02-08 PROBLEM — R10.13 DYSPEPSIA: Status: ACTIVE | Noted: 2022-02-08

## 2022-02-08 PROCEDURE — 99204 OFFICE O/P NEW MOD 45 MIN: CPT | Performed by: INTERNAL MEDICINE

## 2022-02-08 RX ORDER — MAGNESIUM CARB/ALUMINUM HYDROX 105-160MG
296 TABLET,CHEWABLE ORAL ONCE
Qty: 296 ML | Refills: 0 | Status: SHIPPED | OUTPATIENT
Start: 2022-02-08 | End: 2022-03-03

## 2022-02-08 NOTE — ASSESSMENT & PLAN NOTE
Appear to be functional secondary to IBS with exaggerated symptoms during episodes of depression and anxiety  Also consider the possibility from marijuana  Rule out biliary pathology  Rule out peptic ulcer disease or gastric erosions or H pylori gastritis     -check right upper quadrant ultrasound    -schedule for upper endoscopy    -advised to continue omeprazole and Carafate    -discussed about the marijuana but patient got upset stating that her symptoms are not from marijuana and she was already told a few times before

## 2022-02-08 NOTE — ASSESSMENT & PLAN NOTE
Appear to be physiologic, functional secondary to IBS  Nonspecific colon thickening on the CT scan, possible from under distention and also the CT scan was done without any oral contrast   Patient does not have any symptoms of colitis     -advised to take MiraLax and stool softeners regularly    -Schedule for colonoscopy  -High-fiber diet     -Patient was given instructions about the colonoscopy prep     -Patient was explained about  the risks and benefits of the procedure  Risks including but not limited to bleeding, infection, perforation were explained in detail  Also explained about less than 100% sensitivity with the exam and other alternatives

## 2022-02-08 NOTE — ASSESSMENT & PLAN NOTE
Gastroesophageal reflux disease - Patient has the symptoms of chronic acid reflux for a long time  Possible hiatal hernia or LES weakness  Should rule out Dodson's esophagus because of chronic symptoms         -Patient was explained about the lifestyle and dietary modifications  Advised to avoid fatty foods, chocolates, caffeine, alcohol and any other triggering foods  Avoid eating for at least 3 hours before going to bed          -other treatment plan as described above

## 2022-02-08 NOTE — PROGRESS NOTES
Consultation - [facility]  Melia Campan Code 1980 female         Chief Complaint:  Episodes of vomiting    HPI:  78-year-old female with history of depression, anxiety, hypertension, cyclic vomiting was seen in the ER couple of times recently because of episodes of nausea, vomiting and abdominal discomfort  She reports using marijuana few times a week  She has history of GERD for which she was on omeprazole in the past but it was changed to Protonix by her PCP yesterday and was also added on Carafate  She reports that Protonix is very expensive that she could not get it filled  Denies any NSAID use  Complaining about decreased appetite  Reports having worse symptoms of nausea and vomiting when she is anxious and depressed  She also reports having problems with constipation  Denies any blood or mucus in the stool  She had EGD and incomplete colonoscopy done in November 2018  Found to have erosive esophagitis on the upper endoscopy  She had a CT scan done with IV but without oral contrast which showed mild diffuse thickening of the colon  REVIEW OF SYSTEMS: Review of Systems   Constitutional: Negative for activity change, appetite change, chills, diaphoresis, fatigue, fever and unexpected weight change  HENT: Negative for ear discharge, ear pain, facial swelling, hearing loss, nosebleeds, sore throat, tinnitus and voice change  Eyes: Negative for pain, discharge, redness, itching and visual disturbance  Respiratory: Positive for shortness of breath  Negative for apnea, cough, chest tightness and wheezing  Cardiovascular: Positive for chest pain  Negative for palpitations  Gastrointestinal:        As noted in HPI   Endocrine: Negative for cold intolerance, heat intolerance and polyuria  Genitourinary: Negative for difficulty urinating, dysuria, flank pain, hematuria and urgency     Musculoskeletal: Negative for arthralgias, back pain, gait problem, joint swelling and myalgias  Skin: Negative for rash and wound  Neurological: Positive for headaches  Negative for dizziness, tremors, seizures, speech difficulty, light-headedness and numbness  Hematological: Negative for adenopathy  Does not bruise/bleed easily  Psychiatric/Behavioral: Negative for agitation, behavioral problems and confusion  The patient is nervous/anxious  Past Medical History:   Diagnosis Date    Anxiety     Arthritis     OA  b/l knees    Depression     Hx of bleeding disorder     dysmennorrhea-dx lap today 2016    Hypertension     Kidney stone     Seasonal allergies       Past Surgical History:   Procedure Laterality Date     SECTION      DIAGNOSTIC LAPAROSCOPY      DILATION AND CURETTAGE OF UTERUS      NV COLONOSCOPY FLX DX W/COLLJ SPEC WHEN PFRMD N/A 2018    Procedure: EGD AND COLONOSCOPY;  Surgeon: Christiano Elmore MD;  [facility];   Service: Gastroenterology    NV LAP,DIAGNOSTIC ABDOMEN N/A 2016    Procedure: LAPAROSCOPY DIAGNOSTIC DAVID;  Surgeon: Valentino Milo, DO;  [facility];  Service: Gynecology    NV LAP,RMV  ADNEXAL STRUCTURE Bilateral 2016    Procedure: CYSTECTOMY  OVARIAN;  Surgeon: Valentino Milo, DO;  [facility];  Service: Gynecology    WISDOM TOOTH EXTRACTION       Social History     Socioeconomic History    Marital status: /Civil Union     Spouse name: Not on file    Number of children: Not on file    Years of education: Not on file    Highest education level: Not on file   Occupational History    Not on file   Tobacco Use    Smoking status: Current Every Day Smoker     Packs/day: 1 00     Years: 23 00     Pack years: 23 00    Smokeless tobacco: Never Used   Vaping Use    Vaping Use: Former   Substance and Sexual Activity    Alcohol use: No    Drug use: Yes     Types: Marijuana    Sexual activity: Yes     Partners: Male   Other Topics Concern    Not on file   Social History Narrative    Not on file     Social Determinants of Health     Financial Resource Strain: Not on file   Food Insecurity: Not on file   Transportation Needs: Not on file   Physical Activity: Not on file   Stress: Not on file   Social Connections: Not on file   Intimate Partner Violence: Not on file   Housing Stability: Not on file     Family History   Problem Relation Age of Onset    No Known Problems Sister     Autism Daughter     Lung cancer Maternal Grandmother     Diabetes Maternal Grandfather     Aneurysm Paternal Grandmother     No Known Problems Sister      Eggs or egg-derived products - food allergy  Current Outpatient Medications   Medication Sig Dispense Refill    albuterol (ProAir HFA) 90 mcg/act inhaler Inhale 2 puffs every 6 (six) hours as needed for wheezing 18 g 0    cephalexin (KEFLEX) 500 mg capsule Take 1 capsule (500 mg total) by mouth every 6 (six) hours for 10 days 40 capsule 0    gabapentin (NEURONTIN) 300 mg capsule Take 2 capsules (600 mg total) by mouth 2 (two) times a day 180 capsule 5    hydrOXYzine pamoate (VISTARIL) 25 mg capsule Take 1 capsule (25 mg total) by mouth 3 (three) times a day as needed for anxiety 90 capsule 0    lisinopril (ZESTRIL) 20 mg tablet Take 1 tablet (20 mg total) by mouth daily 90 tablet 1    ofloxacin (FLOXIN) 0 3 % otic solution Administer 10 drops into both ears daily 5 mL 0    ondansetron (Zofran ODT) 4 mg disintegrating tablet Take 1 tablet (4 mg total) by mouth every 6 (six) hours as needed for nausea or vomiting 20 tablet 0    pantoprazole (PROTONIX) 40 mg tablet Take 1 tablet (40 mg total) by mouth daily before breakfast 90 tablet 1    QUEtiapine (SEROquel) 100 mg tablet Take 1 tablet (100 mg total) by mouth daily at bedtime As needed 90 tablet 0    sertraline (ZOLOFT) 100 mg tablet Take 1 tablet daily  With the 50 mg    Total 150 mg daily 90 tablet 1    sertraline (ZOLOFT) 50 mg tablet Take 1 tablet (50 mg total) by mouth daily With 100 mg tablet (150mg total) 90 tablet 1    sucralfate (CARAFATE) 1 g tablet Take 1 tablet (1 g total) by mouth 4 (four) times a day 40 tablet 0    magnesium citrate (CITROMA) 1 745 g/30 mL oral solution Take 296 mL by mouth once for 1 dose 296 mL 0    norethindrone (MICRONOR) 0 35 MG tablet Take 1 tablet (0 35 mg total) by mouth daily 84 tablet 3    polyethylene glycol (GOLYTELY) 4000 mL solution Take 4,000 mL by mouth once for 1 dose 4000 mL 0     No current facility-administered medications for this visit  Blood pressure 118/76, height 5' (1 524 m), weight 78 9 kg (174 lb)  PHYSICAL EXAM: Physical Exam  Constitutional:       Appearance: Normal appearance  She is well-developed  HENT:      Head: Normocephalic and atraumatic  Nose: Nose normal    Eyes:      Conjunctiva/sclera: Conjunctivae normal    Neck:      Thyroid: No thyromegaly  Vascular: No JVD  Trachea: No tracheal deviation  Cardiovascular:      Rate and Rhythm: Normal rate and regular rhythm  Heart sounds: Normal heart sounds  No murmur heard  No friction rub  No gallop  Pulmonary:      Effort: Pulmonary effort is normal  No respiratory distress  Breath sounds: Normal breath sounds  No wheezing or rales  Abdominal:      General: Bowel sounds are normal  There is no distension  Palpations: Abdomen is soft  There is no mass  Tenderness: There is no abdominal tenderness  There is no guarding  Hernia: No hernia is present  Musculoskeletal:         General: No tenderness or deformity  Cervical back: Neck supple  Right lower leg: No edema  Left lower leg: No edema  Lymphadenopathy:      Cervical: No cervical adenopathy  Skin:     General: Skin is warm and dry  Findings: No erythema or rash  Neurological:      Mental Status: She is alert and oriented to person, place, and time     Psychiatric:         Mood and Affect: Mood normal          Behavior: Behavior normal          Thought Content: Thought content normal           Lab Results   Component Value Date    WBC 12 29 (H) 01/23/2022    HGB 14 3 01/23/2022    HCT 39 8 01/23/2022    MCV 94 01/23/2022     01/23/2022     Lab Results   Component Value Date    CALCIUM 9 0 01/23/2022    K 2 5 (LL) 01/23/2022    CO2 32 01/23/2022    CL 92 (L) 01/23/2022    BUN 17 01/23/2022    CREATININE 0 98 01/23/2022       No results found for: INR, PROTIME    No results found  ASSESSMENT & PLAN:    Intractable cyclical vomiting associated with migraine  Appear to be functional secondary to IBS with exaggerated symptoms during episodes of depression and anxiety  Also consider the possibility from marijuana  Rule out biliary pathology  Rule out peptic ulcer disease or gastric erosions or H pylori gastritis     -check right upper quadrant ultrasound    -schedule for upper endoscopy    -advised to continue omeprazole and Carafate    -discussed about the marijuana but patient got upset stating that her symptoms are not from marijuana and she was already told a few times before  GERD (gastroesophageal reflux disease)  Gastroesophageal reflux disease - Patient has the symptoms of chronic acid reflux for a long time  Possible hiatal hernia or LES weakness  Should rule out Dodson's esophagus because of chronic symptoms         -Patient was explained about the lifestyle and dietary modifications  Advised to avoid fatty foods, chocolates, caffeine, alcohol and any other triggering foods  Avoid eating for at least 3 hours before going to bed  -other treatment plan as described above    Constipation  Appear to be physiologic, functional secondary to IBS  Nonspecific colon thickening on the CT scan, possible from under distention and also the CT scan was done without any oral contrast   Patient does not have any symptoms of colitis     -advised to take MiraLax and stool softeners regularly    -Schedule for colonoscopy      -High-fiber diet     -Patient was given instructions about the colonoscopy prep     -Patient was explained about  the risks and benefits of the procedure  Risks including but not limited to bleeding, infection, perforation were explained in detail  Also explained about less than 100% sensitivity with the exam and other alternatives

## 2022-02-09 NOTE — PATIENT INSTRUCTIONS
Scheduled date of EGD/colonoscopy (as of today):5/2/2022  Physician performing EGD/colonoscopy:DR AKINS  Location of EGD[facility]colonoscopy[facility]  Desired bowel prep reviewed with patient:GOLYTELY/MAG CITRATE PER DR Landry Hinojosa  Instructions reviewed with patient by:VIC SOTO  Clearances:

## 2022-02-10 PROBLEM — F12.90 MARIJUANA USE, EPISODIC: Status: ACTIVE | Noted: 2022-02-10

## 2022-02-11 ENCOUNTER — HOSPITAL ENCOUNTER (OUTPATIENT)
Dept: RADIOLOGY | Facility: IMAGING CENTER | Age: 42
Discharge: HOME | End: 2022-02-11
Payer: COMMERCIAL

## 2022-02-11 DIAGNOSIS — G43.A1 INTRACTABLE CYCLICAL VOMITING ASSOCIATED WITH MIGRAINE: ICD-10-CM

## 2022-02-11 PROCEDURE — 76705 ECHO EXAM OF ABDOMEN: CPT

## 2022-02-16 NOTE — PROGRESS NOTES
Pt has been dealing with nausea and vomiting and has not been in to physical therapy  She is now being discharged

## 2022-02-17 ENCOUNTER — TELEPHONE (OUTPATIENT)
Dept: OTHER | Facility: OTHER | Age: 42
End: 2022-02-17

## 2022-02-17 NOTE — TELEPHONE ENCOUNTER
Arie Dakins (Self) [phone] (M)     Is requesting a call to schedule an appointment            Procedure: Ambulatory referral to General Surgery Status: Needs Scheduling   Requested appt date:   Authorizing: Maribell Lin MD in [address] [place] [address]   Referral: [Restricted]       Expires: 2/16/2023 Priority: Routine   Diagnosis: Calculus of gallbladder without cholecystitis without obstruction [K80 20]

## 2022-02-21 ENCOUNTER — OFFICE VISIT (OUTPATIENT)
Dept: FAMILY MEDICINE CLINIC | Facility: CLINIC | Age: 42
End: 2022-02-21
Payer: COMMERCIAL

## 2022-02-21 VITALS
HEART RATE: 68 BPM | WEIGHT: 179.4 LBS | OXYGEN SATURATION: 96 % | TEMPERATURE: 97.3 F | HEIGHT: 60 IN | BODY MASS INDEX: 35.22 KG/M2 | DIASTOLIC BLOOD PRESSURE: 74 MMHG | SYSTOLIC BLOOD PRESSURE: 120 MMHG | RESPIRATION RATE: 16 BRPM

## 2022-02-21 DIAGNOSIS — F33.0 MILD EPISODE OF RECURRENT MAJOR DEPRESSIVE DISORDER (HCC): ICD-10-CM

## 2022-02-21 DIAGNOSIS — F41.9 ANXIETY: ICD-10-CM

## 2022-02-21 DIAGNOSIS — K83.1 CHOLESTASIS: Primary | ICD-10-CM

## 2022-02-21 PROCEDURE — 99213 OFFICE O/P EST LOW 20 MIN: CPT | Performed by: PHYSICIAN ASSISTANT

## 2022-02-21 NOTE — PROGRESS NOTES
Assessment/Plan:    I did advise her that I was not familiar with  POEMS and this is something I would have to research further  She did mention to me that [facility] nurse practitioner diagnosed her with this syndrome in 2018  She states that she did see multiple specialists with no specific findings related to this diagnosis  Patient mentioned that if this diagnosis is not in her chart it would make it more difficult for her to get disability  I did explain to her that I could not add a diagnosis without justification for the diagnosis  She did mention that if it was not added she may not continue care at this office     -she will follow-up with the surgeon as scheduled for her cholecystectomy on February 23rd   -she will follow-up with the GI specialist for her endoscopy and colonoscopy scheduled on March 11th   -she will continue her sertraline and Seroquel since she has noticed improvement  Encouraged to see Psychiatry   -I did advise her I would notify her once a research the above diagnosis  M*Modal software was used to dictate this note  It may contain errors with dictating incorrect words/spelling  Please contact provider directly for any questions  Diagnoses and all orders for this visit:    Cholestasis    Mild episode of recurrent major depressive disorder ([facility] [address] )    Anxiety          Subjective:      Patient ID: Melia Flowers is a 35 y o  female  Patient returns today for 2 week follow-up for her depression/anxiety and abdominal pain/vomiting  I saw her on February 7th and fortunately there was a cancellation with the GI specialist on February 8th  An abdominal ultrasound was ordered and she does have gallstones  She is scheduled for gallbladder removal on February 23rd  She is also scheduled for another endoscopy and colonoscopy on March 11th  She does have a history of erosive esophagitis    She states that she has not been vomiting much since she has been seen by the GI specialist       She did decrease the sertraline and increase the Seroquel as advised  She states overall her mental health has improved  She has not tried to find a psychiatrist   She is not seeing counseling  She did question why the diagnosis AMIRAH was taken off her problem list   She states that this diagnosis was determine by Declan Cabrera, nurse practitioner who is not practicing at [facility] at this time  She states that she did see multiple specialists who could not determine that she had this syndrome  She states that she cannot get disability without this diagnosis being on her problem list   She did mention that if it was not added she would not continue care here  The following portions of the patient's history were reviewed and updated as appropriate:   She  has a past medical history of Anxiety, Arthritis, Depression, bleeding disorder, Hypertension, Kidney stone, and Seasonal allergies    She   Patient Active Problem List    Diagnosis Date Noted    Cholestasis 02/21/2022    Marijuana use, episodic 02/10/2022    Dyspepsia 02/08/2022    Intractable cyclical vomiting associated with migraine 02/08/2022    Mild episode of recurrent major depressive disorder ([facility] [address] ) 12/02/2021    Tobacco use 04/08/2021    Pain and swelling of left knee 01/21/2021    Insomnia 01/14/2021    Encounter for screening for HIV 11/29/2019    Lipid screening 09/12/2019    Bilateral lower extremity edema 09/12/2019    Varicose veins of both legs with edema 09/12/2019    Shortness of breath 09/12/2019    Encounter for screening mammogram for malignant neoplasm of breast 03/29/2019    Panic attack as reaction to stress 03/28/2019    Numbness and tingling of both legs below knees     GERD (gastroesophageal reflux disease) 10/25/2018    Constipation 10/25/2018    Other constipation 10/25/2018    Neuropathy 10/02/2018    Mild intermittent asthma without complication 09/67/2448    Cystic disease of ovaries 10/02/2018    Essential hypertension 10/02/2018    Anxiety 2017    Dysmenorrhea 2016    Chronic pain of both knees 2013    Dysthymic disorder 2013     She  has a past surgical history that includes  section; Harvey tooth extraction; Diagnostic laparoscopy; pr lap,diagnostic abdomen (N/A, 2016); pr lap,rmv  adnexal structure (Bilateral, 2016); Dilation and curettage of uterus (); and pr colonoscopy flx dx w/collj spec when pfrmd (N/A, 2018)  Her family history includes Aneurysm in her paternal grandmother; Autism in her daughter; Diabetes in her maternal grandfather; Lung cancer in her maternal grandmother; No Known Problems in her sister and sister  She  reports that she has been smoking  She has a 23 00 pack-year smoking history  She has never used smokeless tobacco  She reports current drug use  Drug: Marijuana  She reports that she does not drink alcohol    Current Outpatient Medications   Medication Sig Dispense Refill    albuterol (ProAir HFA) 90 mcg/act inhaler Inhale 2 puffs every 6 (six) hours as needed for wheezing 18 g 0    gabapentin (NEURONTIN) 300 mg capsule Take 2 capsules (600 mg total) by mouth 2 (two) times a day 180 capsule 5    hydrOXYzine pamoate (VISTARIL) 25 mg capsule Take 1 capsule (25 mg total) by mouth 3 (three) times a day as needed for anxiety 90 capsule 0    lisinopril (ZESTRIL) 20 mg tablet Take 1 tablet (20 mg total) by mouth daily 90 tablet 1    ofloxacin (FLOXIN) 0 3 % otic solution Administer 10 drops into both ears daily 5 mL 0    ondansetron (Zofran ODT) 4 mg disintegrating tablet Take 1 tablet (4 mg total) by mouth every 6 (six) hours as needed for nausea or vomiting 20 tablet 0    pantoprazole (PROTONIX) 40 mg tablet Take 1 tablet (40 mg total) by mouth daily before breakfast 90 tablet 1    QUEtiapine (SEROquel) 100 mg tablet Take 1 tablet (100 mg total) by mouth daily at bedtime As needed 90 tablet 0    sertraline (ZOLOFT) 100 mg tablet Take 1 tablet daily  With the 50 mg  Total 150 mg daily 90 tablet 1    sertraline (ZOLOFT) 50 mg tablet Take 1 tablet (50 mg total) by mouth daily With 100 mg tablet (150mg total) 90 tablet 1    sucralfate (CARAFATE) 1 g tablet Take 1 tablet (1 g total) by mouth 4 (four) times a day 40 tablet 0    magnesium citrate (CITROMA) 1 745 g/30 mL oral solution Take 296 mL by mouth once for 1 dose 296 mL 0    norethindrone (MICRONOR) 0 35 MG tablet Take 1 tablet (0 35 mg total) by mouth daily 84 tablet 3    polyethylene glycol (GOLYTELY) 4000 mL solution Take 4,000 mL by mouth once for 1 dose 4000 mL 0     No current facility-administered medications for this visit  Current Outpatient Medications on File Prior to Visit   Medication Sig    albuterol (ProAir HFA) 90 mcg/act inhaler Inhale 2 puffs every 6 (six) hours as needed for wheezing    gabapentin (NEURONTIN) 300 mg capsule Take 2 capsules (600 mg total) by mouth 2 (two) times a day    hydrOXYzine pamoate (VISTARIL) 25 mg capsule Take 1 capsule (25 mg total) by mouth 3 (three) times a day as needed for anxiety    lisinopril (ZESTRIL) 20 mg tablet Take 1 tablet (20 mg total) by mouth daily    ofloxacin (FLOXIN) 0 3 % otic solution Administer 10 drops into both ears daily    ondansetron (Zofran ODT) 4 mg disintegrating tablet Take 1 tablet (4 mg total) by mouth every 6 (six) hours as needed for nausea or vomiting    pantoprazole (PROTONIX) 40 mg tablet Take 1 tablet (40 mg total) by mouth daily before breakfast    QUEtiapine (SEROquel) 100 mg tablet Take 1 tablet (100 mg total) by mouth daily at bedtime As needed    sertraline (ZOLOFT) 100 mg tablet Take 1 tablet daily  With the 50 mg    Total 150 mg daily    sertraline (ZOLOFT) 50 mg tablet Take 1 tablet (50 mg total) by mouth daily With 100 mg tablet (150mg total)    sucralfate (CARAFATE) 1 g tablet Take 1 tablet (1 g total) by mouth 4 (four) times a day    magnesium citrate (CITROMA) 1 745 g/30 mL oral solution Take 296 mL by mouth once for 1 dose    norethindrone (MICRONOR) 0 35 MG tablet Take 1 tablet (0 35 mg total) by mouth daily    polyethylene glycol (GOLYTELY) 4000 mL solution Take 4,000 mL by mouth once for 1 dose     No current facility-administered medications on file prior to visit  She is allergic to eggs or egg-derived products - food allergy       Review of Systems   Gastrointestinal: Negative for nausea and vomiting  Objective:      /74 (BP Location: Left arm, Patient Position: Sitting, Cuff Size: Standard)   Pulse 68   Temp (!) 97 3 °F (36 3 °C) (Tympanic)   Resp 16   Ht 5' (1 524 m)   Wt 81 4 kg (179 lb 6 4 oz)   LMP  (LMP Unknown)   SpO2 96%   BMI 35 04 kg/m²          Physical Exam  Vitals reviewed  Constitutional:       General: She is not in acute distress  Appearance: Normal appearance  She is not ill-appearing, toxic-appearing or diaphoretic  HENT:      Head: Normocephalic and atraumatic  Neurological:      General: No focal deficit present  Mental Status: She is alert

## 2022-02-23 ENCOUNTER — CONSULT (OUTPATIENT)
Dept: SURGERY | Facility: CLINIC | Age: 42
End: 2022-02-23
Payer: COMMERCIAL

## 2022-02-23 ENCOUNTER — PREP FOR PROCEDURE (OUTPATIENT)
Dept: SURGERY | Facility: CLINIC | Age: 42
End: 2022-02-23

## 2022-02-23 VITALS
SYSTOLIC BLOOD PRESSURE: 122 MMHG | BODY MASS INDEX: 34.95 KG/M2 | DIASTOLIC BLOOD PRESSURE: 76 MMHG | HEART RATE: 56 BPM | WEIGHT: 178 LBS | HEIGHT: 60 IN

## 2022-02-23 DIAGNOSIS — K80.20 GALLSTONES: Primary | ICD-10-CM

## 2022-02-23 DIAGNOSIS — K42.9 UMBILICAL HERNIA: Primary | ICD-10-CM

## 2022-02-23 DIAGNOSIS — K80.20 CALCULUS OF GALLBLADDER WITHOUT CHOLECYSTITIS WITHOUT OBSTRUCTION: ICD-10-CM

## 2022-02-23 DIAGNOSIS — K42.9 UMBILICAL HERNIA: ICD-10-CM

## 2022-02-23 PROCEDURE — 3074F SYST BP LT 130 MM HG: CPT | Performed by: SURGERY

## 2022-02-23 PROCEDURE — 3078F DIAST BP <80 MM HG: CPT | Performed by: SURGERY

## 2022-02-23 PROCEDURE — 99214 OFFICE O/P EST MOD 30 MIN: CPT | Performed by: SURGERY

## 2022-02-23 PROCEDURE — 4004F PT TOBACCO SCREEN RCVD TLK: CPT | Performed by: SURGERY

## 2022-02-23 PROCEDURE — 3008F BODY MASS INDEX DOCD: CPT | Performed by: SURGERY

## 2022-02-23 NOTE — PROGRESS NOTES
Assessment/Plan:    Diagnoses and all orders for this visit:    Umbilical hernia  Risks and benefits of an umbilical hernia repair (no mesh) were discussed with her and she agrees to proceed  This we done the same time as her gallbladder procedure    Calculus of gallbladder without cholecystitis without obstruction  -     Ambulatory referral to General Surgery    Risks and benefits of a laparoscopic cholecystectomy including potential for bile duct injury, bowel injury, or open surgery were discussed with her and she agrees to proceed  Given the superficial abdominal wall tenderness noted on today's exam, I did explain that removing her gallbladder may not necessarily correct all of her abdominal complaints  It should certainly help with fatty food intolerance and nausea  She understands and desires to proceed    Subjective:      Patient ID: Austin Kruger is a 35 y o  female  Patient presents for gallbladder consult  States she has had epigastric pain after eating for 2 months  Ultrasound RUQ 2/11/2022  Has had intermittent episodes of nausea and vomiting  Does state a 10 lb weight loss over the last month  Living on ice water and ice chips  Has had a thorough GI evaluation  IMPRESSION:  1  Cholelithiasis  Otherwise no acute findings        Abdominal Pain  The current episode started more than 1 month ago  The problem occurs daily  The problem has been unchanged  The pain is located in the epigastric region  The quality of the pain is aching and dull  The abdominal pain does not radiate  Associated symptoms include belching, constipation, diarrhea, flatus, nausea and vomiting  The pain is aggravated by eating  The pain is relieved by nothing  She has tried antacids for the symptoms  The treatment provided no relief  Prior diagnostic workup includes ultrasound  Her past medical history is significant for gallstones         The following portions of the patient's history were reviewed and updated as appropriate:     She  has a past medical history of Anxiety, Arthritis, Asthma, Chronic kidney disease, Depression, bleeding disorder, Hypertension, Kidney stone, Obesity, and Seasonal allergies  She  has a past surgical history that includes  section; [place] tooth extraction; Diagnostic laparoscopy; pr lap,diagnostic abdomen (N/A, 2016); pr lap,rmv  adnexal structure (Bilateral, 2016); Dilation and curettage of uterus (); and pr colonoscopy flx dx w/collj spec when pfrmd (N/A, 2018)  Her family history includes Aneurysm in her paternal grandmother; Autism in her daughter; Cancer in her maternal grandmother; Diabetes in her maternal grandfather; Lung cancer in her maternal grandmother; No Known Problems in her sister and sister  She  reports that she has been smoking  She has a 23 00 pack-year smoking history  She has never used smokeless tobacco  She reports current drug use  Frequency: 7 00 times per week  Drug: Marijuana  She reports that she does not drink alcohol    Current Outpatient Medications   Medication Sig Dispense Refill    albuterol (ProAir HFA) 90 mcg/act inhaler Inhale 2 puffs every 6 (six) hours as needed for wheezing 18 g 0    gabapentin (NEURONTIN) 300 mg capsule Take 2 capsules (600 mg total) by mouth 2 (two) times a day 180 capsule 5    hydrOXYzine pamoate (VISTARIL) 25 mg capsule Take 1 capsule (25 mg total) by mouth 3 (three) times a day as needed for anxiety 90 capsule 0    lisinopril (ZESTRIL) 20 mg tablet Take 1 tablet (20 mg total) by mouth daily 90 tablet 1    magnesium citrate (CITROMA) 1 745 g/30 mL oral solution Take 296 mL by mouth once for 1 dose 296 mL 0    norethindrone (MICRONOR) 0 35 MG tablet Take 1 tablet (0 35 mg total) by mouth daily 84 tablet 3    ofloxacin (FLOXIN) 0 3 % otic solution Administer 10 drops into both ears daily 5 mL 0    ondansetron (Zofran ODT) 4 mg disintegrating tablet Take 1 tablet (4 mg total) by mouth every 6 (six) hours as needed for nausea or vomiting 20 tablet 0    pantoprazole (PROTONIX) 40 mg tablet Take 1 tablet (40 mg total) by mouth daily before breakfast 90 tablet 1    polyethylene glycol (GOLYTELY) 4000 mL solution Take 4,000 mL by mouth once for 1 dose 4000 mL 0    QUEtiapine (SEROquel) 100 mg tablet Take 1 tablet (100 mg total) by mouth daily at bedtime As needed 90 tablet 0    sertraline (ZOLOFT) 100 mg tablet Take 1 tablet daily  With the 50 mg  Total 150 mg daily 90 tablet 1    sertraline (ZOLOFT) 50 mg tablet Take 1 tablet (50 mg total) by mouth daily With 100 mg tablet (150mg total) 90 tablet 1    sucralfate (CARAFATE) 1 g tablet Take 1 tablet (1 g total) by mouth 4 (four) times a day 40 tablet 0     No current facility-administered medications for this visit  She is allergic to eggs or egg-derived products - food allergy       Review of Systems   Constitutional: Positive for appetite change and fatigue  Gastrointestinal: Positive for abdominal distention, abdominal pain, constipation, diarrhea, flatus, nausea and vomiting  Allergic/Immunologic: Positive for immunocompromised state  Psychiatric/Behavioral: The patient is nervous/anxious  All other systems reviewed and are negative  Objective:      /76   Pulse 56   Ht 5' (1 524 m)   Wt 80 7 kg (178 lb)   LMP  (LMP Unknown)   BMI 34 76 kg/m²          Physical Exam  Constitutional:       Appearance: She is obese  HENT:      Mouth/Throat:      Mouth: Mucous membranes are moist    Cardiovascular:      Rate and Rhythm: Normal rate  Pulmonary:      Effort: Pulmonary effort is normal    Abdominal:      General: Bowel sounds are normal       Palpations: Abdomen is soft  Tenderness: There is no abdominal tenderness  Hernia: A hernia is present  Hernia is present in the umbilical area  Skin:     General: Skin is warm and dry  Neurological:      Mental Status: She is alert         extremities: No edema

## 2022-02-23 NOTE — H&P (VIEW-ONLY)
Assessment/Plan:    Diagnoses and all orders for this visit:    Umbilical hernia  Risks and benefits of an umbilical hernia repair (no mesh) were discussed with her and she agrees to proceed  This we done the same time as her gallbladder procedure    Calculus of gallbladder without cholecystitis without obstruction  -     Ambulatory referral to General Surgery    Risks and benefits of a laparoscopic cholecystectomy including potential for bile duct injury, bowel injury, or open surgery were discussed with her and she agrees to proceed  Given the superficial abdominal wall tenderness noted on today's exam, I did explain that removing her gallbladder may not necessarily correct all of her abdominal complaints  It should certainly help with fatty food intolerance and nausea  She understands and desires to proceed    Subjective:      Patient ID: Serina Ramos is a 35 y o  female  Patient presents for gallbladder consult  States she has had epigastric pain after eating for 2 months  Ultrasound RUQ 2/11/2022  Has had intermittent episodes of nausea and vomiting  Does state a 10 lb weight loss over the last month  Living on ice water and ice chips  Has had a thorough GI evaluation  IMPRESSION:  1  Cholelithiasis  Otherwise no acute findings        Abdominal Pain  The current episode started more than 1 month ago  The problem occurs daily  The problem has been unchanged  The pain is located in the epigastric region  The quality of the pain is aching and dull  The abdominal pain does not radiate  Associated symptoms include belching, constipation, diarrhea, flatus, nausea and vomiting  The pain is aggravated by eating  The pain is relieved by nothing  She has tried antacids for the symptoms  The treatment provided no relief  Prior diagnostic workup includes ultrasound  Her past medical history is significant for gallstones         The following portions of the patient's history were reviewed and updated as appropriate:     She  has a past medical history of Anxiety, Arthritis, Asthma, Chronic kidney disease, Depression, bleeding disorder, Hypertension, Kidney stone, Obesity, and Seasonal allergies  She  has a past surgical history that includes  section; [place] tooth extraction; Diagnostic laparoscopy; pr lap,diagnostic abdomen (N/A, 2016); pr lap,rmv  adnexal structure (Bilateral, 2016); Dilation and curettage of uterus (); and pr colonoscopy flx dx w/collj spec when pfrmd (N/A, 2018)  Her family history includes Aneurysm in her paternal grandmother; Autism in her daughter; Cancer in her maternal grandmother; Diabetes in her maternal grandfather; Lung cancer in her maternal grandmother; No Known Problems in her sister and sister  She  reports that she has been smoking  She has a 23 00 pack-year smoking history  She has never used smokeless tobacco  She reports current drug use  Frequency: 7 00 times per week  Drug: Marijuana  She reports that she does not drink alcohol    Current Outpatient Medications   Medication Sig Dispense Refill    albuterol (ProAir HFA) 90 mcg/act inhaler Inhale 2 puffs every 6 (six) hours as needed for wheezing 18 g 0    gabapentin (NEURONTIN) 300 mg capsule Take 2 capsules (600 mg total) by mouth 2 (two) times a day 180 capsule 5    hydrOXYzine pamoate (VISTARIL) 25 mg capsule Take 1 capsule (25 mg total) by mouth 3 (three) times a day as needed for anxiety 90 capsule 0    lisinopril (ZESTRIL) 20 mg tablet Take 1 tablet (20 mg total) by mouth daily 90 tablet 1    magnesium citrate (CITROMA) 1 745 g/30 mL oral solution Take 296 mL by mouth once for 1 dose 296 mL 0    norethindrone (MICRONOR) 0 35 MG tablet Take 1 tablet (0 35 mg total) by mouth daily 84 tablet 3    ofloxacin (FLOXIN) 0 3 % otic solution Administer 10 drops into both ears daily 5 mL 0    ondansetron (Zofran ODT) 4 mg disintegrating tablet Take 1 tablet (4 mg total) by mouth every 6 (six) hours as needed for nausea or vomiting 20 tablet 0    pantoprazole (PROTONIX) 40 mg tablet Take 1 tablet (40 mg total) by mouth daily before breakfast 90 tablet 1    polyethylene glycol (GOLYTELY) 4000 mL solution Take 4,000 mL by mouth once for 1 dose 4000 mL 0    QUEtiapine (SEROquel) 100 mg tablet Take 1 tablet (100 mg total) by mouth daily at bedtime As needed 90 tablet 0    sertraline (ZOLOFT) 100 mg tablet Take 1 tablet daily  With the 50 mg  Total 150 mg daily 90 tablet 1    sertraline (ZOLOFT) 50 mg tablet Take 1 tablet (50 mg total) by mouth daily With 100 mg tablet (150mg total) 90 tablet 1    sucralfate (CARAFATE) 1 g tablet Take 1 tablet (1 g total) by mouth 4 (four) times a day 40 tablet 0     No current facility-administered medications for this visit  She is allergic to eggs or egg-derived products - food allergy       Review of Systems   Constitutional: Positive for appetite change and fatigue  Gastrointestinal: Positive for abdominal distention, abdominal pain, constipation, diarrhea, flatus, nausea and vomiting  Allergic/Immunologic: Positive for immunocompromised state  Psychiatric/Behavioral: The patient is nervous/anxious  All other systems reviewed and are negative  Objective:      /76   Pulse 56   Ht 5' (1 524 m)   Wt 80 7 kg (178 lb)   LMP  (LMP Unknown)   BMI 34 76 kg/m²          Physical Exam  Constitutional:       Appearance: She is obese  HENT:      Mouth/Throat:      Mouth: Mucous membranes are moist    Cardiovascular:      Rate and Rhythm: Normal rate  Pulmonary:      Effort: Pulmonary effort is normal    Abdominal:      General: Bowel sounds are normal       Palpations: Abdomen is soft  Tenderness: There is no abdominal tenderness  Hernia: A hernia is present  Hernia is present in the umbilical area  Skin:     General: Skin is warm and dry  Neurological:      Mental Status: She is alert         extremities: No edema

## 2022-03-02 ENCOUNTER — ANESTHESIA EVENT (OUTPATIENT)
Dept: PERIOP | Facility: HOSPITAL | Age: 42
End: 2022-03-02
Payer: COMMERCIAL

## 2022-03-03 NOTE — PRE-PROCEDURE INSTRUCTIONS
Pre-Surgery Instructions:   Medication Instructions    albuterol (ProAir HFA) 90 mcg/act inhaler pt instructed to use prn on day of surgery and to bring on day of surgery     gabapentin (NEURONTIN) 300 mg capsule pt instructed to take on day of surgery with 1-2 sips of water    hydrOXYzine pamoate (VISTARIL) 25 mg capsule pt instructed to take prn on day of surgery with 1-2 sips of water    lisinopril (ZESTRIL) 20 mg tablet pt instructed to NOT take on day of surgery     norethindrone (MICRONOR) 0 35 MG tablet pt instructed to take on day of surgery with 1-2 sips of water     ondansetron (Zofran ODT) 4 mg disintegrating tablet pt instructed to take prn on day of surgery with 1-2 sips of water     pantoprazole (PROTONIX) 40 mg tablet pt instructed to take on day of surgery with 1-2 sips of water     QUEtiapine (SEROquel) 100 mg tablet pt takes at hs     sertraline (ZOLOFT) 100 mg tablet pt instructed to take on day of surgery with 1-2 sips of water    sucralfate (CARAFATE) 1 g tablet pt instructed to take on day of surgery with 1-2 sips of water     Pt denies fever, sob, sore throat and cough  Pt verbalized understanding of shower and med instructions  Pt instructed to stop nsaids and supplements prior to surgery

## 2022-03-04 ENCOUNTER — APPOINTMENT (OUTPATIENT)
Dept: LAB | Facility: IMAGING CENTER | Age: 42
End: 2022-03-04
Payer: COMMERCIAL

## 2022-03-04 DIAGNOSIS — Z01.818 PRE-OP TESTING: ICD-10-CM

## 2022-03-04 LAB
ANION GAP SERPL CALCULATED.3IONS-SCNC: 7 MMOL/L (ref 4–13)
BUN SERPL-MCNC: 11 MG/DL (ref 5–25)
CALCIUM SERPL-MCNC: 9.8 MG/DL (ref 8.3–10.1)
CHLORIDE SERPL-SCNC: 109 MMOL/L (ref 100–108)
CO2 SERPL-SCNC: 22 MMOL/L (ref 21–32)
CREAT SERPL-MCNC: 0.88 MG/DL (ref 0.6–1.3)
GFR SERPL CREATININE-BSD FRML MDRD: 80 ML/MIN/1.73SQ M
GLUCOSE P FAST SERPL-MCNC: 105 MG/DL (ref 65–99)
POTASSIUM SERPL-SCNC: 4.3 MMOL/L (ref 3.5–5.3)
SODIUM SERPL-SCNC: 138 MMOL/L (ref 136–145)

## 2022-03-04 PROCEDURE — 80048 BASIC METABOLIC PNL TOTAL CA: CPT

## 2022-03-04 PROCEDURE — 36415 COLL VENOUS BLD VENIPUNCTURE: CPT

## 2022-03-10 ENCOUNTER — TELEPHONE (OUTPATIENT)
Dept: GASTROENTEROLOGY | Facility: AMBULARY SURGERY CENTER | Age: 42
End: 2022-03-10

## 2022-03-10 RX ORDER — CEFAZOLIN SODIUM 2 G/50ML
2000 SOLUTION INTRAVENOUS ONCE
Status: COMPLETED | OUTPATIENT
Start: 2022-03-11 | End: 2022-03-11

## 2022-03-10 RX ORDER — INDOCYANINE GREEN AND WATER 25 MG
5 KIT INJECTION ONCE
Status: COMPLETED | OUTPATIENT
Start: 2022-03-11 | End: 2022-03-11

## 2022-03-10 NOTE — TELEPHONE ENCOUNTER
Pt states she is having Gall bladder and hernia repair surgery tomorrow-3/11/2022/pt states she does not want to have egd/colonoscopy due to those procedures being all scheduled for the same day   Pt states she will back the gi office back when she is ready to schedule

## 2022-03-11 ENCOUNTER — HOSPITAL ENCOUNTER (OUTPATIENT)
Facility: HOSPITAL | Age: 42
Setting detail: OUTPATIENT SURGERY
Discharge: HOME | End: 2022-03-11
Attending: SURGERY | Admitting: SURGERY
Payer: COMMERCIAL

## 2022-03-11 ENCOUNTER — ANESTHESIA (OUTPATIENT)
Dept: PERIOP | Facility: HOSPITAL | Age: 42
End: 2022-03-11
Payer: COMMERCIAL

## 2022-03-11 ENCOUNTER — APPOINTMENT (OUTPATIENT)
Dept: GASTROENTEROLOGY | Facility: AMBULARY SURGERY CENTER | Age: 42
End: 2022-03-11
Attending: INTERNAL MEDICINE
Payer: COMMERCIAL

## 2022-03-11 VITALS
OXYGEN SATURATION: 99 % | RESPIRATION RATE: 16 BRPM | HEART RATE: 78 BPM | SYSTOLIC BLOOD PRESSURE: 168 MMHG | TEMPERATURE: 98.2 F | DIASTOLIC BLOOD PRESSURE: 94 MMHG

## 2022-03-11 DIAGNOSIS — K42.9 UMBILICAL HERNIA: ICD-10-CM

## 2022-03-11 DIAGNOSIS — K80.20 CALCULUS OF GALLBLADDER WITHOUT CHOLECYSTITIS WITHOUT OBSTRUCTION: Primary | ICD-10-CM

## 2022-03-11 DIAGNOSIS — K80.20 GALLSTONES: ICD-10-CM

## 2022-03-11 DIAGNOSIS — Z01.818 PRE-OP TESTING: ICD-10-CM

## 2022-03-11 LAB
EXT PREGNANCY TEST URINE: NEGATIVE
EXT. CONTROL: NORMAL

## 2022-03-11 PROCEDURE — NC001 PR NO CHARGE: Performed by: PHYSICIAN ASSISTANT

## 2022-03-11 PROCEDURE — 81025 URINE PREGNANCY TEST: CPT | Performed by: SURGERY

## 2022-03-11 PROCEDURE — 88304 TISSUE EXAM BY PATHOLOGIST: CPT | Performed by: PATHOLOGY

## 2022-03-11 PROCEDURE — 47562 LAPAROSCOPIC CHOLECYSTECTOMY: CPT | Performed by: SURGERY

## 2022-03-11 PROCEDURE — S2900 ROBOTIC SURGICAL SYSTEM: HCPCS | Performed by: SURGERY

## 2022-03-11 PROCEDURE — 47562 LAPAROSCOPIC CHOLECYSTECTOMY: CPT | Performed by: PHYSICIAN ASSISTANT

## 2022-03-11 RX ORDER — HALOPERIDOL 5 MG/ML
2 INJECTION INTRAMUSCULAR ONCE
Status: COMPLETED | OUTPATIENT
Start: 2022-03-11 | End: 2022-03-11

## 2022-03-11 RX ORDER — SODIUM CHLORIDE, SODIUM LACTATE, POTASSIUM CHLORIDE, CALCIUM CHLORIDE 600; 310; 30; 20 MG/100ML; MG/100ML; MG/100ML; MG/100ML
100 INJECTION, SOLUTION INTRAVENOUS CONTINUOUS
Status: DISCONTINUED | OUTPATIENT
Start: 2022-03-11 | End: 2022-03-11 | Stop reason: HOSPADM

## 2022-03-11 RX ORDER — BUPIVACAINE HYDROCHLORIDE AND EPINEPHRINE 2.5; 5 MG/ML; UG/ML
INJECTION, SOLUTION EPIDURAL; INFILTRATION; INTRACAUDAL; PERINEURAL AS NEEDED
Status: DISCONTINUED | OUTPATIENT
Start: 2022-03-11 | End: 2022-03-11 | Stop reason: HOSPADM

## 2022-03-11 RX ORDER — ROCURONIUM BROMIDE 10 MG/ML
INJECTION, SOLUTION INTRAVENOUS AS NEEDED
Status: DISCONTINUED | OUTPATIENT
Start: 2022-03-11 | End: 2022-03-11

## 2022-03-11 RX ORDER — FENTANYL CITRATE 50 UG/ML
INJECTION, SOLUTION INTRAMUSCULAR; INTRAVENOUS AS NEEDED
Status: DISCONTINUED | OUTPATIENT
Start: 2022-03-11 | End: 2022-03-11

## 2022-03-11 RX ORDER — MAGNESIUM HYDROXIDE 1200 MG/15ML
LIQUID ORAL AS NEEDED
Status: DISCONTINUED | OUTPATIENT
Start: 2022-03-11 | End: 2022-03-11 | Stop reason: HOSPADM

## 2022-03-11 RX ORDER — HALOPERIDOL 5 MG/ML
1 INJECTION INTRAMUSCULAR ONCE
Status: DISCONTINUED | OUTPATIENT
Start: 2022-03-11 | End: 2022-03-11 | Stop reason: HOSPADM

## 2022-03-11 RX ORDER — ONDANSETRON 2 MG/ML
4 INJECTION INTRAMUSCULAR; INTRAVENOUS EVERY 4 HOURS PRN
Status: DISCONTINUED | OUTPATIENT
Start: 2022-03-11 | End: 2022-03-11 | Stop reason: HOSPADM

## 2022-03-11 RX ORDER — GLYCOPYRROLATE 0.2 MG/ML
INJECTION INTRAMUSCULAR; INTRAVENOUS AS NEEDED
Status: DISCONTINUED | OUTPATIENT
Start: 2022-03-11 | End: 2022-03-11

## 2022-03-11 RX ORDER — DEXAMETHASONE SODIUM PHOSPHATE 4 MG/ML
INJECTION, SOLUTION INTRA-ARTICULAR; INTRALESIONAL; INTRAMUSCULAR; INTRAVENOUS; SOFT TISSUE AS NEEDED
Status: DISCONTINUED | OUTPATIENT
Start: 2022-03-11 | End: 2022-03-11

## 2022-03-11 RX ORDER — MIDAZOLAM HYDROCHLORIDE 2 MG/2ML
INJECTION, SOLUTION INTRAMUSCULAR; INTRAVENOUS AS NEEDED
Status: DISCONTINUED | OUTPATIENT
Start: 2022-03-11 | End: 2022-03-11

## 2022-03-11 RX ORDER — KETOROLAC TROMETHAMINE 30 MG/ML
INJECTION, SOLUTION INTRAMUSCULAR; INTRAVENOUS AS NEEDED
Status: DISCONTINUED | OUTPATIENT
Start: 2022-03-11 | End: 2022-03-11

## 2022-03-11 RX ORDER — ONDANSETRON 2 MG/ML
4 INJECTION INTRAMUSCULAR; INTRAVENOUS ONCE AS NEEDED
Status: COMPLETED | OUTPATIENT
Start: 2022-03-11 | End: 2022-03-11

## 2022-03-11 RX ORDER — FENTANYL CITRATE/PF 50 MCG/ML
25 SYRINGE (ML) INJECTION
Status: COMPLETED | OUTPATIENT
Start: 2022-03-11 | End: 2022-03-11

## 2022-03-11 RX ORDER — SODIUM CHLORIDE 9 MG/ML
INJECTION, SOLUTION INTRAVENOUS CONTINUOUS PRN
Status: DISCONTINUED | OUTPATIENT
Start: 2022-03-11 | End: 2022-03-11

## 2022-03-11 RX ORDER — SODIUM CHLORIDE, SODIUM LACTATE, POTASSIUM CHLORIDE, CALCIUM CHLORIDE 600; 310; 30; 20 MG/100ML; MG/100ML; MG/100ML; MG/100ML
INJECTION, SOLUTION INTRAVENOUS CONTINUOUS PRN
Status: DISCONTINUED | OUTPATIENT
Start: 2022-03-11 | End: 2022-03-11

## 2022-03-11 RX ORDER — LIDOCAINE HYDROCHLORIDE 10 MG/ML
INJECTION, SOLUTION EPIDURAL; INFILTRATION; INTRACAUDAL; PERINEURAL AS NEEDED
Status: DISCONTINUED | OUTPATIENT
Start: 2022-03-11 | End: 2022-03-11

## 2022-03-11 RX ORDER — SODIUM CHLORIDE, SODIUM LACTATE, POTASSIUM CHLORIDE, CALCIUM CHLORIDE 600; 310; 30; 20 MG/100ML; MG/100ML; MG/100ML; MG/100ML
125 INJECTION, SOLUTION INTRAVENOUS CONTINUOUS
Status: DISCONTINUED | OUTPATIENT
Start: 2022-03-11 | End: 2022-03-11 | Stop reason: HOSPADM

## 2022-03-11 RX ORDER — OXYCODONE HYDROCHLORIDE 5 MG/1
5 TABLET ORAL EVERY 4 HOURS PRN
Status: DISCONTINUED | OUTPATIENT
Start: 2022-03-11 | End: 2022-03-11 | Stop reason: HOSPADM

## 2022-03-11 RX ORDER — OXYCODONE HYDROCHLORIDE 5 MG/1
5 TABLET ORAL EVERY 4 HOURS PRN
Qty: 10 TABLET | Refills: 0 | Status: SHIPPED | OUTPATIENT
Start: 2022-03-11 | End: 2022-03-21

## 2022-03-11 RX ORDER — PROPOFOL 10 MG/ML
INJECTION, EMULSION INTRAVENOUS AS NEEDED
Status: DISCONTINUED | OUTPATIENT
Start: 2022-03-11 | End: 2022-03-11

## 2022-03-11 RX ORDER — MORPHINE SULFATE 10 MG/ML
4 INJECTION, SOLUTION INTRAMUSCULAR; INTRAVENOUS
Status: DISCONTINUED | OUTPATIENT
Start: 2022-03-11 | End: 2022-03-11 | Stop reason: HOSPADM

## 2022-03-11 RX ORDER — HYDROMORPHONE HCL IN WATER/PF 6 MG/30 ML
0.2 PATIENT CONTROLLED ANALGESIA SYRINGE INTRAVENOUS
Status: DISCONTINUED | OUTPATIENT
Start: 2022-03-11 | End: 2022-03-11 | Stop reason: HOSPADM

## 2022-03-11 RX ORDER — PROMETHAZINE HYDROCHLORIDE 25 MG/ML
12.5 INJECTION, SOLUTION INTRAMUSCULAR; INTRAVENOUS ONCE
Status: COMPLETED | OUTPATIENT
Start: 2022-03-11 | End: 2022-03-11

## 2022-03-11 RX ADMIN — FENTANYL CITRATE 25 MCG: 50 INJECTION INTRAMUSCULAR; INTRAVENOUS at 11:58

## 2022-03-11 RX ADMIN — PHENYLEPHRINE HYDROCHLORIDE 30 MCG/MIN: 10 INJECTION INTRAVENOUS at 10:18

## 2022-03-11 RX ADMIN — CEFAZOLIN SODIUM 2000 MG: 2 SOLUTION INTRAVENOUS at 09:57

## 2022-03-11 RX ADMIN — PROPOFOL 200 MG: 10 INJECTION, EMULSION INTRAVENOUS at 10:06

## 2022-03-11 RX ADMIN — MIDAZOLAM HYDROCHLORIDE 2 MG: 1 INJECTION, SOLUTION INTRAMUSCULAR; INTRAVENOUS at 09:57

## 2022-03-11 RX ADMIN — SODIUM CHLORIDE: 0.9 INJECTION, SOLUTION INTRAVENOUS at 10:10

## 2022-03-11 RX ADMIN — DEXAMETHASONE SODIUM PHOSPHATE 10 MG: 4 INJECTION INTRA-ARTICULAR; INTRALESIONAL; INTRAMUSCULAR; INTRAVENOUS; SOFT TISSUE at 10:20

## 2022-03-11 RX ADMIN — KETOROLAC TROMETHAMINE 15 MG: 30 INJECTION, SOLUTION INTRAMUSCULAR at 11:00

## 2022-03-11 RX ADMIN — ONDANSETRON 4 MG: 2 INJECTION INTRAMUSCULAR; INTRAVENOUS at 13:43

## 2022-03-11 RX ADMIN — PROMETHAZINE HYDROCHLORIDE 12.5 MG: 25 INJECTION INTRAMUSCULAR; INTRAVENOUS at 12:02

## 2022-03-11 RX ADMIN — FENTANYL CITRATE 50 MCG: 50 INJECTION, SOLUTION INTRAMUSCULAR; INTRAVENOUS at 10:27

## 2022-03-11 RX ADMIN — LIDOCAINE HYDROCHLORIDE 50 MG: 10 INJECTION, SOLUTION EPIDURAL; INFILTRATION; INTRACAUDAL at 10:06

## 2022-03-11 RX ADMIN — FENTANYL CITRATE 25 MCG: 50 INJECTION INTRAMUSCULAR; INTRAVENOUS at 11:37

## 2022-03-11 RX ADMIN — ROCURONIUM BROMIDE 50 MG: 10 SOLUTION INTRAVENOUS at 10:07

## 2022-03-11 RX ADMIN — HALOPERIDOL LACTATE 2 MG: 5 INJECTION, SOLUTION INTRAMUSCULAR at 15:07

## 2022-03-11 RX ADMIN — ONDANSETRON 4 MG: 2 INJECTION INTRAMUSCULAR; INTRAVENOUS at 11:27

## 2022-03-11 RX ADMIN — FENTANYL CITRATE 25 MCG: 50 INJECTION INTRAMUSCULAR; INTRAVENOUS at 11:45

## 2022-03-11 RX ADMIN — GLYCOPYRROLATE 0.2 MG: 0.2 INJECTION, SOLUTION INTRAMUSCULAR; INTRAVENOUS at 10:18

## 2022-03-11 RX ADMIN — FENTANYL CITRATE 25 MCG: 50 INJECTION INTRAMUSCULAR; INTRAVENOUS at 11:55

## 2022-03-11 RX ADMIN — SUGAMMADEX 200 MG: 100 INJECTION, SOLUTION INTRAVENOUS at 11:05

## 2022-03-11 RX ADMIN — INDOCYANINE GREEN AND WATER 5 MG: KIT at 08:32

## 2022-03-11 RX ADMIN — FENTANYL CITRATE 50 MCG: 50 INJECTION, SOLUTION INTRAMUSCULAR; INTRAVENOUS at 10:06

## 2022-03-11 RX ADMIN — SODIUM CHLORIDE, SODIUM LACTATE, POTASSIUM CHLORIDE, AND CALCIUM CHLORIDE: .6; .31; .03; .02 INJECTION, SOLUTION INTRAVENOUS at 09:57

## 2022-03-11 NOTE — OP NOTE
OPERATIVE REPORT  PATIENT NAME: Ct Madden    :  1980  MRN: [id number]  Pt Location: [facility] OR ROOM 04    SURGERY DATE: 3/11/2022    Surgeon(s) and Role:     * Marcus Ardon,  - Primary     * Nguyen Ge PA-C - Assisting     * Maria Luz Christie PA-C - Assisting  No qualified resident was available  Her assistance was required for exposure retraction throughout the case    Preop Diagnosis:  Gallstones [J73 85]  Umbilical hernia [A01 5]    Post-Op Diagnosis Codes:     Chronic calculous cholecystitis     * Umbilical hernia [A28 1]    Procedure(s) (LRB):  ROBOTIC LAPAROSCOPIC CHOLECYSTECTOMY (N/A)  UMBILICAL HERNIA REPAIR (N/A), no mesh    Specimen(s):  ID Type Source Tests Collected by Time Destination   1 : CC PCP Tissue Gallbladder TISSUE EXAM Griffin Triplett DO 3/11/2022 1044        Estimated Blood Loss:   20 mL    Drains:  [REMOVED] NG/OG/Enteral Tube Orogastric 18 Fr Center mouth (Removed)   Number of days: 0       Anesthesia Type:   General/local    Operative Indications:  Gallstones [U03 21]  Umbilical hernia [P59 8]      Operative Findings:  Changes consistent with chronic calculous cholecystitis  ASA 2  Wound class 2  Height 60 in weight 81 kg/178 lb  BMI is 35    Complications:   None    Procedure and Technique:  Patient was brought the operative suite and identified by visualization, conversation, by armband  Sequential compression pumps were placed  She was given Ancef perioperatively  Once under anesthesia abdomen is then prepped and draped in a sterile fashion  Time-out was performed was assured that the prep was dry  Local was instilled about the umbilicus  Curvilinear skin incision made to the right side of the umbilicus  Hot cautery was then utilized to identify the hernia lift this up  There was some preperitoneal fat coming through small umbilical hernia  This was amputated in the remainder returned to the abdominal cavity    Through this defect an 8 mm robotic trocar was placed  CO2 was attached creating pneumoperitoneum 2 other 8 mm working robotic ports were placed in the respective positions  Other 5 mm trocar was placed in the anterior axillary line in the right upper quadrant  Robot was then docked  Under direct visualization monopolar hook as well as grasper were inserted  I then went to the robotic console  Gallbladder was lifted cephalad with a laparoscopic grasper  Careful dissection was then carried out around the neck of the gallbladder she had some omental adhesions up to this area consistent with a chronic process  Continued to retract the neck laterally  Cystic duct and cystic artery structures were carefully dissected out obtaining a critical view  Confirmation the cystic duct was done with firefly as she again ICG preoperatively  Both duct and artery were divided between clips  Gallbladder then taken off the liver using hook with hot cautery  There was excellent hemostasis  I then returned to the operative field  Small amount of irrigation was carried out  Gauze was placed into an Endo-Catch bag  This was taken out through the umbilical port/trocar site  Other trocars removed  Fascia at the umbilical trocar/hernia site was closed using 0 Vicryl in a figure-of-eight fashion  Local was instilled  Four Monocryl was used to close skin incisions in a subcuticular fashion  Wounds washed and dried  Sterile skin glue was applied  She was awake in the operating returned to the recovery area in stable condition having tolerated the procedure well     I was present for the entire procedure    Patient Disposition:  PACU       SIGNATURE: Deric Orr DO  DATE: March 11, 2022  TIME: 11:06 AM

## 2022-03-11 NOTE — ANESTHESIA PREPROCEDURE EVALUATION
Medical History    History Comments   Hx of bleeding disorder dysmennorrhea-dx lap today 8/12/2016   Arthritis OA  b/l knees   Hypertension    Anxiety    Depression    Kidney stone    Seasonal allergies    Obesity    Asthma Approx 2 years ago   Chronic kidney disease      Procedure:  ROBOTIC LAPAROSCOPIC CHOLECYSTECTOMY (N/A Abdomen)  UMBILICAL HERNIA REPAIR (N/A Abdomen)    Relevant Problems   ANESTHESIA (within normal limits)      CARDIO   (+) Essential hypertension      GI/HEPATIC   (+) GERD (gastroesophageal reflux disease)      NEURO/PSYCH   (+) Anxiety   (+) Dysthymic disorder   (+) Mild episode of recurrent major depressive disorder (HCC)   (+) Numbness and tingling of both legs below knees   (+) Panic attack as reaction to stress      PULMONARY   (+) Mild intermittent asthma without complication   (+) Shortness of breath        Physical Exam    Airway    Mallampati score: II  TM Distance: >3 FB  Neck ROM: full     Dental       Cardiovascular  Rate: normal,     Pulmonary  Pulmonary exam normal     Other Findings        Anesthesia Plan  ASA Score- 2     Anesthesia Type- general with ASA Monitors  Additional Monitors:   Airway Plan: ETT  Plan Factors-Exercise tolerance (METS): >4 METS  Chart reviewed  Patient summary reviewed  Patient is not a current smoker  Induction- intravenous  Postoperative Plan- Plan for postoperative opioid use  Informed Consent- Anesthetic plan and risks discussed with patient  I personally reviewed this patient with the CRNA  Discussed and agreed on the Anesthesia Plan with the MAIK Drake

## 2022-03-11 NOTE — INTERVAL H&P NOTE
H&P reviewed  After examining the patient I find no changes in the patients condition since the H&P had been written      Vitals:    03/11/22 0816   BP: 107/61   Pulse: 64   Resp: 20   Temp: (!) 97 3 °F (36 3 °C)   SpO2: 98%

## 2022-03-11 NOTE — ANESTHESIA POSTPROCEDURE EVALUATION
Post-Op Assessment Note    CV Status:  Stable  Pain Score: 0    Pain management: adequate     Mental Status:  Alert and awake   Hydration Status:  Euvolemic   PONV Controlled:  Controlled   Airway Patency:  Patent      Post Op Vitals Reviewed: Yes      Staff: CRNA         No complications documented      BP   142/99   Temp  97 4   Pulse  99   Resp   15   SpO2   99

## 2022-03-12 ENCOUNTER — APPOINTMENT (EMERGENCY)
Dept: CT IMAGING | Facility: HOSPITAL | Age: 42
End: 2022-03-12
Payer: COMMERCIAL

## 2022-03-12 ENCOUNTER — HOSPITAL ENCOUNTER (EMERGENCY)
Facility: HOSPITAL | Age: 42
Discharge: HOME | End: 2022-03-12
Attending: EMERGENCY MEDICINE | Admitting: EMERGENCY MEDICINE
Payer: COMMERCIAL

## 2022-03-12 VITALS
WEIGHT: 178 LBS | BODY MASS INDEX: 34.95 KG/M2 | HEART RATE: 78 BPM | HEIGHT: 60 IN | DIASTOLIC BLOOD PRESSURE: 87 MMHG | RESPIRATION RATE: 18 BRPM | SYSTOLIC BLOOD PRESSURE: 145 MMHG | OXYGEN SATURATION: 97 % | TEMPERATURE: 98.1 F

## 2022-03-12 DIAGNOSIS — G89.18 POSTOPERATIVE ABDOMINAL PAIN: Primary | ICD-10-CM

## 2022-03-12 DIAGNOSIS — R11.2 POSTOPERATIVE NAUSEA AND VOMITING: ICD-10-CM

## 2022-03-12 DIAGNOSIS — R10.9 POSTOPERATIVE ABDOMINAL PAIN: Primary | ICD-10-CM

## 2022-03-12 DIAGNOSIS — Z98.890 POSTOPERATIVE NAUSEA AND VOMITING: ICD-10-CM

## 2022-03-12 LAB
ALBUMIN SERPL BCP-MCNC: 3.5 G/DL (ref 3.5–5)
ALP SERPL-CCNC: 90 U/L (ref 46–116)
ALT SERPL W P-5'-P-CCNC: 45 U/L (ref 12–78)
ANION GAP SERPL CALCULATED.3IONS-SCNC: 8 MMOL/L (ref 4–13)
AST SERPL W P-5'-P-CCNC: 37 U/L (ref 5–45)
BASOPHILS # BLD AUTO: 0.02 THOUSANDS/ΜL (ref 0–0.1)
BASOPHILS NFR BLD AUTO: 0 % (ref 0–1)
BILIRUB SERPL-MCNC: 0.36 MG/DL (ref 0.2–1)
BUN SERPL-MCNC: 10 MG/DL (ref 5–25)
CALCIUM SERPL-MCNC: 8.9 MG/DL (ref 8.3–10.1)
CHLORIDE SERPL-SCNC: 103 MMOL/L (ref 100–108)
CO2 SERPL-SCNC: 26 MMOL/L (ref 21–32)
CREAT SERPL-MCNC: 0.81 MG/DL (ref 0.6–1.3)
EOSINOPHIL # BLD AUTO: 0 THOUSAND/ΜL (ref 0–0.61)
EOSINOPHIL NFR BLD AUTO: 0 % (ref 0–6)
ERYTHROCYTE [DISTWIDTH] IN BLOOD BY AUTOMATED COUNT: 12.6 % (ref 11.6–15.1)
GFR SERPL CREATININE-BSD FRML MDRD: 89 ML/MIN/1.73SQ M
GLUCOSE SERPL-MCNC: 137 MG/DL (ref 65–140)
HCT VFR BLD AUTO: 37.7 % (ref 34.8–46.1)
HGB BLD-MCNC: 13.1 G/DL (ref 11.5–15.4)
IMM GRANULOCYTES # BLD AUTO: 0.15 THOUSAND/UL (ref 0–0.2)
IMM GRANULOCYTES NFR BLD AUTO: 1 % (ref 0–2)
LIPASE SERPL-CCNC: 201 U/L (ref 73–393)
LYMPHOCYTES # BLD AUTO: 1.99 THOUSANDS/ΜL (ref 0.6–4.47)
LYMPHOCYTES NFR BLD AUTO: 12 % (ref 14–44)
MCH RBC QN AUTO: 33.8 PG (ref 26.8–34.3)
MCHC RBC AUTO-ENTMCNC: 34.7 G/DL (ref 31.4–37.4)
MCV RBC AUTO: 97 FL (ref 82–98)
MONOCYTES # BLD AUTO: 1.5 THOUSAND/ΜL (ref 0.17–1.22)
MONOCYTES NFR BLD AUTO: 9 % (ref 4–12)
NEUTROPHILS # BLD AUTO: 13.14 THOUSANDS/ΜL (ref 1.85–7.62)
NEUTS SEG NFR BLD AUTO: 78 % (ref 43–75)
NRBC BLD AUTO-RTO: 0 /100 WBCS
PLATELET # BLD AUTO: 214 THOUSANDS/UL (ref 149–390)
PMV BLD AUTO: 10.1 FL (ref 8.9–12.7)
POTASSIUM SERPL-SCNC: 3.7 MMOL/L (ref 3.5–5.3)
PROT SERPL-MCNC: 7.2 G/DL (ref 6.4–8.2)
RBC # BLD AUTO: 3.88 MILLION/UL (ref 3.81–5.12)
SODIUM SERPL-SCNC: 137 MMOL/L (ref 136–145)
WBC # BLD AUTO: 16.8 THOUSAND/UL (ref 4.31–10.16)

## 2022-03-12 PROCEDURE — 85025 COMPLETE CBC W/AUTO DIFF WBC: CPT | Performed by: EMERGENCY MEDICINE

## 2022-03-12 PROCEDURE — 96374 THER/PROPH/DIAG INJ IV PUSH: CPT

## 2022-03-12 PROCEDURE — 99284 EMERGENCY DEPT VISIT MOD MDM: CPT

## 2022-03-12 PROCEDURE — 74177 CT ABD & PELVIS W/CONTRAST: CPT

## 2022-03-12 PROCEDURE — 80053 COMPREHEN METABOLIC PANEL: CPT | Performed by: EMERGENCY MEDICINE

## 2022-03-12 PROCEDURE — 99285 EMERGENCY DEPT VISIT HI MDM: CPT | Performed by: EMERGENCY MEDICINE

## 2022-03-12 PROCEDURE — G1004 CDSM NDSC: HCPCS

## 2022-03-12 PROCEDURE — 96375 TX/PRO/DX INJ NEW DRUG ADDON: CPT

## 2022-03-12 PROCEDURE — 83690 ASSAY OF LIPASE: CPT | Performed by: EMERGENCY MEDICINE

## 2022-03-12 PROCEDURE — 36415 COLL VENOUS BLD VENIPUNCTURE: CPT | Performed by: EMERGENCY MEDICINE

## 2022-03-12 PROCEDURE — 96361 HYDRATE IV INFUSION ADD-ON: CPT

## 2022-03-12 RX ORDER — ONDANSETRON 4 MG/1
4 TABLET, ORALLY DISINTEGRATING ORAL EVERY 6 HOURS PRN
Qty: 20 TABLET | Refills: 0 | Status: SHIPPED | OUTPATIENT
Start: 2022-03-12 | End: 2022-03-25

## 2022-03-12 RX ORDER — ONDANSETRON 4 MG/1
4 TABLET, ORALLY DISINTEGRATING ORAL EVERY 6 HOURS PRN
Qty: 20 TABLET | Refills: 0 | Status: SHIPPED | OUTPATIENT
Start: 2022-03-12 | End: 2022-03-12 | Stop reason: SDUPTHER

## 2022-03-12 RX ORDER — ONDANSETRON 2 MG/ML
4 INJECTION INTRAMUSCULAR; INTRAVENOUS ONCE
Status: COMPLETED | OUTPATIENT
Start: 2022-03-12 | End: 2022-03-12

## 2022-03-12 RX ORDER — MORPHINE SULFATE 4 MG/ML
8 INJECTION, SOLUTION INTRAMUSCULAR; INTRAVENOUS ONCE
Status: COMPLETED | OUTPATIENT
Start: 2022-03-12 | End: 2022-03-12

## 2022-03-12 RX ADMIN — ONDANSETRON 4 MG: 2 INJECTION INTRAMUSCULAR; INTRAVENOUS at 05:54

## 2022-03-12 RX ADMIN — MORPHINE SULFATE 8 MG: 4 INJECTION INTRAVENOUS at 05:57

## 2022-03-12 RX ADMIN — IOHEXOL 100 ML: 350 INJECTION, SOLUTION INTRAVENOUS at 06:25

## 2022-03-12 RX ADMIN — SODIUM CHLORIDE 1000 ML: 0.9 INJECTION, SOLUTION INTRAVENOUS at 05:54

## 2022-03-12 NOTE — ED CARE HANDOFF
Emergency Department Sign Out Note        Sign out and transfer of care from Dr Xenia Roberts and Dr Dk Black (resident physician)  See Separate Emergency Department note  The patient, Silvia Mayer, was evaluated by the previous provider for abdominal pain and vomiting, s/p cholecystectomy and umbilical hernia repair on 3/11  Workup Completed:  Labs, pt medicated and feeling better    ED Course / Workup Pending (followup):  Pending CTAP, dispo accordingly                                  ED Course as of 03/12/22 0745   Sat Mar 12, 2022   0657 CT abdomen pelvis with contrast  1 ) Status post cholecystectomy and umbilical hernia repair  2 ) Hiatal hernia with reflux into the distal esophagus  3 ) Colonic diverticulosis  0704 Pt feeling better at this time, nausea and vomiting have resolved  Will PO challenge with likely discharge home after  0741 Pt tolerated PO, feeling better and ready for discharge home  Recommend continued symptomatic treatment and surgery follow up  Pt in agreement  Procedures  MDM        Disposition  Final diagnoses:   Postoperative abdominal pain   Postoperative nausea and vomiting     Time reflects when diagnosis was documented in both MDM as applicable and the Disposition within this note     Time User Action Codes Description Comment    3/12/2022  6:31 AM Griselda Battiest Add [R10 9,  G89 18] Postoperative abdominal pain     3/12/2022  6:32 AM Griselda Battiest Add [R11 2,  Z98 890] Postoperative nausea and vomiting       ED Disposition     ED Disposition Condition Date/Time Comment    Discharge Stable Sat Mar 12, 2022  7:42 AM Melia Gloria Carrier discharge to home/self care  Follow-up Information     Follow up With Specialties Details Why Contact Info Additional Information    Chaz Jacobo,  General Surgery Schedule an appointment as soon as possible for a visit  To make appointment for reevaluation in 3-5 days   [address] [facility] Emergency [facility] Emergency Medicine Go to  If symptoms worsen [place] [id number]  [facility] Emergency Department, [address]  , [place], [place], [address]        Current Discharge Medication List      START taking these medications    Details   !! ondansetron (ZOFRAN-ODT) 4 mg disintegrating tablet Take 1 tablet (4 mg total) by mouth every 6 (six) hours as needed for nausea or vomiting  Qty: 20 tablet, Refills: 0    Associated Diagnoses: Postoperative nausea and vomiting       !! - Potential duplicate medications found  Please discuss with provider  CONTINUE these medications which have NOT CHANGED    Details   gabapentin (NEURONTIN) 300 mg capsule Take 2 capsules (600 mg total) by mouth 2 (two) times a day  Qty: 180 capsule, Refills: 5    Comments: Not to exceed 5 additional fills before 11/24/2019PLEASE SEND DIRECTLY TO US  Parminder Mathis Associated Diagnoses: Neuropathy associated with polyneuropathy, organomegaly, endocrinopathy, monoclonal gammopathy, and skin changes syndrome (HCC)      lisinopril (ZESTRIL) 20 mg tablet Take 1 tablet (20 mg total) by mouth daily  Qty: 90 tablet, Refills: 1    Associated Diagnoses: Essential hypertension      pantoprazole (PROTONIX) 40 mg tablet Take 1 tablet (40 mg total) by mouth daily before breakfast  Qty: 90 tablet, Refills: 1    Associated Diagnoses: Gastroesophageal reflux disease without esophagitis      QUEtiapine (SEROquel) 100 mg tablet Take 1 tablet (100 mg total) by mouth daily at bedtime As needed  Qty: 90 tablet, Refills: 0    Associated Diagnoses: Mild episode of recurrent major depressive disorder ([address] ); Anxiety; Insomnia, unspecified type      sertraline (ZOLOFT) 100 mg tablet Take 1 tablet daily  With the 50 mg    Total 150 mg daily  Qty: 90 tablet, Refills: 1    Associated Diagnoses: Mild episode of recurrent major depressive disorder ([address] ); Anxiety      albuterol (ProAir HFA) 90 mcg/act inhaler Inhale 2 puffs every 6 (six) hours as needed for wheezing  Qty: 18 g, Refills: 0    Comments: Substitution to a formulary equivalent within the same pharmaceutical class is authorized  Associated Diagnoses: Mild intermittent asthma without complication      hydrOXYzine pamoate (VISTARIL) 25 mg capsule Take 1 capsule (25 mg total) by mouth 3 (three) times a day as needed for anxiety  Qty: 90 capsule, Refills: 0    Associated Diagnoses: Anxiety      norethindrone (MICRONOR) 0 35 MG tablet Take 1 tablet (0 35 mg total) by mouth daily  Qty: 84 tablet, Refills: 3    Associated Diagnoses: Pelvic pain in female; History of ovarian cyst      !! ondansetron (Zofran ODT) 4 mg disintegrating tablet Take 1 tablet (4 mg total) by mouth every 6 (six) hours as needed for nausea or vomiting  Qty: 20 tablet, Refills: 0    Associated Diagnoses: Nausea and vomiting      oxyCODONE (ROXICODONE) 5 immediate release tablet Take 1 tablet (5 mg total) by mouth every 4 (four) hours as needed for moderate pain for up to 10 days Max Daily Amount: 30 mg  Qty: 10 tablet, Refills: 0    Associated Diagnoses: Umbilical hernia; Calculus of gallbladder without cholecystitis without obstruction      sucralfate (CARAFATE) 1 g tablet Take 1 tablet (1 g total) by mouth 4 (four) times a day  Qty: 40 tablet, Refills: 0    Associated Diagnoses: Gastroesophageal reflux disease without esophagitis       ! ! - Potential duplicate medications found  Please discuss with provider  No discharge procedures on file         ED Provider  Electronically Signed by     Ivy Morales DO  03/12/22 0745

## 2022-03-12 NOTE — ED NOTES
Pt was able to tolerate P  O fluids and states she is feeling much better      Tristan Dakin, RN  03/12/22 2689

## 2022-03-12 NOTE — DISCHARGE INSTRUCTIONS
You have been seen for vomiting and abdominal pain following surgery  Please take zofran and your previously prescribed pain regimen as needed for vomiting and discomfort  Return to the emergency department if you develop worsening pain, vomiting, fevers or any other symptoms of concern  Please follow up with general surgery by calling the number provided

## 2022-03-12 NOTE — ED PROVIDER NOTES
History  Chief Complaint   Patient presents with    Vomiting     pt seen yesterday at [facility] for gallbladder removal and hernia repair, reports vomiting began right after surgery  Dark brown vomitus noted upon arrival  unable to keep food or beverages down post op     Melia Alvarenga is a 35y o  year old female POD#1 s/p cholecystectomy presenting to the [place] [address] ED for vomiting and abdominal pain  Patient has had one day of dark brown vomitus and epigastric abdominal pain  Symptoms started following surgery yesterday and persisted through the evening  Pain rated as 8/10, described as sharp sensation and nonradiating  Patient denies associated fevers, chest pain or dyspnea  She has not passed a bowel movement or flats since surgery  The patient has not taken/received any medications at home for relief of symptoms as she has not filled the prescription for medications prescribed yet  Patient underwent outpatient laparoscopic robotic cholecystectomy and umbilical hernia repair one day ago  Patient denies dysuria/hematuria or flank pain  History provided by:  Patient   used: No        Prior to Admission Medications   Prescriptions Last Dose Informant Patient Reported? Taking?    QUEtiapine (SEROquel) 100 mg tablet Past Week at Unknown time Self No Yes   Sig: Take 1 tablet (100 mg total) by mouth daily at bedtime As needed   Patient taking differently: Take 100 mg by mouth daily at bedtime     albuterol (ProAir HFA) 90 mcg/act inhaler More than a month at Unknown time Self No No   Sig: Inhale 2 puffs every 6 (six) hours as needed for wheezing   gabapentin (NEURONTIN) 300 mg capsule Past Week at Unknown time Self No Yes   Sig: Take 2 capsules (600 mg total) by mouth 2 (two) times a day   hydrOXYzine pamoate (VISTARIL) 25 mg capsule Unknown at Unknown time Self No No   Sig: Take 1 capsule (25 mg total) by mouth 3 (three) times a day as needed for anxiety   lisinopril (ZESTRIL) 20 mg tablet Past Week at Unknown time Self No Yes   Sig: Take 1 tablet (20 mg total) by mouth daily   norethindrone (MICRONOR) 0 35 MG tablet   No No   Sig: Take 1 tablet (0 35 mg total) by mouth daily   ondansetron (Zofran ODT) 4 mg disintegrating tablet Not Taking at Unknown time Self No No   Sig: Take 1 tablet (4 mg total) by mouth every 6 (six) hours as needed for nausea or vomiting   Patient not taking: Reported on 3/12/2022    oxyCODONE (ROXICODONE) 5 immediate release tablet Not Taking at Unknown time  No No   Sig: Take 1 tablet (5 mg total) by mouth every 4 (four) hours as needed for moderate pain for up to 10 days Max Daily Amount: 30 mg   Patient not taking: Reported on 3/12/2022    pantoprazole (PROTONIX) 40 mg tablet Past Month at Unknown time Self No Yes   Sig: Take 1 tablet (40 mg total) by mouth daily before breakfast   sertraline (ZOLOFT) 100 mg tablet Past Week at Unknown time Self No Yes   Sig: Take 1 tablet daily  With the 50 mg  Total 150 mg daily   Patient taking differently: 100 mg daily     sucralfate (CARAFATE) 1 g tablet Unknown at Unknown time Self No No   Sig: Take 1 tablet (1 g total) by mouth 4 (four) times a day      Facility-Administered Medications: None       Past Medical History:   Diagnosis Date    Anxiety     Arthritis     OA  b/l knees    Asthma     Approx 2 years ago    Chronic kidney disease     Depression     Hx of bleeding disorder     dysmennorrhea-dx lap today 2016    Hypertension     Kidney stone     Obesity     Seasonal allergies        Past Surgical History:   Procedure Laterality Date     SECTION      DIAGNOSTIC LAPAROSCOPY      DILATION AND CURETTAGE OF UTERUS      MN COLONOSCOPY FLX DX W/COLLJ SPEC WHEN PFRMD N/A 2018    Procedure: EGD AND COLONOSCOPY;  Surgeon: Thomas Mancilla MD;  [facility];   Service: Gastroenterology    MN LAP,DIAGNOSTIC ABDOMEN N/A 2016    Procedure: LAPAROSCOPY DIAGNOSTIC DAVID;  Surgeon: Mich Rios Aleja Kee DO;  [facility];  Service: Gynecology    VA LAP,RMV  ADNEXAL STRUCTURE Bilateral 8/12/2016    Procedure: CYSTECTOMY  OVARIAN;  Surgeon: Carlos Doll DO;  [facility];  Service: Gynecology    WISDOM TOOTH EXTRACTION         Family History   Problem Relation Age of Onset    No Known Problems Sister     Autism Daughter     Lung cancer Maternal Grandmother     Cancer Maternal Grandmother         Deceassd 8 years    Diabetes Maternal Grandfather     Aneurysm Paternal Grandmother     No Known Problems Sister      I have reviewed and agree with the history as documented  E-Cigarette/Vaping    E-Cigarette Use Former User      E-Cigarette/Vaping Substances    Nicotine No     THC No     CBD No     Flavoring No     Other No     Unknown No      Social History     Tobacco Use    Smoking status: Current Every Day Smoker     Packs/day: 1 00     Years: 23 00     Pack years: 23 00    Smokeless tobacco: Never Used   Vaping Use    Vaping Use: Former   Substance Use Topics    Alcohol use: No    Drug use: Yes     Frequency: 7 0 times per week     Types: Marijuana     Comment: I exhaused all your rx prescription-- NOTHING ELSE HELPED!! Review of Systems   Constitutional: Negative for chills and fever  HENT: Negative for congestion and rhinorrhea  Eyes: Negative for visual disturbance  Respiratory: Negative for cough and shortness of breath  Cardiovascular: Negative for chest pain  Gastrointestinal: Positive for abdominal pain, diarrhea and nausea  Negative for abdominal distention and vomiting  Endocrine: Negative for polyuria  Genitourinary: Negative for difficulty urinating, dysuria, flank pain and hematuria  Musculoskeletal: Negative for arthralgias  Skin: Negative for rash  Neurological: Negative for seizures, syncope and light-headedness  Psychiatric/Behavioral: Negative for behavioral problems and confusion     All other systems reviewed and are negative  Physical Exam  ED Triage Vitals   Temperature Pulse Respirations Blood Pressure SpO2   03/12/22 0542 03/12/22 0542 03/12/22 0542 03/12/22 0542 03/12/22 0542   98 1 °F (36 7 °C) 81 19 155/88 97 %      Temp Source Heart Rate Source Patient Position - Orthostatic VS BP Location FiO2 (%)   03/12/22 0542 03/12/22 0542 03/12/22 0542 03/12/22 0542 --   Oral Monitor Sitting Right arm       Pain Score       03/12/22 0557       10 - Worst Possible Pain             Orthostatic Vital Signs  Vitals:    03/12/22 0542 03/12/22 0602 03/12/22 0639   BP: 155/88  145/87   Pulse: 81 78    Patient Position - Orthostatic VS: Sitting  Sitting       Physical Exam  Vitals and nursing note reviewed  Constitutional:       General: She is not in acute distress  Appearance: She is well-developed  She is ill-appearing  She is not toxic-appearing or diaphoretic  HENT:      Head: Normocephalic and atraumatic  Nose: No congestion or rhinorrhea  Eyes:      General:         Right eye: No discharge  Left eye: No discharge  Cardiovascular:      Rate and Rhythm: Normal rate and regular rhythm  Pulmonary:      Effort: Pulmonary effort is normal  No accessory muscle usage or respiratory distress  Breath sounds: Normal breath sounds  No stridor  No decreased breath sounds, wheezing, rhonchi or rales  Abdominal:      General: Bowel sounds are normal  There is no distension  Palpations: Abdomen is soft  Tenderness: There is abdominal tenderness in the epigastric area  There is no right CVA tenderness, left CVA tenderness, guarding or rebound  Musculoskeletal:      Cervical back: Normal range of motion  No rigidity  Right lower leg: No tenderness  No edema  Left lower leg: No tenderness  No edema  Skin:     Capillary Refill: Capillary refill takes less than 2 seconds  Findings: Wound (abdominal incisions c/d/i) present        Comments: Abdominal incision sites c/d/i   Neurological: Mental Status: She is alert and oriented to person, place, and time     Psychiatric:         Mood and Affect: Mood normal          Behavior: Behavior normal          ED Medications  Medications   sodium chloride 0 9 % bolus 1,000 mL (0 mL Intravenous Stopped 3/12/22 0654)   ondansetron (ZOFRAN) injection 4 mg (4 mg Intravenous Given 3/12/22 0554)   morphine (PF) 4 mg/mL injection 8 mg (8 mg Intravenous Given 3/12/22 0557)   iohexol (OMNIPAQUE) 350 MG/ML injection (SINGLE-DOSE) 100 mL (100 mL Intravenous Given 3/12/22 0625)       Diagnostic Studies  Results Reviewed     Procedure Component Value Units Date/Time    Comprehensive metabolic panel [[id number]] Collected: 03/12/22 0551    Lab Status: Final result Specimen: Blood from Arm, Right Updated: 03/12/22 0612     Sodium 137 mmol/L      Potassium 3 7 mmol/L      Chloride 103 mmol/L      CO2 26 mmol/L      ANION GAP 8 mmol/L      BUN 10 mg/dL      Creatinine 0 81 mg/dL      Glucose 137 mg/dL      Calcium 8 9 mg/dL      AST 37 U/L      ALT 45 U/L      Alkaline Phosphatase 90 U/L      Total Protein 7 2 g/dL      Albumin 3 5 g/dL      Total Bilirubin 0 36 mg/dL      eGFR 89 ml/min/1 73sq m     Narrative:      [place] guidelines for Chronic Kidney Disease (CKD):     Stage 1 with normal or high GFR (GFR > 90 mL/min/1 73 square meters)    Stage 2 Mild CKD (GFR = 60-89 mL/min/1 73 square meters)    Stage 3A Moderate CKD (GFR = 45-59 mL/min/1 73 square meters)    Stage 3B Moderate CKD (GFR = 30-44 mL/min/1 73 square meters)    Stage 4 Severe CKD (GFR = 15-29 mL/min/1 73 square meters)    Stage 5 End Stage CKD (GFR <15 mL/min/1 73 square meters)  Note: GFR calculation is accurate only with a steady state creatinine    Lipase [[id number]]  (Normal) Collected: 03/12/22 0551    Lab Status: Final result Specimen: Blood from Arm, Right Updated: 03/12/22 0612     Lipase 201 u/L     CBC and differential [[id number]]  (Abnormal) Collected: 03/12/22 1445    Lab Status: Final result Specimen: Blood from Arm, Right Updated: 03/12/22 0555     WBC 16 80 Thousand/uL      RBC 3 88 Million/uL      Hemoglobin 13 1 g/dL      Hematocrit 37 7 %      MCV 97 fL      MCH 33 8 pg      MCHC 34 7 g/dL      RDW 12 6 %      MPV 10 1 fL      Platelets 319 Thousands/uL      nRBC 0 /100 WBCs      Neutrophils Relative 78 %      Immat GRANS % 1 %      Lymphocytes Relative 12 %      Monocytes Relative 9 %      Eosinophils Relative 0 %      Basophils Relative 0 %      Neutrophils Absolute 13 14 Thousands/µL      Immature Grans Absolute 0 15 Thousand/uL      Lymphocytes Absolute 1 99 Thousands/µL      Monocytes Absolute 1 50 Thousand/µL      Eosinophils Absolute 0 00 Thousand/µL      Basophils Absolute 0 02 Thousands/µL                  CT abdomen pelvis with contrast   Final Result by Bhargavi Benjamin DO (03/12 5659)   1  Status post cholecystectomy and umbilical hernia repair  2   Hiatal hernia with reflux into the distal esophagus  3   Colonic diverticulosis  Workstation performed: [id number]               Procedures  Procedures      ED Course  ED Course as of 03/12/22 2027   Sat Mar 12, 2022   0555 WBC(!): 16 80   0555 Hemoglobin: 13 1   0555 Platelet Count: 978                             SBIRT 20yo+      Most Recent Value   SBIRT (25 yo +)    In order to provide better care to our patients, we are screening all of our patients for alcohol and drug use  Would it be okay to ask you these screening questions? Yes Filed at: 03/12/2022 7456   Initial Alcohol Screen: US AUDIT-C     1  How often do you have a drink containing alcohol? 0 Filed at: 03/12/2022 0617   2  How many drinks containing alcohol do you have on a typical day you are drinking? 0 Filed at: 03/12/2022 0617   3b  FEMALE Any Age, or MALE 65+: How often do you have 4 or more drinks on one occassion?  0 Filed at: 03/12/2022 0617   Audit-C Score 0 Filed at: 03/12/2022 1610   ZLUMA: How many times in the past year have you    Used an illegal drug or used a prescription medication for non-medical reasons? Never Filed at: 03/12/2022 0617                [facility]  Number of Diagnoses or Management Options  Postoperative abdominal pain  Postoperative nausea and vomiting  Diagnosis management comments:     35y o  year old female POD#1 s/p cholecystectomy and umbilical hernia repair presenting for vomiting and epigastric pain  Will order labs and CT a/p to evaluate for pancreatitis or other intraabdominal pathology  Will treat with IV fluids, antiemetics and analgesia  Reassessment: patient feeling better, tolerating oral intake in ED  I have discussed with the patient our plan to discharge them from the ED and the patient is in agreement with this plan  The patient was provided a written after visit summary with strict RTED precautions  Discharge Plan: Rx for zofran, continue previously prescribed roxycodone as needed for pain  Followup: I have discussed with the patient plan to follow up with general surgery   Contact information provided in AVS        Amount and/or Complexity of Data Reviewed  Clinical lab tests: ordered and reviewed  Tests in the radiology section of CPT®: ordered and reviewed  Review and summarize past medical records: yes  Independent visualization of images, tracings, or specimens: yes    Patient Progress  Patient progress: improved      Disposition  Final diagnoses:   Postoperative abdominal pain   Postoperative nausea and vomiting     Time reflects when diagnosis was documented in both MDM as applicable and the Disposition within this note     Time User Action Codes Description Comment    3/12/2022  6:31 AM Ju Hemp Add [R10 9,  G89 18] Postoperative abdominal pain     3/12/2022  6:32 AM [place] Hemp Add [R11 2,  Z98 890] Postoperative nausea and vomiting       ED Disposition     ED Disposition Condition Date/Time Comment    Discharge Stable Sat Mar 12, 2022  7:42 AM Melia Damiannkle discharge to home/self care  Follow-up Information     Follow up With Specialties Details Why Contact Info Additional Information    Uche Alejandra, DO General Surgery Schedule an appointment as soon as possible for a visit  To make appointment for reevaluation in 3-5 days  [address]  [address] [place] [address] Emergency Department Emergency Medicine Go to  If symptoms worsen [place] [phone] [address] [facility] Emergency Department, [address] [facility] [place], [place], [address]          Discharge Medication List as of 3/12/2022  7:42 AM      CONTINUE these medications which have CHANGED    Details   !! ondansetron (ZOFRAN-ODT) 4 mg disintegrating tablet Take 1 tablet (4 mg total) by mouth every 6 (six) hours as needed for nausea or vomiting, Starting Sat 3/12/2022, Normal       !! - Potential duplicate medications found  Please discuss with provider        CONTINUE these medications which have NOT CHANGED    Details   albuterol (ProAir HFA) 90 mcg/act inhaler Inhale 2 puffs every 6 (six) hours as needed for wheezing, Starting Mon 12/6/2021, Normal      gabapentin (NEURONTIN) 300 mg capsule Take 2 capsules (600 mg total) by mouth 2 (two) times a day, Starting Thu 12/2/2021, Normal      hydrOXYzine pamoate (VISTARIL) 25 mg capsule Take 1 capsule (25 mg total) by mouth 3 (three) times a day as needed for anxiety, Starting Mon 2/7/2022, Normal      lisinopril (ZESTRIL) 20 mg tablet Take 1 tablet (20 mg total) by mouth daily, Starting Thu 12/2/2021, Normal      norethindrone (MICRONOR) 0 35 MG tablet Take 1 tablet (0 35 mg total) by mouth daily, Starting Wed 5/26/2021, Until Thu 3/3/2022, Normal      !! ondansetron (Zofran ODT) 4 mg disintegrating tablet Take 1 tablet (4 mg total) by mouth every 6 (six) hours as needed for nausea or vomiting, Starting Mon 1/24/2022, Normal oxyCODONE (ROXICODONE) 5 immediate release tablet Take 1 tablet (5 mg total) by mouth every 4 (four) hours as needed for moderate pain for up to 10 days Max Daily Amount: 30 mg, Starting Fri 3/11/2022, Until Mon 3/21/2022 at 2359, Normal      pantoprazole (PROTONIX) 40 mg tablet Take 1 tablet (40 mg total) by mouth daily before breakfast, Starting Mon 2/7/2022, Normal      QUEtiapine (SEROquel) 100 mg tablet Take 1 tablet (100 mg total) by mouth daily at bedtime As needed, Starting Mon 2/7/2022, Normal      sertraline (ZOLOFT) 100 mg tablet Take 1 tablet daily  With the 50 mg  Total 150 mg daily, Normal      sucralfate (CARAFATE) 1 g tablet Take 1 tablet (1 g total) by mouth 4 (four) times a day, Starting Mon 2/7/2022, Normal       !! - Potential duplicate medications found  Please discuss with provider  No discharge procedures on file  PDMP Review       Value Time User    PDMP Reviewed  Yes 1/30/2022 10:57 AM Frida Ariza DO           ED Provider  Attending physically available and evaluated Melia DAI Raines  I managed the patient along with the ED Attending      Electronically Signed by         Anderson Stephens DO  03/12/22 2029

## 2022-03-12 NOTE — ED ATTENDING ATTESTATION
3/12/2022  I, Sandy Lewis MD, saw and evaluated the patient  I have discussed the patient with the resident/non-physician practitioner and agree with the resident's/non-physician practitioner's findings, Plan of Care, and MDM as documented in the resident's/non-physician practitioner's note, except where noted  All available labs and Radiology studies were reviewed  I was present for key portions of any procedure(s) performed by the resident/non-physician practitioner and I was immediately available to provide assistance  At this point I agree with the current assessment done in the Emergency Department  I have conducted an independent evaluation of this patient a history and physical is as follows:        Final Diagnosis:  1  Postoperative abdominal pain    2  Postoperative nausea and vomiting      Chief Complaint   Patient presents with    Vomiting     pt seen yesterday at [address] [place] [address] for gallbladder removal and hernia repair, reports vomiting began right after surgery  Dark brown vomitus noted upon arrival  unable to keep food or beverages down post op         This is a 24-year-old female with history of hypertension, chronic kidney disease, depression, gallstones status post laparoscopic cholecystectomy on 3/11/22 who presents with nausea/vomiting  Patient had a laparoscopic cholecystectomy performed yesterday  Since this time, the patient has experienced innumerable episodes of nonbloody, nonbilious vomiting  She has also been experiencing pain in the epigastric area  She has not tried taking anything for the pain or vomiting  She was prescribed medications by the surgeon, however, she has not pick these medications up at the pharmacy yet  Denies any alleviating or exacerbating factors    Denies fever/chills, lightheadedness/dizziness, numbness/weakness, headache, change in vision, URI symptoms, neck pain, chest pain, palpitations, shortness of breath, cough, back pain, flank pain, diarrhea, hematochezia, melena, dysuria, hematuria, abnormal vaginal discharge/bleeding  PMH:  - hypertension, chronic kidney disease, cholelithiasis, depression  PSH:  - laparoscopic cholecystectomy    PE:   Vitals:    03/12/22 0542 03/12/22 0602 03/12/22 0639   BP: 155/88  145/87   BP Location: Right arm  Right arm   Pulse: 81 78    Resp: 19 18    Temp: 98 1 °F (36 7 °C)     TempSrc: Oral     SpO2: 97% 97%    Weight: 80 7 kg (178 lb)     Height: 5' (1 524 m)           Constitutional: Vital signs are normal  She appears well-developed  She is cooperative  No distress  HENT:   Mouth/Throat: Uvula is midline, oropharynx is clear and moist and mucous membranes are normal    Eyes: Pupils are equal, round, and reactive to light  Conjunctivae and EOM are normal    Neck: Trachea normal  No thyroid mass and no thyromegaly present  Cardiovascular: Normal rate, regular rhythm, normal heart sounds, intact distal pulses and normal pulses  No murmur heard  Pulmonary/Chest: Effort normal and breath sounds normal    Abdominal: Soft  Normal appearance and bowel sounds are normal  There is epigastric tenderness  There is no rebound, no guarding and no CVA tenderness  Laparoscopic surgical sites are clean, dry, intact  Neurological: She is alert  Skin: Skin is warm, dry and intact  Psychiatric: She has a normal mood and affect  Her speech is normal and behavior is normal  Thought content normal        A:  - this is a 78-year-old female with history of hypertension, chronic kidney disease postop day 1 status post laparoscopic cholecystectomy who presents with nausea/vomiting  P:  - will check labs, CT abdomen/pelvis with contrast   Symptomatic treatment  Disposition pending results  - 13 point ROS was performed and all are normal unless stated in the history above  - Nursing note reviewed  Vitals reviewed     - Orders placed by myself and/or advanced practitioner / resident     - Previous chart was reviewed  - No language barrier    - History obtained from patient  - There are no limitations to the history obtained  - Critical care time: Not applicable for this patient  Medications   sodium chloride 0 9 % bolus 1,000 mL (0 mL Intravenous Stopped 3/12/22 0654)   ondansetron (ZOFRAN) injection 4 mg (4 mg Intravenous Given 3/12/22 0554)   morphine (PF) 4 mg/mL injection 8 mg (8 mg Intravenous Given 3/12/22 0557)   iohexol (OMNIPAQUE) 350 MG/ML injection (SINGLE-DOSE) 100 mL (100 mL Intravenous Given 3/12/22 0625)     CT abdomen pelvis with contrast   Final Result   1  Status post cholecystectomy and umbilical hernia repair  2   Hiatal hernia with reflux into the distal esophagus  3   Colonic diverticulosis              Workstation performed: [id number]           Orders Placed This Encounter   Procedures    CT abdomen pelvis with contrast    CBC and differential    Comprehensive metabolic panel    Lipase     Labs Reviewed   CBC AND DIFFERENTIAL - Abnormal       Result Value Ref Range Status    WBC 16 80 (*) 4 31 - 10 16 Thousand/uL Final    RBC 3 88  3 81 - 5 12 Million/uL Final    Hemoglobin 13 1  11 5 - 15 4 g/dL Final    Hematocrit 37 7  34 8 - 46 1 % Final    MCV 97  82 - 98 fL Final    MCH 33 8  26 8 - 34 3 pg Final    MCHC 34 7  31 4 - 37 4 g/dL Final    RDW 12 6  11 6 - 15 1 % Final    MPV 10 1  8 9 - 12 7 fL Final    Platelets 919  796 - 390 Thousands/uL Final    nRBC 0  /100 WBCs Final    Neutrophils Relative 78 (*) 43 - 75 % Final    Immat GRANS % 1  0 - 2 % Final    Lymphocytes Relative 12 (*) 14 - 44 % Final    Monocytes Relative 9  4 - 12 % Final    Eosinophils Relative 0  0 - 6 % Final    Basophils Relative 0  0 - 1 % Final    Neutrophils Absolute 13 14 (*) 1 85 - 7 62 Thousands/µL Final    Immature Grans Absolute 0 15  0 00 - 0 20 Thousand/uL Final    Lymphocytes Absolute 1 99  0 60 - 4 47 Thousands/µL Final    Monocytes Absolute 1 50 (*) 0 17 - 1 22 Thousand/µL Final    Eosinophils Absolute 0 00  0 00 - 0 61 Thousand/µL Final    Basophils Absolute 0 02  0 00 - 0 10 Thousands/µL Final   LIPASE - Normal    Lipase 201  73 - 393 u/L Final   COMPREHENSIVE METABOLIC PANEL    Sodium 002  136 - 145 mmol/L Final    Potassium 3 7  3 5 - 5 3 mmol/L Final    Chloride 103  100 - 108 mmol/L Final    CO2 26  21 - 32 mmol/L Final    ANION GAP 8  4 - 13 mmol/L Final    BUN 10  5 - 25 mg/dL Final    Creatinine 0 81  0 60 - 1 30 mg/dL Final    Comment: Standardized to IDMS reference method    Glucose 137  65 - 140 mg/dL Final    Comment: If the patient is fasting, the ADA then defines impaired fasting glucose as > 100 mg/dL and diabetes as > or equal to 123 mg/dL  Specimen collection should occur prior to Sulfasalazine administration due to the potential for falsely depressed results  Specimen collection should occur prior to Sulfapyridine administration due to the potential for falsely elevated results  Calcium 8 9  8 3 - 10 1 mg/dL Final    AST 37  5 - 45 U/L Final    Comment: Specimen collection should occur prior to Sulfasalazine administration due to the potential for falsely depressed results  ALT 45  12 - 78 U/L Final    Comment: Specimen collection should occur prior to Sulfasalazine administration due to the potential for falsely depressed results  Alkaline Phosphatase 90  46 - 116 U/L Final    Total Protein 7 2  6 4 - 8 2 g/dL Final    Albumin 3 5  3 5 - 5 0 g/dL Final    Total Bilirubin 0 36  0 20 - 1 00 mg/dL Final    Comment: Use of this assay is not recommended for patients undergoing treatment with eltrombopag due to the potential for falsely elevated results      eGFR 89  ml/min/1 73sq m Final    Narrative:     [place] guidelines for Chronic Kidney Disease (CKD):     Stage 1 with normal or high GFR (GFR > 90 mL/min/1 73 square meters)    Stage 2 Mild CKD (GFR = 60-89 mL/min/1 73 square meters)    Stage 3A Moderate CKD (GFR = 45-59 mL/min/1 73 square meters)    Stage 3B Moderate CKD (GFR = 30-44 mL/min/1 73 square meters)    Stage 4 Severe CKD (GFR = 15-29 mL/min/1 73 square meters)    Stage 5 End Stage CKD (GFR <15 mL/min/1 73 square meters)  Note: GFR calculation is accurate only with a steady state creatinine     Time reflects when diagnosis was documented in both MDM as applicable and the Disposition within this note     Time User Action Codes Description Comment    3/12/2022  6:31 AM Saulo Costain Add [R10 9,  G89 18] Postoperative abdominal pain     3/12/2022  6:32 AM Saulo Costain Add [R11 2,  Z98 890] Postoperative nausea and vomiting       ED Disposition     ED Disposition Condition Date/Time Comment    Discharge Stable Sat Mar 12, 2022  7:42 AM Melia Mathias discharge to home/self care  Follow-up Information     Follow up With Specialties Details Why Contact Info Additional Information    Tracy Cooley,  General Surgery Schedule an appointment as soon as possible for a visit  To make appointment for reevaluation in 3-5 days  [address] Emergency Department Emergency Medicine Go to  If symptoms worsen [facility] [id number]  [address] [place] [address] Emergency Department, [address] [facility] , [facility], [address] [facility], [address]        Discharge Medication List as of 3/12/2022  7:42 AM      CONTINUE these medications which have CHANGED    Details   !! ondansetron (ZOFRAN-ODT) 4 mg disintegrating tablet Take 1 tablet (4 mg total) by mouth every 6 (six) hours as needed for nausea or vomiting, Starting Sat 3/12/2022, Normal       !! - Potential duplicate medications found  Please discuss with provider        CONTINUE these medications which have NOT CHANGED    Details   gabapentin (NEURONTIN) 300 mg capsule Take 2 capsules (600 mg total) by mouth 2 (two) times a day, Starting u 12/2/2021, Normal      lisinopril (ZESTRIL) 20 mg tablet Take 1 tablet (20 mg total) by mouth daily, Starting Thu 12/2/2021, Normal      pantoprazole (PROTONIX) 40 mg tablet Take 1 tablet (40 mg total) by mouth daily before breakfast, Starting Mon 2/7/2022, Normal      QUEtiapine (SEROquel) 100 mg tablet Take 1 tablet (100 mg total) by mouth daily at bedtime As needed, Starting Mon 2/7/2022, Normal      sertraline (ZOLOFT) 100 mg tablet Take 1 tablet daily  With the 50 mg  Total 150 mg daily, Normal      albuterol (ProAir HFA) 90 mcg/act inhaler Inhale 2 puffs every 6 (six) hours as needed for wheezing, Starting Mon 12/6/2021, Normal      hydrOXYzine pamoate (VISTARIL) 25 mg capsule Take 1 capsule (25 mg total) by mouth 3 (three) times a day as needed for anxiety, Starting Mon 2/7/2022, Normal      norethindrone (MICRONOR) 0 35 MG tablet Take 1 tablet (0 35 mg total) by mouth daily, Starting Wed 5/26/2021, Until Thu 3/3/2022, Normal      !! ondansetron (Zofran ODT) 4 mg disintegrating tablet Take 1 tablet (4 mg total) by mouth every 6 (six) hours as needed for nausea or vomiting, Starting Mon 1/24/2022, Normal      oxyCODONE (ROXICODONE) 5 immediate release tablet Take 1 tablet (5 mg total) by mouth every 4 (four) hours as needed for moderate pain for up to 10 days Max Daily Amount: 30 mg, Starting Fri 3/11/2022, Until Mon 3/21/2022 at 2359, Normal      sucralfate (CARAFATE) 1 g tablet Take 1 tablet (1 g total) by mouth 4 (four) times a day, Starting Mon 2/7/2022, Normal       !! - Potential duplicate medications found  Please discuss with provider  No discharge procedures on file  Prior to Admission Medications   Prescriptions Last Dose Informant Patient Reported? Taking?    QUEtiapine (SEROquel) 100 mg tablet Past Week at Unknown time Self No Yes   Sig: Take 1 tablet (100 mg total) by mouth daily at bedtime As needed   Patient taking differently: Take 100 mg by mouth daily at bedtime     albuterol (ProAir HFA) 90 mcg/act inhaler More than a month at Unknown time Self No No   Sig: Inhale 2 puffs every 6 (six) hours as needed for wheezing   gabapentin (NEURONTIN) 300 mg capsule Past Week at Unknown time Self No Yes   Sig: Take 2 capsules (600 mg total) by mouth 2 (two) times a day   hydrOXYzine pamoate (VISTARIL) 25 mg capsule Unknown at Unknown time Self No No   Sig: Take 1 capsule (25 mg total) by mouth 3 (three) times a day as needed for anxiety   lisinopril (ZESTRIL) 20 mg tablet Past Week at Unknown time Self No Yes   Sig: Take 1 tablet (20 mg total) by mouth daily   norethindrone (MICRONOR) 0 35 MG tablet   No No   Sig: Take 1 tablet (0 35 mg total) by mouth daily   ondansetron (Zofran ODT) 4 mg disintegrating tablet Not Taking at Unknown time Self No No   Sig: Take 1 tablet (4 mg total) by mouth every 6 (six) hours as needed for nausea or vomiting   Patient not taking: Reported on 3/12/2022    oxyCODONE (ROXICODONE) 5 immediate release tablet Not Taking at Unknown time  No No   Sig: Take 1 tablet (5 mg total) by mouth every 4 (four) hours as needed for moderate pain for up to 10 days Max Daily Amount: 30 mg   Patient not taking: Reported on 3/12/2022    pantoprazole (PROTONIX) 40 mg tablet Past Month at Unknown time Self No Yes   Sig: Take 1 tablet (40 mg total) by mouth daily before breakfast   sertraline (ZOLOFT) 100 mg tablet Past Week at Unknown time Self No Yes   Sig: Take 1 tablet daily  With the 50 mg  Total 150 mg daily   Patient taking differently: 100 mg daily     sucralfate (CARAFATE) 1 g tablet Unknown at Unknown time Self No No   Sig: Take 1 tablet (1 g total) by mouth 4 (four) times a day      Facility-Administered Medications: None       Portions of the record may have been created with voice recognition software  Occasional wrong word or "sound a like" substitutions may have occurred due to the inherent limitations of voice recognition software   Read the chart carefully and recognize, using context, where substitutions have occurred          ED Course         Critical Care Time  Procedures

## 2022-03-25 ENCOUNTER — OFFICE VISIT (OUTPATIENT)
Dept: INTERNAL MEDICINE CLINIC | Facility: OTHER | Age: 42
End: 2022-03-25
Payer: COMMERCIAL

## 2022-03-25 VITALS
SYSTOLIC BLOOD PRESSURE: 122 MMHG | OXYGEN SATURATION: 98 % | TEMPERATURE: 98.8 F | HEIGHT: 60 IN | DIASTOLIC BLOOD PRESSURE: 82 MMHG | BODY MASS INDEX: 33.96 KG/M2 | WEIGHT: 173 LBS | HEART RATE: 64 BPM

## 2022-03-25 DIAGNOSIS — F33.0 MILD EPISODE OF RECURRENT MAJOR DEPRESSIVE DISORDER (HCC): ICD-10-CM

## 2022-03-25 DIAGNOSIS — Z11.59 NEED FOR HEPATITIS C SCREENING TEST: ICD-10-CM

## 2022-03-25 DIAGNOSIS — Z72.0 TOBACCO USE: ICD-10-CM

## 2022-03-25 DIAGNOSIS — E87.6 HYPOKALEMIA: ICD-10-CM

## 2022-03-25 DIAGNOSIS — F34.1 DYSTHYMIC DISORDER: ICD-10-CM

## 2022-03-25 DIAGNOSIS — G62.9 NEUROPATHY: ICD-10-CM

## 2022-03-25 DIAGNOSIS — D72.829 LEUKOCYTOSIS, UNSPECIFIED TYPE: ICD-10-CM

## 2022-03-25 DIAGNOSIS — F12.90 MARIJUANA USE, EPISODIC: ICD-10-CM

## 2022-03-25 DIAGNOSIS — R11.15 CYCLICAL VOMITING SYNDROME: ICD-10-CM

## 2022-03-25 DIAGNOSIS — I10 ESSENTIAL HYPERTENSION: ICD-10-CM

## 2022-03-25 DIAGNOSIS — Z90.49 HISTORY OF CHOLECYSTECTOMY: ICD-10-CM

## 2022-03-25 DIAGNOSIS — F41.9 ANXIETY: ICD-10-CM

## 2022-03-25 DIAGNOSIS — F51.01 PRIMARY INSOMNIA: ICD-10-CM

## 2022-03-25 DIAGNOSIS — K21.9 GASTROESOPHAGEAL REFLUX DISEASE WITHOUT ESOPHAGITIS: Primary | ICD-10-CM

## 2022-03-25 DIAGNOSIS — N83.209 CYSTIC DISEASE OF OVARIES: ICD-10-CM

## 2022-03-25 PROBLEM — K80.20 CALCULUS OF GALLBLADDER WITHOUT CHOLECYSTITIS WITHOUT OBSTRUCTION: Status: RESOLVED | Noted: 2022-02-23 | Resolved: 2022-03-25

## 2022-03-25 PROBLEM — M25.462 PAIN AND SWELLING OF LEFT KNEE: Status: RESOLVED | Noted: 2021-01-21 | Resolved: 2022-03-25

## 2022-03-25 PROBLEM — F43.0 PANIC ATTACK AS REACTION TO STRESS: Status: RESOLVED | Noted: 2019-03-28 | Resolved: 2022-03-25

## 2022-03-25 PROBLEM — Z11.4 ENCOUNTER FOR SCREENING FOR HIV: Status: RESOLVED | Noted: 2019-11-29 | Resolved: 2022-03-25

## 2022-03-25 PROBLEM — R60.0 BILATERAL LOWER EXTREMITY EDEMA: Status: RESOLVED | Noted: 2019-09-12 | Resolved: 2022-03-25

## 2022-03-25 PROBLEM — R06.02 SHORTNESS OF BREATH: Status: RESOLVED | Noted: 2019-09-12 | Resolved: 2022-03-25

## 2022-03-25 PROBLEM — K59.09 OTHER CONSTIPATION: Status: RESOLVED | Noted: 2018-10-25 | Resolved: 2022-03-25

## 2022-03-25 PROBLEM — F41.0 PANIC ATTACK AS REACTION TO STRESS: Status: RESOLVED | Noted: 2019-03-28 | Resolved: 2022-03-25

## 2022-03-25 PROBLEM — M25.562 PAIN AND SWELLING OF LEFT KNEE: Status: RESOLVED | Noted: 2021-01-21 | Resolved: 2022-03-25

## 2022-03-25 PROBLEM — R10.13 DYSPEPSIA: Status: RESOLVED | Noted: 2022-02-08 | Resolved: 2022-03-25

## 2022-03-25 PROBLEM — G43.A1: Status: RESOLVED | Noted: 2022-02-08 | Resolved: 2022-03-25

## 2022-03-25 PROBLEM — K83.1 CHOLESTASIS: Status: RESOLVED | Noted: 2022-02-21 | Resolved: 2022-03-25

## 2022-03-25 PROBLEM — K59.00 CONSTIPATION: Status: RESOLVED | Noted: 2018-10-25 | Resolved: 2022-03-25

## 2022-03-25 PROCEDURE — 3074F SYST BP LT 130 MM HG: CPT | Performed by: INTERNAL MEDICINE

## 2022-03-25 PROCEDURE — 99204 OFFICE O/P NEW MOD 45 MIN: CPT | Performed by: INTERNAL MEDICINE

## 2022-03-25 PROCEDURE — 3079F DIAST BP 80-89 MM HG: CPT | Performed by: INTERNAL MEDICINE

## 2022-03-25 PROCEDURE — 3008F BODY MASS INDEX DOCD: CPT | Performed by: INTERNAL MEDICINE

## 2022-03-25 RX ORDER — PROMETHAZINE HYDROCHLORIDE 25 MG/1
25 TABLET ORAL EVERY 6 HOURS PRN
Qty: 20 TABLET | Refills: 0 | Status: SHIPPED | OUTPATIENT
Start: 2022-03-25

## 2022-03-25 RX ORDER — BETAMETHASONE VALERATE 1.2 MG/G
AEROSOL, FOAM TOPICAL
COMMUNITY
Start: 2022-03-02 | End: 2022-05-11 | Stop reason: SDUPTHER

## 2022-03-25 RX ORDER — OMEPRAZOLE 40 MG/1
40 CAPSULE, DELAYED RELEASE ORAL DAILY
Start: 2022-03-25

## 2022-03-25 NOTE — PROGRESS NOTES
Assessment/Plan:    History of cholecystectomy  Laparoscopic incision sites healing well  Continue to monitor clinically  Will recheck CBC in 1 month  GERD (gastroesophageal reflux disease)  Continue omeprazole 40 mg daily as needed  Cystic disease of ovaries  Continue follow-up with gynecology and continue norethindrone  Mild intermittent asthma without complication  Stable without recent exacerbation  Continue albuterol rescue inhaler as needed  Counseled patient on smoking cessation  Essential hypertension  Controlled  Continue lisinopril 20 mg daily  Neuropathy  Stable, controlled  Continue gabapentin 600 mg b i d     Anxiety  Stable  Continue sertraline 150 mg daily  Continue hydroxyzine as needed    Marijuana use, episodic  Discussed with patient that frequent marijuana use can cause cyclical vomiting syndrome  She reports only smoking marijuana every other day or as needed  Tobacco use  Counseled patient on smoking cessation  Hypokalemia  Continue to trend CMP  Leukocytosis  Will recheck CBC in 1 month       Diagnoses and all orders for this visit:    Gastroesophageal reflux disease without esophagitis  -     omeprazole (PriLOSEC) 40 MG capsule; Take 1 capsule (40 mg total) by mouth daily    Essential hypertension  -     CBC; Future  -     Comprehensive metabolic panel; Future  -     Lipid panel; Future  -     TSH, 3rd generation with Free T4 reflex; Future    Neuropathy  -     TSH, 3rd generation with Free T4 reflex; Future    Mild episode of recurrent major depressive disorder (HCC)    Anxiety    Hypokalemia  -     Comprehensive metabolic panel; Future  -     Magnesium; Future  -     TSH, 3rd generation with Free T4 reflex; Future    Cyclical vomiting syndrome  -     promethazine (PHENERGAN) 25 mg tablet;  Take 1 tablet (25 mg total) by mouth every 6 (six) hours as needed for nausea or vomiting    Dysthymic disorder    Primary insomnia    Tobacco use    Need for hepatitis C screening test  -     Hepatitis C antibody; Future    History of cholecystectomy    Leukocytosis, unspecified type  -     CBC; Future    Cystic disease of ovaries    Marijuana use, episodic    Other orders  -     betamethasone valerate (LUXIQ) 0 12 % foam  -     sertraline (ZOLOFT) 50 mg tablet; Take 50 mg by mouth daily          BMI Counseling: Body mass index is 33 79 kg/m²  The BMI is above normal  Nutrition recommendations include decreasing portion sizes, encouraging healthy choices of fruits and vegetables, decreasing fast food intake, consuming healthier snacks, limiting drinks that contain sugar, moderation in carbohydrate intake, increasing intake of lean protein, reducing intake of saturated and trans fat and reducing intake of cholesterol  Exercise recommendations include moderate physical activity 150 minutes/week and exercising 3-5 times per week  No pharmacotherapy was ordered  Patient referred to PCP  Rationale for BMI follow-up plan is due to patient being overweight or obese  Depression Screening and Follow-up Plan: Clincally patient does not have depression  No treatment is required  Patient advised to follow-up with PCP for further management  Tobacco Cessation Counseling: Tobacco cessation counseling was provided  The patient is sincerely urged to quit consumption of tobacco  She is not ready to quit tobacco  Medication options and side effects of medication discussed  Patient refused medication  Subjective:      Patient ID: Hanh Gibson is a 35 y o  female  Chief Complaint   Patient presents with   [facility] Patient Visit     New patient appointment // HX of unknown reason for vomiting/ nausea Has been to hosp  several times for that   Hypertension    GERD       35year old female is seen today to establish care  She has a PMHx of asthma, anxiety, depression, insomnia, and s/p cholecystectomy (3/11/2022)       She has been experiencing recurrent episodes of intractable nausea with vomiting  She has been to the ER multiple times over the past few years  She feels her symptoms may be due to hypokalemia however she is found to be hypokalemic following episodes of vomiting  She does smoke marijuana approximately every other day or as needed for anxiety and neuropathy  She also has a history of a hiatal hernia  She is scheduled for EGD and colonoscopy  She denies any acid reflux symptoms  Otherwise, she has been compliant with medication regimen and has no active complaints or concerns at this time  She recently underwent cholecystectomy and had nausea vomiting with abdominal pain following procedure, requiring evaluation by the emergency department  In reviewing her laboratory studies, her CBC did show leukocytosis with elevated neutrophil count  She currently does not exhibited any active signs of infection  Hypertension  This is a chronic problem  The current episode started more than 1 year ago  The problem is unchanged  The problem is controlled  Associated symptoms include anxiety  Pertinent negatives include no chest pain, headaches, palpitations or shortness of breath  Past treatments include ACE inhibitors  The current treatment provides moderate improvement  There are no compliance problems  Anxiety  Presents for follow-up visit  Patient reports no chest pain, dizziness, nausea, palpitations, shortness of breath or suicidal ideas  Symptoms occur occasionally  The severity of symptoms is mild  The patient sleeps 8 hours per night  The quality of sleep is good  Nighttime awakenings: none  Compliance with medications is %         The following portions of the patient's history were reviewed and updated as appropriate: allergies, current medications, past family history, past medical history, past social history, past surgical history and problem list     Review of Systems   Constitutional: Negative for activity change, appetite change, chills, diaphoresis, fatigue and fever  HENT: Negative for congestion, postnasal drip, rhinorrhea, sinus pressure, sinus pain, sneezing and sore throat  Eyes: Negative for visual disturbance  Respiratory: Negative for apnea, cough, choking, chest tightness, shortness of breath and wheezing  Cardiovascular: Negative for chest pain, palpitations and leg swelling  Gastrointestinal: Negative for abdominal distention, abdominal pain, anal bleeding, blood in stool, constipation, diarrhea, nausea and vomiting  Endocrine: Negative for cold intolerance and heat intolerance  Genitourinary: Negative for difficulty urinating, dysuria and hematuria  Musculoskeletal: Negative  Skin: Negative  Neurological: Negative for dizziness, weakness, light-headedness, numbness and headaches  Hematological: Negative for adenopathy  Psychiatric/Behavioral: Negative for agitation, sleep disturbance and suicidal ideas  All other systems reviewed and are negative          Past Medical History:   Diagnosis Date    Anxiety     Arthritis     OA  b/l knees    Asthma     Approx 2 years ago    Calculus of gallbladder without cholecystitis without obstruction 2/23/2022    Chronic kidney disease     Depression     Hx of bleeding disorder     dysmennorrhea-dx lap today 8/12/2016    Hypertension     Kidney stone     Obesity     Seasonal allergies          Current Outpatient Medications:     albuterol (ProAir HFA) 90 mcg/act inhaler, Inhale 2 puffs every 6 (six) hours as needed for wheezing, Disp: 18 g, Rfl: 0    betamethasone valerate (LUXIQ) 0 12 % foam, , Disp: , Rfl:     gabapentin (NEURONTIN) 300 mg capsule, Take 2 capsules (600 mg total) by mouth 2 (two) times a day, Disp: 180 capsule, Rfl: 5    hydrOXYzine pamoate (VISTARIL) 25 mg capsule, Take 1 capsule (25 mg total) by mouth 3 (three) times a day as needed for anxiety, Disp: 90 capsule, Rfl: 0    lisinopril (ZESTRIL) 20 mg tablet, Take 1 tablet (20 mg total) by mouth daily, Disp: 90 tablet, Rfl: 1    norethindrone (MICRONOR) 0 35 MG tablet, Take 1 tablet (0 35 mg total) by mouth daily, Disp: 84 tablet, Rfl: 3    ondansetron (Zofran ODT) 4 mg disintegrating tablet, Take 1 tablet (4 mg total) by mouth every 6 (six) hours as needed for nausea or vomiting, Disp: 20 tablet, Rfl: 0    sertraline (ZOLOFT) 100 mg tablet, Take 1 tablet daily  With the 50 mg  Total 150 mg daily (Patient taking differently: 100 mg daily  ), Disp: 90 tablet, Rfl: 1    sertraline (ZOLOFT) 50 mg tablet, Take 50 mg by mouth daily, Disp: , Rfl:     omeprazole (PriLOSEC) 40 MG capsule, Take 1 capsule (40 mg total) by mouth daily, Disp: , Rfl:     promethazine (PHENERGAN) 25 mg tablet, Take 1 tablet (25 mg total) by mouth every 6 (six) hours as needed for nausea or vomiting, Disp: 20 tablet, Rfl: 0    Allergies   Allergen Reactions    Eggs Or Egg-Derived Products - Food Allergy Other (See Comments)     abd pain       Social History   Past Surgical History:   Procedure Laterality Date     SECTION      CHOLECYSTECTOMY      DIAGNOSTIC LAPAROSCOPY      DILATION AND CURETTAGE OF UTERUS      HERNIA REPAIR      TX COLONOSCOPY FLX DX W/COLLJ SPEC WHEN PFRMD N/A 2018    Procedure: EGD AND COLONOSCOPY;  Surgeon: Slim Patel MD;  [facility];   Service: Gastroenterology    TX LAP,CHOLECYSTECTOMY N/A 3/11/2022    Procedure: ROBOTIC LAPAROSCOPIC CHOLECYSTECTOMY;  Surgeon: Suzi Nelson DO;  [facility];  Service: General    TX LAP,DIAGNOSTIC ABDOMEN N/A 2016    Procedure: LAPAROSCOPY DIAGNOSTIC DAVID;  Surgeon: Perla Irving DO;  [facility];  Service: Gynecology    TX LAP,RMV  ADNEXAL STRUCTURE Bilateral 2016    Procedure: CYSTECTOMY  OVARIAN;  Surgeon: Perla Irving DO;  [facility];  Service: Gynecology    TX REPAIR UMBILICAL VIAE,9+O/S,XIZHV N/A 3/11/2022    Procedure: UMBILICAL HERNIA REPAIR;  Surgeon: Suzi Nelson DO; [facility];  Service: General    WISDOM TOOTH EXTRACTION       Family History   Problem Relation Age of Onset    No Known Problems Sister     Autism Daughter     Lung cancer Maternal Grandmother     Cancer Maternal Grandmother         Deceassd 8 years    Diabetes Maternal Grandfather     Aneurysm Paternal Grandmother     No Known Problems Sister        Objective:  /82 (BP Location: Left arm, Patient Position: Sitting, Cuff Size: Adult)   Pulse 64   Temp 98 8 °F (37 1 °C) (Temporal)   Ht 5' (1 524 m)   Wt 78 5 kg (173 lb)   SpO2 98%   BMI 33 79 kg/m²     Recent Results (from the past 1344 hour(s))   Basic metabolic panel    Collection Time: 03/04/22  7:56 AM   Result Value Ref Range    Sodium 138 136 - 145 mmol/L    Potassium 4 3 3 5 - 5 3 mmol/L    Chloride 109 (H) 100 - 108 mmol/L    CO2 22 21 - 32 mmol/L    ANION GAP 7 4 - 13 mmol/L    BUN 11 5 - 25 mg/dL    Creatinine 0 88 0 60 - 1 30 mg/dL    Glucose, Fasting 105 (H) 65 - 99 mg/dL    Calcium 9 8 8 3 - 10 1 mg/dL    eGFR 80 ml/min/1 73sq m   POCT pregnancy, urine    Collection Time: 03/11/22  8:20 AM   Result Value Ref Range    EXT Preg Test, Ur Negative Negative    Control Valid Valid   Tissue Exam    Collection Time: 03/11/22 10:44 AM   Result Value Ref Range    Case Report       Surgical Pathology Report                         Case: [id number]                                   Authorizing Provider:  Darrian Berman DO         Collected:           03/11/2022 1044              Ordering Location:     [facility]        Received:            03/11/2022 [address] Operating Room                                                      Pathologist:           Jessie Chan MD                                                           Specimen:    Gallbladder, CC PCP                                                                        Final Diagnosis       A  Gallbladder, Cholecystectomy:  - Chronic cholecystitis        Additional Information       All reported additional testing was performed with appropriately reactive controls  These tests were developed and their performance characteristics determined by [facility] or appropriate performing facility, though some tests may be performed on tissues which have not been validated for performance characteristics (such as staining performed on alcohol exposed cell blocks and decalcified tissues)  Results should be interpreted with caution and in the context of the patients clinical condition  These tests may not be cleared or approved by the [place]  Food and Drug Administration, though the FDA has determined that such clearance or approval is not necessary  These tests are used for clinical purposes and they should not be regarded as investigational or for research  This laboratory has been approved by [place] 88, designated as a high-complexity laboratory and is qualified to perform these tests   Interpretation performed at [facility], [address] [facility] [address] [facility]        Gross Description          A  The specimen is received in formalin, labeled with the patient's name and hospital number, and is designated "gallbladder  The specimen consists of a gallbladder measuring 6 9 x 3 2 x 2 7 cm  The serosa is tan to pink purple, smooth and glistening  The cystic duct margin is inked blue and is included in the submitted sections  The lumen contains liquid bile; no stones are identified in the specimen or the specimen container  The gallbladder wall averages 1-2 mm in thickness  The mucosa is tan green and velvety  Representative sections  One cassette  Note: The estimated total formalin fixation time based upon information provided by the submitting clinician and the standard processing schedule is under 72 hours      MCrites        Clinical Information CC PCP    CBC and differential    Collection Time: 03/12/22  5:51 AM Result Value Ref Range    WBC 16 80 (H) 4 31 - 10 16 Thousand/uL    RBC 3 88 3 81 - 5 12 Million/uL    Hemoglobin 13 1 11 5 - 15 4 g/dL    Hematocrit 37 7 34 8 - 46 1 %    MCV 97 82 - 98 fL    MCH 33 8 26 8 - 34 3 pg    MCHC 34 7 31 4 - 37 4 g/dL    RDW 12 6 11 6 - 15 1 %    MPV 10 1 8 9 - 12 7 fL    Platelets 159 346 - 529 Thousands/uL    nRBC 0 /100 WBCs    Neutrophils Relative 78 (H) 43 - 75 %    Immat GRANS % 1 0 - 2 %    Lymphocytes Relative 12 (L) 14 - 44 %    Monocytes Relative 9 4 - 12 %    Eosinophils Relative 0 0 - 6 %    Basophils Relative 0 0 - 1 %    Neutrophils Absolute 13 14 (H) 1 85 - 7 62 Thousands/µL    Immature Grans Absolute 0 15 0 00 - 0 20 Thousand/uL    Lymphocytes Absolute 1 99 0 60 - 4 47 Thousands/µL    Monocytes Absolute 1 50 (H) 0 17 - 1 22 Thousand/µL    Eosinophils Absolute 0 00 0 00 - 0 61 Thousand/µL    Basophils Absolute 0 02 0 00 - 0 10 Thousands/µL   Comprehensive metabolic panel    Collection Time: 03/12/22  5:51 AM   Result Value Ref Range    Sodium 137 136 - 145 mmol/L    Potassium 3 7 3 5 - 5 3 mmol/L    Chloride 103 100 - 108 mmol/L    CO2 26 21 - 32 mmol/L    ANION GAP 8 4 - 13 mmol/L    BUN 10 5 - 25 mg/dL    Creatinine 0 81 0 60 - 1 30 mg/dL    Glucose 137 65 - 140 mg/dL    Calcium 8 9 8 3 - 10 1 mg/dL    AST 37 5 - 45 U/L    ALT 45 12 - 78 U/L    Alkaline Phosphatase 90 46 - 116 U/L    Total Protein 7 2 6 4 - 8 2 g/dL    Albumin 3 5 3 5 - 5 0 g/dL    Total Bilirubin 0 36 0 20 - 1 00 mg/dL    eGFR 89 ml/min/1 73sq m   Lipase    Collection Time: 03/12/22  5:51 AM   Result Value Ref Range    Lipase 201 73 - 393 u/L            Physical Exam  Vitals and nursing note reviewed  Constitutional:       General: She is not in acute distress  Appearance: She is well-developed  She is not diaphoretic  HENT:      Head: Normocephalic and atraumatic  Eyes:      General:         Right eye: No discharge  Left eye: No discharge        Conjunctiva/sclera: Conjunctivae normal       Pupils: Pupils are equal, round, and reactive to light  Neck:      Thyroid: No thyromegaly  Vascular: No JVD  Cardiovascular:      Rate and Rhythm: Normal rate and regular rhythm  Heart sounds: Normal heart sounds  No murmur heard  No friction rub  No gallop  Pulmonary:      Effort: Pulmonary effort is normal  No respiratory distress  Breath sounds: Normal breath sounds  No wheezing or rales  Chest:      Chest wall: No tenderness  Abdominal:      General: There is no distension  Palpations: Abdomen is soft  Tenderness: There is no abdominal tenderness  Comments: Laparoscopic incision sites healing well  Musculoskeletal:         General: No tenderness or deformity  Normal range of motion  Cervical back: Normal range of motion and neck supple  Lymphadenopathy:      Cervical: No cervical adenopathy  Skin:     General: Skin is warm and dry  Coloration: Skin is not pale  Findings: No erythema or rash  Neurological:      Mental Status: She is alert and oriented to person, place, and time  Cranial Nerves: No cranial nerve deficit  Coordination: Coordination normal    Psychiatric:         Behavior: Behavior normal          Thought Content:  Thought content normal          Judgment: Judgment normal

## 2022-03-25 NOTE — ASSESSMENT & PLAN NOTE
Laparoscopic incision sites healing well  Continue to monitor clinically  Will recheck CBC in 1 month

## 2022-03-25 NOTE — ASSESSMENT & PLAN NOTE
Stable without recent exacerbation  Continue albuterol rescue inhaler as needed  Counseled patient on smoking cessation

## 2022-03-25 NOTE — ASSESSMENT & PLAN NOTE
Discussed with patient that frequent marijuana use can cause cyclical vomiting syndrome  She reports only smoking marijuana every other day or as needed

## 2022-04-12 ENCOUNTER — ANNUAL EXAM (OUTPATIENT)
Dept: OBGYN CLINIC | Facility: CLINIC | Age: 42
End: 2022-04-12
Payer: COMMERCIAL

## 2022-04-12 VITALS
BODY MASS INDEX: 34.36 KG/M2 | SYSTOLIC BLOOD PRESSURE: 108 MMHG | WEIGHT: 175 LBS | HEIGHT: 60 IN | DIASTOLIC BLOOD PRESSURE: 80 MMHG

## 2022-04-12 DIAGNOSIS — Z98.890 HISTORY OF OVARIAN CYSTECTOMY: ICD-10-CM

## 2022-04-12 DIAGNOSIS — F32.A ANXIETY AND DEPRESSION: ICD-10-CM

## 2022-04-12 DIAGNOSIS — Z87.42 HISTORY OF OVARIAN CYSTECTOMY: ICD-10-CM

## 2022-04-12 DIAGNOSIS — Z86.79 HISTORY OF HYPERTENSION: ICD-10-CM

## 2022-04-12 DIAGNOSIS — Z01.419 WOMEN'S ANNUAL ROUTINE GYNECOLOGICAL EXAMINATION: ICD-10-CM

## 2022-04-12 DIAGNOSIS — G58.8 OTHER MONONEUROPATHY: ICD-10-CM

## 2022-04-12 DIAGNOSIS — N95.1 MENOPAUSAL SYNDROME (HOT FLASHES): ICD-10-CM

## 2022-04-12 DIAGNOSIS — Z90.49 HISTORY OF CHOLECYSTECTOMY: ICD-10-CM

## 2022-04-12 DIAGNOSIS — E87.6 HYPOKALEMIA: ICD-10-CM

## 2022-04-12 DIAGNOSIS — N91.1 SECONDARY AMENORRHEA: ICD-10-CM

## 2022-04-12 DIAGNOSIS — F41.9 ANXIETY AND DEPRESSION: ICD-10-CM

## 2022-04-12 PROBLEM — G62.9 PERIPHERAL NEUROPATHY: Status: ACTIVE | Noted: 2022-04-12

## 2022-04-12 PROCEDURE — 3079F DIAST BP 80-89 MM HG: CPT | Performed by: OBSTETRICS & GYNECOLOGY

## 2022-04-12 PROCEDURE — 3074F SYST BP LT 130 MM HG: CPT | Performed by: OBSTETRICS & GYNECOLOGY

## 2022-04-12 PROCEDURE — 99396 PREV VISIT EST AGE 40-64: CPT | Performed by: OBSTETRICS & GYNECOLOGY

## 2022-04-12 PROCEDURE — 3008F BODY MASS INDEX DOCD: CPT | Performed by: OBSTETRICS & GYNECOLOGY

## 2022-04-12 RX ORDER — NORGESTIMATE AND ETHINYL ESTRADIOL 7DAYSX3 LO
1 KIT ORAL DAILY
Qty: 84 TABLET | Refills: 3 | Status: SHIPPED | OUTPATIENT
Start: 2022-04-12 | End: 2022-07-11 | Stop reason: SDUPTHER

## 2022-04-12 NOTE — PROGRESS NOTES
Assessment     Annual well-woman exam    History of bilateral ovarian cystectomy in      Secondary amenorrhea    Menopausal syndrome    Recent cholecystectomy    History of hypertension    History of peripheral neuropathy    History of anxiety depression    Colon cancer screening pending        Plan     Screening laboratories studies, FSH, LH prolactin, TSH    Switch to biphasic OCP  Ortho-[facility]    Mammogram ordered   All questions answered  Normal Pap smear in  next Pap smear due in     Subjective  Here for exam     Melia Woodruff is a 38 y o  female   1 para who presents for annual exam  Periods are rare, lasting 0 days  Dysmenorrhea:none  Cyclic symptoms include anxiety  No intermenstrual bleeding, spotting, or discharge  The patient reports that there is not domestic violence in her life  Current contraception: oral progesterone-only contraceptive  History of abnormal Pap smear: no  Family history of uterine or ovarian cancer: no  Regular self breast exam: yes  History of abnormal mammogram: no  Family history of breast cancer: no  History of abnormal lipids: no  Menstrual History:  OB History        1    Para   1    Term   1            AB        Living           SAB        IAB        Ectopic        Multiple        Live Births                    Menarche age: 13  No LMP recorded  (Menstrual status: Birth Control)  Review of Systems  Pertinent items are noted in HPI        Objective  No acute distress   /80   Ht 5' (1 524 m)   Wt 79 4 kg (175 lb)   BMI 34 18 kg/m²     General:   alert and oriented, in no acute distress, alert, appears stated age and cooperative   Heart: regular rate and rhythm, S1, S2 normal, no murmur, click, rub or gallop   Lungs: clear to auscultation bilaterally   Abdomen: soft, non-tender, without masses or organomegaly   Vulva: normal, Bartholin's, Urethra, [place]'s normal, female escutcheon   Vagina: normal mucosa, normal discharge, no palpable nodules   Cervix: multiparous appearance, no cervical motion tenderness and no lesions   Uterus: normal size, mobile, non-tender, normal shape and consistency   Adnexa: normal adnexa and no mass, fullness, tenderness   Bilateral breast exam in the sitting and supine position with chaperone present, no visible or palpable breast lesions identified  No breast masses noted  No supraclavicular or axillary lymphadenopathy noted  No nipple discharge  Reviewed self-breast exam techniques     Rectal exam,  deferred

## 2022-04-30 ENCOUNTER — HOSPITAL ENCOUNTER (EMERGENCY)
Facility: HOSPITAL | Age: 42
Discharge: HOME | End: 2022-05-01
Attending: EMERGENCY MEDICINE | Admitting: EMERGENCY MEDICINE
Payer: COMMERCIAL

## 2022-04-30 DIAGNOSIS — R11.10 VOMITING: Primary | ICD-10-CM

## 2022-04-30 LAB
ALBUMIN SERPL BCP-MCNC: 4.1 G/DL (ref 3.5–5)
ALP SERPL-CCNC: 95 U/L (ref 46–116)
ALT SERPL W P-5'-P-CCNC: 36 U/L (ref 12–78)
ANION GAP SERPL CALCULATED.3IONS-SCNC: 14 MMOL/L (ref 4–13)
AST SERPL W P-5'-P-CCNC: 21 U/L (ref 5–45)
BASOPHILS # BLD AUTO: 0.01 THOUSANDS/ΜL (ref 0–0.1)
BASOPHILS NFR BLD AUTO: 0 % (ref 0–1)
BILIRUB SERPL-MCNC: 0.69 MG/DL (ref 0.2–1)
BILIRUB UR QL STRIP: ABNORMAL
BUN SERPL-MCNC: 17 MG/DL (ref 5–25)
CALCIUM SERPL-MCNC: 9.6 MG/DL (ref 8.3–10.1)
CHLORIDE SERPL-SCNC: 101 MMOL/L (ref 100–108)
CLARITY UR: CLEAR
CO2 SERPL-SCNC: 21 MMOL/L (ref 21–32)
COLOR UR: YELLOW
CREAT SERPL-MCNC: 1.02 MG/DL (ref 0.6–1.3)
EOSINOPHIL # BLD AUTO: 0 THOUSAND/ΜL (ref 0–0.61)
EOSINOPHIL NFR BLD AUTO: 0 % (ref 0–6)
ERYTHROCYTE [DISTWIDTH] IN BLOOD BY AUTOMATED COUNT: 13.2 % (ref 11.6–15.1)
EXT PREG TEST URINE: NEGATIVE
EXT. CONTROL ED NAV: NORMAL
GFR SERPL CREATININE-BSD FRML MDRD: 67 ML/MIN/1.73SQ M
GLUCOSE SERPL-MCNC: 127 MG/DL (ref 65–140)
GLUCOSE UR STRIP-MCNC: NEGATIVE MG/DL
HCT VFR BLD AUTO: 39.2 % (ref 34.8–46.1)
HGB BLD-MCNC: 13.6 G/DL (ref 11.5–15.4)
HGB UR QL STRIP.AUTO: ABNORMAL
IMM GRANULOCYTES # BLD AUTO: 0.1 THOUSAND/UL (ref 0–0.2)
IMM GRANULOCYTES NFR BLD AUTO: 1 % (ref 0–2)
KETONES UR STRIP-MCNC: ABNORMAL MG/DL
LEUKOCYTE ESTERASE UR QL STRIP: NEGATIVE
LIPASE SERPL-CCNC: 162 U/L (ref 73–393)
LYMPHOCYTES # BLD AUTO: 2.66 THOUSANDS/ΜL (ref 0.6–4.47)
LYMPHOCYTES NFR BLD AUTO: 18 % (ref 14–44)
MCH RBC QN AUTO: 33.6 PG (ref 26.8–34.3)
MCHC RBC AUTO-ENTMCNC: 34.7 G/DL (ref 31.4–37.4)
MCV RBC AUTO: 97 FL (ref 82–98)
MONOCYTES # BLD AUTO: 1.93 THOUSAND/ΜL (ref 0.17–1.22)
MONOCYTES NFR BLD AUTO: 13 % (ref 4–12)
NEUTROPHILS # BLD AUTO: 10.19 THOUSANDS/ΜL (ref 1.85–7.62)
NEUTS SEG NFR BLD AUTO: 68 % (ref 43–75)
NITRITE UR QL STRIP: NEGATIVE
NRBC BLD AUTO-RTO: 0 /100 WBCS
PH UR STRIP.AUTO: 5.5 [PH] (ref 4.5–8)
PLATELET # BLD AUTO: 276 THOUSANDS/UL (ref 149–390)
PMV BLD AUTO: 11 FL (ref 8.9–12.7)
POTASSIUM SERPL-SCNC: 3.2 MMOL/L (ref 3.5–5.3)
PROT SERPL-MCNC: 8.3 G/DL (ref 6.4–8.2)
PROT UR STRIP-MCNC: ABNORMAL MG/DL
RBC # BLD AUTO: 4.05 MILLION/UL (ref 3.81–5.12)
SODIUM SERPL-SCNC: 136 MMOL/L (ref 136–145)
SP GR UR STRIP.AUTO: >=1.03 (ref 1–1.03)
UROBILINOGEN UR QL STRIP.AUTO: 0.2 E.U./DL
WBC # BLD AUTO: 14.89 THOUSAND/UL (ref 4.31–10.16)

## 2022-04-30 PROCEDURE — 85025 COMPLETE CBC W/AUTO DIFF WBC: CPT | Performed by: PHYSICIAN ASSISTANT

## 2022-04-30 PROCEDURE — 96372 THER/PROPH/DIAG INJ SC/IM: CPT

## 2022-04-30 PROCEDURE — 81025 URINE PREGNANCY TEST: CPT | Performed by: PHYSICIAN ASSISTANT

## 2022-04-30 PROCEDURE — 99284 EMERGENCY DEPT VISIT MOD MDM: CPT | Performed by: PHYSICIAN ASSISTANT

## 2022-04-30 PROCEDURE — 96360 HYDRATION IV INFUSION INIT: CPT

## 2022-04-30 PROCEDURE — 83690 ASSAY OF LIPASE: CPT | Performed by: PHYSICIAN ASSISTANT

## 2022-04-30 PROCEDURE — 36415 COLL VENOUS BLD VENIPUNCTURE: CPT | Performed by: PHYSICIAN ASSISTANT

## 2022-04-30 PROCEDURE — 99284 EMERGENCY DEPT VISIT MOD MDM: CPT

## 2022-04-30 PROCEDURE — 80053 COMPREHEN METABOLIC PANEL: CPT | Performed by: PHYSICIAN ASSISTANT

## 2022-04-30 PROCEDURE — 81001 URINALYSIS AUTO W/SCOPE: CPT | Performed by: PHYSICIAN ASSISTANT

## 2022-04-30 RX ORDER — HALOPERIDOL 5 MG/ML
5 INJECTION INTRAMUSCULAR ONCE
Status: COMPLETED | OUTPATIENT
Start: 2022-04-30 | End: 2022-04-30

## 2022-04-30 RX ORDER — POTASSIUM CHLORIDE 20 MEQ/1
20 TABLET, EXTENDED RELEASE ORAL ONCE
Status: COMPLETED | OUTPATIENT
Start: 2022-04-30 | End: 2022-04-30

## 2022-04-30 RX ADMIN — POTASSIUM CHLORIDE 20 MEQ: 20 TABLET, EXTENDED RELEASE ORAL at 22:01

## 2022-04-30 RX ADMIN — SODIUM CHLORIDE 1000 ML: 0.9 INJECTION, SOLUTION INTRAVENOUS at 20:02

## 2022-04-30 RX ADMIN — HALOPERIDOL LACTATE 5 MG: 5 INJECTION, SOLUTION INTRAMUSCULAR at 20:06

## 2022-04-30 NOTE — ED PROVIDER NOTES
History  Chief Complaint   Patient presents with    Vomiting     pt arrives by EMS from home c/o vomiting since 1700 yesterday  hx of cyclical vomiting, pt tearful during triage  Olivia Hernandez is a 38 y o   female with PMH of hypertension, anxiety, depression, CKD, marijuana abuse, cyclic vomiting cholecystectomy who presents to the emergency department with multiple episodes of vomiting  The patient states over last 24 hours she has mom ended multiple times  She states the vomit is nonbloody nonbilious  She states that she does have some mild epigastric pain when she is vomiting otherwise does not have any abdominal pain  Has not been able to tolerate liquids or solids She denies any fevers, chills, chest pain, shortness of breath, palpitations, diarrhea, or rashes  She denies any suspicious food intakes  She denies any trauma or recent sick contacts  She does admit to marijuana use  History provided by:  Patient   used: No    Vomiting  Severity:  Moderate  Duration:  1 day  Timing:  Constant  Number of daily episodes:  Numerous  Quality:  Stomach contents  Progression:  Unchanged  Chronicity:  Recurrent  Recent urination:  Normal  Relieved by:  None tried  Worsened by:  Nothing  Ineffective treatments:  None tried  Associated symptoms: abdominal pain    Associated symptoms: no arthralgias, no chills, no cough, no diarrhea, no fever, no headaches, no myalgias, no sore throat and no URI    Abdominal pain:     Location:  Epigastric    Quality: cramping      Severity:  Mild    Onset quality:  Gradual    Timing:  Intermittent    Progression:  Waxing and waning    Chronicity:  Recurrent  Risk factors: prior abdominal surgery    Risk factors: no alcohol use, no diabetes, not pregnant, no sick contacts, no suspect food intake and no travel to endemic areas        Prior to Admission Medications   Prescriptions Last Dose Informant Patient Reported? Taking?    albuterol (ProAir HFA) 90 mcg/act inhaler  Self No No   Sig: Inhale 2 puffs every 6 (six) hours as needed for wheezing   betamethasone valerate (LUXIQ) 0 12 % foam   Yes No   gabapentin (NEURONTIN) 300 mg capsule  Self No No   Sig: Take 2 capsules (600 mg total) by mouth 2 (two) times a day   hydrOXYzine pamoate (VISTARIL) 25 mg capsule  Self No No   Sig: Take 1 capsule (25 mg total) by mouth 3 (three) times a day as needed for anxiety   lisinopril (ZESTRIL) 20 mg tablet  Self No No   Sig: Take 1 tablet (20 mg total) by mouth daily   norgestimate-ethinyl estradiol (Ortho Tri-Cyclen Lo) 0 18/0 215/0 25 MG-25 MCG per tablet   No No   Sig: Take 1 tablet by mouth daily   omeprazole (PriLOSEC) 40 MG capsule   No No   Sig: Take 1 capsule (40 mg total) by mouth daily   ondansetron (Zofran ODT) 4 mg disintegrating tablet  Self No No   Sig: Take 1 tablet (4 mg total) by mouth every 6 (six) hours as needed for nausea or vomiting   promethazine (PHENERGAN) 25 mg tablet   No No   Sig: Take 1 tablet (25 mg total) by mouth every 6 (six) hours as needed for nausea or vomiting   sertraline (ZOLOFT) 100 mg tablet  Self No No   Sig: Take 1 tablet daily  With the 50 mg    Total 150 mg daily   Patient taking differently: 100 mg daily     sertraline (ZOLOFT) 50 mg tablet   Yes No   Sig: Take 50 mg by mouth daily      Facility-Administered Medications: None       Past Medical History:   Diagnosis Date    Anxiety     Arthritis     OA  b/l knees    Asthma     Approx 2 years ago    Calculus of gallbladder without cholecystitis without obstruction 2022    Chronic kidney disease     Depression     Hx of bleeding disorder     dysmennorrhea-dx lap today 2016    Hypertension     Kidney stone     Obesity     Seasonal allergies        Past Surgical History:   Procedure Laterality Date     SECTION      CHOLECYSTECTOMY      DIAGNOSTIC LAPAROSCOPY      DILATION AND CURETTAGE OF UTERUS      HERNIA REPAIR      SC COLONOSCOPY FLX DX W/COLLJ SPEC WHEN PFRMD N/A 11/7/2018    Procedure: EGD AND COLONOSCOPY;  Surgeon: Dana Flores MD;  [facility]; Service: Gastroenterology    AZ LAP,CHOLECYSTECTOMY N/A 3/11/2022    Procedure: ROBOTIC LAPAROSCOPIC CHOLECYSTECTOMY;  Surgeon: Maren Hendrickson DO;  [facility];  Service: General    AZ LAP,DIAGNOSTIC ABDOMEN N/A 8/12/2016    Procedure: LAPAROSCOPY DIAGNOSTIC DAVID;  Surgeon: Denise Nguyen DO;  [facility];  Service: Gynecology    AZ LAP,RMV  ADNEXAL STRUCTURE Bilateral 8/12/2016    Procedure: CYSTECTOMY  OVARIAN;  Surgeon: Denise Nguyen DO;  [facility];  Service: Gynecology    AZ REPAIR UMBILICAL FNDF,2+C/Y,CIJIR N/A 3/11/2022    Procedure: UMBILICAL HERNIA REPAIR;  Surgeon: Maren Hendrickson DO;  [facility];  Service: General    WISDOM TOOTH EXTRACTION         Family History   Problem Relation Age of Onset    No Known Problems Sister     Autism Daughter     Lung cancer Maternal Grandmother     Cancer Maternal Grandmother         Deceassd 8 years    Diabetes Maternal Grandfather     Aneurysm Paternal Grandmother     No Known Problems Sister      I have reviewed and agree with the history as documented  E-Cigarette/Vaping    E-Cigarette Use Former User      E-Cigarette/Vaping Substances    Nicotine No     THC No     CBD No     Flavoring No     Other No     Unknown No      Social History     Tobacco Use    Smoking status: Current Every Day Smoker     Packs/day: 1 00     Years: 23 00     Pack years: 23 00    Smokeless tobacco: Never Used   Vaping Use    Vaping Use: Former   Substance Use Topics    Alcohol use: No    Drug use: Yes     Frequency: 7 0 times per week     Types: Marijuana     Comment: I exhaused all your rx prescription-- NOTHING ELSE HELPED!! Review of Systems   Constitutional: Negative for activity change, appetite change, chills, diaphoresis, fever and unexpected weight change     HENT: Negative for congestion, dental problem, ear pain, mouth sores, nosebleeds, sinus pressure, sinus pain, sneezing, sore throat and trouble swallowing  Respiratory: Negative for apnea, cough, choking, chest tightness, shortness of breath, wheezing and stridor  Cardiovascular: Negative for chest pain, palpitations and leg swelling  Gastrointestinal: Positive for abdominal pain and vomiting  Negative for constipation, diarrhea and nausea  Genitourinary: Negative for dysuria, flank pain, frequency and urgency  Musculoskeletal: Negative for arthralgias, joint swelling and myalgias  Skin: Negative for color change, pallor and rash  Neurological: Negative for dizziness, tremors, seizures, syncope, speech difficulty, weakness, light-headedness, numbness and headaches  All other systems reviewed and are negative  Physical Exam  Physical Exam  Vitals and nursing note reviewed  Constitutional:       General: She is not in acute distress  Appearance: Normal appearance  She is normal weight  She is not ill-appearing or toxic-appearing  HENT:      Head: Normocephalic and atraumatic  Mouth/Throat:      Mouth: Mucous membranes are moist       Pharynx: Oropharynx is clear  Eyes:      Extraocular Movements: Extraocular movements intact  Pupils: Pupils are equal, round, and reactive to light  Cardiovascular:      Rate and Rhythm: Normal rate and regular rhythm  Pulses: Normal pulses  Heart sounds: Normal heart sounds  No murmur heard  No friction rub  No gallop  Pulmonary:      Effort: Pulmonary effort is normal  No respiratory distress  Breath sounds: Normal breath sounds  No stridor  No wheezing, rhonchi or rales  Abdominal:      General: Abdomen is flat  Bowel sounds are normal  There is no distension  Palpations: Abdomen is soft  There is no mass  Tenderness: There is no abdominal tenderness  There is no guarding or rebound  Hernia: No hernia is present  Comments: Abdomen is soft, nontender, nondistended   Musculoskeletal:         General: No swelling, tenderness, deformity or signs of injury  Normal range of motion  Cervical back: Normal range of motion  No rigidity or tenderness  Right lower leg: No edema  Left lower leg: No edema  Skin:     General: Skin is warm and dry  Capillary Refill: Capillary refill takes less than 2 seconds  Neurological:      General: No focal deficit present  Mental Status: She is alert and oriented to person, place, and time  Mental status is at baseline  Cranial Nerves: No cranial nerve deficit  Sensory: No sensory deficit  Motor: No weakness        Coordination: Coordination normal          Vital Signs  ED Triage Vitals [04/30/22 1921]   Temperature Pulse Respirations Blood Pressure SpO2   98 3 °F (36 8 °C) 78 20 139/92 100 %      Temp Source Heart Rate Source Patient Position - Orthostatic VS BP Location FiO2 (%)   Oral Monitor Sitting Right arm --      Pain Score       10 - Worst Possible Pain           Vitals:    04/30/22 1921 04/30/22 2106 04/30/22 2318 05/01/22 0103   BP: 139/92 145/85 159/86 144/79   Pulse: 78 72 76 64   Patient Position - Orthostatic VS: Sitting Lying Lying Lying         Visual Acuity      ED Medications  Medications   haloperidol lactate (HALDOL) injection 5 mg (5 mg Intramuscular Given 4/30/22 2006)   sodium chloride 0 9 % bolus 1,000 mL (0 mL Intravenous Stopped 4/30/22 2102)   potassium chloride (K-DUR,KLOR-CON) CR tablet 20 mEq (20 mEq Oral Given 4/30/22 2201)       Diagnostic Studies  Results Reviewed     Procedure Component Value Units Date/Time    Urine Microscopic [[id number]]  (Abnormal) Collected: 04/30/22 2336    Lab Status: Final result Specimen: Urine, Clean Catch Updated: 05/01/22 0046     RBC, UA None Seen /hpf      WBC, UA 2-4 /hpf      Epithelial Cells Moderate /hpf      Bacteria, UA Occasional /hpf      MUCUS THREADS Moderate    UA w Reflex to Microscopic w Reflex to Culture [[id number]]  (Abnormal) Collected: 04/30/22 2336    Lab Status: Final result Specimen: Urine, Clean Catch Updated: 05/01/22 0019     Color, UA Yellow     Clarity, UA Clear     Specific Gravity, UA >=1 030     pH, UA 5 5     Leukocytes, UA Negative     Nitrite, UA Negative     Protein, UA 30 (1+) mg/dl      Glucose, UA Negative mg/dl      Ketones, UA 15 (1+) mg/dl      Urobilinogen, UA 0 2 E U /dl      Bilirubin, UA Interference- unable to analyze     Blood, UA Small    POCT pregnancy, urine [[id number]]  (Normal) Resulted: 04/30/22 2336    Lab Status: Final result Updated: 04/30/22 2338     EXT PREG TEST UR (Ref: Negative) negative     Control valid    Urine Macroscopic, POC [[id number]]  (Abnormal) Collected: 04/30/22 2333    Lab Status: Final result Specimen: Urine Updated: 04/30/22 2334     Color, UA Yellow     Clarity, UA Clear     pH, UA 5 5     Leukocytes, UA Negative     Nitrite, UA Negative     Protein,  (2+) mg/dl      Glucose, UA Negative mg/dl      Ketones, UA 40 (2+) mg/dl      Urobilinogen, UA 0 2 E U /dl      Bilirubin, UA Interference- unable to analyze     Blood, UA Small     Specific [place], UA >=1 030    Narrative:      CLINITEK RESULT    Comprehensive metabolic panel [[id number]]  (Abnormal) Collected: 04/30/22 1959    Lab Status: Final result Specimen: Blood from Arm, Right Updated: 04/30/22 2024     Sodium 136 mmol/L      Potassium 3 2 mmol/L      Chloride 101 mmol/L      CO2 21 mmol/L      ANION GAP 14 mmol/L      BUN 17 mg/dL      Creatinine 1 02 mg/dL      Glucose 127 mg/dL      Calcium 9 6 mg/dL      AST 21 U/L      ALT 36 U/L      Alkaline Phosphatase 95 U/L      Total Protein 8 3 g/dL      Albumin 4 1 g/dL      Total Bilirubin 0 69 mg/dL      eGFR 67 ml/min/1 73sq m     Narrative:      [facility] guidelines for Chronic Kidney Disease (CKD):     Stage 1 with normal or high GFR (GFR > 90 mL/min/1 73 square meters)    Stage 2 Mild CKD (GFR = 60-89 mL/min/1 73 square meters)    Stage 3A Moderate CKD (GFR = 45-59 mL/min/1 73 square meters)    Stage 3B Moderate CKD (GFR = 30-44 mL/min/1 73 square meters)    Stage 4 Severe CKD (GFR = 15-29 mL/min/1 73 square meters)    Stage 5 End Stage CKD (GFR <15 mL/min/1 73 square meters)  Note: GFR calculation is accurate only with a steady state creatinine    Lipase [[id number]]  (Normal) Collected: 04/30/22 1959    Lab Status: Final result Specimen: Blood from Arm, Right Updated: 04/30/22 2024     Lipase 162 u/L     CBC and differential [[id number]]  (Abnormal) Collected: 04/30/22 1959    Lab Status: Final result Specimen: Blood from Arm, Right Updated: 04/30/22 2005     WBC 14 89 Thousand/uL      RBC 4 05 Million/uL      Hemoglobin 13 6 g/dL      Hematocrit 39 2 %      MCV 97 fL      MCH 33 6 pg      MCHC 34 7 g/dL      RDW 13 2 %      MPV 11 0 fL      Platelets 848 Thousands/uL      nRBC 0 /100 WBCs      Neutrophils Relative 68 %      Immat GRANS % 1 %      Lymphocytes Relative 18 %      Monocytes Relative 13 %      Eosinophils Relative 0 %      Basophils Relative 0 %      Neutrophils Absolute 10 19 Thousands/µL      Immature Grans Absolute 0 10 Thousand/uL      Lymphocytes Absolute 2 66 Thousands/µL      Monocytes Absolute 1 93 Thousand/µL      Eosinophils Absolute 0 00 Thousand/µL      Basophils Absolute 0 01 Thousands/µL                  No orders to display              Procedures  Procedures         ED Course                                             MDM  Number of Diagnoses or Management Options  Vomiting: new and requires workup  Diagnosis management comments: Patient was seen and examined  in the emergency department for chief complaint of nausea and vomiting as well as intermittent abdominal pain with vomiting  The patient presented with 24 hours of symptoms  Not take any medications prior to arrival   Is not able to tolerate liquids or solids    She denies any abdominal pain was eyes when she is vomiting but states she has just been persistently vomiting  Previous surgical history of cholecystectomy  No fevers, chills, diarrhea, vaginal complaints of urinary complaints  On exam patient is in no acute distress, lungs are clear, regular rate rhythm, no gallops  Abdomen soft nontender nondistended  No rashes      DDx including but not limited to: food poisoning, viral illness, metabolic abnormality, dehydration, GI etiology, UTI, adverse reaction, cyclic vomiting due to marijuana; doubt acute surgical intraabdominal process, Boorhave's syndrome, mediastinitis  Workup:  Obtain basic labs including CBC, CMP, lipase, urinalysis and urine pregnancy test   Treat symptomatically with Haldol as a suspect component of cyclic vomiting syndrome  Lab work reassuring  On reassessment all symptoms have resolved patient requesting discharge  Repeat abdominal exam shows not acute abdomen, no rebound, guarding or tenderness  Disposition:  General impression 77-year-old female who presents with vomiting  Treated with Haldol  Suspect cyclic vomiting  Discharge with PCP follow-up  Return precautions discussed  Recommended stopping marijuana      The patient was discharged in stable condition  Patient ambulated off the department  Extensive return to emergency department precautions were discussed  Follow up with appropriate providers including primary care physician was discussed  Patient and/or their  primary decision maker expressed understanding  Patient remained stable during entire emergency department stay           Amount and/or Complexity of Data Reviewed  Clinical lab tests: ordered and reviewed  Review and summarize past medical records: yes  Independent visualization of images, tracings, or specimens: yes    Risk of Complications, Morbidity, and/or Mortality  Presenting problems: moderate  Diagnostic procedures: moderate  Management options: moderate    Patient Progress  Patient progress: stable      Disposition  Final diagnoses:   Vomiting     Time reflects when diagnosis was documented in both MDM as applicable and the Disposition within this note     Time User Action Codes Description Comment    5/1/2022 12:49 AM Gloris Closs Add [R11 10] Vomiting       ED Disposition     ED Disposition Condition Date/Time Comment    Discharge Stable Sun May 1, 2022 12:49 AM Melia Marie discharge to home/self care              Follow-up Information     Follow up With Specialties Details Why [address] [place] [facility] Emergency Medicine Go to  As needed, If symptoms worsen [facility] [id number]  [address] [facility] Emergency Department, [address] [place] [address], [facility] [address]  Call  To schedule an appointment with a primary care physician [phone]             Discharge Medication List as of 5/1/2022 12:50 AM      CONTINUE these medications which have NOT CHANGED    Details   albuterol (ProAir HFA) 90 mcg/act inhaler Inhale 2 puffs every 6 (six) hours as needed for wheezing, Starting Mon 12/6/2021, Normal      betamethasone valerate (LUXIQ) 0 12 % foam Starting Wed 3/2/2022, Historical Med      gabapentin (NEURONTIN) 300 mg capsule Take 2 capsules (600 mg total) by mouth 2 (two) times a day, Starting Thu 12/2/2021, Normal      hydrOXYzine pamoate (VISTARIL) 25 mg capsule Take 1 capsule (25 mg total) by mouth 3 (three) times a day as needed for anxiety, Starting Mon 2/7/2022, Normal      lisinopril (ZESTRIL) 20 mg tablet Take 1 tablet (20 mg total) by mouth daily, Starting Thu 12/2/2021, Normal      norgestimate-ethinyl estradiol (Ortho Tri-Cyclen Lo) 0 18/0 215/0 25 MG-25 MCG per tablet Take 1 tablet by mouth daily, Starting Tue 4/12/2022, Until Tue 7/5/2022, Normal      omeprazole (PriLOSEC) 40 MG capsule Take 1 capsule (40 mg total) by mouth daily, Starting Fri 3/25/2022, No Print      ondansetron (Zofran ODT) 4 mg disintegrating tablet Take 1 tablet (4 mg total) by mouth every 6 (six) hours as needed for nausea or vomiting, Starting Mon 1/24/2022, Normal      promethazine (PHENERGAN) 25 mg tablet Take 1 tablet (25 mg total) by mouth every 6 (six) hours as needed for nausea or vomiting, Starting Fri 3/25/2022, Normal      !! sertraline (ZOLOFT) 100 mg tablet Take 1 tablet daily  With the 50 mg  Total 150 mg daily, Normal      !! sertraline (ZOLOFT) 50 mg tablet Take 50 mg by mouth daily, Historical Med       !! - Potential duplicate medications found  Please discuss with provider  No discharge procedures on file      PDMP Review       Value Time User    PDMP Reviewed  Yes 1/30/2022 10:57 AM Rikki Boyd DO          ED Provider  Electronically Signed by           Isabel Chao PA-C  05/01/22 5605

## 2022-05-01 VITALS
HEART RATE: 64 BPM | RESPIRATION RATE: 18 BRPM | DIASTOLIC BLOOD PRESSURE: 79 MMHG | TEMPERATURE: 98.3 F | OXYGEN SATURATION: 96 % | SYSTOLIC BLOOD PRESSURE: 144 MMHG

## 2022-05-01 LAB
BACTERIA UR QL AUTO: ABNORMAL /HPF
BILIRUB UR QL STRIP: ABNORMAL
CLARITY UR: CLEAR
COLOR UR: YELLOW
GLUCOSE UR STRIP-MCNC: NEGATIVE MG/DL
HGB UR QL STRIP.AUTO: ABNORMAL
KETONES UR STRIP-MCNC: ABNORMAL MG/DL
LEUKOCYTE ESTERASE UR QL STRIP: NEGATIVE
MUCOUS THREADS UR QL AUTO: ABNORMAL
NITRITE UR QL STRIP: NEGATIVE
NON-SQ EPI CELLS URNS QL MICRO: ABNORMAL /HPF
PH UR STRIP.AUTO: 5.5 [PH]
PROT UR STRIP-MCNC: ABNORMAL MG/DL
RBC #/AREA URNS AUTO: ABNORMAL /HPF
SP GR UR STRIP.AUTO: >=1.03 (ref 1–1.03)
UROBILINOGEN UR QL STRIP.AUTO: 0.2 E.U./DL
WBC #/AREA URNS AUTO: ABNORMAL /HPF

## 2022-05-01 NOTE — ED NOTES
Patient's , Duane updated on pt's status  Per , he should be contacted when pt is discharged       Shonna Gardner RN  05/01/22 1061

## 2022-05-01 NOTE — ED NOTES
AVS reviewed with pt by provider, pt verbalized understanding of d/c instructions  VSS   Pt left ambulatory with steady gait to waiting room for  by , alert and oriented     Steff Taylor RN  05/01/22 7709

## 2022-05-01 NOTE — DISCHARGE INSTRUCTIONS
You were seen in the emergency department today for nausea vomiting  Laboratory analysis does not reveal any acute abnormalities  Please follow-up with your primary care physician regarding your symptoms  Return to the Emergency Department sooner if increased pain, fever, vomiting, diarrhea, difficulty breathing or urinating, bleeding  Clear fluids for 1-2 days and then advance diet as tolerated  Discontinue marijuana use

## 2022-05-05 ENCOUNTER — OFFICE VISIT (OUTPATIENT)
Dept: INTERNAL MEDICINE CLINIC | Facility: OTHER | Age: 42
End: 2022-05-05
Payer: COMMERCIAL

## 2022-05-05 ENCOUNTER — HOSPITAL ENCOUNTER (OUTPATIENT)
Dept: NON INVASIVE DIAGNOSTICS | Facility: CLINIC | Age: 42
Discharge: HOME | End: 2022-05-05
Payer: COMMERCIAL

## 2022-05-05 VITALS
TEMPERATURE: 97.7 F | HEART RATE: 62 BPM | BODY MASS INDEX: 33.73 KG/M2 | HEIGHT: 60 IN | WEIGHT: 171.8 LBS | OXYGEN SATURATION: 98 % | DIASTOLIC BLOOD PRESSURE: 60 MMHG | SYSTOLIC BLOOD PRESSURE: 102 MMHG

## 2022-05-05 DIAGNOSIS — Z72.0 TOBACCO USE: ICD-10-CM

## 2022-05-05 DIAGNOSIS — H60.503 ACUTE OTITIS EXTERNA OF BOTH EARS, UNSPECIFIED TYPE: Primary | ICD-10-CM

## 2022-05-05 DIAGNOSIS — M79.604 RIGHT LEG PAIN: ICD-10-CM

## 2022-05-05 DIAGNOSIS — R11.15 CYCLICAL VOMITING SYNDROME: ICD-10-CM

## 2022-05-05 PROCEDURE — 93970 EXTREMITY STUDY: CPT

## 2022-05-05 PROCEDURE — 99214 OFFICE O/P EST MOD 30 MIN: CPT

## 2022-05-05 RX ORDER — OFLOXACIN 3 MG/ML
5 SOLUTION AURICULAR (OTIC) 2 TIMES DAILY
Qty: 10 ML | Refills: 1 | Status: SHIPPED | OUTPATIENT
Start: 2022-05-05 | End: 2022-05-11

## 2022-05-05 RX ORDER — FLUTICASONE PROPIONATE 50 MCG
1 SPRAY, SUSPENSION (ML) NASAL DAILY
Qty: 15.8 ML | Refills: 0 | Status: SHIPPED | OUTPATIENT
Start: 2022-05-05

## 2022-05-05 RX ORDER — NICOTINE 21 MG/24HR
1 PATCH, TRANSDERMAL 24 HOURS TRANSDERMAL EVERY 24 HOURS
Qty: 30 PATCH | Refills: 0 | Status: SHIPPED | OUTPATIENT
Start: 2022-05-05 | End: 2022-06-04

## 2022-05-05 NOTE — PROGRESS NOTES
Assessment/Plan:    Cyclical vomiting syndrome  Hx of cyclic vomiting w recent episode  Discussed w pt as well as her marijuana use and possible cannabis-induced hyperemesis, as pt smokes marijuana and has cyclic episodes of vomiting relieved with hot showers  Pt states that cyclic vomiting precedes marijuana use, making this less likely, however she is amenable to trying marijuana cessation to see if symptoms improve  Acute otitis externa of both ears  Acute b/l ear pain x4 days  Denies discharge, hearing change  Associated w facial pain R side extending from ear to lower jaw  R ear exquisitely painful on exam with manipulation of external ear  TM nl bilaterally  White deposits in b/l canals  Will treat for acute otitis externa  oflaxacin otic drops 5 drops each ear twice a day for 5 days  flonase 1 spray each nostril QD for 5 days    Tobacco use  Pt is current PPD smoker, interested in quitting, would like to try nicotine replacement therapy at this time    Start nicotine patch 14 mg QD    Right leg pain  Pt developing acute pain in the posterior right lower leg  Pain started in distal calf and has progressed up calf  In the setting of decreased mobility last several days  No CP or SOB  Family hx of DVT and pt has hx of spontaneous   Tender to palpation  No swelling  STAT venous duplex of b/l LEs to r/o DVT       Diagnoses and all orders for this visit:    Acute otitis externa of both ears, unspecified type  -     ofloxacin (FLOXIN) 0 3 % otic solution; Administer 5 drops into both ears 2 (two) times a day for 5 days  -     fluticasone (FLONASE) 50 mcg/act nasal spray; 1 spray into each nostril daily    Right leg pain  -     VAS lower limb venous duplex study, complete bilateral; Future    Tobacco use  -     nicotine (NICODERM CQ) 14 mg/24hr TD 24 hr patch; Place 1 patch on the skin every 24 hours    Cyclical vomiting syndrome          Subjective:      Patient ID: Melia Winters Ra is a 38 y  o  female  Ms Serjio Mak presents to clinic this morning for evaluation of 4 days bilateral ear pain right greater than left associated with right sided facial pain extending from R ear down along lower jaw  Previous episode in January with similar symptoms, seen in urgent care and successfully treated with otic drops  Pt attributes episode to spending excessive time in hot shower during episode of acute vomiting d/t cyclic vomiting syndrome on Friday and Saturday of the previous week  States that nausea and vomiting have presently resolved  Pt is also concerned for development of acute-onset pain in the R lower leg  States that pain began karen the low R calf and has extended up the posterior calf  Denies CP, SOB  Family history of DVT in maternal GM  Pt has no DVT history, does have history of pregnancy loss d/t spontaneous  x1  Reports decreased mobility for several days in the setting of nausea and vomiting  The following portions of the patient's history were reviewed and updated as appropriate: allergies, current medications, past family history, past medical history, past social history, past surgical history and problem list     Review of Systems   Constitutional: Positive for activity change (recently decreased dt illness) and appetite change (acute decrease in setting of N/V)  Negative for chills, diaphoresis, fatigue and fever  HENT: Positive for ear pain  Negative for congestion, ear discharge, facial swelling, hearing loss, rhinorrhea, sinus pressure, sinus pain and sore throat  Eyes: Negative for pain, discharge and visual disturbance  Respiratory: Negative for cough and shortness of breath  Cardiovascular: Negative for chest pain and leg swelling  Gastrointestinal: Positive for diarrhea (recent, resolved), nausea (recent, resolved) and vomiting (recent, resolved  hx cyclic vomiting)  Negative for abdominal pain  Endocrine: Negative  Genitourinary: Negative      Musculoskeletal: Acute pain in RLE   Skin: Negative  Allergic/Immunologic: Positive for environmental allergies (seasonal)  Neurological: Positive for light-headedness (with standing, starting this morning)  Hematological: Negative  Psychiatric/Behavioral: Negative  Objective:      /60 (BP Location: Left arm, Patient Position: Sitting, Cuff Size: Adult)   Pulse 62   Temp 97 7 °F (36 5 °C) (Tympanic)   Ht 5' (1 524 m)   Wt 77 9 kg (171 lb 12 8 oz)   SpO2 98% Comment: ra  BMI 33 55 kg/m²          Physical Exam  HENT:      Right Ear: Tympanic membrane normal  There is no impacted cerumen  Left Ear: Tympanic membrane normal  There is no impacted cerumen  Ears:      Comments: White deposits in the b/l ear canals  R ear is exquisitely painful with manipulation of the external ear  Nose: Congestion present  Mouth/Throat:      Mouth: Mucous membranes are moist       Pharynx: Oropharynx is clear  No oropharyngeal exudate or posterior oropharyngeal erythema  Eyes:      General: No scleral icterus  Right eye: No discharge  Left eye: No discharge  Extraocular Movements: Extraocular movements intact  Conjunctiva/sclera: Conjunctivae normal       Pupils: Pupils are equal, round, and reactive to light  Musculoskeletal:         General: Tenderness (R calf tender to palpation) present  No swelling, deformity or signs of injury  Normal range of motion  Right lower leg: No edema  Left lower leg: No edema  Skin:     General: Skin is warm and dry  Coloration: Skin is not jaundiced or pale  Comments: Some skin changes of the b/l LEs suggestive of venous stasis   Neurological:      Mental Status: She is oriented to person, place, and time  Sensory: Sensory deficit (facial sensation asymmetry R compared to right ) present     Psychiatric:         Mood and Affect: Mood normal          Behavior: Behavior normal

## 2022-05-05 NOTE — ASSESSMENT & PLAN NOTE
Hx of cyclic vomiting w recent episode  Discussed w pt as well as her marijuana use and possible cannabis-induced hyperemesis, as pt smokes marijuana and has cyclic episodes of vomiting relieved with hot showers  Pt states that cyclic vomiting precedes marijuana use, making this less likely, however she is amenable to trying marijuana cessation to see if symptoms improve

## 2022-05-05 NOTE — ASSESSMENT & PLAN NOTE
Pt is current PPD smoker, interested in quitting, would like to try nicotine replacement therapy at this time    Start nicotine patch 14 mg QD

## 2022-05-05 NOTE — ASSESSMENT & PLAN NOTE
Acute b/l ear pain x4 days  Denies discharge, hearing change  Associated w facial pain R side extending from ear to lower jaw  R ear exquisitely painful on exam with manipulation of external ear  TM nl bilaterally  White deposits in b/l canals       Will treat for acute otitis externa  oflaxacin otic drops 5 drops each ear twice a day for 5 days  flonase 1 spray each nostril QD for 5 days

## 2022-05-05 NOTE — ASSESSMENT & PLAN NOTE
Pt developing acute pain in the posterior right lower leg  Pain started in distal calf and has progressed up calf  In the setting of decreased mobility last several days  No CP or SOB  Family hx of DVT and pt has hx of spontaneous   Tender to palpation  No swelling        STAT venous duplex of b/l LEs to r/o DVT

## 2022-05-06 PROCEDURE — 93970 EXTREMITY STUDY: CPT | Performed by: SURGERY

## 2022-05-11 ENCOUNTER — OFFICE VISIT (OUTPATIENT)
Dept: INTERNAL MEDICINE CLINIC | Facility: OTHER | Age: 42
End: 2022-05-11
Payer: COMMERCIAL

## 2022-05-11 VITALS
DIASTOLIC BLOOD PRESSURE: 58 MMHG | OXYGEN SATURATION: 97 % | RESPIRATION RATE: 18 BRPM | WEIGHT: 175 LBS | HEART RATE: 75 BPM | BODY MASS INDEX: 34.36 KG/M2 | TEMPERATURE: 98.6 F | SYSTOLIC BLOOD PRESSURE: 100 MMHG | HEIGHT: 60 IN

## 2022-05-11 DIAGNOSIS — E87.6 HYPOKALEMIA: ICD-10-CM

## 2022-05-11 DIAGNOSIS — R11.15 CYCLICAL VOMITING SYNDROME: Primary | ICD-10-CM

## 2022-05-11 DIAGNOSIS — L30.9 DERMATITIS: ICD-10-CM

## 2022-05-11 DIAGNOSIS — R80.9 PROTEINURIA, UNSPECIFIED TYPE: ICD-10-CM

## 2022-05-11 DIAGNOSIS — J45.20 MILD INTERMITTENT ASTHMA WITHOUT COMPLICATION: ICD-10-CM

## 2022-05-11 DIAGNOSIS — F41.9 ANXIETY: ICD-10-CM

## 2022-05-11 DIAGNOSIS — F51.01 PRIMARY INSOMNIA: ICD-10-CM

## 2022-05-11 DIAGNOSIS — I10 ESSENTIAL HYPERTENSION: ICD-10-CM

## 2022-05-11 DIAGNOSIS — G58.8 OTHER MONONEUROPATHY: ICD-10-CM

## 2022-05-11 DIAGNOSIS — F33.0 MILD EPISODE OF RECURRENT MAJOR DEPRESSIVE DISORDER (HCC): ICD-10-CM

## 2022-05-11 DIAGNOSIS — K21.9 GASTROESOPHAGEAL REFLUX DISEASE WITHOUT ESOPHAGITIS: ICD-10-CM

## 2022-05-11 PROBLEM — Z90.49 HISTORY OF CHOLECYSTECTOMY: Status: RESOLVED | Noted: 2022-03-25 | Resolved: 2022-05-11

## 2022-05-11 PROBLEM — F32.A ANXIETY AND DEPRESSION: Status: RESOLVED | Noted: 2022-04-12 | Resolved: 2022-05-11

## 2022-05-11 PROBLEM — G62.9 NEUROPATHY: Status: RESOLVED | Noted: 2018-10-02 | Resolved: 2022-05-11

## 2022-05-11 PROBLEM — Z87.42 HISTORY OF OVARIAN CYSTECTOMY: Status: RESOLVED | Noted: 2022-04-12 | Resolved: 2022-05-11

## 2022-05-11 PROBLEM — Z98.890 HISTORY OF OVARIAN CYSTECTOMY: Status: RESOLVED | Noted: 2022-04-12 | Resolved: 2022-05-11

## 2022-05-11 PROBLEM — Z86.79 HISTORY OF HYPERTENSION: Status: RESOLVED | Noted: 2022-04-12 | Resolved: 2022-05-11

## 2022-05-11 PROBLEM — H60.503 ACUTE OTITIS EXTERNA OF BOTH EARS: Status: RESOLVED | Noted: 2022-05-05 | Resolved: 2022-05-11

## 2022-05-11 PROCEDURE — 3078F DIAST BP <80 MM HG: CPT | Performed by: INTERNAL MEDICINE

## 2022-05-11 PROCEDURE — 3074F SYST BP LT 130 MM HG: CPT | Performed by: INTERNAL MEDICINE

## 2022-05-11 PROCEDURE — 3008F BODY MASS INDEX DOCD: CPT | Performed by: INTERNAL MEDICINE

## 2022-05-11 PROCEDURE — 99214 OFFICE O/P EST MOD 30 MIN: CPT | Performed by: INTERNAL MEDICINE

## 2022-05-11 RX ORDER — LISINOPRIL 20 MG/1
20 TABLET ORAL DAILY
Qty: 90 TABLET | Refills: 1 | Status: SHIPPED | OUTPATIENT
Start: 2022-05-11

## 2022-05-11 RX ORDER — SERTRALINE HYDROCHLORIDE 100 MG/1
TABLET, FILM COATED ORAL
Qty: 90 TABLET | Refills: 1 | Status: SHIPPED | OUTPATIENT
Start: 2022-05-11

## 2022-05-11 RX ORDER — BETAMETHASONE VALERATE 1.2 MG/G
AEROSOL, FOAM TOPICAL DAILY
Qty: 100 G | Refills: 1 | Status: SHIPPED | OUTPATIENT
Start: 2022-05-11

## 2022-05-11 NOTE — PROGRESS NOTES
Assessment/Plan:    Peripheral neuropathy  Continue gabapentin 600 mg twice a day  GERD (gastroesophageal reflux disease)  Controlled  Continue omeprazole 40 mg daily as needed  Mild intermittent asthma without complication  Stable without recent exacerbation  Continue albuterol inhaler as needed  Anxiety  Controlled  Continue Zoloft 150 mg daily  Hypokalemia  Will refer to Nephrology for further evaluation  She has concerns that she has Gitelman syndrome  Diagnoses and all orders for this visit:    Cyclical vomiting syndrome    Mild episode of recurrent major depressive disorder (HCC)  -     sertraline (ZOLOFT) 100 mg tablet; Take 1 tablet daily  With the 50 mg  Total 150 mg daily  -     sertraline (ZOLOFT) 50 mg tablet; Take 1 tablet (50 mg total) by mouth in the morning  Anxiety  -     sertraline (ZOLOFT) 100 mg tablet; Take 1 tablet daily  With the 50 mg  Total 150 mg daily    Essential hypertension  -     lisinopril (ZESTRIL) 20 mg tablet; Take 1 tablet (20 mg total) by mouth in the morning  Hypokalemia  -     Ambulatory Referral to Nephrology; Future  -     Aldosterone/Renin Ratio; Future  -     CBC; Future  -     Comprehensive metabolic panel; Future  -     Potassium, urine, random  -     Calcium, urine, random  -     Magnesium; Future  -     Protein electrophoresis, urine  -     Protein, urine, 24 hour; Future    Dermatitis  -     betamethasone valerate (LUXIQ) 0 12 % foam; Apply topically daily    Proteinuria, unspecified type  -     Ambulatory Referral to Nephrology; Future  -     Aldosterone/Renin Ratio; Future  -     CBC; Future  -     Comprehensive metabolic panel; Future  -     Potassium, urine, random  -     Calcium, urine, random  -     Magnesium; Future  -     Protein electrophoresis, urine  -     Protein, urine, 24 hour;  Future    Other mononeuropathy    Gastroesophageal reflux disease without esophagitis    Mild intermittent asthma without complication    Primary insomnia                Subjective:      Patient ID: Radha Rater is a 38 y o  female  Chief Complaint   Patient presents with    Follow-up     on medical conditions discussed at last office visit  Seen on 5/5 Dr Carlos Welch  She stated that she has been digging aroung and searching on her own and has looked through medical records and she feels she has finally after 5 years figured out what is wrong with her and wants your opinion       68-year-old female seen today for follow-up of chronic conditions  Laboratory studies from recent hospitalization reviewed today which showed proteinuria, hypokalemia, and leukocytosis  She had recurrence of intractable vomiting  She was evaluated in the emergency department and diagnosed with cyclical vomiting syndrome  She uses marijuana regularly  She is concerned that she may have Gitelman syndrome due to restart she has done herself based on her symptoms and lab findings  Otherwise, she has no active complaints or concerns at this time and has been compliant with medication regimen  Anxiety  Presents for follow-up visit  Patient reports no chest pain, dizziness, nausea, palpitations, shortness of breath or suicidal ideas  Symptoms occur rarely  The severity of symptoms is mild  The patient sleeps 8 hours per night  The quality of sleep is good  Her past medical history is significant for asthma  Compliance with medications is %  Heartburn  She reports no abdominal pain, no chest pain, no choking, no coughing, no nausea, no sore throat or no wheezing  This is a chronic problem  The current episode started more than 1 year ago  The problem occurs rarely  The problem has been unchanged  The symptoms are aggravated by certain foods  Pertinent negatives include no fatigue  She has tried a PPI for the symptoms  The treatment provided moderate relief  Asthma  There is no cough, shortness of breath or wheezing  This is a chronic problem   The current episode started more than 1 year ago  The problem occurs rarely  The problem has been unchanged  Pertinent negatives include no appetite change, chest pain, fever, headaches, postnasal drip, rhinorrhea, sneezing or sore throat  Her symptoms are alleviated by beta-agonist  She reports moderate improvement on treatment  Her past medical history is significant for asthma  The following portions of the patient's history were reviewed and updated as appropriate: allergies, current medications, past family history, past medical history, past social history, past surgical history and problem list     Review of Systems   Constitutional: Negative for activity change, appetite change, chills, diaphoresis, fatigue and fever  HENT: Negative for congestion, postnasal drip, rhinorrhea, sinus pressure, sinus pain, sneezing and sore throat  Eyes: Negative for visual disturbance  Respiratory: Negative for apnea, cough, choking, chest tightness, shortness of breath and wheezing  Cardiovascular: Negative for chest pain, palpitations and leg swelling  Gastrointestinal: Negative for abdominal distention, abdominal pain, anal bleeding, blood in stool, constipation, diarrhea, nausea and vomiting  Endocrine: Negative for cold intolerance and heat intolerance  Genitourinary: Negative for difficulty urinating, dysuria and hematuria  Musculoskeletal: Negative  Skin: Negative  Neurological: Negative for dizziness, weakness, light-headedness, numbness and headaches  Hematological: Negative for adenopathy  Psychiatric/Behavioral: Negative for agitation, sleep disturbance and suicidal ideas  All other systems reviewed and are negative          Past Medical History:   Diagnosis Date    Anxiety     Arthritis     OA  b/l knees    Asthma     Approx 2 years ago    Calculus of gallbladder without cholecystitis without obstruction 2/23/2022    Chronic kidney disease     Depression     Hx of bleeding disorder dysmennorrhea-dx lap today 8/12/2016    Hypertension     Kidney stone     Obesity     Seasonal allergies          Current Outpatient Medications:     albuterol (ProAir HFA) 90 mcg/act inhaler, Inhale 2 puffs every 6 (six) hours as needed for wheezing, Disp: 18 g, Rfl: 0    betamethasone valerate (LUXIQ) 0 12 % foam, Apply topically daily, Disp: 100 g, Rfl: 1    fluticasone (FLONASE) 50 mcg/act nasal spray, 1 spray into each nostril daily, Disp: 15 8 mL, Rfl: 0    gabapentin (NEURONTIN) 300 mg capsule, Take 2 capsules (600 mg total) by mouth 2 (two) times a day, Disp: 180 capsule, Rfl: 5    hydrOXYzine pamoate (VISTARIL) 25 mg capsule, Take 1 capsule (25 mg total) by mouth 3 (three) times a day as needed for anxiety, Disp: 90 capsule, Rfl: 0    lisinopril (ZESTRIL) 20 mg tablet, Take 1 tablet (20 mg total) by mouth in the morning , Disp: 90 tablet, Rfl: 1    nicotine (NICODERM CQ) 14 mg/24hr TD 24 hr patch, Place 1 patch on the skin every 24 hours, Disp: 30 patch, Rfl: 0    norgestimate-ethinyl estradiol (Ortho Tri-Cyclen Lo) 0 18/0 215/0 25 MG-25 MCG per tablet, Take 1 tablet by mouth daily, Disp: 84 tablet, Rfl: 3    omeprazole (PriLOSEC) 40 MG capsule, Take 1 capsule (40 mg total) by mouth daily, Disp: , Rfl:     ondansetron (Zofran ODT) 4 mg disintegrating tablet, Take 1 tablet (4 mg total) by mouth every 6 (six) hours as needed for nausea or vomiting, Disp: 20 tablet, Rfl: 0    promethazine (PHENERGAN) 25 mg tablet, Take 1 tablet (25 mg total) by mouth every 6 (six) hours as needed for nausea or vomiting, Disp: 20 tablet, Rfl: 0    sertraline (ZOLOFT) 100 mg tablet, Take 1 tablet daily  With the 50 mg    Total 150 mg daily, Disp: 90 tablet, Rfl: 1    sertraline (ZOLOFT) 50 mg tablet, Take 1 tablet (50 mg total) by mouth in the morning , Disp: 90 tablet, Rfl: 1    Allergies   Allergen Reactions    Eggs Or Egg-Derived Products - Food Allergy Other (See Comments)     abd pain       Social History Past Surgical History:   Procedure Laterality Date     SECTION      CHOLECYSTECTOMY      DIAGNOSTIC LAPAROSCOPY      DILATION AND CURETTAGE OF UTERUS      HERNIA REPAIR      ME COLONOSCOPY FLX DX W/COLLJ SPEC WHEN PFRMD N/A 2018    Procedure: EGD AND COLONOSCOPY;  Surgeon: Fish Guerra MD;  Location: [facility];   Service: Gastroenterology    ME LAP,CHOLECYSTECTOMY N/A 3/11/2022    Procedure: ROBOTIC LAPAROSCOPIC CHOLECYSTECTOMY;  Surgeon: Carl Dance, DO;  [facility];  Service: General    ME LAP,DIAGNOSTIC ABDOMEN N/A 2016    Procedure: LAPAROSCOPY DIAGNOSTIC DAVID;  Surgeon: Jonny Mims DO;  [facility];  Service: Gynecology    ME LAP,RMV  ADNEXAL STRUCTURE Bilateral 2016    Procedure: CYSTECTOMY  OVARIAN;  Surgeon: Jonny Mims DO;  [facility];  Service: Gynecology    ME REPAIR UMBILICAL NYRV,4+R/B,WVRXL N/A 3/11/2022    Procedure: UMBILICAL HERNIA REPAIR;  Surgeon: Carl Dance, DO;  [facility];  Service: General    WISDOM TOOTH EXTRACTION       Family History   Problem Relation Age of Onset    No Known Problems Sister     Autism Daughter     Lung cancer Maternal Grandmother     Cancer Maternal Grandmother         Deceassd 10 years    Diabetes Maternal Grandfather     Aneurysm Paternal Grandmother     No Known Problems Sister        Objective:  /58 (BP Location: Left arm, Patient Position: Sitting, Cuff Size: Standard)   Pulse 75   Temp 98 6 °F (37 °C) (Temporal)   Resp 18   Ht 5' (1 524 m)   Wt 79 4 kg (175 lb)   SpO2 97%   BMI 34 18 kg/m²     Recent Results (from the past 1344 hour(s))   CBC and differential    Collection Time: 22  7:59 PM   Result Value Ref Range    WBC 14 89 (H) 4 31 - 10 16 Thousand/uL    RBC 4 05 3 81 - 5 12 Million/uL    Hemoglobin 13 6 11 5 - 15 4 g/dL    Hematocrit 39 2 34 8 - 46 1 %    MCV 97 82 - 98 fL    MCH 33 6 26 8 - 34 3 pg    MCHC 34 7 31 4 - 37 4 g/dL    RDW 13 2 11 6 - 15 1 %    MPV 11 0 8 9 - 12 7 fL    Platelets 353 806 - 501 Thousands/uL    nRBC 0 /100 WBCs    Neutrophils Relative 68 43 - 75 %    Immat GRANS % 1 0 - 2 %    Lymphocytes Relative 18 14 - 44 %    Monocytes Relative 13 (H) 4 - 12 %    Eosinophils Relative 0 0 - 6 %    Basophils Relative 0 0 - 1 %    Neutrophils Absolute 10 19 (H) 1 85 - 7 62 Thousands/µL    Immature Grans Absolute 0 10 0 00 - 0 20 Thousand/uL    Lymphocytes Absolute 2 66 0 60 - 4 47 Thousands/µL    Monocytes Absolute 1 93 (H) 0 17 - 1 22 Thousand/µL    Eosinophils Absolute 0 00 0 00 - 0 61 Thousand/µL    Basophils Absolute 0 01 0 00 - 0 10 Thousands/µL   Comprehensive metabolic panel    Collection Time: 04/30/22  7:59 PM   Result Value Ref Range    Sodium 136 136 - 145 mmol/L    Potassium 3 2 (L) 3 5 - 5 3 mmol/L    Chloride 101 100 - 108 mmol/L    CO2 21 21 - 32 mmol/L    ANION GAP 14 (H) 4 - 13 mmol/L    BUN 17 5 - 25 mg/dL    Creatinine 1 02 0 60 - 1 30 mg/dL    Glucose 127 65 - 140 mg/dL    Calcium 9 6 8 3 - 10 1 mg/dL    AST 21 5 - 45 U/L    ALT 36 12 - 78 U/L    Alkaline Phosphatase 95 46 - 116 U/L    Total Protein 8 3 (H) 6 4 - 8 2 g/dL    Albumin 4 1 3 5 - 5 0 g/dL    Total Bilirubin 0 69 0 20 - 1 00 mg/dL    eGFR 67 ml/min/1 73sq m   Lipase    Collection Time: 04/30/22  7:59 PM   Result Value Ref Range    Lipase 162 73 - 393 u/L   Urine Macroscopic, POC    Collection Time: 04/30/22 11:33 PM   Result Value Ref Range    Color, UA Yellow     Clarity, UA Clear     pH, UA 5 5 4 5 - 8 0    Leukocytes, UA Negative Negative    Nitrite, UA Negative Negative    Protein,  (2+) (A) Negative mg/dl    Glucose, UA Negative Negative mg/dl    Ketones, UA 40 (2+) (A) Negative mg/dl    Urobilinogen, UA 0 2 0 2, 1 0 E U /dl E U /dl    Bilirubin, UA Interference- unable to analyze (A) Negative    Blood, UA Small (A) Negative    Specific Gravity, UA >=1 030 1 003 - 1 030   UA w Reflex to Microscopic w Reflex to Culture    Collection Time: 04/30/22 11:36 PM    Specimen: Urine, Clean Catch   Result Value Ref Range    Color, UA Yellow     Clarity, UA Clear     Specific Gravity, UA >=1 030 1 003 - 1 030    pH, UA 5 5 4 5, 5 0, 5 5, 6 0, 6 5, 7 0, 7 5, 8 0    Leukocytes, UA Negative Negative    Nitrite, UA Negative Negative    Protein, UA 30 (1+) (A) Negative mg/dl    Glucose, UA Negative Negative mg/dl    Ketones, UA 15 (1+) (A) Negative mg/dl    Urobilinogen, UA 0 2 0 2, 1 0 E U /dl E U /dl    Bilirubin, UA Interference- unable to analyze (A) Negative    Blood, UA Small (A) Negative   POCT pregnancy, urine    Collection Time: 04/30/22 11:36 PM   Result Value Ref Range    EXT PREG TEST UR (Ref: Negative) negative     Control valid    Urine Microscopic    Collection Time: 04/30/22 11:36 PM   Result Value Ref Range    RBC, UA None Seen None Seen, 2-4 /hpf    WBC, UA 2-4 None Seen, 2-4, 5-60 /hpf    Epithelial Cells Moderate (A) None Seen, Occasional /hpf    Bacteria, UA Occasional None Seen, Occasional /hpf    MUCUS THREADS Moderate (A) None Seen            Physical Exam  Vitals and nursing note reviewed  Constitutional:       General: She is not in acute distress  Appearance: She is well-developed  She is not diaphoretic  HENT:      Head: Normocephalic and atraumatic  Eyes:      General:         Right eye: No discharge  Left eye: No discharge  Conjunctiva/sclera: Conjunctivae normal       Pupils: Pupils are equal, round, and reactive to light  Neck:      Thyroid: No thyromegaly  Vascular: No JVD  Cardiovascular:      Rate and Rhythm: Normal rate and regular rhythm  Heart sounds: Normal heart sounds  No murmur heard  No friction rub  No gallop  Pulmonary:      Effort: Pulmonary effort is normal  No respiratory distress  Breath sounds: Normal breath sounds  No wheezing or rales  Chest:      Chest wall: No tenderness  Abdominal:      General: There is no distension  Palpations: Abdomen is soft  Tenderness: There is no abdominal tenderness  Musculoskeletal:         General: No tenderness or deformity  Normal range of motion  Cervical back: Normal range of motion and neck supple  Lymphadenopathy:      Cervical: No cervical adenopathy  Skin:     General: Skin is warm and dry  Coloration: Skin is not pale  Findings: No erythema or rash  Neurological:      Mental Status: She is alert and oriented to person, place, and time  Cranial Nerves: No cranial nerve deficit  Coordination: Coordination normal    Psychiatric:         Behavior: Behavior normal          Thought Content:  Thought content normal          Judgment: Judgment normal

## 2022-05-12 ENCOUNTER — APPOINTMENT (OUTPATIENT)
Dept: LAB | Facility: IMAGING CENTER | Age: 42
End: 2022-05-12
Payer: COMMERCIAL

## 2022-05-12 DIAGNOSIS — R80.9 PROTEINURIA, UNSPECIFIED TYPE: ICD-10-CM

## 2022-05-12 DIAGNOSIS — E87.6 HYPOKALEMIA: ICD-10-CM

## 2022-05-12 LAB
ALBUMIN SERPL BCP-MCNC: 3.5 G/DL (ref 3.5–5)
ALP SERPL-CCNC: 110 U/L (ref 46–116)
ALT SERPL W P-5'-P-CCNC: 42 U/L (ref 12–78)
ANION GAP SERPL CALCULATED.3IONS-SCNC: 2 MMOL/L (ref 4–13)
AST SERPL W P-5'-P-CCNC: 18 U/L (ref 5–45)
BILIRUB SERPL-MCNC: 0.17 MG/DL (ref 0.2–1)
BUN SERPL-MCNC: 21 MG/DL (ref 5–25)
CALCIUM PRE 500 MG CA PO UR-SCNC: <5 MG/DL
CALCIUM SERPL-MCNC: 9.5 MG/DL (ref 8.3–10.1)
CHLORIDE SERPL-SCNC: 109 MMOL/L (ref 100–108)
CO2 SERPL-SCNC: 27 MMOL/L (ref 21–32)
CREAT SERPL-MCNC: 0.88 MG/DL (ref 0.6–1.3)
ERYTHROCYTE [DISTWIDTH] IN BLOOD BY AUTOMATED COUNT: 13.7 % (ref 11.6–15.1)
GFR SERPL CREATININE-BSD FRML MDRD: 80 ML/MIN/1.73SQ M
GLUCOSE SERPL-MCNC: 87 MG/DL (ref 65–140)
HCT VFR BLD AUTO: 42.3 % (ref 34.8–46.1)
HGB BLD-MCNC: 13.7 G/DL (ref 11.5–15.4)
MAGNESIUM SERPL-MCNC: 2.1 MG/DL (ref 1.6–2.6)
MCH RBC QN AUTO: 33.1 PG (ref 26.8–34.3)
MCHC RBC AUTO-ENTMCNC: 32.4 G/DL (ref 31.4–37.4)
MCV RBC AUTO: 102 FL (ref 82–98)
PLATELET # BLD AUTO: 319 THOUSANDS/UL (ref 149–390)
PMV BLD AUTO: 10 FL (ref 8.9–12.7)
POTASSIUM SERPL-SCNC: 4.8 MMOL/L (ref 3.5–5.3)
POTASSIUM UR-SCNC: 80.5 MMOL/L
PROT SERPL-MCNC: 7.6 G/DL (ref 6.4–8.2)
RBC # BLD AUTO: 4.14 MILLION/UL (ref 3.81–5.12)
SODIUM SERPL-SCNC: 138 MMOL/L (ref 136–145)
WBC # BLD AUTO: 8.13 THOUSAND/UL (ref 4.31–10.16)

## 2022-05-12 PROCEDURE — 82340 ASSAY OF CALCIUM IN URINE: CPT | Performed by: INTERNAL MEDICINE

## 2022-05-12 PROCEDURE — 84166 PROTEIN E-PHORESIS/URINE/CSF: CPT | Performed by: PATHOLOGY

## 2022-05-12 PROCEDURE — 83735 ASSAY OF MAGNESIUM: CPT

## 2022-05-12 PROCEDURE — 84133 ASSAY OF URINE POTASSIUM: CPT | Performed by: INTERNAL MEDICINE

## 2022-05-12 PROCEDURE — 80053 COMPREHEN METABOLIC PANEL: CPT

## 2022-05-12 PROCEDURE — 36415 COLL VENOUS BLD VENIPUNCTURE: CPT

## 2022-05-12 PROCEDURE — 84166 PROTEIN E-PHORESIS/URINE/CSF: CPT | Performed by: INTERNAL MEDICINE

## 2022-05-12 PROCEDURE — 85027 COMPLETE CBC AUTOMATED: CPT

## 2022-05-14 LAB
ALBUMIN UR ELPH-MCNC: 100 %
ALPHA1 GLOB MFR UR ELPH: 0 %
ALPHA2 GLOB MFR UR ELPH: 0 %
B-GLOBULIN MFR UR ELPH: 0 %
GAMMA GLOB MFR UR ELPH: 0 %
PROT PATTERN UR ELPH-IMP: NORMAL
PROT UR-MCNC: 11 MG/DL

## 2022-06-06 ENCOUNTER — HOSPITAL ENCOUNTER (EMERGENCY)
Facility: HOSPITAL | Age: 42
Discharge: HOME | End: 2022-06-06
Attending: EMERGENCY MEDICINE
Payer: COMMERCIAL

## 2022-06-06 VITALS
OXYGEN SATURATION: 96 % | SYSTOLIC BLOOD PRESSURE: 177 MMHG | DIASTOLIC BLOOD PRESSURE: 93 MMHG | RESPIRATION RATE: 20 BRPM | HEART RATE: 84 BPM

## 2022-06-06 DIAGNOSIS — R10.13 EPIGASTRIC PAIN: ICD-10-CM

## 2022-06-06 DIAGNOSIS — R11.2 NAUSEA AND VOMITING: Primary | ICD-10-CM

## 2022-06-06 LAB
ANION GAP SERPL CALCULATED.3IONS-SCNC: 10 MMOL/L (ref 4–13)
BUN SERPL-MCNC: 13 MG/DL (ref 5–25)
CALCIUM SERPL-MCNC: 10 MG/DL (ref 8.3–10.1)
CHLORIDE SERPL-SCNC: 106 MMOL/L (ref 100–108)
CO2 SERPL-SCNC: 22 MMOL/L (ref 21–32)
CREAT SERPL-MCNC: 0.96 MG/DL (ref 0.6–1.3)
GFR SERPL CREATININE-BSD FRML MDRD: 72 ML/MIN/1.73SQ M
GLUCOSE SERPL-MCNC: 128 MG/DL (ref 65–140)
POTASSIUM SERPL-SCNC: 3.4 MMOL/L (ref 3.5–5.3)
SODIUM SERPL-SCNC: 138 MMOL/L (ref 136–145)

## 2022-06-06 PROCEDURE — 96372 THER/PROPH/DIAG INJ SC/IM: CPT

## 2022-06-06 PROCEDURE — 96365 THER/PROPH/DIAG IV INF INIT: CPT

## 2022-06-06 PROCEDURE — 96375 TX/PRO/DX INJ NEW DRUG ADDON: CPT

## 2022-06-06 PROCEDURE — 99284 EMERGENCY DEPT VISIT MOD MDM: CPT

## 2022-06-06 PROCEDURE — 36415 COLL VENOUS BLD VENIPUNCTURE: CPT

## 2022-06-06 PROCEDURE — 99285 EMERGENCY DEPT VISIT HI MDM: CPT | Performed by: EMERGENCY MEDICINE

## 2022-06-06 PROCEDURE — 93005 ELECTROCARDIOGRAM TRACING: CPT

## 2022-06-06 PROCEDURE — 80048 BASIC METABOLIC PNL TOTAL CA: CPT

## 2022-06-06 RX ORDER — HALOPERIDOL 5 MG/ML
2 INJECTION INTRAMUSCULAR ONCE
Status: COMPLETED | OUTPATIENT
Start: 2022-06-06 | End: 2022-06-06

## 2022-06-06 RX ORDER — KETOROLAC TROMETHAMINE 30 MG/ML
1 INJECTION, SOLUTION INTRAMUSCULAR; INTRAVENOUS ONCE
Status: COMPLETED | OUTPATIENT
Start: 2022-06-06 | End: 2022-06-06

## 2022-06-06 RX ORDER — DIAZEPAM 5 MG/ML
5 INJECTION, SOLUTION INTRAMUSCULAR; INTRAVENOUS ONCE
Status: COMPLETED | OUTPATIENT
Start: 2022-06-06 | End: 2022-06-06

## 2022-06-06 RX ORDER — ONDANSETRON 2 MG/ML
4 INJECTION INTRAMUSCULAR; INTRAVENOUS ONCE
Status: DISCONTINUED | OUTPATIENT
Start: 2022-06-06 | End: 2022-06-06

## 2022-06-06 RX ORDER — ONDANSETRON 2 MG/ML
1 INJECTION INTRAMUSCULAR; INTRAVENOUS ONCE
Status: COMPLETED | OUTPATIENT
Start: 2022-06-06 | End: 2022-06-06

## 2022-06-06 RX ADMIN — ONDANSETRON 8 MG: 2 INJECTION INTRAMUSCULAR; INTRAVENOUS at 20:54

## 2022-06-06 RX ADMIN — HALOPERIDOL LACTATE 2 MG: 5 INJECTION, SOLUTION INTRAMUSCULAR at 21:52

## 2022-06-06 RX ADMIN — DIAZEPAM 5 MG: 10 INJECTION, SOLUTION INTRAMUSCULAR; INTRAVENOUS at 19:57

## 2022-06-06 NOTE — ED PROVIDER NOTES
History  Chief Complaint   Patient presents with    Abdominal Pain     Pt reports 10/10 abdominal pain and vomiting starting yesterday  Pt reports a monthly 5 year hx of the same symptoms  39 yo F w/ hx of recurrent vomiting presents w/ nausea and vomiting starting last night  Pt reports 40+ episodes of NBNB vomiting and epigastric pain  Pain described as constant, severe, non-radiating, and present during previous bouts of vomiting  Denies known toxic ingestion, headache, sick contacts, palpitations, chest pain, hx of DM, or marijuana use  Prior to Admission Medications   Prescriptions Last Dose Informant Patient Reported? Taking? albuterol (ProAir HFA) 90 mcg/act inhaler  Self No No   Sig: Inhale 2 puffs every 6 (six) hours as needed for wheezing   betamethasone valerate (LUXIQ) 0 12 % foam   No No   Sig: Apply topically daily   fluticasone (FLONASE) 50 mcg/act nasal spray  Self No No   Si spray into each nostril daily   gabapentin (NEURONTIN) 300 mg capsule  Self No No   Sig: Take 2 capsules (600 mg total) by mouth 2 (two) times a day   hydrOXYzine pamoate (VISTARIL) 25 mg capsule  Self No No   Sig: Take 1 capsule (25 mg total) by mouth 3 (three) times a day as needed for anxiety   lisinopril (ZESTRIL) 20 mg tablet   No No   Sig: Take 1 tablet (20 mg total) by mouth in the morning     norgestimate-ethinyl estradiol (Ortho Tri-Cyclen Lo) 0 18/0 215/0 25 MG-25 MCG per tablet  Self No No   Sig: Take 1 tablet by mouth daily   omeprazole (PriLOSEC) 40 MG capsule  Self No No   Sig: Take 1 capsule (40 mg total) by mouth daily   ondansetron (Zofran ODT) 4 mg disintegrating tablet  Self No No   Sig: Take 1 tablet (4 mg total) by mouth every 6 (six) hours as needed for nausea or vomiting   promethazine (PHENERGAN) 25 mg tablet  Self No No   Sig: Take 1 tablet (25 mg total) by mouth every 6 (six) hours as needed for nausea or vomiting   sertraline (ZOLOFT) 100 mg tablet   No No   Sig: Take 1 tablet daily With the 50 mg  Total 150 mg daily   sertraline (ZOLOFT) 50 mg tablet   No No   Sig: Take 1 tablet (50 mg total) by mouth in the morning  Facility-Administered Medications: None       Past Medical History:   Diagnosis Date    Anxiety     Arthritis     OA  b/l knees    Asthma     Approx 2 years ago    Calculus of gallbladder without cholecystitis without obstruction 2022    Chronic kidney disease     Depression     Hx of bleeding disorder     dysmennorrhea-dx lap today 2016    Hypertension     Kidney stone     Obesity     Seasonal allergies        Past Surgical History:   Procedure Laterality Date     SECTION      CHOLECYSTECTOMY      DIAGNOSTIC LAPAROSCOPY      DILATION AND CURETTAGE OF UTERUS      HERNIA REPAIR      FL COLONOSCOPY FLX DX W/COLLJ SPEC WHEN PFRMD N/A 2018    Procedure: EGD AND COLONOSCOPY;  Surgeon: Selvin Edwards MD;  [facility];   Service: Gastroenterology    FL LAP,CHOLECYSTECTOMY N/A 3/11/2022    Procedure: ROBOTIC LAPAROSCOPIC CHOLECYSTECTOMY;  Surgeon: oRcio Manjarrez DO;  [facility];  Service: General    FL LAP,DIAGNOSTIC ABDOMEN N/A 2016    Procedure: LAPAROSCOPY DIAGNOSTIC DAVID;  Surgeon: Kiki Bob DO;  [facility];  Service: Gynecology    FL LAP,RMV  ADNEXAL STRUCTURE Bilateral 2016    Procedure: CYSTECTOMY  OVARIAN;  Surgeon: Kiki Bob DO;  [facility];  Service: Gynecology    FL REPAIR UMBILICAL UAZZ,3+O/M,KNHRZ N/A 3/11/2022    Procedure: UMBILICAL HERNIA REPAIR;  Surgeon: Rocio Manjarrez DO;  [facility];  Service: General    WISDOM TOOTH EXTRACTION         Family History   Problem Relation Age of Onset    No Known Problems Sister     Autism Daughter     Lung cancer Maternal Grandmother     Cancer Maternal Grandmother         Deceassd 8 years    Diabetes Maternal Grandfather     Aneurysm Paternal Grandmother     No Known Problems Sister      I have reviewed and agree with the history as documented  E-Cigarette/Vaping    E-Cigarette Use Never User      E-Cigarette/Vaping Substances    Nicotine No     THC No     CBD No     Flavoring No     Other No     Unknown No      Social History     Tobacco Use    Smoking status: Current Every Day Smoker     Packs/day: 1 00     Years: 23 00     Pack years: 23 00    Smokeless tobacco: Never Used   Vaping Use    Vaping Use: Never used   Substance Use Topics    Alcohol use: No    Drug use: Yes     Frequency: 7 0 times per week     Types: Marijuana     Comment: I exhaused all your rx prescription-- NOTHING ELSE HELPED!! Review of Systems   Constitutional: Negative for chills and fever  HENT: Negative for ear pain and sore throat  Eyes: Negative for pain and visual disturbance  Respiratory: Negative for cough and shortness of breath  Cardiovascular: Negative for chest pain and palpitations  Gastrointestinal: Positive for abdominal pain, nausea and vomiting  Genitourinary: Negative for dysuria and hematuria  Musculoskeletal: Negative for arthralgias and back pain  Skin: Negative for color change and rash  Neurological: Negative for seizures and syncope  All other systems reviewed and are negative  Physical Exam  ED Triage Vitals [06/06/22 1930]   Temp Pulse Respirations Blood Pressure SpO2   -- 74 22 (!) 181/131 99 %      Temp src Heart Rate Source Patient Position - Orthostatic VS BP Location FiO2 (%)   -- Monitor Lying Right arm --      Pain Score       10 - Worst Possible Pain             Orthostatic Vital Signs  Vitals:    06/06/22 1930 06/06/22 2100   BP: (!) 181/131 (!) 177/93   Pulse: 74 84   Patient Position - Orthostatic VS: Lying Lying       Physical Exam  Vitals and nursing note reviewed  Constitutional:       General: She is in acute distress  Appearance: Normal appearance  She is well-developed and normal weight   She is not ill-appearing, toxic-appearing or diaphoretic  Comments: Actively vomiting   HENT:      Head: Normocephalic and atraumatic  Right Ear: External ear normal       Left Ear: External ear normal       Nose: Nose normal       Mouth/Throat:      Pharynx: Oropharynx is clear  Eyes:      General:         Right eye: No discharge  Left eye: No discharge  Extraocular Movements: Extraocular movements intact  Conjunctiva/sclera: Conjunctivae normal    Cardiovascular:      Rate and Rhythm: Normal rate and regular rhythm  Pulses: Normal pulses  Heart sounds: Normal heart sounds  No murmur heard  No friction rub  Pulmonary:      Effort: Pulmonary effort is normal  No respiratory distress  Breath sounds: Normal breath sounds  No wheezing or rales  Abdominal:      General: Abdomen is flat  Bowel sounds are normal  There is no distension  Palpations: Abdomen is soft  Tenderness: There is no abdominal tenderness  There is no guarding  Musculoskeletal:         General: Normal range of motion  Cervical back: Normal range of motion and neck supple  Skin:     General: Skin is warm and dry  Capillary Refill: Capillary refill takes less than 2 seconds  Coloration: Skin is not pale  Neurological:      Mental Status: She is alert and oriented to person, place, and time     Psychiatric:         Mood and Affect: Mood normal          Behavior: Behavior normal          ED Medications  Medications   ondansetron (FOR EMS ONLY) (ZOFRAN) 4 mg/2 mL injection 4 mg (0 mg Does not apply Given to EMS 6/6/22 1930)   ketorolac (FOR EMS ONLY) (TORADOL) injection 30 mg (0 mg Does not apply Given to EMS 6/6/22 1931)   diazepam (VALIUM) injection 5 mg (5 mg Intravenous Given 6/6/22 1957)   ondansetron (ZOFRAN) 8 mg in sodium chloride 0 9 % 50 mL IVPB (0 mg Intravenous Stopped 6/6/22 2148)   haloperidol lactate (HALDOL) injection 2 mg (2 mg Intramuscular Given 6/6/22 2152)       Diagnostic Studies  Results Reviewed Procedure Component Value Units Date/Time    Basic metabolic panel [[id number]]  (Abnormal) Collected: 06/06/22 1957    Lab Status: Final result Specimen: Blood from Hand, Right Updated: 06/06/22 2027     Sodium 138 mmol/L      Potassium 3 4 mmol/L      Chloride 106 mmol/L      CO2 22 mmol/L      ANION GAP 10 mmol/L      BUN 13 mg/dL      Creatinine 0 96 mg/dL      Glucose 128 mg/dL      Calcium 10 0 mg/dL      eGFR 72 ml/min/1 73sq m     Narrative:      [place] guidelines for Chronic Kidney Disease (CKD):     Stage 1 with normal or high GFR (GFR > 90 mL/min/1 73 square meters)    Stage 2 Mild CKD (GFR = 60-89 mL/min/1 73 square meters)    Stage 3A Moderate CKD (GFR = 45-59 mL/min/1 73 square meters)    Stage 3B Moderate CKD (GFR = 30-44 mL/min/1 73 square meters)    Stage 4 Severe CKD (GFR = 15-29 mL/min/1 73 square meters)    Stage 5 End Stage CKD (GFR <15 mL/min/1 73 square meters)  Note: GFR calculation is accurate only with a steady state creatinine                 No orders to display         Procedures  ECG 12 Lead Documentation Only    Date/Time: 6/6/2022 9:44 PM  Performed by: Fernando Connolly MD  Authorized by: Suad Bowers MD     Indications / Diagnosis:  Haldol administration  ECG reviewed by me, the ED Provider: yes    Patient location:  ED  Previous ECG:     Previous ECG:  Compared to current    Similarity:  No change    Comparison to cardiac monitor: Yes    Interpretation:     Interpretation: normal    Rate:     ECG rate:  76    ECG rate assessment: normal    Rhythm:     Rhythm: sinus rhythm    Ectopy:     Ectopy: none    QRS:     QRS axis:  Normal    QRS intervals:  Normal  Conduction:     Conduction: normal    ST segments:     ST segments:  Normal  T waves:     T waves: normal            ED Course                                       MDM  Number of Diagnoses or Management Options  Epigastric pain: established and worsening  Nausea and vomiting: established and worsening  Diagnosis management comments: Impression: benign abdominal exam  Pt non-toxic appearing  Known hx of recurrent nausea/vomiting  Plan: EKG, symptomatic tx, IVF, BMP, re-assess    Pt reports resolution of sxs after zofran, haldol  Feels well enough to go home  Amount and/or Complexity of Data Reviewed  Clinical lab tests: ordered and reviewed  Review and summarize past medical records: yes    Risk of Complications, Morbidity, and/or Mortality  Presenting problems: low  Diagnostic procedures: minimal  Management options: minimal    Patient Progress  Patient progress: resolved      Disposition  Final diagnoses:   Nausea and vomiting   Epigastric pain     Time reflects when diagnosis was documented in both MDM as applicable and the Disposition within this note     Time User Action Codes Description Comment    6/6/2022  9:11 PM Rory Printers [R11 2] Nausea and vomiting     6/6/2022  9:11 PM Cora Hal Add [R10 13] Epigastric pain       ED Disposition     ED Disposition   Discharge    Condition   Stable    Date/Time   Mon Jun 6, 2022 10:32 PM    [address] discharge to home/self care                 Follow-up Information     Follow up With Specialties Details Why Contact Info Additional [address]  [facility] Call  As needed [address] [phone]  [place] [id number], [address] [facility]          Discharge Medication List as of 6/6/2022 10:33 PM      CONTINUE these medications which have NOT CHANGED    Details   albuterol (ProAir HFA) 90 mcg/act inhaler Inhale 2 puffs every 6 (six) hours as needed for wheezing, Starting Mon 12/6/2021, Normal      betamethasone valerate (LUXIQ) 0 12 % foam Apply topically daily, Starting Wed 5/11/2022, Normal      fluticasone (FLONASE) 50 mcg/act nasal spray 1 spray into each nostril daily, Starting Thu 5/5/2022, Normal      gabapentin (NEURONTIN) 300 mg capsule Take 2 capsules (600 mg total) by mouth 2 (two) times a day, Starting Thu 12/2/2021, Normal      hydrOXYzine pamoate (VISTARIL) 25 mg capsule Take 1 capsule (25 mg total) by mouth 3 (three) times a day as needed for anxiety, Starting Mon 2/7/2022, Normal      lisinopril (ZESTRIL) 20 mg tablet Take 1 tablet (20 mg total) by mouth in the morning , Starting Wed 5/11/2022, Normal      norgestimate-ethinyl estradiol (Ortho Tri-Cyclen Lo) 0 18/0 215/0 25 MG-25 MCG per tablet Take 1 tablet by mouth daily, Starting Tue 4/12/2022, Until Tue 7/5/2022, Normal      omeprazole (PriLOSEC) 40 MG capsule Take 1 capsule (40 mg total) by mouth daily, Starting Fri 3/25/2022, No Print      ondansetron (Zofran ODT) 4 mg disintegrating tablet Take 1 tablet (4 mg total) by mouth every 6 (six) hours as needed for nausea or vomiting, Starting Mon 1/24/2022, Normal      promethazine (PHENERGAN) 25 mg tablet Take 1 tablet (25 mg total) by mouth every 6 (six) hours as needed for nausea or vomiting, Starting Fri 3/25/2022, Normal      !! sertraline (ZOLOFT) 100 mg tablet Take 1 tablet daily  With the 50 mg  Total 150 mg daily, Normal      !! sertraline (ZOLOFT) 50 mg tablet Take 1 tablet (50 mg total) by mouth in the morning , Starting Wed 5/11/2022, Normal       !! - Potential duplicate medications found  Please discuss with provider  No discharge procedures on file  PDMP Review       Value Time User    PDMP Reviewed  Yes 1/30/2022 10:57 AM Vinicio Hammond, DO           ED Provider  Attending physically available and evaluated Melia Portillo  I managed the patient along with the ED Attending      Electronically Signed by         Quang August MD  06/07/22 0051

## 2022-06-06 NOTE — ED ATTENDING ATTESTATION
6/6/2022  IEvelin MD, saw and evaluated the patient  I have discussed the patient with the resident/non-physician practitioner and agree with the resident's/non-physician practitioner's findings, Plan of Care, and MDM as documented in the resident's/non-physician practitioner's note, except where noted  All available labs and Radiology studies were reviewed  I was present for key portions of any procedure(s) performed by the resident/non-physician practitioner and I was immediately available to provide assistance  At this point I agree with the current assessment done in the Emergency Department  I have conducted an independent evaluation of this patient a history and physical is as follows:    ED Course         Critical Care Time  Procedures    39 yo female with recurrent vomiting and abdominal pain since last night  Pt with previous visits to ed for same  Pt with epigastric pain  pmh choley, hernia repair, htn  No fever, no diarrhea, no cp, no sob, no back pain, no urinary symptoms  Vss, afebrile, lungs cta, rrr, mild distress, dry mucous membranees, abdomen soft nontender  Ivf, labs, antiemetics  Valium

## 2022-06-07 LAB
ATRIAL RATE: 76 BPM
P AXIS: 64 DEGREES
PR INTERVAL: 132 MS
QRS AXIS: 48 DEGREES
QRSD INTERVAL: 76 MS
QT INTERVAL: 366 MS
QTC INTERVAL: 411 MS
T WAVE AXIS: 48 DEGREES
VENTRICULAR RATE: 76 BPM

## 2022-06-07 PROCEDURE — 93010 ELECTROCARDIOGRAM REPORT: CPT | Performed by: INTERNAL MEDICINE

## 2022-06-08 ENCOUNTER — APPOINTMENT (EMERGENCY)
Dept: RADIOLOGY | Facility: HOSPITAL | Age: 42
End: 2022-06-08
Payer: COMMERCIAL

## 2022-06-08 ENCOUNTER — HOSPITAL ENCOUNTER (EMERGENCY)
Facility: HOSPITAL | Age: 42
Discharge: HOME | End: 2022-06-08
Attending: EMERGENCY MEDICINE
Payer: COMMERCIAL

## 2022-06-08 VITALS
TEMPERATURE: 98 F | SYSTOLIC BLOOD PRESSURE: 159 MMHG | DIASTOLIC BLOOD PRESSURE: 85 MMHG | OXYGEN SATURATION: 99 % | HEART RATE: 70 BPM | RESPIRATION RATE: 18 BRPM

## 2022-06-08 DIAGNOSIS — R10.13 EPIGASTRIC PAIN: Primary | ICD-10-CM

## 2022-06-08 DIAGNOSIS — R11.2 NAUSEA AND VOMITING, UNSPECIFIED VOMITING TYPE: ICD-10-CM

## 2022-06-08 DIAGNOSIS — Z78.9 NEED FOR FOLLOW-UP BY SOCIAL WORKER: Primary | ICD-10-CM

## 2022-06-08 DIAGNOSIS — R74.8 ELEVATED LIPASE: ICD-10-CM

## 2022-06-08 DIAGNOSIS — R74.8 ABNORMAL TRANSAMINASES: ICD-10-CM

## 2022-06-08 LAB
ALBUMIN SERPL BCP-MCNC: 3.9 G/DL (ref 3.5–5)
ALP SERPL-CCNC: 100 U/L (ref 46–116)
ALT SERPL W P-5'-P-CCNC: 109 U/L (ref 12–78)
ANION GAP SERPL CALCULATED.3IONS-SCNC: 10 MMOL/L (ref 4–13)
AST SERPL W P-5'-P-CCNC: 107 U/L (ref 5–45)
BACTERIA UR QL AUTO: ABNORMAL /HPF
BASOPHILS # BLD AUTO: 0.02 THOUSANDS/ΜL (ref 0–0.1)
BASOPHILS NFR BLD AUTO: 0 % (ref 0–1)
BILIRUB SERPL-MCNC: 1.76 MG/DL (ref 0.2–1)
BILIRUB UR QL STRIP: ABNORMAL
BUN SERPL-MCNC: 13 MG/DL (ref 5–25)
CALCIUM SERPL-MCNC: 9.4 MG/DL (ref 8.3–10.1)
CHLORIDE SERPL-SCNC: 95 MMOL/L (ref 100–108)
CLARITY UR: CLEAR
CLARITY, POC: CLEAR
CO2 SERPL-SCNC: 26 MMOL/L (ref 21–32)
COLOR UR: YELLOW
COLOR, POC: YELLOW
CREAT SERPL-MCNC: 0.87 MG/DL (ref 0.6–1.3)
EOSINOPHIL # BLD AUTO: 0.01 THOUSAND/ΜL (ref 0–0.61)
EOSINOPHIL NFR BLD AUTO: 0 % (ref 0–6)
ERYTHROCYTE [DISTWIDTH] IN BLOOD BY AUTOMATED COUNT: 13.1 % (ref 11.6–15.1)
EXT PREG TEST URINE: NEGATIVE
EXT. CONTROL ED NAV: NORMAL
GFR SERPL CREATININE-BSD FRML MDRD: 81 ML/MIN/1.73SQ M
GLUCOSE SERPL-MCNC: 121 MG/DL (ref 65–140)
GLUCOSE UR STRIP-MCNC: NEGATIVE MG/DL
HCT VFR BLD AUTO: 40.7 % (ref 34.8–46.1)
HGB BLD-MCNC: 14.3 G/DL (ref 11.5–15.4)
HGB UR QL STRIP.AUTO: ABNORMAL
IMM GRANULOCYTES # BLD AUTO: 0.06 THOUSAND/UL (ref 0–0.2)
IMM GRANULOCYTES NFR BLD AUTO: 0 % (ref 0–2)
KETONES UR STRIP-MCNC: ABNORMAL MG/DL
LEUKOCYTE ESTERASE UR QL STRIP: NEGATIVE
LIPASE SERPL-CCNC: 407 U/L (ref 73–393)
LYMPHOCYTES # BLD AUTO: 3.04 THOUSANDS/ΜL (ref 0.6–4.47)
LYMPHOCYTES NFR BLD AUTO: 22 % (ref 14–44)
MCH RBC QN AUTO: 33 PG (ref 26.8–34.3)
MCHC RBC AUTO-ENTMCNC: 35.1 G/DL (ref 31.4–37.4)
MCV RBC AUTO: 94 FL (ref 82–98)
MONOCYTES # BLD AUTO: 2.17 THOUSAND/ΜL (ref 0.17–1.22)
MONOCYTES NFR BLD AUTO: 16 % (ref 4–12)
MUCOUS THREADS UR QL AUTO: ABNORMAL
NEUTROPHILS # BLD AUTO: 8.42 THOUSANDS/ΜL (ref 1.85–7.62)
NEUTS SEG NFR BLD AUTO: 62 % (ref 43–75)
NITRITE UR QL STRIP: NEGATIVE
NON-SQ EPI CELLS URNS QL MICRO: ABNORMAL /HPF
NRBC BLD AUTO-RTO: 0 /100 WBCS
PH UR STRIP.AUTO: 7 [PH] (ref 4.5–8)
PLATELET # BLD AUTO: 266 THOUSANDS/UL (ref 149–390)
PMV BLD AUTO: 10.9 FL (ref 8.9–12.7)
POTASSIUM SERPL-SCNC: 4.5 MMOL/L (ref 3.5–5.3)
PROT SERPL-MCNC: 8.3 G/DL (ref 6.4–8.2)
PROT UR STRIP-MCNC: ABNORMAL MG/DL
RBC # BLD AUTO: 4.33 MILLION/UL (ref 3.81–5.12)
RBC #/AREA URNS AUTO: ABNORMAL /HPF
SODIUM SERPL-SCNC: 131 MMOL/L (ref 136–145)
SP GR UR STRIP.AUTO: >=1.03 (ref 1–1.03)
UROBILINOGEN UR QL STRIP.AUTO: 0.2 E.U./DL
WBC # BLD AUTO: 13.72 THOUSAND/UL (ref 4.31–10.16)
WBC #/AREA URNS AUTO: ABNORMAL /HPF

## 2022-06-08 PROCEDURE — 81025 URINE PREGNANCY TEST: CPT | Performed by: PHYSICIAN ASSISTANT

## 2022-06-08 PROCEDURE — 96374 THER/PROPH/DIAG INJ IV PUSH: CPT

## 2022-06-08 PROCEDURE — 85025 COMPLETE CBC W/AUTO DIFF WBC: CPT

## 2022-06-08 PROCEDURE — 96372 THER/PROPH/DIAG INJ SC/IM: CPT

## 2022-06-08 PROCEDURE — 81001 URINALYSIS AUTO W/SCOPE: CPT

## 2022-06-08 PROCEDURE — 83690 ASSAY OF LIPASE: CPT | Performed by: EMERGENCY MEDICINE

## 2022-06-08 PROCEDURE — 96361 HYDRATE IV INFUSION ADD-ON: CPT

## 2022-06-08 PROCEDURE — 99285 EMERGENCY DEPT VISIT HI MDM: CPT | Performed by: EMERGENCY MEDICINE

## 2022-06-08 PROCEDURE — 80053 COMPREHEN METABOLIC PANEL: CPT

## 2022-06-08 PROCEDURE — 36415 COLL VENOUS BLD VENIPUNCTURE: CPT

## 2022-06-08 PROCEDURE — 74176 CT ABD & PELVIS W/O CONTRAST: CPT

## 2022-06-08 PROCEDURE — 99285 EMERGENCY DEPT VISIT HI MDM: CPT

## 2022-06-08 PROCEDURE — G1004 CDSM NDSC: HCPCS

## 2022-06-08 PROCEDURE — 96375 TX/PRO/DX INJ NEW DRUG ADDON: CPT

## 2022-06-08 RX ORDER — HALOPERIDOL 5 MG/ML
5 INJECTION INTRAMUSCULAR ONCE
Status: COMPLETED | OUTPATIENT
Start: 2022-06-08 | End: 2022-06-08

## 2022-06-08 RX ORDER — ONDANSETRON 2 MG/ML
4 INJECTION INTRAMUSCULAR; INTRAVENOUS ONCE
Status: COMPLETED | OUTPATIENT
Start: 2022-06-08 | End: 2022-06-08

## 2022-06-08 RX ORDER — KETOROLAC TROMETHAMINE 30 MG/ML
15 INJECTION, SOLUTION INTRAMUSCULAR; INTRAVENOUS ONCE
Status: COMPLETED | OUTPATIENT
Start: 2022-06-08 | End: 2022-06-08

## 2022-06-08 RX ORDER — PROCHLORPERAZINE MALEATE 10 MG
5 TABLET ORAL EVERY 8 HOURS PRN
Qty: 3 TABLET | Refills: 0 | Status: SHIPPED | OUTPATIENT
Start: 2022-06-08

## 2022-06-08 RX ADMIN — SODIUM CHLORIDE 1000 ML: 0.9 INJECTION, SOLUTION INTRAVENOUS at 12:33

## 2022-06-08 RX ADMIN — SODIUM CHLORIDE 1000 ML: 0.9 INJECTION, SOLUTION INTRAVENOUS at 11:02

## 2022-06-08 RX ADMIN — MORPHINE SULFATE 2 MG: 2 INJECTION, SOLUTION INTRAMUSCULAR; INTRAVENOUS at 13:36

## 2022-06-08 RX ADMIN — HALOPERIDOL LACTATE 5 MG: 5 INJECTION, SOLUTION INTRAMUSCULAR at 12:10

## 2022-06-08 RX ADMIN — ONDANSETRON 4 MG: 2 INJECTION INTRAMUSCULAR; INTRAVENOUS at 11:01

## 2022-06-08 RX ADMIN — KETOROLAC TROMETHAMINE 15 MG: 30 INJECTION, SOLUTION INTRAMUSCULAR; INTRAVENOUS at 11:01

## 2022-06-08 NOTE — ED ATTENDING ATTESTATION
6/8/2022  IBradley DO, saw and evaluated the patient  I have discussed the patient with the resident/non-physician practitioner and agree with the resident's/non-physician practitioner's findings, Plan of Care, and MDM as documented in the resident's/non-physician practitioner's note, except where noted  All available labs and Radiology studies were reviewed  I was present for key portions of any procedure(s) performed by the resident/non-physician practitioner and I was immediately available to provide assistance  At this point I agree with the current assessment done in the Emergency Department  I have conducted an independent evaluation of this patient a history and physical is as follows:    40-year-old female presents with vomiting  Patient also complains of abdominal discomfort  Patient states she is unable to keep anything down  Was seen in the emergency department 2 days ago and evaluated for same complaint and sent home  Patient states she has had vomiting like this in the past   Admits to smoking marijuana but last use was 1 week ago  Denies fevers, no chills, no diarrhea  On exam-no acute distress, heart regular, no respiratory distress, abdomen soft with diffuse tenderness, worse in the upper abdomen    Plan-check labs, IV fluids, Zofran, reassess    ED Course         Critical Care Time  Procedures

## 2022-06-08 NOTE — ED PROVIDER NOTES
History  Chief Complaint   Patient presents with    Vomiting     Pt c/o vomiting x 3 days      Patient is a 15-year-old female with pt reported past medical history of hypertension, anxiety and ? cyclical vomiting disorder, presenting with 3 days of nonbilious nonbloody emesis and abdominal pain after drinking "one slushy alcohol beverage" on Sunday evening  Patient notes that she was brought in by EMS 2 days ago and evaluated for the same CC and sent home  She notes "nothing you guys gave me two days ago helped and it hasn't gone away"  She endorses 40 episodes of emesis in the last 24 hours  She endorses 10/10 upper abdominal pain, that is squeezing, nonradiating, and constant  She reports attempting to use sublingual Zofran but it has not provided relief  She reports smoking cannabis which she reports can worsen her symptoms  Her last use of cannabis was 1 week PTA  She also notes drinking ETOH exacerbates her pain with last drink 3 days ago  She reports mild dizziness while walking distances without exertion  She denies fevers, chills, night sweats and unintentional weight loss  She denies headaches, chest pain, shortness of breath, diarrhea, and constipation  She denies sick contacts  History provided by:  Patient   used: No    Vomiting  Severity:  Severe  Duration:  3 days  Timing:  Constant  Quality:  Undigested food ("watery")  Able to tolerate:  Liquids  How soon after eating does vomiting occur:  5 minutes  Progression:  Unchanged  Chronicity:  Recurrent  Recent urination:  Normal  Context: not post-tussive and not self-induced    Relieved by:  Nothing  Exacerbated by: pt reported ETOH use    Ineffective treatments:  Antiemetics and liquids  Associated symptoms: abdominal pain    Associated symptoms: no arthralgias, no chills, no cough, no diarrhea, no fever, no headaches, no sore throat and no URI    Risk factors: alcohol use and prior abdominal surgery    Risk factors: no diabetes, not pregnant, no sick contacts, no suspect food intake and no travel to endemic areas        Prior to Admission Medications   Prescriptions Last Dose Informant Patient Reported? Taking? albuterol (ProAir HFA) 90 mcg/act inhaler  Self No No   Sig: Inhale 2 puffs every 6 (six) hours as needed for wheezing   betamethasone valerate (LUXIQ) 0 12 % foam   No No   Sig: Apply topically daily   fluticasone (FLONASE) 50 mcg/act nasal spray  Self No No   Si spray into each nostril daily   gabapentin (NEURONTIN) 300 mg capsule  Self No No   Sig: Take 2 capsules (600 mg total) by mouth 2 (two) times a day   hydrOXYzine pamoate (VISTARIL) 25 mg capsule  Self No No   Sig: Take 1 capsule (25 mg total) by mouth 3 (three) times a day as needed for anxiety   lisinopril (ZESTRIL) 20 mg tablet   No No   Sig: Take 1 tablet (20 mg total) by mouth in the morning  norgestimate-ethinyl estradiol (Ortho Tri-Cyclen Lo) 0 18/0 215/0 25 MG-25 MCG per tablet  Self No No   Sig: Take 1 tablet by mouth daily   omeprazole (PriLOSEC) 40 MG capsule  Self No No   Sig: Take 1 capsule (40 mg total) by mouth daily   ondansetron (Zofran ODT) 4 mg disintegrating tablet 2022 at Unknown time Self No Yes   Sig: Take 1 tablet (4 mg total) by mouth every 6 (six) hours as needed for nausea or vomiting   promethazine (PHENERGAN) 25 mg tablet  Self No No   Sig: Take 1 tablet (25 mg total) by mouth every 6 (six) hours as needed for nausea or vomiting   sertraline (ZOLOFT) 100 mg tablet   No No   Sig: Take 1 tablet daily  With the 50 mg  Total 150 mg daily   sertraline (ZOLOFT) 50 mg tablet   No No   Sig: Take 1 tablet (50 mg total) by mouth in the morning        Facility-Administered Medications: None       Past Medical History:   Diagnosis Date    Anxiety     Arthritis     OA  b/l knees    Asthma     Approx 2 years ago    Calculus of gallbladder without cholecystitis without obstruction 2022    Chronic kidney disease     Depression     Hx of bleeding disorder     dysmennorrhea-dx lap today 2016    Hypertension     Kidney stone     Obesity     Seasonal allergies        Past Surgical History:   Procedure Laterality Date     SECTION      CHOLECYSTECTOMY      DIAGNOSTIC LAPAROSCOPY      DILATION AND CURETTAGE OF UTERUS      HERNIA REPAIR      OR COLONOSCOPY FLX DX W/COLLJ SPEC WHEN PFRMD N/A 2018    Procedure: EGD AND COLONOSCOPY;  Surgeon: Caterina Hall MD;  [facility]; Service: Gastroenterology    OR LAP,CHOLECYSTECTOMY N/A 3/11/2022    Procedure: ROBOTIC LAPAROSCOPIC CHOLECYSTECTOMY;  Surgeon: Cb Cohen DO;  [facility];  Service: General    OR LAP,DIAGNOSTIC ABDOMEN N/A 2016    Procedure: LAPAROSCOPY DIAGNOSTIC DAVID;  [facility];  [facility];  Service: Gynecology    OR LAP,RMV  ADNEXAL STRUCTURE Bilateral 2016    Procedure: CYSTECTOMY  OVARIAN;  [facility];  [facility];  Service: Gynecology    OR REPAIR UMBILICAL ASLM,8+G/K,HFSPV N/A 3/11/2022    Procedure: UMBILICAL HERNIA REPAIR;  Surgeon: Cb Cohen DO;  [facility];  Service: General    WISDOM TOOTH EXTRACTION         Family History   Problem Relation Age of Onset    No Known Problems Sister     Autism Daughter     Lung cancer Maternal Grandmother     Cancer Maternal Grandmother         Deceassd 8 years    Diabetes Maternal Grandfather     Aneurysm Paternal Grandmother     No Known Problems Sister      I have reviewed and agree with the history as documented      E-Cigarette/Vaping    E-Cigarette Use Never User      E-Cigarette/Vaping Substances    Nicotine No     THC No     CBD No     Flavoring No     Other No     Unknown No      Social History     Tobacco Use    Smoking status: Current Every Day Smoker     Packs/day: 1 00     Years: 23 00     Pack years: 23     Smokeless tobacco: Never Used   Vaping Use    Vaping Use: Never used   Substance Use Topics    Alcohol use: Yes    Drug use: Yes     Frequency: 7 0 times per week     Types: Marijuana     Comment: I exhaused all your rx prescription-- NOTHING ELSE HELPED!! Review of Systems   Constitutional: Positive for appetite change  Negative for chills, diaphoresis, fatigue, fever and unexpected weight change  HENT: Negative for ear pain and sore throat  Eyes: Negative for pain and visual disturbance  Respiratory: Negative for cough and shortness of breath  Cardiovascular: Negative for chest pain and palpitations  Gastrointestinal: Positive for abdominal pain, nausea and vomiting  Negative for constipation and diarrhea  Genitourinary: Negative for dysuria and hematuria  Musculoskeletal: Negative for arthralgias and back pain  Skin: Negative for color change and rash  Neurological: Negative for seizures, syncope and headaches  All other systems reviewed and are negative  Physical Exam  Physical Exam  Vitals and nursing note reviewed  Constitutional:       General: She is not in acute distress  Appearance: Normal appearance  She is well-developed and normal weight  She is not ill-appearing, toxic-appearing or diaphoretic  HENT:      Head: Normocephalic and atraumatic  Nose: Nose normal  No congestion or rhinorrhea  Mouth/Throat:      Mouth: Mucous membranes are dry  Pharynx: Oropharynx is clear  No oropharyngeal exudate or posterior oropharyngeal erythema  Eyes:      Extraocular Movements: Extraocular movements intact  Conjunctiva/sclera: Conjunctivae normal    Cardiovascular:      Rate and Rhythm: Normal rate and regular rhythm  Pulses: Normal pulses  Heart sounds: Normal heart sounds  No murmur heard  Pulmonary:      Effort: Pulmonary effort is normal  No respiratory distress  Breath sounds: Normal breath sounds  Abdominal:      General: Bowel sounds are increased   There is no distension or abdominal bruit  There are no signs of injury  Palpations: Abdomen is soft  There is no hepatomegaly or splenomegaly  Tenderness: There is abdominal tenderness in the epigastric area and left upper quadrant  There is no right CVA tenderness, left CVA tenderness, guarding or rebound  Negative signs include Nevarez's sign  Musculoskeletal:         General: Normal range of motion  Cervical back: Normal range of motion and neck supple  Lymphadenopathy:      Cervical: No cervical adenopathy  Skin:     General: Skin is warm and dry  Capillary Refill: Capillary refill takes less than 2 seconds  Neurological:      General: No focal deficit present  Mental Status: She is alert and oriented to person, place, and time  Mental status is at baseline  Psychiatric:         Mood and Affect: Mood normal          Behavior: Behavior normal          Thought Content:  Thought content normal          Judgment: Judgment normal          Vital Signs  ED Triage Vitals [06/08/22 1017]   Temperature Pulse Respirations Blood Pressure SpO2   98 °F (36 7 °C) 88 18 (!) 157/111 97 %      Temp Source Heart Rate Source Patient Position - Orthostatic VS BP Location FiO2 (%)   Oral Monitor Lying Right arm --      Pain Score       8           Vitals:    06/08/22 1017 06/08/22 1231   BP: (!) 157/111 159/85   Pulse: 88 70   Patient Position - Orthostatic VS: Lying Sitting         Visual Acuity      ED Medications  Medications   sodium chloride 0 9 % bolus 1,000 mL (0 mL Intravenous Stopped 6/8/22 1336)   ondansetron (ZOFRAN) injection 4 mg (4 mg Intravenous Given 6/8/22 1101)   ketorolac (TORADOL) injection 15 mg (15 mg Intravenous Given 6/8/22 1101)   haloperidol lactate (HALDOL) injection 5 mg (5 mg Intramuscular Given 6/8/22 1210)   sodium chloride 0 9 % bolus 1,000 mL (1,000 mL Intravenous New Bag 6/8/22 1233)   morphine injection 2 mg (2 mg Intravenous Given 6/8/22 1336)       Diagnostic Studies  Results Reviewed Procedure Component Value Units Date/Time    Urine Microscopic [[id number]]  (Abnormal) Collected: 06/08/22 1342    Lab Status: Final result Specimen: Urine, Clean Catch Updated: 06/08/22 1407     RBC, UA 4-10 /hpf      WBC, UA 2-4 /hpf      Epithelial Cells Occasional /hpf      Bacteria, UA Occasional /hpf      MUCUS THREADS Moderate    POCT urinalysis dipstick [[id number]]  (Normal) Resulted: 06/08/22 1345    Lab Status: Final result Specimen: Urine Updated: 06/08/22 1345     Color, UA yellow     Clarity, UA clear    Urine Macroscopic, POC [[id number]]  (Abnormal) Collected: 06/08/22 1342    Lab Status: Final result Specimen: Urine Updated: 06/08/22 1344     Color, UA Yellow     Clarity, UA Clear     pH, UA 7 0     Leukocytes, UA Negative     Nitrite, UA Negative     Protein, UA 30 (1+) mg/dl      Glucose, UA Negative mg/dl      Ketones, UA Trace mg/dl      Urobilinogen, UA 0 2 E U /dl      Bilirubin, UA Interference- unable to analyze     Blood, UA Small     Specific [place], UA >=1 030    Narrative:      CLINITEK RESULT    POCT pregnancy, urine [[id number]]  (Normal) Resulted: 06/08/22 1341    Lab Status: Final result Updated: 06/08/22 1341     EXT PREG TEST UR (Ref: Negative) negative     Control valid    Comprehensive metabolic panel [[id number]]  (Abnormal) Collected: 06/08/22 1101    Lab Status: Final result Specimen: Blood from Arm, Left Updated: 06/08/22 1150     Sodium 131 mmol/L      Potassium 4 5 mmol/L      Chloride 95 mmol/L      CO2 26 mmol/L      ANION GAP 10 mmol/L      BUN 13 mg/dL      Creatinine 0 87 mg/dL      Glucose 121 mg/dL      Calcium 9 4 mg/dL       U/L       U/L      Alkaline Phosphatase 100 U/L      Total Protein 8 3 g/dL      Albumin 3 9 g/dL      Total Bilirubin 1 76 mg/dL      eGFR 81 ml/min/1 73sq m     Narrative:      [place] guidelines for Chronic Kidney Disease (CKD):     Stage 1 with normal or high GFR (GFR > 90 mL/min/1 73 square meters)   Stage 2 Mild CKD (GFR = 60-89 mL/min/1 73 square meters)    Stage 3A Moderate CKD (GFR = 45-59 mL/min/1 73 square meters)    Stage 3B Moderate CKD (GFR = 30-44 mL/min/1 73 square meters)    Stage 4 Severe CKD (GFR = 15-29 mL/min/1 73 square meters)    Stage 5 End Stage CKD (GFR <15 mL/min/1 73 square meters)  Note: GFR calculation is accurate only with a steady state creatinine    Lipase [[id number]]  (Abnormal) Collected: 06/08/22 1101    Lab Status: Final result Specimen: Blood from Arm, Left Updated: 06/08/22 1139     Lipase 407 u/L     CBC and differential [[id number]]  (Abnormal) Collected: 06/08/22 1101    Lab Status: Final result Specimen: Blood from Arm, Left Updated: 06/08/22 1117     WBC 13 72 Thousand/uL      RBC 4 33 Million/uL      Hemoglobin 14 3 g/dL      Hematocrit 40 7 %      MCV 94 fL      MCH 33 0 pg      MCHC 35 1 g/dL      RDW 13 1 %      MPV 10 9 fL      Platelets 614 Thousands/uL      nRBC 0 /100 WBCs      Neutrophils Relative 62 %      Immat GRANS % 0 %      Lymphocytes Relative 22 %      Monocytes Relative 16 %      Eosinophils Relative 0 %      Basophils Relative 0 %      Neutrophils Absolute 8 42 Thousands/µL      Immature Grans Absolute 0 06 Thousand/uL      Lymphocytes Absolute 3 04 Thousands/µL      Monocytes Absolute 2 17 Thousand/µL      Eosinophils Absolute 0 01 Thousand/µL      Basophils Absolute 0 02 Thousands/µL                  CT abdomen pelvis wo contrast   Final Result by Enrrique Gayle MD (06/08 1419)      No acute intra-abdominal pathology  Workstation performed: [id number]                    Procedures  Procedures         ED Course  ED Course as of 06/08/22 1712 Wed Jun 08, 2022   1149 On reassessment, pt notes improvement in nausea and abdominal pain  She has not vomited since arrival to the ED  She requests further management of her nausea, haldol discussed and pt agreeable to trying      1200 Signed out to Yann Hong    1430 CT abdomen pelvis wo contrast  Discussed finding with attending Donna Rosen   97 70 84 On reassessment, pt taking sips of AJ and has successfully eaten 1 5 saltines  Pt encouraged to keep trying to PO challenge  Pt notes nausea controlled at this time, no vomiting since arrival     1532 Pt with minimal progress in PO challenge d/t limited effort  Pt sleeping on this CRNP entering room  Gently woken and reminded of PO challenge  Plan discussed with pt to discharge with close f/u from GI given clinical improvement and no vomiting while in the ED  Pt without flank pain and urinary sx     1555 DC paperwork reviewed with patient  All questions answered  The patient  able to tolerate p o  Diet while here in the emergency room  IV removed by this CRNP  MDM  Number of Diagnoses or Management Options  Epigastric pain: established and improving  Nausea and vomiting, unspecified vomiting type: established and improving     Amount and/or Complexity of Data Reviewed  Clinical lab tests: ordered and reviewed  Tests in the radiology section of CPT®: ordered and reviewed    Risk of Complications, Morbidity, and/or Mortality  General comments: The patient is a 64-year-old F presenting with 3 days of upper abdominal pain associated with nausea and vomiting  Vital signs are stable  Her physical exam is full for lower epigastric pain on deep palpation  She has dry mucous membranes, but has no other objective signs of clinical dehydration  Her plan includes labs, urine, and CT imaging  Her workup is notable for mildly elevated liver function tests and lipase  Her CT imaging is negative for hepatomegaly and pancreatitis  She was p o  Challenged after receiving anti emetics and pain medicines  She was able to tolerate without any nausea or vomiting  She notes her abdominal pain is controlled at this time  The plan for discharge was discussed including close follow-up with GI specialty in 3 days    She is instructed to return for blood in vomit or stool, severe dehydration, and worsening abdominal pain  She on repeat physical exam she is less tender at the epigastrium  Mucous membranes are now moist and pink  C-collar patient is medically stable for discharge at this time as directed or return if she clinically worsens  she is instructed to use the prescribed Compazinefor breakthrough nausea after trying Zofran 1st      Patient Progress  Patient progress: improved      Disposition  Final diagnoses:   Epigastric pain   Nausea and vomiting, unspecified vomiting type   Elevated lipase   Abnormal transaminases     Time reflects when diagnosis was documented in both MDM as applicable and the Disposition within this note     Time User Action Codes Description Comment    6/8/2022  3:45 PM Clif Wilcox [R10 13] Epigastric pain     6/8/2022  3:45 PM Clif Wilcox [R11 2] Nausea and vomiting, unspecified vomiting type     6/8/2022  3:45 PM Maria T Harms Add [R74 8] Elevated lipase     6/8/2022  3:45 PM Maria T Harms Add [R74 8] Abnormal transaminases       ED Disposition     ED Disposition   Discharge    Condition   Stable    Date/Time   Wed Jun 8, 2022  3:44 PM    [address] discharge to home/self care                 Follow-up Information     Follow up With Specialties Details Why Contact Info Additional [facility] Schedule an appointment as soon as possible for a visit in 3 days  [address] [facility] [address]  Adan Santos [facility] [place], [address] [facility], [facility] [address], [place], [place], [address]          Patient's Medications   Discharge Prescriptions    PROCHLORPERAZINE (COMPAZINE) 10 MG TABLET    Take 0 5 tablets (5 mg total) by mouth every 8 (eight) hours as needed for nausea or vomiting       Start Date: 6/8/2022  End Date: --       Order Dose: 5 mg       Quantity: 3 tablet    Refills: 0       No discharge procedures on file      PDMP Review       Value Time User    PDMP Reviewed  Yes 1/30/2022 10:57 AM Horace Rubio DO          ED Provider  Electronically Signed by           Gasper Graves  06/08/22 2829

## 2022-06-08 NOTE — CASE MANAGEMENT
Case Management ED Discharge Planning Note    Patient name Sandy Taveras ED 28/ED 28 MRN [id number]  : 1980 Date 2022        OBJECTIVE:  Predictive Model Details         27% Factor Value    Risk of Hospital Admission or ED Visit Model Number of ED Visits 5+     Is in Relationship Yes     Number of Hospitalizations 1     Has Chronic Kidney Disease Yes     Has Depression Yes     Has Asthma Yes       Chief Complaint:   Patient Class: Emergency  Preferred Pharmacy:   [facility]/pharmacy #[id number]- Emmie Sanabria [address] [address]  Phone: [phone] Fax: [phone]    Primary Care Provider: No primary care provider on file  Primary Insurance: CIGNA  Secondary Insurance:     ED Discharge Details:    Discharge planning discussed with[de-identified] Patient  Freedom of Choice: Yes     CM contacted family/caregiver?: No- see comments (Pt alert and oriented)      Other Referral/Resources/Interventions Provided:  Interventions: Outpatient   Referral Comments: Pt states that she is attempting to get SSD for herself and for her 15year old autistic daughter  Pt expressed frustration with the application process  Pt is interested in an OP CM and states that she just changed her PCP to a [facility] PCP  CM placed a request with Presley Virgen for pt to be assigned an OP CM once pt is fully established with  PCP      Treatment Team Recommendation: Home  Discharge Destination Plan[de-identified] Home     Transport at Discharge : Family

## 2022-06-08 NOTE — CASE MANAGEMENT
Case Management ED Assessment    Patient name Zonia Najera  Location ED 28/ED 28 MRN [id number]  : 1980 Date 2022        OBJECTIVE:  Predictive Model Details         27% Factor Value    Risk of Hospital Admission or ED Visit Model Number of ED Visits 5+     Is in Relationship Yes     Number of Hospitalizations 1     Has Chronic Kidney Disease Yes     Has Depression Yes     Has Asthma Yes       Chief Complaint:   Patient Class: Emergency  [facility]erred Pharm[facility]   [id number] [address] [address]  Phone: [phone] Fax: [phone]    Primary Care Provider: No primary care provider on file  Primary Insurance: CIGNA  Secondary Insurance:     ED ASSESSMENT:  [facility] Proxies    There are no active Health Care Proxies on file          Outpatient Care Information  Have you seen a doctor in the last year?: Yes  Specify which physician group(s) you have seen in the past year : Other  Specialist(s) seen outside of [facility] or [facility]: PCP    Prescribed Medications Prior to Admission/ED Visit  Has patient been prescribed medications (prior to this ED visit/admission)?: Yes  Any difficulty obtaining medications?: No  Has the patient been taking all prescribed medication(s)?: Yes  Does the patient have difficulty affording medication(s)?: No    Patient Information  Admitted from[de-identified] Home  Mental Status: Alert  During Assessment patient was accompanied by: Not accompanied during assessment  Assessment information provided by[de-identified] Patient  Primary Caregiver: Self  Support Systems: Self, Spouse/significant other  [place] of Residence: [address] [place] [address] do you live in?: [place]  In the last 12 months, was there a time when you were not able to pay the mortgage or rent on time?: No  In the last 12 months, how many places have you lived?: 1  In the last 12 months, was there a time when you did not have a steady place to sleep or slept in a shelter (including now)?: No  Homeless/housing insecurity resource given?: No  Living Arrangements: Lives w/ Spouse/significant other    Patient Information Continued  Income Source: Unemployed  Does patient have prescription coverage?: Yes  Within the past 12 months, you worried that your food would run out before you got the money to buy more : Never true  Within the past 12 months, the food you bought just didn't last and you didn't have money to get more : Never true  Food insecurity resource given?: No  Does patient receive dialysis treatments?: No  Does patient have a history of substance abuse?: No  Does patient have a history of Mental Health Diagnosis?: No    Food and Transportation  What is patient's usual means of transportation?: Drives Self  Ask patient:  How would you describe your eating habits?: Good

## 2022-06-08 NOTE — ED CARE HANDOFF
Emergency Department Sign Out Note        Sign out and transfer of care from [facility]  See Separate Emergency Department note  The patient, Zaheer Cline, was evaluated by the previous provider for vomiting and abdominal pain    Workup Completed:  Labs: cbc, cmp, lipase show Leukocytosis, elevated liver enzymes, lipase, and JACKI  Toradol and zofran    ED Course / Workup Pending (followup):  Repeat evaluation, haldol ordered  CT scan ordered  Care was resumed by [facility] please refer to her note for final disposition                          ED Course as of 06/08/22 1433   Wed Jun 08, 2022   1228 Reviewed lab results with patient discussed further evaluation of abdomen with CT, patient complains of abdominal discomfort and nausea, repeat abdominal exam soft with epigastric tenderness no guarding no rebound no distension     Procedures  MDM        Disposition  Final diagnoses:   None     ED Disposition     None      Follow-up Information    None       Patient's Medications   Discharge Prescriptions    No medications on file     No discharge procedures on file         ED Provider  Electronically Signed by     Carlitos June PA-C  06/08/22 8964

## 2022-06-08 NOTE — DISCHARGE INSTRUCTIONS
Thank you for allowing me to take part in your care today  We did a workup that revealed a mild elevated liver function tests and lipase  Like you to follow-up with your GI doctor in 3 days  Regarding your nausea and vomiting, I recommend taking your prescribed Zofran sublingual 1st   I am prescribing 3 tablets of Compazine for as needed for nausea  Please eat a bland diet  Please avoid fried foods, fatty foods, spicy foods, and caffeinated products  Please irrigate small meals and eat slowly  Please return for acute worsening of your abdominal pain, blood in your vomit, and blood in her stool  Please abstain from alcohol and marijuana products

## 2022-06-09 ENCOUNTER — TELEPHONE (OUTPATIENT)
Dept: NEPHROLOGY | Facility: HOSPITAL | Age: 42
End: 2022-06-09

## 2022-06-09 ENCOUNTER — PATIENT OUTREACH (OUTPATIENT)
Dept: INTERNAL MEDICINE CLINIC | Age: 42
End: 2022-06-09

## 2022-06-09 NOTE — PROGRESS NOTES
OP CM rcvd consult from inpt CM in regards to SSD for pt and her autistic daughter  Called to pt and LM

## 2022-06-10 ENCOUNTER — PATIENT OUTREACH (OUTPATIENT)
Dept: INTERNAL MEDICINE CLINIC | Age: 42
End: 2022-06-10

## 2022-06-13 NOTE — TELEPHONE ENCOUNTER
New Patient Intake Form   Patient Details   Melia Gonzalez     1980     [id number]     Appointment Information   Who is calling to schedule? If not patient, what is callers name? Patient    Referring Provider  Tejinder   Referring Provider Number [phone]   Reason for Appt (Diagnosis) Proteinuria, Hypokalemia   Is patient aware of why they are being referred? yes   Does Patient have labs done at [place] [address]? If not, where do they go? yes    Has patient had labs / urine work done? List date of most recent lab / urine work yes   6/8   Has patient had a BMP & CBC done in the past 2 years? If so, list the date yes 6/8   Has patient been hospitalized recently? If yes, list name and location of hospital they were in no   Has patient been seen by a Nephrologist before? If yes, list name, location and phone number  no   Has patient been see by another Specialty before (ex  Neurology, urology, cardiology)? If yes, please list name, and specialty  Kesha Duncan   Has the patient had imaging done? If so, list the most recent date and type of imaging yes   6/ CAT   Does the patient has a stone analysis report if history of kidney stones? no   Appointment Details   Is there a referral on file?  yes    Appointment Date  9/15   Location [facility]    Miscellaneous

## 2022-07-11 ENCOUNTER — OFFICE VISIT (OUTPATIENT)
Dept: OBGYN CLINIC | Facility: CLINIC | Age: 42
End: 2022-07-11
Payer: COMMERCIAL

## 2022-07-11 VITALS
BODY MASS INDEX: 33.81 KG/M2 | WEIGHT: 172.2 LBS | SYSTOLIC BLOOD PRESSURE: 136 MMHG | DIASTOLIC BLOOD PRESSURE: 86 MMHG | HEIGHT: 60 IN

## 2022-07-11 DIAGNOSIS — N80.9 ENDOMETRIOSIS DETERMINED BY LAPAROSCOPY: ICD-10-CM

## 2022-07-11 DIAGNOSIS — Z98.890 HISTORY OF COLONOSCOPY: ICD-10-CM

## 2022-07-11 DIAGNOSIS — F32.A ANXIETY AND DEPRESSION: ICD-10-CM

## 2022-07-11 DIAGNOSIS — I10 PRIMARY HYPERTENSION: ICD-10-CM

## 2022-07-11 DIAGNOSIS — N95.1 MENOPAUSAL SYNDROME (HOT FLASHES): Primary | ICD-10-CM

## 2022-07-11 DIAGNOSIS — F41.9 ANXIETY AND DEPRESSION: ICD-10-CM

## 2022-07-11 PROCEDURE — 3075F SYST BP GE 130 - 139MM HG: CPT | Performed by: OBSTETRICS & GYNECOLOGY

## 2022-07-11 PROCEDURE — 99213 OFFICE O/P EST LOW 20 MIN: CPT | Performed by: OBSTETRICS & GYNECOLOGY

## 2022-07-11 PROCEDURE — 3079F DIAST BP 80-89 MM HG: CPT | Performed by: OBSTETRICS & GYNECOLOGY

## 2022-07-11 RX ORDER — NORGESTIMATE AND ETHINYL ESTRADIOL 7DAYSX3 LO
1 KIT ORAL DAILY
Qty: 84 TABLET | Refills: 3 | Status: SHIPPED | OUTPATIENT
Start: 2022-07-11 | End: 2022-10-03

## 2022-07-11 NOTE — PROGRESS NOTES
Assessment/Plan:      Diagnoses and all orders for this visit:    Menopausal syndrome (hot flashes)  -     norgestimate-ethinyl estradiol (Ortho Tri-Cyclen Lo) 0 18/0 215/0 25 MG-25 MCG per tablet; Take 1 tablet by mouth daily    Endometriosis determined by laparoscopy    Anxiety and depression    Primary hypertension        Subjective: here for follow-up     Patient ID: Milena Canales is a 38 y o  female  HPI    59-year-old female  1 para 1 history of  section history of pelvic endometriosis determined by laparoscopy  Patient was recently here in April this past year for her annual exam   She had been previously on low-dose norethindrone for many years to control her endometriosis symptoms  Recently she began have increasing symptoms of hot flashes and night sweats  We switched her norethindrone to biphasic birth control pill which seems to be helping significantly  She states she rarely has hot flashes or night sweats anymore  Denies any pelvic pain symptoms  Recommend we continue Ortho-Tri-Cyclen Lo as directed follow-up annual exam in April next year & PRN    Review of Systems   Unchanged from previous visit    Objective: no acute distress  /86   Ht 5' (1 524 m)   Wt 78 1 kg (172 lb 3 2 oz)   BMI 33 63 kg/m²      Physical Exam  Vitals and nursing note reviewed  Constitutional:       Appearance: Normal appearance  HENT:      Head: Normocephalic  Nose: Nose normal    Eyes:      Pupils: Pupils are equal, round, and reactive to light  Pulmonary:      Effort: Pulmonary effort is normal    Musculoskeletal:         General: Normal range of motion  Cervical back: Normal range of motion  Neurological:      Mental Status: She is alert and oriented to person, place, and time  Psychiatric:         Mood and Affect: Mood normal          Behavior: Behavior normal          Thought Content:  Thought content normal          Judgment: Judgment normal         Please note    In addition to the time spent discussing the findings and results of today's visit and exam, I spent approximately 20 minutes of face-to-face time with the patient, greater than 50% of which was spent in counseling and coordination of care for this patient

## 2022-08-17 ENCOUNTER — OFFICE VISIT (OUTPATIENT)
Dept: INTERNAL MEDICINE CLINIC | Facility: OTHER | Age: 42
End: 2022-08-17
Payer: COMMERCIAL

## 2022-08-17 ENCOUNTER — TELEPHONE (OUTPATIENT)
Dept: INTERNAL MEDICINE CLINIC | Facility: OTHER | Age: 42
End: 2022-08-17

## 2022-08-17 VITALS
DIASTOLIC BLOOD PRESSURE: 84 MMHG | TEMPERATURE: 98.3 F | OXYGEN SATURATION: 96 % | RESPIRATION RATE: 18 BRPM | BODY MASS INDEX: 32.98 KG/M2 | HEART RATE: 57 BPM | SYSTOLIC BLOOD PRESSURE: 128 MMHG | HEIGHT: 60 IN | WEIGHT: 168 LBS

## 2022-08-17 DIAGNOSIS — F12.90 MARIJUANA USE, CONTINUOUS: ICD-10-CM

## 2022-08-17 DIAGNOSIS — F41.9 ANXIETY: ICD-10-CM

## 2022-08-17 DIAGNOSIS — I10 PRIMARY HYPERTENSION: Primary | ICD-10-CM

## 2022-08-17 PROBLEM — F32.A ANXIETY AND DEPRESSION: Status: RESOLVED | Noted: 2022-07-11 | Resolved: 2022-08-17

## 2022-08-17 PROCEDURE — 99214 OFFICE O/P EST MOD 30 MIN: CPT | Performed by: INTERNAL MEDICINE

## 2022-08-17 PROCEDURE — 3074F SYST BP LT 130 MM HG: CPT | Performed by: INTERNAL MEDICINE

## 2022-08-17 PROCEDURE — 3079F DIAST BP 80-89 MM HG: CPT | Performed by: INTERNAL MEDICINE

## 2022-08-17 NOTE — ASSESSMENT & PLAN NOTE
She reports no recent exacerbation  Continue current regimen  Counseled patient on smoking cessation

## 2022-08-17 NOTE — PROGRESS NOTES
Assessment/Plan:    Cyclical vomiting syndrome  Counseled patient on marijuana cessation regarding sickle call vomiting syndrome to which she was not agreeable to my recommendations  Mild intermittent asthma without complication  She reports no recent exacerbation  Continue current regimen  Counseled patient on smoking cessation  Primary hypertension  Controlled  Continue lisinopril 20 mg daily    Anxiety  Continue sertraline 150 mg daily  Diagnoses and all orders for this visit:    Primary hypertension    Marijuana use, continuous    Anxiety                Subjective:      Patient ID: Melia Gomez is a 38 y o  female  Chief Complaint   Patient presents with    Follow-up     3 month, no labs due  No issues    hm     Mammo ordered 3/16/22 ( aware of the order and did not need help scheduling ) hep c ordered 3/25/22       49-year-old female seen today for follow-up of chronic conditions  Laboratory studies reviewed today  She denies any recent exacerbation of asthma  She reports continuing to experience episodes of cyclical vomiting syndrome  She admits to continuous marijuana use, reporting that the association between cyclical vomiting syndrome and chronic/continuous marijuana use is "bullshit"  She was very reserved during today's appointment, stating that she has nothing to talk about  Hypertension  This is a chronic problem  The problem is unchanged  The problem is controlled  Associated symptoms include anxiety  Pertinent negatives include no chest pain, headaches, palpitations or shortness of breath  Past treatments include ACE inhibitors  The current treatment provides moderate improvement  Compliance problems include diet and exercise  Anxiety  Presents for follow-up visit  Patient reports no chest pain, dizziness, nausea, palpitations, shortness of breath or suicidal ideas  Symptoms occur occasionally  The severity of symptoms is mild  The quality of sleep is good  Nighttime awakenings: none  Compliance with medications is %  The following portions of the patient's history were reviewed and updated as appropriate: allergies, current medications, past family history, past medical history, past social history, past surgical history and problem list     Review of Systems   Constitutional: Negative for activity change, appetite change, chills, diaphoresis, fatigue and fever  HENT: Negative for congestion, postnasal drip, rhinorrhea, sinus pressure, sinus pain, sneezing and sore throat  Eyes: Negative for visual disturbance  Respiratory: Negative for apnea, cough, choking, chest tightness, shortness of breath and wheezing  Cardiovascular: Negative for chest pain, palpitations and leg swelling  Gastrointestinal: Negative for abdominal distention, abdominal pain, anal bleeding, blood in stool, constipation, diarrhea, nausea and vomiting  Endocrine: Negative for cold intolerance and heat intolerance  Genitourinary: Negative for difficulty urinating, dysuria and hematuria  Musculoskeletal: Negative  Skin: Negative  Neurological: Negative for dizziness, weakness, light-headedness, numbness and headaches  Hematological: Negative for adenopathy  Psychiatric/Behavioral: Negative for agitation, sleep disturbance and suicidal ideas  All other systems reviewed and are negative  Past Medical History:   Diagnosis Date    Anxiety     Arthritis     OA  b/l knees    Asthma     Approx 2 years ago    Calculus of gallbladder without cholecystitis without obstruction 02/23/2022    Chronic kidney disease     Depression     GERD (gastroesophageal reflux disease)     In my 25s   Approx 15 years ago    Hx of bleeding disorder     dysmennorrhea-dx lap today 8/12/2016    Hypertension     Kidney stone     Obesity     Seasonal allergies          Current Outpatient Medications:     albuterol (ProAir HFA) 90 mcg/act inhaler, Inhale 2 puffs every 6 (six) hours as needed for wheezing, Disp: 18 g, Rfl: 0    betamethasone valerate (LUXIQ) 0 12 % foam, Apply topically daily, Disp: 100 g, Rfl: 1    fluticasone (FLONASE) 50 mcg/act nasal spray, 1 spray into each nostril daily, Disp: 15 8 mL, Rfl: 0    gabapentin (NEURONTIN) 300 mg capsule, Take 2 capsules (600 mg total) by mouth 2 (two) times a day, Disp: 180 capsule, Rfl: 5    hydrOXYzine pamoate (VISTARIL) 25 mg capsule, Take 1 capsule (25 mg total) by mouth 3 (three) times a day as needed for anxiety, Disp: 90 capsule, Rfl: 0    lisinopril (ZESTRIL) 20 mg tablet, Take 1 tablet (20 mg total) by mouth in the morning , Disp: 90 tablet, Rfl: 1    norgestimate-ethinyl estradiol (Ortho Tri-Cyclen Lo) 0 18/0 215/0 25 MG-25 MCG per tablet, Take 1 tablet by mouth daily, Disp: 84 tablet, Rfl: 3    omeprazole (PriLOSEC) 40 MG capsule, Take 1 capsule (40 mg total) by mouth daily, Disp: , Rfl:     ondansetron (Zofran ODT) 4 mg disintegrating tablet, Take 1 tablet (4 mg total) by mouth every 6 (six) hours as needed for nausea or vomiting, Disp: 20 tablet, Rfl: 0    prochlorperazine (COMPAZINE) 10 mg tablet, Take 0 5 tablets (5 mg total) by mouth every 8 (eight) hours as needed for nausea or vomiting, Disp: 3 tablet, Rfl: 0    promethazine (PHENERGAN) 25 mg tablet, Take 1 tablet (25 mg total) by mouth every 6 (six) hours as needed for nausea or vomiting, Disp: 20 tablet, Rfl: 0    sertraline (ZOLOFT) 100 mg tablet, Take 1 tablet daily  With the 50 mg    Total 150 mg daily, Disp: 90 tablet, Rfl: 1    sertraline (ZOLOFT) 50 mg tablet, Take 1 tablet (50 mg total) by mouth in the morning , Disp: 90 tablet, Rfl: 1    Allergies   Allergen Reactions    Eggs Or Egg-Derived Products - Food Allergy Other (See Comments)     abd pain       Social History   Past Surgical History:   Procedure Laterality Date     SECTION      CHOLECYSTECTOMY      DIAGNOSTIC LAPAROSCOPY      DILATION AND CURETTAGE OF UTERUS  2008    HERNIA REPAIR      NJ COLONOSCOPY FLX DX W/COLLJ SPEC WHEN PFRMD N/A 2018    Procedure: EGD AND COLONOSCOPY;  Surgeon: Slim Patel MD;  [facility]; Service: Gastroenterology    NJ LAP,CHOLECYSTECTOMY N/A 3/11/2022    Procedure: ROBOTIC LAPAROSCOPIC CHOLECYSTECTOMY;  Surgeon: Suzi Nelson DO;  [facility];  Service: General    NJ LAP,DIAGNOSTIC ABDOMEN N/A 2016    Procedure: LAPAROSCOPY DIAGNOSTIC DAVID;  Surgeon: Prela Irving DO;  [facility];  Service: Gynecology    NJ LAP,RMV  ADNEXAL STRUCTURE Bilateral 2016    Procedure: CYSTECTOMY  OVARIAN;  Surgeon: Perla Irving DO;  [facility];  Service: Gynecology    NJ REPAIR UMBILICAL EHXG,8+P/C,ZKIDN N/A 3/11/2022    Procedure: UMBILICAL HERNIA REPAIR;  Surgeon: Suzi Nelson DO;  [facility];  Service: General    WISDOM TOOTH EXTRACTION       Family History   Problem Relation Age of Onset    No Known Problems Sister     Autism Daughter     Lung cancer Maternal Grandmother     Cancer Maternal Grandmother         Deceassd 8 years    Diabetes Maternal Grandfather     Arthritis Maternal Grandfather          10 years    Aneurysm Paternal Grandmother     No Known Problems Sister        Objective:  /84 (BP Location: Left arm, Patient Position: Sitting, Cuff Size: Standard)   Pulse 57   Temp 98 3 °F (36 8 °C) (Temporal)   Resp 18   Ht 5' (1 524 m)   Wt 76 2 kg (168 lb)   SpO2 96%   BMI 32 81 kg/m²     No results found for this or any previous visit (from the past 1344 hour(s))  Physical Exam  Vitals and nursing note reviewed  Constitutional:       General: She is not in acute distress  Appearance: She is well-developed  She is not diaphoretic  HENT:      Head: Normocephalic and atraumatic  Eyes:      General:         Right eye: No discharge  Left eye: No discharge        Conjunctiva/sclera: Conjunctivae normal       Pupils: Pupils are equal, round, and reactive to light  Neck:      Thyroid: No thyromegaly  Vascular: No JVD  Cardiovascular:      Rate and Rhythm: Normal rate and regular rhythm  Heart sounds: Normal heart sounds  No murmur heard  No friction rub  No gallop  Pulmonary:      Effort: Pulmonary effort is normal  No respiratory distress  Breath sounds: Normal breath sounds  No wheezing or rales  Chest:      Chest wall: No tenderness  Abdominal:      General: There is no distension  Palpations: Abdomen is soft  Tenderness: There is no abdominal tenderness  Musculoskeletal:         General: No tenderness or deformity  Normal range of motion  Cervical back: Normal range of motion and neck supple  Lymphadenopathy:      Cervical: No cervical adenopathy  Skin:     General: Skin is warm and dry  Coloration: Skin is not pale  Findings: No erythema or rash  Neurological:      Mental Status: She is alert and oriented to person, place, and time  Cranial Nerves: No cranial nerve deficit  Coordination: Coordination normal    Psychiatric:         Behavior: Behavior normal          Thought Content:  Thought content normal          Judgment: Judgment normal

## 2022-08-17 NOTE — ASSESSMENT & PLAN NOTE
Counseled patient on marijuana cessation regarding sickle call vomiting syndrome to which she was not agreeable to my recommendations

## 2022-08-17 NOTE — TELEPHONE ENCOUNTER
----- Message from Ilda Waggoner MD sent at 8/17/2022  1:37 PM EDT -----  Regarding: patient dismissal  Bettyestefania Elliott,   As per our conversation regarding the behavior Mrs. Dusty Durand exhibited towards myself and the staff, I would like to dismiss her from the practice. Please notify the patient of her dismissal in accordance with our policy.      Ilda Waggoner MD

## 2022-08-30 ENCOUNTER — HOSPITAL ENCOUNTER (EMERGENCY)
Facility: HOSPITAL | Age: 42
Discharge: HOME | End: 2022-08-30
Attending: EMERGENCY MEDICINE
Payer: COMMERCIAL

## 2022-08-30 VITALS
SYSTOLIC BLOOD PRESSURE: 156 MMHG | DIASTOLIC BLOOD PRESSURE: 98 MMHG | RESPIRATION RATE: 16 BRPM | HEART RATE: 62 BPM | TEMPERATURE: 98.4 F | OXYGEN SATURATION: 98 %

## 2022-08-30 DIAGNOSIS — F32.A DEPRESSION: Primary | ICD-10-CM

## 2022-08-30 LAB — ETHANOL EXG-MCNC: 0 MG/DL

## 2022-08-30 PROCEDURE — 82075 ASSAY OF BREATH ETHANOL: CPT | Performed by: EMERGENCY MEDICINE

## 2022-08-30 PROCEDURE — 99284 EMERGENCY DEPT VISIT MOD MDM: CPT

## 2022-08-30 PROCEDURE — 99284 EMERGENCY DEPT VISIT MOD MDM: CPT | Performed by: EMERGENCY MEDICINE

## 2022-08-30 NOTE — DISCHARGE INSTRUCTIONS
This writer discussed the patients current presentation and recommended discharge plan with Dr Salomon Hale  They agree with the patient being discharged at this time with referrals and/or information about outpatient mental health resources  The patient was Instructed to follow up with their PCP regarding medication management  The patient was provided with referral information for:   Outpatient mental health resources  This writer and the patient completed a safety plan  The patient was provided with a copy of their safety plan with encouragement to utilize the plan following discharge  In addition, the patient was instructed to call [facility] crisis, other crisis services, [facility] or to go to the nearest ER immediately if their situation changes at any time  This writer discussed discharge plans with the patient, who agrees with and understands the discharge plans  SAFETY PLAN  Warning Signs (thoughts, images, mood, behavior, situations) of a potential crisis: Increased stress, anxiety, crying  Coping Skills (what can I do to take my mind off the problem, or to keep myself safe): utilize medical marijuana, spend time with daughter        Outside Support (who can I reach out to for support and help):  and Mother        National Suicide Prevention Hotline:  [address]: [facility]: [address] [phone] - Crisis   [phone] - [address] [facility] (1-9pm daily)  [phone] - [address]  [place] (1-9pm daily)  [address] [place] ([place]) [phone] - [address] [facility]

## 2022-08-30 NOTE — ED ATTENDING ATTESTATION
8/30/2022  Edie Marrero, DO, saw and evaluated the patient  I have discussed the patient with the resident/non-physician practitioner and agree with the resident's/non-physician practitioner's findings, Plan of Care, and MDM as documented in the resident's/non-physician practitioner's note, except where noted  All available labs and Radiology studies were reviewed  I was present for key portions of any procedure(s) performed by the resident/non-physician practitioner and I was immediately available to provide assistance  At this point I agree with the current assessment done in the Emergency Department  I have conducted an independent evaluation of this patient a history and physical is as follows:    Patient brought by police for evaluation of depression with suicidal ideation  Police were called to her house by her and she requested to file a police report regarding money she was scammed out of  She also indicated that she was having a rough week when her autistic child was dropped off by a school bus at their home when she was not home and the child wandered away  Further, her sister is losing custody of her children and she is taking custody of a four-month-old  These things are stressing her and she became frustrated  Unfortunately, she expresses frustration by telling the  that they should just shoot her  She denies active SI  She denies HI, command hallucinations  She does have a h/o anxiety and depression but no prior behavior health admissions or suicide attempts  She has seen a counselor but her family doctor manages her symptoms at present  ROS: Denies f/c, HA, CP, SOB, abdominal pain, n/v/d  12 system ROS o/w negative       PE: NAD, appears comfortable, alert, cooperative; PERRL, EOMI; MMM, no posterior oropharyngeal exudate, edema or erythema; HRR, no murmur; lungs CTA w/o w/r/r, POx 100% on RA (nl); abdomen s/nt/nd, nl BS in all 4 quadrant; (-) LE edema or calf TTP, FROM extremities x4; skin p/w/d; CN II-XII GI/NF, oriented; Psych flat affect, good eye contact, good insight  DDx: Depression, passive statement of SI w/o credible plan  A/P: Will consult crisis for improved outpatient support services             ED Course         Critical Care Time  Procedures

## 2022-08-30 NOTE — ED NOTES
This writer discussed the patients current presentation and recommended discharge plan with Dr Saenz Rule Dr Chloe Zavala  They agree with the patient being discharged at this time with referrals and/or information about outpatient mental health resources  The patient was Instructed to follow up with their PCP regarding medication management  The patient was provided with referral information for:   Outpatient mental health resources  This writer and the patient completed a safety plan  The patient was provided with a copy of their safety plan with encouragement to utilize the plan following discharge  In addition, the patient was instructed to call [facility] crisis, other crisis services, [facility] or to go to the nearest ER immediately if their situation changes at any time  This writer discussed discharge plans with the patient, who agrees with and understands the discharge plans  SAFETY PLAN  Warning Signs (thoughts, images, mood, behavior, situations) of a potential crisis: Increased stress, anxiety, crying  Coping Skills (what can I do to take my mind off the problem, or to keep myself safe): utilize medical marijuana, spend time with daughter        Outside Support (who can I reach out to for support and help):  and Mother        National Suicide Prevention Hotline:  [address]: [facility]: [address] [place] [address] [address] [phone] - Crisis   [phone] - [address] [facility] (1-9pm daily)  [phone] - [address]  [place] (1-9pm daily)  [address] [facility] [phone] - [address] [facility]

## 2022-08-30 NOTE — ED PROVIDER NOTES
History  Chief Complaint   Patient presents with    Psychiatric Evaluation     Patient reports that she is depressed with a lot of stressors going on at home, states she got scammed out of large amount of money today and when the police was there she told them to shoot her  Melia Alicia is a 75-year-old woman presenting after selling a  to shoot her today  She reports multiple life stressors  Yesterday, the school dropped her autistic child off at her house without her pain home and her child wandered off  Her sister is addicted to methamphetamines and lost custody of her children and Child protective Services is currently trying to place a 3month-old baby with her  She also fell victim to a scam which she lost 1200 dollars 2, which she states is most if not all of her savings  She called police to make a police report for this scan,  showed up to her house, and she told the  to shoot her  She did not a cost the  or attend to steal their weapon  Her other sister is an alcoholic and she cannot rely on her for support  Her mother is still alive and she can rely on her for support, however feels as though she is older and she cannot further her with her at problems  She is  and lives with her   She reports no drug or alcohol use today  She reports no previous psychiatric admissions, no previous suicide attempts  She has no physical complaints today  Her mother currently is watching her daughter while she is in the ED  Prior to Admission Medications   Prescriptions Last Dose Informant Patient Reported? Taking?    albuterol (ProAir HFA) 90 mcg/act inhaler  Self No No   Sig: Inhale 2 puffs every 6 (six) hours as needed for wheezing   betamethasone valerate (LUXIQ) 0 12 % foam  Self No No   Sig: Apply topically daily   fluticasone (FLONASE) 50 mcg/act nasal spray  Self No No   Si spray into each nostril daily gabapentin (NEURONTIN) 300 mg capsule  Self No No   Sig: Take 2 capsules (600 mg total) by mouth 2 (two) times a day   hydrOXYzine pamoate (VISTARIL) 25 mg capsule  Self No No   Sig: Take 1 capsule (25 mg total) by mouth 3 (three) times a day as needed for anxiety   lisinopril (ZESTRIL) 20 mg tablet  Self No No   Sig: Take 1 tablet (20 mg total) by mouth in the morning  norgestimate-ethinyl estradiol (Ortho Tri-Cyclen Lo) 0 18/0 215/0 25 MG-25 MCG per tablet  Self No No   Sig: Take 1 tablet by mouth daily   omeprazole (PriLOSEC) 40 MG capsule  Self No No   Sig: Take 1 capsule (40 mg total) by mouth daily   ondansetron (Zofran ODT) 4 mg disintegrating tablet  Self No No   Sig: Take 1 tablet (4 mg total) by mouth every 6 (six) hours as needed for nausea or vomiting   prochlorperazine (COMPAZINE) 10 mg tablet  Self No No   Sig: Take 0 5 tablets (5 mg total) by mouth every 8 (eight) hours as needed for nausea or vomiting   promethazine (PHENERGAN) 25 mg tablet  Self No No   Sig: Take 1 tablet (25 mg total) by mouth every 6 (six) hours as needed for nausea or vomiting   sertraline (ZOLOFT) 100 mg tablet  Self No No   Sig: Take 1 tablet daily  With the 50 mg  Total 150 mg daily   sertraline (ZOLOFT) 50 mg tablet  Self No No   Sig: Take 1 tablet (50 mg total) by mouth in the morning  Facility-Administered Medications: None       Past Medical History:   Diagnosis Date    Anxiety     Arthritis     OA  b/l knees    Asthma     Approx 2 years ago    Calculus of gallbladder without cholecystitis without obstruction 2022    Chronic kidney disease     Depression     GERD (gastroesophageal reflux disease)     In my 25s   Approx 15 years ago    Hx of bleeding disorder     dysmennorrhea-dx lap today 2016    Hypertension     Kidney stone     Obesity     Seasonal allergies        Past Surgical History:   Procedure Laterality Date     SECTION      CHOLECYSTECTOMY      DIAGNOSTIC LAPAROSCOPY  DILATION AND CURETTAGE OF UTERUS      HERNIA REPAIR      NC COLONOSCOPY FLX DX W/COLLJ SPEC WHEN PFRMD N/A 2018    Procedure: EGD AND COLONOSCOPY;  Surgeon: Lashell Smith MD;  [facility]; Service: Gastroenterology    NC LAP,CHOLECYSTECTOMY N/A 3/11/2022    Procedure: ROBOTIC LAPAROSCOPIC CHOLECYSTECTOMY;  Surgeon: Padmini Roberts DO;  [facility];  Service: General    NC LAP,DIAGNOSTIC ABDOMEN N/A 2016    Procedure: LAPAROSCOPY DIAGNOSTIC DAVID;  Surgeon: Fahad Perales DO;  [facility];  Service: Gynecology    NC LAP,RMV  ADNEXAL STRUCTURE Bilateral 2016    Procedure: CYSTECTOMY  OVARIAN;  Surgeon: Fahad Perales DO;  [facility];  Service: Gynecology    NC REPAIR UMBILICAL MYBN,0+L/M,JONCD N/A 3/11/2022    Procedure: UMBILICAL HERNIA REPAIR;  Surgeon: Padmini Roberts DO;  [facility];  Service: General    WISDOM TOOTH EXTRACTION         Family History   Problem Relation Age of Onset    No Known Problems Sister     Autism Daughter     Lung cancer Maternal Grandmother     Cancer Maternal Grandmother         Deceassd 8 years    Diabetes Maternal Grandfather     Arthritis Maternal Grandfather          8 years    Aneurysm Paternal Grandmother     No Known Problems Sister      I have reviewed and agree with the history as documented      E-Cigarette/Vaping    E-Cigarette Use Never User      E-Cigarette/Vaping Substances    Nicotine No     THC No     CBD No     Flavoring No     Other No     Unknown No      Social History     Tobacco Use    Smoking status: Current Every Day Smoker     Packs/day: 1 00     Years: 23 00     Pack years: 23 00     Types: Cigarettes    Smokeless tobacco: Never Used   Vaping Use    Vaping Use: Never used   Substance Use Topics    Alcohol use: Not Currently     Comment: social    Drug use: Yes     Frequency: 7 0 times per week     Types: Marijuana     Comment: I exhaused all your rx prescription-- NOTHING ELSE HELPED!! Review of Systems   All other systems reviewed and are negative  Physical Exam  ED Triage Vitals [08/30/22 1554]   Temperature Pulse Respirations Blood Pressure SpO2   98 4 °F (36 9 °C) 65 17 156/98 100 %      Temp Source Heart Rate Source Patient Position - Orthostatic VS BP Location FiO2 (%)   Oral Monitor Sitting Left arm --      Pain Score       No Pain             Orthostatic Vital Signs  Vitals:    08/30/22 1554 08/30/22 1702   BP: 156/98    Pulse: 65 62   Patient Position - Orthostatic VS: Sitting        Physical Exam  Vitals and nursing note reviewed  Constitutional:       General: She is not in acute distress  Appearance: She is not ill-appearing  HENT:      Head: Normocephalic and atraumatic  Right Ear: External ear normal       Left Ear: External ear normal       Nose: Nose normal       Mouth/Throat:      Mouth: Mucous membranes are moist    Eyes:      Conjunctiva/sclera: Conjunctivae normal    Cardiovascular:      Rate and Rhythm: Normal rate  Pulmonary:      Effort: Pulmonary effort is normal    Abdominal:      General: There is no distension  Musculoskeletal:         General: No deformity  Skin:     General: Skin is warm and dry  Capillary Refill: Capillary refill takes less than 2 seconds  Neurological:      General: No focal deficit present  Mental Status: She is alert  Psychiatric:      Comments: No SI, HI, AVH  Is able to identify her daughter as a reason to live  She also is able to identify with my help that her  is a person of support as well  I believe she has insight into her current situation  She states "I would never kills myself " Tearful            ED Medications  Medications - No data to display    Diagnostic Studies  Results Reviewed     Procedure Component Value Units Date/Time    POCT alcohol breath test [[id number]]  (Normal) Resulted: 08/30/22 1644    Lab Status: Final result Updated: 08/30/22 1644     EXTBreath Alcohol 0 000                 No orders to display         Procedures  Procedures      ED Course                                       MDM  Number of Diagnoses or Management Options  Depression  Diagnosis management comments: 39 yo woman presenting after telling police to shoot her  No active suicidal ideation here on my evaluation in the ED  Patient is able to identify people who support her in her life, reasons for her to live  She has no history of psychiatric hospitalization or previous suicide attempt  I offered Crisis services to patient to discuss outpatient therapy, which she accepted  Her breath alcohol is 0 000 here in the ED and she does not appear intoxicated with any other substances  Patient discussed stressors with Crisis, identified coping skills, safety plan, and to follow up with PCP regarding mental health services  Discharged home in stable condition, return precautions given  Amount and/or Complexity of Data Reviewed  Clinical lab tests: reviewed        Disposition  Final diagnoses:   Depression     Time reflects when diagnosis was documented in both MDM as applicable and the Disposition within this note     Time User Action Codes Description Comment    8/30/2022  6:00 PM Heber Daniel Add [A29  A] Depression     8/30/2022  6:00 PM Heber Daniel Remove [W63  A] Depression     8/30/2022  6:00 PM Heber Daniel Add [P33  A] Depression       ED Disposition     ED Disposition   Discharge    Condition   Stable    Date/Time   Tue Aug 30, 2022  6:00 PM    Comment   Melia Lira discharge to home/self care  MD Antonio Beyer    None         Patient's Medications   Discharge Prescriptions    No medications on file     No discharge procedures on file      PDMP Review       Value Time User    PDMP Reviewed  Yes 1/30/2022 10:57 AM Ale Peck DO           ED Provider  Attending physically available and evaluated Melia De Souza I managed the patient along with the ED Attending      Electronically Signed by         Carli Torres MD  08/30/22 2222

## 2022-08-30 NOTE — ED NOTES
Pt is a 38 y o  female who was brought to the ED due to anxiety and making a comment to police to shoot her today  Patient states she has been under overwhelming stress, particularly these past two days  Patient states that yesterday was her autistic daughter's first day of school and she was not dropped off by the bus until 2 hours later than she was supposed to  Patient expressed deep concern and worry about her daughter during that time and today went to the school and spoke with the karen about this issue  Following that, patient learned that she was scammed out of $1300 and now owes her bank over $700 for overdrafting  Patient expressed that she was trying to obtain a part-time job and was sent a check to cash but was told she would need to withdraw it afterwards  However, she has not been able to contact the people that did this since she learned that it was a scam so she went to the police to report it  The officer had come to the home to take the report, and after patient explained the various stressors going on, she said "just shoot me", which prompted police to bring her to the hospital  Patient denies any suicidal ideations currently or in the past  Patient also states that tomorrow CPS is coming to the house as her neice, who is using drugs, has a 2 month old baby they are trying to find a safe home for  Patient has been feeling overwhelmed with the various responsibilities she has, and limited support outside of her  and mother  Patient denies any previous inpatient mental health treatment  Her PCP currently prescribes her medications, although she needs to find a new doctor because he is no longer willing to refill her medical marijuana  Patient denies homicidal ideations and auditory/visual hallucinations  She denies issues with sleep as long as she takes her medications  Patient does report a decreased appetite over the last two days related to all the existing stressors   Patient feels safe to be discharged home at this time  Patient was provided with a list of outpatient providers and was encouraged to schedule with a therapist or consider a partial program once things are sorted out at home  Patient is receptive to receiving additional community supports  Chief Complaint   Patient presents with    Psychiatric Evaluation     Patient reports that she is depressed with a lot of stressors going on at home, states she got scammed out of large amount of money today and when the police was there she told them to shoot her         Intake Assessment completed, Safety risk Assessment completed    VIK Huitron  08/30/22   [id number]

## 2022-09-06 ENCOUNTER — TELEPHONE (OUTPATIENT)
Dept: NEPHROLOGY | Facility: CLINIC | Age: 42
End: 2022-09-06

## 2022-09-06 NOTE — TELEPHONE ENCOUNTER
Received a vm from patient stating she needs to reschedule her appt for tomorrow  I called but had to leave a voicemail

## 2022-09-06 NOTE — TELEPHONE ENCOUNTER
Appointment Confirmation   Person confirmed appointment with  If not patient, name of the person lm    Date and time of appointment 09/07   Patient acknowledged and will be at appointment? no   Did you advise the patient that they will need a urine sample if they are a new patient?  yes   Did you advise the patient to bring their current medications for verification? (including any OTC) yes   Additional Information

## 2022-09-15 ENCOUNTER — CONSULT (OUTPATIENT)
Dept: NEPHROLOGY | Facility: CLINIC | Age: 42
End: 2022-09-15
Payer: COMMERCIAL

## 2022-09-15 ENCOUNTER — TELEPHONE (OUTPATIENT)
Dept: NEPHROLOGY | Facility: CLINIC | Age: 42
End: 2022-09-15

## 2022-09-15 VITALS
DIASTOLIC BLOOD PRESSURE: 60 MMHG | BODY MASS INDEX: 32.59 KG/M2 | HEIGHT: 60 IN | SYSTOLIC BLOOD PRESSURE: 110 MMHG | WEIGHT: 166 LBS

## 2022-09-15 DIAGNOSIS — R80.9 PROTEINURIA, UNSPECIFIED TYPE: ICD-10-CM

## 2022-09-15 DIAGNOSIS — E87.6 HYPOKALEMIA: ICD-10-CM

## 2022-09-15 PROCEDURE — 3078F DIAST BP <80 MM HG: CPT | Performed by: STUDENT IN AN ORGANIZED HEALTH CARE EDUCATION/TRAINING PROGRAM

## 2022-09-15 PROCEDURE — 3074F SYST BP LT 130 MM HG: CPT | Performed by: STUDENT IN AN ORGANIZED HEALTH CARE EDUCATION/TRAINING PROGRAM

## 2022-09-15 PROCEDURE — 99204 OFFICE O/P NEW MOD 45 MIN: CPT | Performed by: STUDENT IN AN ORGANIZED HEALTH CARE EDUCATION/TRAINING PROGRAM

## 2022-09-15 NOTE — PATIENT INSTRUCTIONS
Thank you for coming to your visit today   As we discussed you kidney function is normal  Please follow the recommendations below       Recommend low sodium (salt) food    Repeat blood and urine test     Avoid nonsteroidal anti-inflammatory drugs such as Naprosyn, ibuprofen, Aleve, Advil, Celebrex, Meloxicam (Mobic) etc   You can use Tylenol as needed if you do not have any liver condition to be concerned about    Try to exercise at least 30 minutes 3 days a week to begin with with an ultimate goal of 5 days a week for at least 30 minutes    Next Visit in 4 months with results   If you need to see us earlier we can change the appointment for you      Melanie Julien MD  Nephrology Attending

## 2022-09-15 NOTE — PROGRESS NOTES
NEPHROLOGY OUTPATIENT CONSULTATION   Melia Briseno 38 y o  female MRN: [id number]  Date: 9/15/2022  Reason for consultation:   Chief Complaint   Patient presents with    Consult    Hypertension       ASSESSMENT AND PLAN:  37 yo woman PMH obesity, anxiety, depression , + DAMIEN, HTN, cyclical vomiting syndrome, asthma, hypokalemia  Patient is here for initial consultation for hypokalemia and proteinuria     PLAN:    #Hypokalemia   · Intermittent hypokalemia   · Etiology: likely secondary to poor food intake and intermittent N/V   · Recommend enforce solute intake  · Repeat BMP       #Proteinuria? · + dipstick for proteins with SG >1030 withc can give a false +  · Needs UPCr to confirm proteinuria   · Hx of + DAMIEN  · Will check complement, ds-DNA    #Volume status/hypertension:   Volume:euvolemic    Blood pressure:normotensive /60mmhg, goal <140/90   Recommend:   Enforce fluid intake      #Anxiety/depression   · On meds     HISTORY OF PRESENT ILLNESS:  Melia Briseno is a 38 y o   female with PMH obesity, anxiety, depression , + DAMIEN, hypertensive, cyclical vomiting syndrome, asthma, hypokalemia who presents for initial consultation for hypokalemia and proteinuria     REVIEW OF SYSTEMS:    More than 10 point review of systems were obtained and discussed in length with the patient  Complete review of systems were negative / unremarkable except mentioned in the HPI section      Review of Systems - Ophthalmic ROS: negative  ENT ROS: negative  Hematological and Lymphatic ROS: negative  Endocrine ROS: negative  Respiratory ROS: no cough, shortness of breath, or wheezing  Cardiovascular ROS: no chest pain or dyspnea on exertion  Gastrointestinal ROS: no abdominal pain, change in bowel habits, or black or bloody stools  Genito-Urinary ROS: no dysuria, trouble voiding, or hematuria  Musculoskeletal ROS: negative  Neurological ROS: no TIA or stroke symptoms  Dermatological ROS: negative     PHYSICAL EXAM:  Vitals: 09/15/22 1245 09/15/22 1333   BP:  110/60   Weight: 75 3 kg (166 lb)    Height: 5' (1 524 m)      Body mass index is 32 42 kg/m²  Physical Exam   General:  no acute distress at this time  Skin:  No acute rash  Eyes:  No scleral icterus and noninjected  ENT:  mucous membranes moist  Neck:  no carotid bruits  Chest:  Clear to auscultation percussion, good respiratory effort, no use of accessory respiratory muscles  CVS:  Regular rate and rhythm without a murmur rub  Abdomen:  soft and nontender   Extremities:   no significant lower extremity edema  Neuro:  No gross focality  Psych:  Alert , cooperative       PAST MEDICAL HISTORY:  Past Medical History:   Diagnosis Date    Anxiety     Arthritis     OA  b/l knees    Asthma     Approx 2 years ago    Calculus of gallbladder without cholecystitis without obstruction 2022    Chronic kidney disease     Depression     GERD (gastroesophageal reflux disease)     In my 25s  Approx 15 years ago    Hx of bleeding disorder     dysmennorrhea-dx lap today 2016    Hypertension     Kidney stone     Obesity     Seasonal allergies        PAST SURGICAL HISTORY:  Past Surgical History:   Procedure Laterality Date     SECTION      CHOLECYSTECTOMY      DIAGNOSTIC LAPAROSCOPY      DILATION AND CURETTAGE OF UTERUS      HERNIA REPAIR      SD COLONOSCOPY FLX DX W/COLLJ SPEC WHEN PFRMD N/A 2018    Procedure: EGD AND COLONOSCOPY;  Surgeon: Lashell Smith MD;  [facility];   Service: Gastroenterology    SD LAP,CHOLECYSTECTOMY N/A 3/11/2022    Procedure: ROBOTIC LAPAROSCOPIC CHOLECYSTECTOMY;  Surgeon: Padmini Roberts DO;  [facility];  Service: General    SD LAP,DIAGNOSTIC ABDOMEN N/A 2016    Procedure: LAPAROSCOPY DIAGNOSTIC DAVID;  Surgeon: Fahad Perales DO;  [facility];  Service: Gynecology    SD LAP,RMV  ADNEXAL STRUCTURE Bilateral 2016    Procedure: CYSTECTOMY  OVARIAN;  Surgeon: Fahad Perales DO; [facility];  Service: Gynecology    CT REPAIR UMBILICAL LCBU,6+Z/M,VIMKU N/A 3/11/2022    Procedure: UMBILICAL HERNIA REPAIR;  Surgeon: Aylin Lopez DO;  [facility];  Service: General    WISDOM TOOTH EXTRACTION         ALLERGIES:  Allergies   Allergen Reactions    Eggs Or Egg-Derived Products - Food Allergy Other (See Comments)     abd pain       SOCIAL HISTORY:  Social History     Substance and Sexual Activity   Alcohol Use Not Currently    Comment: social     Social History     Substance and Sexual Activity   Drug Use Yes    Frequency: 7 0 times per week    Types: Marijuana    Comment: I exhaused all your rx prescription-- NOTHING ELSE HELPED!!      Social History     Tobacco Use   Smoking Status Current Every Day Smoker    Packs/day:     Years:     Pack years:     Types: Cigarettes   Smokeless Tobacco Never Used       FAMILY HISTORY:  Family History   Problem Relation Age of Onset    No Known Problems Sister     Autism Daughter     Lung cancer Maternal Grandmother     Cancer Maternal Grandmother         Deceassd 8 years    Diabetes Maternal Grandfather     Arthritis Maternal Grandfather          8 years    Aneurysm Paternal Grandmother     No Known Problems Sister        MEDICATIONS:    Current Outpatient Medications:     albuterol (ProAir HFA) 90 mcg/act inhaler, Inhale 2 puffs every 6 (six) hours as needed for wheezing, Disp: 18 g, Rfl: 0    betamethasone valerate (LUXIQ) 0 12 % foam, Apply topically daily, Disp: 100 g, Rfl: 1    fluticasone (FLONASE) 50 mcg/act nasal spray, 1 spray into each nostril daily, Disp: 15 8 mL, Rfl: 0    gabapentin (NEURONTIN) 300 mg capsule, Take 2 capsules (600 mg total) by mouth 2 (two) times a day, Disp: 180 capsule, Rfl: 5    hydrOXYzine pamoate (VISTARIL) 25 mg capsule, Take 1 capsule (25 mg total) by mouth 3 (three) times a day as needed for anxiety, Disp: 90 capsule, Rfl: 0    lisinopril (ZESTRIL) 20 mg tablet, Take 1 tablet (20 mg total) by mouth in the morning , Disp: 90 tablet, Rfl: 1    norgestimate-ethinyl estradiol (Ortho Tri-Cyclen Lo) 0 18/0 215/0 25 MG-25 MCG per tablet, Take 1 tablet by mouth daily, Disp: 84 tablet, Rfl: 3    ondansetron (Zofran ODT) 4 mg disintegrating tablet, Take 1 tablet (4 mg total) by mouth every 6 (six) hours as needed for nausea or vomiting, Disp: 20 tablet, Rfl: 0    prochlorperazine (COMPAZINE) 10 mg tablet, Take 0 5 tablets (5 mg total) by mouth every 8 (eight) hours as needed for nausea or vomiting, Disp: 3 tablet, Rfl: 0    promethazine (PHENERGAN) 25 mg tablet, Take 1 tablet (25 mg total) by mouth every 6 (six) hours as needed for nausea or vomiting, Disp: 20 tablet, Rfl: 0    sertraline (ZOLOFT) 100 mg tablet, Take 1 tablet daily  With the 50 mg  Total 150 mg daily, Disp: 90 tablet, Rfl: 1    sertraline (ZOLOFT) 50 mg tablet, Take 1 tablet (50 mg total) by mouth in the morning , Disp: 90 tablet, Rfl: 1    Lab Results:   Results for orders placed or performed during the hospital encounter of 08/30/22   POCT alcohol breath test   Result Value Ref Range    EXTBreath Alcohol 0 000        Portions of the record may have been created with voice recognition software  Occasional wrong word or "sound a like" substitutions may have occurred due to the inherent limitations of voice recognition software  Read the chart carefully and recognize, using context, where substitutions have occurred

## 2022-11-29 ENCOUNTER — HOSPITAL ENCOUNTER (EMERGENCY)
Facility: HOSPITAL | Age: 42
Discharge: HOME | End: 2022-11-29
Attending: EMERGENCY MEDICINE

## 2022-11-29 VITALS
DIASTOLIC BLOOD PRESSURE: 86 MMHG | TEMPERATURE: 97.3 F | SYSTOLIC BLOOD PRESSURE: 144 MMHG | HEART RATE: 82 BPM | OXYGEN SATURATION: 99 % | RESPIRATION RATE: 22 BRPM

## 2022-11-29 DIAGNOSIS — R10.9 ABDOMINAL PAIN: Primary | ICD-10-CM

## 2022-11-29 DIAGNOSIS — R11.2 NAUSEA AND VOMITING: ICD-10-CM

## 2022-11-29 LAB
ALBUMIN SERPL BCP-MCNC: 4 G/DL (ref 3.5–5)
ALP SERPL-CCNC: 105 U/L (ref 46–116)
ALT SERPL W P-5'-P-CCNC: 26 U/L (ref 12–78)
ANION GAP SERPL CALCULATED.3IONS-SCNC: 13 MMOL/L (ref 4–13)
AST SERPL W P-5'-P-CCNC: 19 U/L (ref 5–45)
BASOPHILS # BLD AUTO: 0.06 THOUSANDS/ÂΜL (ref 0–0.1)
BASOPHILS NFR BLD AUTO: 1 % (ref 0–1)
BILIRUB SERPL-MCNC: 0.49 MG/DL (ref 0.2–1)
BUN SERPL-MCNC: 12 MG/DL (ref 5–25)
CALCIUM SERPL-MCNC: 10.2 MG/DL (ref 8.3–10.1)
CHLORIDE SERPL-SCNC: 103 MMOL/L (ref 96–108)
CO2 SERPL-SCNC: 18 MMOL/L (ref 21–32)
CREAT SERPL-MCNC: 1.04 MG/DL (ref 0.6–1.3)
EOSINOPHIL # BLD AUTO: 0.19 THOUSAND/ÂΜL (ref 0–0.61)
EOSINOPHIL NFR BLD AUTO: 2 % (ref 0–6)
ERYTHROCYTE [DISTWIDTH] IN BLOOD BY AUTOMATED COUNT: 12.9 % (ref 11.6–15.1)
GFR SERPL CREATININE-BSD FRML MDRD: 65 ML/MIN/1.73SQ M
GLUCOSE SERPL-MCNC: 158 MG/DL (ref 65–140)
HCT VFR BLD AUTO: 39.2 % (ref 34.8–46.1)
HGB BLD-MCNC: 14 G/DL (ref 11.5–15.4)
IMM GRANULOCYTES # BLD AUTO: 0.03 THOUSAND/UL (ref 0–0.2)
IMM GRANULOCYTES NFR BLD AUTO: 0 % (ref 0–2)
LIPASE SERPL-CCNC: 191 U/L (ref 73–393)
LYMPHOCYTES # BLD AUTO: 3.27 THOUSANDS/ÂΜL (ref 0.6–4.47)
LYMPHOCYTES NFR BLD AUTO: 30 % (ref 14–44)
MCH RBC QN AUTO: 32.9 PG (ref 26.8–34.3)
MCHC RBC AUTO-ENTMCNC: 35.7 G/DL (ref 31.4–37.4)
MCV RBC AUTO: 92 FL (ref 82–98)
MONOCYTES # BLD AUTO: 1.2 THOUSAND/ÂΜL (ref 0.17–1.22)
MONOCYTES NFR BLD AUTO: 11 % (ref 4–12)
NEUTROPHILS # BLD AUTO: 6.13 THOUSANDS/ÂΜL (ref 1.85–7.62)
NEUTS SEG NFR BLD AUTO: 56 % (ref 43–75)
NRBC BLD AUTO-RTO: 0 /100 WBCS
PLATELET # BLD AUTO: 280 THOUSANDS/UL (ref 149–390)
PMV BLD AUTO: 9.8 FL (ref 8.9–12.7)
POTASSIUM SERPL-SCNC: 3.4 MMOL/L (ref 3.5–5.3)
PROT SERPL-MCNC: 8.5 G/DL (ref 6.4–8.4)
RBC # BLD AUTO: 4.25 MILLION/UL (ref 3.81–5.12)
SODIUM SERPL-SCNC: 134 MMOL/L (ref 135–147)
WBC # BLD AUTO: 10.88 THOUSAND/UL (ref 4.31–10.16)

## 2022-11-29 RX ORDER — KETOROLAC TROMETHAMINE 30 MG/ML
15 INJECTION, SOLUTION INTRAMUSCULAR; INTRAVENOUS ONCE
Status: COMPLETED | OUTPATIENT
Start: 2022-11-29 | End: 2022-11-29

## 2022-11-29 RX ORDER — HALOPERIDOL 5 MG/ML
5 INJECTION INTRAMUSCULAR ONCE
Status: COMPLETED | OUTPATIENT
Start: 2022-11-29 | End: 2022-11-29

## 2022-11-29 RX ORDER — ONDANSETRON 2 MG/ML
4 INJECTION INTRAMUSCULAR; INTRAVENOUS ONCE
Status: COMPLETED | OUTPATIENT
Start: 2022-11-29 | End: 2022-11-29

## 2022-11-29 RX ADMIN — KETOROLAC TROMETHAMINE 15 MG: 30 INJECTION, SOLUTION INTRAMUSCULAR; INTRAVENOUS at 18:27

## 2022-11-29 RX ADMIN — HALOPERIDOL LACTATE 5 MG: 5 INJECTION, SOLUTION INTRAMUSCULAR at 19:23

## 2022-11-29 RX ADMIN — HALOPERIDOL LACTATE 5 MG: 5 INJECTION, SOLUTION INTRAMUSCULAR at 18:28

## 2022-11-29 RX ADMIN — ONDANSETRON 4 MG: 2 INJECTION INTRAMUSCULAR; INTRAVENOUS at 18:29

## 2022-11-29 RX ADMIN — SODIUM CHLORIDE, SODIUM LACTATE, POTASSIUM CHLORIDE, AND CALCIUM CHLORIDE 1000 ML: .6; .31; .03; .02 INJECTION, SOLUTION INTRAVENOUS at 18:26

## 2022-11-29 NOTE — ED PROVIDER NOTES
History  Chief Complaint   Patient presents with   • Vomiting     Vomiting started today with epigastric pain  Hx cyclical vomiting  Pt moaning loudly  Not actively vomiting  43-year-old woman with relevant past medical history of cyclic vomiting syndrome presents with vomiting and epigastric pain  She was in her normal state of health until earlier this morning when she had occasional vomiting  They have now progressed to abdominal pain and uncontrollable vomiting  She is unable to keep anything down  No fever, cough, congestion, diarrhea, or dysuria  She says this is similar to her prior cyclic vomiting episodes  She used marijuana yesterday  She has a history of cholecystectomy  Prior to Admission Medications   Prescriptions Last Dose Informant Patient Reported? Taking? albuterol (ProAir HFA) 90 mcg/act inhaler   No No   Sig: Inhale 2 puffs every 6 (six) hours as needed for wheezing   betamethasone valerate (LUXIQ) 0 12 % foam   No No   Sig: Apply topically daily   fluticasone (FLONASE) 50 mcg/act nasal spray   No No   Si spray into each nostril daily   gabapentin (NEURONTIN) 300 mg capsule   No No   Sig: Take 2 capsules (600 mg total) by mouth 2 (two) times a day   hydrOXYzine pamoate (VISTARIL) 25 mg capsule   No No   Sig: Take 1 capsule (25 mg total) by mouth 3 (three) times a day as needed for anxiety   lisinopril (ZESTRIL) 20 mg tablet   No No   Sig: Take 1 tablet (20 mg total) by mouth in the morning     norgestimate-ethinyl estradiol (Ortho Tri-Cyclen Lo) 0 18/0 215/0 25 MG-25 MCG per tablet   No No   Sig: Take 1 tablet by mouth daily   ondansetron (Zofran ODT) 4 mg disintegrating tablet   No No   Sig: Take 1 tablet (4 mg total) by mouth every 6 (six) hours as needed for nausea or vomiting   prochlorperazine (COMPAZINE) 10 mg tablet   No No   Sig: Take 0 5 tablets (5 mg total) by mouth every 8 (eight) hours as needed for nausea or vomiting   promethazine (PHENERGAN) 25 mg tablet No No   Sig: Take 1 tablet (25 mg total) by mouth every 6 (six) hours as needed for nausea or vomiting   sertraline (ZOLOFT) 100 mg tablet   No No   Sig: Take 1 tablet daily  With the 50 mg  Total 150 mg daily   sertraline (ZOLOFT) 50 mg tablet   No No   Sig: Take 1 tablet (50 mg total) by mouth in the morning  Facility-Administered Medications: None       Past Medical History:   Diagnosis Date   • Anxiety    • Arthritis     OA  b/l knees   • Asthma     Approx 2 years ago   • Calculus of gallbladder without cholecystitis without obstruction 2022   • Chronic kidney disease    • Depression    • GERD (gastroesophageal reflux disease)     In my 25s  Approx 15 years ago   • Hx of bleeding disorder     dysmennorrhea-dx lap today 2016   • Hypertension    • Kidney stone    • Obesity    • Seasonal allergies        Past Surgical History:   Procedure Laterality Date   •  SECTION     • CHOLECYSTECTOMY     • DIAGNOSTIC LAPAROSCOPY     • DILATION AND CURETTAGE OF UTERUS     • HERNIA REPAIR     • OR COLONOSCOPY FLX DX W/COLLJ SPEC WHEN PFRMD N/A 2018    Procedure: EGD AND COLONOSCOPY;  Surgeon: Tristen Dale MD;  [facility];   Service: Gastroenterology   • OR LAP,CHOLECYSTECTOMY N/A 3/11/2022    Procedure: ROBOTIC LAPAROSCOPIC CHOLECYSTECTOMY;  Surgeon: Che Corral DO;  [facility];  Service: General   • OR LAP,DIAGNOSTIC ABDOMEN N/A 2016    Procedure: LAPAROSCOPY DIAGNOSTIC DAVID;  Surgeon: Michaela Cabral DO;  [facility];  Service: Gynecology   • OR LAP,RMV  ADNEXAL STRUCTURE Bilateral 2016    Procedure: CYSTECTOMY  OVARIAN;  Surgeon: Michaela Cabral DO;  [facility];  Service: Gynecology   • OR REPAIR UMBILICAL QCAI,2+B/K,FBWEH N/A 3/11/2022    Procedure: UMBILICAL HERNIA REPAIR;  Surgeon: Che Corral DO;  [facility];  Service: General   • WISDOM TOOTH EXTRACTION         Family History   Problem Relation Age of Onset   • No Known Problems Sister    • Autism Daughter    • Lung cancer Maternal Grandmother    • Cancer Maternal Grandmother         Deceassd 8 years   • Diabetes Maternal Grandfather    • Arthritis Maternal Grandfather          8 years   • Aneurysm Paternal Grandmother    • No Known Problems Sister      I have reviewed and agree with the history as documented  E-Cigarette/Vaping   • E-Cigarette Use Never User      E-Cigarette/Vaping Substances   • Nicotine No    • THC No    • CBD No    • Flavoring No    • Other No    • Unknown No      Social History     Tobacco Use   • Smoking status: Every Day     Packs/day:  00     Years:      Pack years:      Types: Cigarettes   • Smokeless tobacco: Never   Vaping Use   • Vaping Use: Never used   Substance Use Topics   • Alcohol use: Not Currently     Comment: social   • Drug use: Yes     Frequency: 7 0 times per week     Types: Marijuana     Comment: I exhaused all your ‘rx prescription-- NOTHING ELSE HELPED!! Review of Systems   Constitutional: Negative for chills and fever  HENT: Negative for ear pain and sore throat  Eyes: Negative for pain and visual disturbance  Respiratory: Negative for cough, shortness of breath and wheezing  Cardiovascular: Negative for chest pain and palpitations  Gastrointestinal: Positive for abdominal pain and vomiting  Negative for constipation and diarrhea  Genitourinary: Negative for dysuria, frequency, hematuria and vaginal bleeding  Musculoskeletal: Negative for arthralgias and back pain  Skin: Negative for color change and rash  Neurological: Negative for seizures, syncope and headaches  Psychiatric/Behavioral: Negative for agitation and confusion         Physical Exam  ED Triage Vitals   Temperature Pulse Respirations Blood Pressure SpO2   22 1810 22 1806 22 1806 22 1806 22 1806   (!) 97 3 °F (36 3 °C) 82 22 144/86 99 %      Temp Source Heart Rate Source Patient Position - Orthostatic VS BP Location FiO2 (%)   11/29/22 1810 -- -- -- --   Oral          Pain Score       11/29/22 1826       10 - Worst Possible Pain             Orthostatic Vital Signs  Vitals:    11/29/22 1806   BP: 144/86   Pulse: 82       Physical Exam  Vitals and nursing note reviewed  Constitutional:       General: She is not in acute distress  Appearance: Normal appearance  She is well-developed  Comments: Rolling back and forth in stretcher  HENT:      Head: Normocephalic and atraumatic  Right Ear: External ear normal       Left Ear: External ear normal       Nose: Nose normal  No congestion  Cardiovascular:      Rate and Rhythm: Normal rate and regular rhythm  Pulmonary:      Effort: Pulmonary effort is normal  No respiratory distress  Breath sounds: Normal breath sounds  Abdominal:      Palpations: Abdomen is soft  Tenderness: There is abdominal tenderness in the epigastric area  There is no guarding or rebound  Musculoskeletal:         General: Normal range of motion  Cervical back: Normal range of motion and neck supple  Skin:     General: Skin is warm and dry  Neurological:      General: No focal deficit present  Mental Status: She is alert and oriented to person, place, and time  Sensory: No sensory deficit  Motor: No weakness     Psychiatric:         Mood and Affect: Mood normal          Behavior: Behavior normal          ED Medications  Medications   haloperidol lactate (HALDOL) injection 5 mg (5 mg Intravenous Given 11/29/22 1828)   ondansetron (ZOFRAN) injection 4 mg (4 mg Intravenous Given 11/29/22 1829)   ketorolac (TORADOL) injection 15 mg (15 mg Intravenous Given 11/29/22 1827)   lactated ringers bolus 1,000 mL (0 mL Intravenous Stopped 11/29/22 2030)   haloperidol lactate (HALDOL) injection 5 mg (5 mg Intramuscular Given 11/29/22 1923)       Diagnostic Studies  Results Reviewed     Procedure Component Value Units Date/Time    Comprehensive metabolic panel [[id number]]  (Abnormal) Collected: 11/29/22 1807    Lab Status: Final result Specimen: Blood from Arm, Left Updated: 11/29/22 1852     Sodium 134 mmol/L      Potassium 3 4 mmol/L      Chloride 103 mmol/L      CO2 18 mmol/L      ANION GAP 13 mmol/L      BUN 12 mg/dL      Creatinine 1 04 mg/dL      Glucose 158 mg/dL      Calcium 10 2 mg/dL      AST 19 U/L      ALT 26 U/L      Alkaline Phosphatase 105 U/L      Total Protein 8 5 g/dL      Albumin 4 0 g/dL      Total Bilirubin 0 49 mg/dL      eGFR 65 ml/min/1 73sq m     Narrative:      [employer] Kid[employer] guidelines for Chronic Kidney Disease (CKD):   •  Stage 1 with normal or high GFR (GFR > 90 mL/min/1 73 square meters)  •  Stage 2 Mild CKD (GFR = 60-89 mL/min/1 73 square meters)  •  Stage 3A Moderate CKD (GFR = 45-59 mL/min/1 73 square meters)  •  Stage 3B Moderate CKD (GFR = 30-44 mL/min/1 73 square meters)  •  Stage 4 Severe CKD (GFR = 15-29 mL/min/1 73 square meters)  •  Stage 5 End Stage CKD (GFR <15 mL/min/1 73 square meters)  Note: GFR calculation is accurate only with a steady state creatinine    Lipase [[id number]]  (Normal) Collected: 11/29/22 1807    Lab Status: Final result Specimen: Blood from Arm, Left Updated: 11/29/22 1852     Lipase 191 u/L     CBC and differential [[id number]]  (Abnormal) Collected: 11/29/22 1807    Lab Status: Final result Specimen: Blood from Arm, Left Updated: 11/29/22 1819     WBC 10 88 Thousand/uL      RBC 4 25 Million/uL      Hemoglobin 14 0 g/dL      Hematocrit 39 2 %      MCV 92 fL      MCH 32 9 pg      MCHC 35 7 g/dL      RDW 12 9 %      MPV 9 8 fL      Platelets 941 Thousands/uL      nRBC 0 /100 WBCs      Neutrophils Relative 56 %      Immat GRANS % 0 %      Lymphocytes Relative 30 %      Monocytes Relative 11 %      Eosinophils Relative 2 %      Basophils Relative 1 %      Neutrophils Absolute 6 13 Thousands/µL      Immature Grans Absolute 0 03 Thousand/uL      Lymphocytes Absolute 3 27 Thousands/µL      Monocytes Absolute 1 20 Thousand/µL      Eosinophils Absolute 0 19 Thousand/µL      Basophils Absolute 0 06 Thousands/µL                  No orders to display         Procedures  Procedures      ED Course       MDM  Number of Diagnoses or Management Options  Abdominal pain: new and requires workup  Nausea and vomiting: new and requires workup  Diagnosis management comments: Presents with 1 day of vomiting and abdominal pain  Patient reports pain is similar to her prior cyclic vomiting events  Low suspicion for acute abdominal process  Patient reports improvement of symptoms with medications and fluids  She requested discharge home  Presumed cyclic vomiting episode  Patient in agreement with plan and questions were answered  Verbalized understanding of return precautions  Portions or all of this note were generated using voice recognition software  Occasional wrong word or "sound a like" substitutions may have occurred due to the inherent limitations of voice recognition software  Please interpret any errors within the intended context of the whole sentence or idea  Disposition  Final diagnoses:   Abdominal pain   Nausea and vomiting     Time reflects when diagnosis was documented in both MDM as applicable and the Disposition within this note     Time User Action Codes Description Comment    11/29/2022  8:14 PM Armin Gil Add [R10 9] Abdominal pain     11/29/2022  8:14 PM Armin Gil Add [R11 2] Nausea and vomiting       ED Disposition     ED Disposition   Discharge    Condition   Stable    Date/Time   Tue Nov 29, 2022  8:13 PM    Comment   Melia Lira discharge to home/self care                 Follow-up Information     Follow up With Specialties Details Why Contact Info Additional [address] Emergency Department Emergency Medicine Go to  As needed [address] [phone]  [id number]  [address] [facility], [address], [place], [place], [address]          Patient's Medications   Discharge Prescriptions    No medications on file     No discharge procedures on file  PDMP Review       Value Time User    PDMP Reviewed  Yes 1/30/2022 10:57 AM Cole Mccormick DO           ED Provider  Attending physically available and evaluated Melia Warren  I managed the patient along with the ED Attending      Electronically Signed by         Vika Ashley MD  11/29/22 2032

## 2022-11-30 NOTE — ED ATTENDING ATTESTATION
11/29/2022  IJulian MD, saw and evaluated the patient  I have discussed the patient with the resident/non-physician practitioner and agree with the resident's/non-physician practitioner's findings, Plan of Care, and MDM as documented in the resident's/non-physician practitioner's note, except where noted  All available labs and Radiology studies were reviewed  I was present for key portions of any procedure(s) performed by the resident/non-physician practitioner and I was immediately available to provide assistance  At this point I agree with the current assessment done in the Emergency Department  I have conducted an independent evaluation of this patient a history and physical is as follows:    ED Course     77-year-old female, history of cyclic vomiting syndrome, presenting to the emergency department for evaluation of epigastric abdominal pain, nausea, vomiting  Patient states that this feels like previous episodes of cyclic vomiting  No reported fever  No diarrhea  Ten systems reviewed and negative except as noted  On examination patient is moaning  Head is normocephalic and atraumatic  Eyelids lashes normal   Mucous membranes are dry  Neck is supple without meningismus  Lungs are clear to auscultation bilaterally with no wheezes rales or rhonchi  Heart is regular rate rhythm with no murmurs rubs or gallops  Abdomen is diffusely tender without any rebound or guarding  Skin is warm and dry without any rashes      Labs Reviewed   CBC AND DIFFERENTIAL - Abnormal       Result Value Ref Range Status    WBC 10 88 (*) 4 31 - 10 16 Thousand/uL Final    RBC 4 25  3 81 - 5 12 Million/uL Final    Hemoglobin 14 0  11 5 - 15 4 g/dL Final    Hematocrit 39 2  34 8 - 46 1 % Final    MCV 92  82 - 98 fL Final    MCH 32 9  26 8 - 34 3 pg Final    MCHC 35 7  31 4 - 37 4 g/dL Final    RDW 12 9  11 6 - 15 1 % Final    MPV 9 8  8 9 - 12 7 fL Final    Platelets 552  124 - 390 Thousands/uL Final nRBC 0  /100 WBCs Final    Neutrophils Relative 56  43 - 75 % Final    Immat GRANS % 0  0 - 2 % Final    Lymphocytes Relative 30  14 - 44 % Final    Monocytes Relative 11  4 - 12 % Final    Eosinophils Relative 2  0 - 6 % Final    Basophils Relative 1  0 - 1 % Final    Neutrophils Absolute 6 13  1 85 - 7 62 Thousands/µL Final    Immature Grans Absolute 0 03  0 00 - 0 20 Thousand/uL Final    Lymphocytes Absolute 3 27  0 60 - 4 47 Thousands/µL Final    Monocytes Absolute 1 20  0 17 - 1 22 Thousand/µL Final    Eosinophils Absolute 0 19  0 00 - 0 61 Thousand/µL Final    Basophils Absolute 0 06  0 00 - 0 10 Thousands/µL Final   COMPREHENSIVE METABOLIC PANEL - Abnormal    Sodium 134 (*) 135 - 147 mmol/L Final    Potassium 3 4 (*) 3 5 - 5 3 mmol/L Final    Chloride 103  96 - 108 mmol/L Final    CO2 18 (*) 21 - 32 mmol/L Final    ANION GAP 13  4 - 13 mmol/L Final    BUN 12  5 - 25 mg/dL Final    Creatinine 1 04  0 60 - 1 30 mg/dL Final    Comment: Standardized to IDMS reference method    Glucose 158 (*) 65 - 140 mg/dL Final    Comment: If the patient is fasting, the ADA then defines impaired fasting glucose as > 100 mg/dL and diabetes as > or equal to 123 mg/dL  Specimen collection should occur prior to Sulfasalazine administration due to the potential for falsely depressed results  Specimen collection should occur prior to Sulfapyridine administration due to the potential for falsely elevated results  Calcium 10 2 (*) 8 3 - 10 1 mg/dL Final    AST 19  5 - 45 U/L Final    Comment: Specimen collection should occur prior to Sulfasalazine administration due to the potential for falsely depressed results  ALT 26  12 - 78 U/L Final    Comment: Specimen collection should occur prior to Sulfasalazine and/or Sulfapyridine administration due to the potential for falsely depressed results       Alkaline Phosphatase 105  46 - 116 U/L Final    Total Protein 8 5 (*) 6 4 - 8 4 g/dL Final    Albumin 4 0  3 5 - 5 0 g/dL Final Total Bilirubin 0 49  0 20 - 1 00 mg/dL Final    Comment: Use of this assay is not recommended for patients undergoing treatment with eltrombopag due to the potential for falsely elevated results  eGFR 65  ml/min/1 73sq m Final    Narrative:     [place] guidelines for Chronic Kidney Disease (CKD):   •  Stage 1 with normal or high GFR (GFR > 90 mL/min/1 73 square meters)  •  Stage 2 Mild CKD (GFR = 60-89 mL/min/1 73 square meters)  •  Stage 3A Moderate CKD (GFR = 45-59 mL/min/1 73 square meters)  •  Stage 3B Moderate CKD (GFR = 30-44 mL/min/1 73 square meters)  •  Stage 4 Severe CKD (GFR = 15-29 mL/min/1 73 square meters)  •  Stage 5 End Stage CKD (GFR <15 mL/min/1 73 square meters)  Note: GFR calculation is accurate only with a steady state creatinine   LIPASE - Normal    Lipase 191  73 - 393 u/L Final       After multiple doses of Haldol, patient had improvement of her symptoms and requested discharge      Critical Care Time  Procedures

## 2022-12-18 ENCOUNTER — OFFICE VISIT (OUTPATIENT)
Dept: URGENT CARE | Age: 42
End: 2022-12-18

## 2022-12-18 VITALS — HEART RATE: 55 BPM | RESPIRATION RATE: 12 BRPM | OXYGEN SATURATION: 99 % | TEMPERATURE: 97.3 F

## 2022-12-18 DIAGNOSIS — R20.0 NUMBNESS AND TINGLING IN LEFT HAND: Primary | ICD-10-CM

## 2022-12-18 DIAGNOSIS — R20.2 NUMBNESS AND TINGLING IN LEFT HAND: Primary | ICD-10-CM

## 2022-12-18 RX ORDER — PREDNISONE 10 MG/1
10 TABLET ORAL DAILY
Qty: 21 TABLET | Refills: 0 | Status: SHIPPED | OUTPATIENT
Start: 2022-12-18

## 2022-12-18 NOTE — PROGRESS NOTES
[facility]        NAME: Az Gallardo is a 38 y o  female  : 1980    MRN: [id number]  DATE: 2022  TIME: 6:09 PM    Assessment and Plan   Numbness and tingling in left hand [R20 0, R20 2]  1  Numbness and tingling in left hand  predniSONE 10 mg tablet            Patient Instructions       Follow up with PCP in 3-5 days  Proceed to  ER if symptoms worsen  Chief Complaint     Chief Complaint   Patient presents with   • Arm Pain     Stuck for an IV 3 weeks ago in left arm/AC and since pt has has left arm pain and numbness in left hand         History of Present Illness       HPI  Patient presents today complaining of left arm and hand numbness and tingling ongoing for the past 3 weeks  Patient was stuck within IV 3 weeks ago in the left arm since then patient has been having issues  Patient does not have any weakness or loss of sensation    Review of Systems   Review of Systems  Per HPI    Current Medications       Current Outpatient Medications:   •  predniSONE 10 mg tablet, Take 1 tablet (10 mg total) by mouth daily 6 tab day 1, 5 tab day 2, 4 tab day 3, 3 tab day 4, 2 tab day 5, 1 tab day 6, Disp: 21 tablet, Rfl: 0  •  albuterol (ProAir HFA) 90 mcg/act inhaler, Inhale 2 puffs every 6 (six) hours as needed for wheezing, Disp: 18 g, Rfl: 0  •  betamethasone valerate (LUXIQ) 0 12 % foam, Apply topically daily, Disp: 100 g, Rfl: 1  •  fluticasone (FLONASE) 50 mcg/act nasal spray, 1 spray into each nostril daily, Disp: 15 8 mL, Rfl: 0  •  gabapentin (NEURONTIN) 300 mg capsule, Take 2 capsules (600 mg total) by mouth 2 (two) times a day, Disp: 180 capsule, Rfl: 5  •  hydrOXYzine pamoate (VISTARIL) 25 mg capsule, Take 1 capsule (25 mg total) by mouth 3 (three) times a day as needed for anxiety, Disp: 90 capsule, Rfl: 0  •  lisinopril (ZESTRIL) 20 mg tablet, Take 1 tablet (20 mg total) by mouth in the morning , Disp: 90 tablet, Rfl: 1  •  norgestimate-ethinyl estradiol (Ortho Tri-Cyclen Lo) 0 18/0 215/0 25 MG-25 MCG per tablet, Take 1 tablet by mouth daily, Disp: 84 tablet, Rfl: 3  •  ondansetron (Zofran ODT) 4 mg disintegrating tablet, Take 1 tablet (4 mg total) by mouth every 6 (six) hours as needed for nausea or vomiting, Disp: 20 tablet, Rfl: 0  •  prochlorperazine (COMPAZINE) 10 mg tablet, Take 0 5 tablets (5 mg total) by mouth every 8 (eight) hours as needed for nausea or vomiting, Disp: 3 tablet, Rfl: 0  •  promethazine (PHENERGAN) 25 mg tablet, Take 1 tablet (25 mg total) by mouth every 6 (six) hours as needed for nausea or vomiting, Disp: 20 tablet, Rfl: 0  •  sertraline (ZOLOFT) 100 mg tablet, Take 1 tablet daily  With the 50 mg  Total 150 mg daily, Disp: 90 tablet, Rfl: 1  •  sertraline (ZOLOFT) 50 mg tablet, Take 1 tablet (50 mg total) by mouth in the morning , Disp: 90 tablet, Rfl: 1    Current Allergies     Allergies as of 2022 - Reviewed 2022   Allergen Reaction Noted   • Eggs or egg-derived products - food allergy Other (See Comments) 2016            The following portions of the patient's history were reviewed and updated as appropriate: allergies, current medications, past family history, past medical history, past social history, past surgical history and problem list      Past Medical History:   Diagnosis Date   • Anxiety    • Arthritis     OA  b/l knees   • Asthma     Approx 2 years ago   • Calculus of gallbladder without cholecystitis without obstruction 2022   • Chronic kidney disease    • Depression    • GERD (gastroesophageal reflux disease)     In my 25s   Approx 15 years ago   • Hx of bleeding disorder     dysmennorrhea-dx lap today 2016   • Hypertension    • Kidney stone    • Obesity    • Seasonal allergies        Past Surgical History:   Procedure Laterality Date   •  SECTION     • CHOLECYSTECTOMY     • DIAGNOSTIC LAPAROSCOPY     • DILATION AND CURETTAGE OF UTERUS     • HERNIA REPAIR     • AR COLONOSCOPY FLX DX W/COLLJ SPEC WHEN PFRMD N/A 2018    Procedure: EGD AND COLONOSCOPY;  Surgeon: Josey Bautista MD;  [facility]; Service: Gastroenterology   • IL LAP,CHOLECYSTECTOMY N/A 3/11/2022    Procedure: ROBOTIC LAPAROSCOPIC CHOLECYSTECTOMY;  Surgeon: Kathryn Gurrola DO;  [facility];  Service: General   • IL LAP,DIAGNOSTIC ABDOMEN N/A 2016    Procedure: LAPAROSCOPY DIAGNOSTIC DAVID;  Surgeon: Radha Olivera DO;  [facility];  Service: Gynecology   • IL LAP,RMV  ADNEXAL STRUCTURE Bilateral 2016    Procedure: CYSTECTOMY  OVARIAN;  Surgeon: Radha Olivera DO;  [facility];  Service: Gynecology   • IL REPAIR UMBILICAL AXAD,7+F/S,OKYUM N/A 3/11/2022    Procedure: UMBILICAL HERNIA REPAIR;  Surgeon: Kathryn Gurrola DO;  [facility];  Service: General   • WISDOM TOOTH EXTRACTION         Family History   Problem Relation Age of Onset   • No Known Problems Sister    • Autism Daughter    • Lung cancer Maternal Grandmother    • Cancer Maternal Grandmother         Deceassd 8 years   • Diabetes Maternal Grandfather    • Arthritis Maternal Grandfather          10 years   • Aneurysm Paternal Grandmother    • No Known Problems Sister          Medications have been verified  Objective   Pulse 55   Temp (!) 97 3 °F (36 3 °C) (Temporal)   Resp 12   SpO2 99%   No LMP recorded  Patient is perimenopausal        Physical Exam     Physical Exam  Constitutional:       General: She is not in acute distress  Appearance: Normal appearance  HENT:      Head: Normocephalic  Nose: No congestion or rhinorrhea  Mouth/Throat:      Mouth: Mucous membranes are moist       Pharynx: No oropharyngeal exudate or posterior oropharyngeal erythema  Eyes:      General:         Right eye: No discharge  Left eye: No discharge  Conjunctiva/sclera: Conjunctivae normal    Cardiovascular:      Rate and Rhythm: Normal rate and regular rhythm  Pulses: Normal pulses     Pulmonary: Effort: Pulmonary effort is normal  No respiratory distress  Abdominal:      General: Abdomen is flat  There is no distension  Palpations: Abdomen is soft  Tenderness: There is no abdominal tenderness  Musculoskeletal:      Cervical back: Neck supple  Comments: Strength and sensation intact bilateral lower extremity   Skin:     General: Skin is warm  Capillary Refill: Capillary refill takes less than 2 seconds  Neurological:      Mental Status: She is alert and oriented to person, place, and time  no

## 2022-12-27 ENCOUNTER — OFFICE VISIT (OUTPATIENT)
Dept: FAMILY MEDICINE CLINIC | Facility: CLINIC | Age: 42
End: 2022-12-27

## 2022-12-27 VITALS
TEMPERATURE: 98.2 F | OXYGEN SATURATION: 99 % | BODY MASS INDEX: 32.79 KG/M2 | WEIGHT: 167 LBS | HEIGHT: 60 IN | HEART RATE: 76 BPM | SYSTOLIC BLOOD PRESSURE: 114 MMHG | RESPIRATION RATE: 16 BRPM | DIASTOLIC BLOOD PRESSURE: 70 MMHG

## 2022-12-27 DIAGNOSIS — R20.2 LEFT HAND PARESTHESIA: Primary | ICD-10-CM

## 2022-12-27 DIAGNOSIS — Z12.31 ENCOUNTER FOR SCREENING MAMMOGRAM FOR MALIGNANT NEOPLASM OF BREAST: ICD-10-CM

## 2022-12-27 DIAGNOSIS — F33.0 MILD EPISODE OF RECURRENT MAJOR DEPRESSIVE DISORDER (HCC): ICD-10-CM

## 2022-12-27 DIAGNOSIS — G58.8 OTHER MONONEUROPATHY: ICD-10-CM

## 2022-12-27 DIAGNOSIS — E88.09 NEUROPATHY ASSOCIATED WITH POLYNEUROPATHY, ORGANOMEGALY, ENDOCRINOPATHY, MONOCLONAL GAMMOPATHY, AND SKIN CHANGES SYNDROME (HCC): ICD-10-CM

## 2022-12-27 DIAGNOSIS — I10 ESSENTIAL HYPERTENSION: ICD-10-CM

## 2022-12-27 DIAGNOSIS — G63 NEUROPATHY ASSOCIATED WITH POLYNEUROPATHY, ORGANOMEGALY, ENDOCRINOPATHY, MONOCLONAL GAMMOPATHY, AND SKIN CHANGES SYNDROME (HCC): ICD-10-CM

## 2022-12-27 DIAGNOSIS — R73.9 HYPERGLYCEMIA: ICD-10-CM

## 2022-12-27 RX ORDER — PREDNISONE 50 MG/1
50 TABLET ORAL DAILY
Qty: 5 TABLET | Refills: 0 | Status: SHIPPED | OUTPATIENT
Start: 2022-12-27 | End: 2023-01-01

## 2022-12-27 RX ORDER — GABAPENTIN 300 MG/1
600 CAPSULE ORAL 2 TIMES DAILY
Qty: 180 CAPSULE | Refills: 5 | Status: SHIPPED | OUTPATIENT
Start: 2022-12-27

## 2022-12-27 NOTE — PROGRESS NOTES
Name: Etta Chacon      : 1980      MRN: [id number]  Encounter Provider: MONICA Salter  Encounter Date: 2022   Encounter department: [address] [place] [address]  Left hand paresthesia  Assessment & Plan:  - Prescription sent for prednisone 50 mg daily x5 days  Advised of side effects   -If no improvement, see neurologist   Referral provided   -We will continue to follow-up  Orders:  -     predniSONE 50 mg tablet; Take 1 tablet (50 mg total) by mouth daily for 5 days  -     Ambulatory Referral to Neurology; Future    2  Hyperglycemia  Assessment & Plan:  - Elevated fasting glucose on CMP  -We will check hemoglobin A1c and continue to follow-up  Orders:  -     Hemoglobin A1C; Future    3  Essential hypertension  Assessment & Plan:  - Well-controlled on lisinopril daily  Continue same   -We will continue to monitor  Orders:  -     CBC and differential; Future  -     Comprehensive metabolic panel; Future  -     Lipid Panel with Direct LDL reflex; Future  -     TSH, 3rd generation with Free T4 reflex; Future    4  Mild episode of recurrent major depressive disorder ([facility] [address] )  Assessment & Plan:  - Stable on current medication regimen  Continue same   -We will continue to monitor  5  Neuropathy associated with polyneuropathy, organomegaly, endocrinopathy, monoclonal gammopathy, and skin changes syndrome (HCC)  -     gabapentin (NEURONTIN) 300 mg capsule; Take 2 capsules (600 mg total) by mouth 2 (two) times a day    6  Other mononeuropathy  Assessment & Plan:  - Patient reports history of neuropathy since age 21 after having foot run over by a forklift   -Currently on gabapentin 600 mg twice daily  Continue same  Refill sent   -We will continue to monitor        7  Encounter for screening mammogram for malignant neoplasm of breast  -     Mammo screening bilateral w 3d & cad; Future; Expected date: 2022           Subjective     Patient with past medical history of GERD, asthma, hypertension, anxiety, and depression presents today to reestablish care  She also has a history of cyclic vomiting syndrome  4 weeks ago she was taken by ambulance to [facility] for vomiting  They put an IV in her arm and since then she has experienced numbness and tingling of her ring finger and pinky finger on her left hand  She was seen in urgent care and given a prednisone taper which did not help  She continues to have numbness and tingling of her hand  Denies any other symptoms  Denies any other concerns or complaints today  Review of Systems   Constitutional: Negative for fatigue and fever  HENT: Negative for trouble swallowing  Eyes: Negative for visual disturbance  Respiratory: Negative for cough and shortness of breath  Cardiovascular: Negative for chest pain and palpitations  Gastrointestinal: Negative for abdominal pain and blood in stool  Endocrine: Negative for cold intolerance and heat intolerance  Genitourinary: Negative for difficulty urinating and dysuria  Musculoskeletal: Negative for gait problem  Skin: Negative for rash  Neurological: Positive for numbness (numbness and tingling of left hand)  Negative for dizziness, syncope and headaches  Hematological: Negative for adenopathy  Psychiatric/Behavioral: Negative for behavioral problems  Past Medical History:   Diagnosis Date   • Anxiety    • Arthritis     OA  b/l knees   • Asthma     Approx 2 years ago   • Calculus of gallbladder without cholecystitis without obstruction 2022   • Chronic kidney disease    • Depression    • GERD (gastroesophageal reflux disease)     In my 25s   Approx 15 years ago   • Hx of bleeding disorder     dysmennorrhea-dx lap today 2016   • Hypertension    • Kidney stone    • Obesity    • Seasonal allergies      Past Surgical History:   Procedure Laterality Date   •  SECTION     • CHOLECYSTECTOMY     • DIAGNOSTIC LAPAROSCOPY     • DILATION AND CURETTAGE OF UTERUS     • HERNIA REPAIR     • MS COLONOSCOPY FLX DX W/COLLJ SPEC WHEN PFRMD N/A 2018    Procedure: EGD AND COLONOSCOPY;  Surgeon: Summer Garcia MD;  [facility]: [facility];   Service: Gastroenterology   • MS LAPAROSCOPY SURG CHOLECYSTECTOMY N/A 3/11/2022    Procedure: ROBOTIC LAPAROSCOPIC CHOLECYSTECTOMY;  Surgeon: Katherin Maldonado DO;  Location: [facility];  Service: General   • MS LAPAROSCOPY W/RMVL ADNEXAL STRUCTURES Bilateral 2016    Procedure: CYSTECTOMY  OVARIAN;  Surgeon: Dora Monsivais DO;  Location: [facility];  Service: Gynecology   • MS LAPS ABD PRTM&OMENTUM DX W/WO SPEC BR/WA SPX N/A 2016    Procedure: LAPAROSCOPY DIAGNOSTIC DAVID;  Surgeon: Dora Monsivais DO;  [facility];  Service: Gynecology   • MS RPR UMBILICAL HRNA 5 YRS/> REDUCIBLE N/A 3/11/2022    Procedure: UMBILICAL HERNIA REPAIR;  Surgeon: Katherin Maldonado DO;  Location: [facility];  Service: General   • WISDOM TOOTH EXTRACTION       Family History   Problem Relation Age of Onset   • No Known Problems Sister    • Autism Daughter    • Lung cancer Maternal Grandmother    • Cancer Maternal Grandmother         Deceassd 8 years   • Diabetes Maternal Grandfather    • Arthritis Maternal Grandfather          10 years   • Aneurysm Paternal Grandmother    • No Known Problems Sister      Social History     Socioeconomic History   • Marital status: /Civil Union     Spouse name: None   • Number of children: None   • Years of education: None   • Highest education level: None   Occupational History   • None   Tobacco Use   • Smoking status: Every Day     Packs/day: 1 00     Years: 23 00     Pack years:      Types: Cigarettes   • Smokeless tobacco: Never   Vaping Use   • Vaping Use: Never used   Substance and Sexual Activity   • Alcohol use: Not Currently     Comment: social   • Drug use: Yes     Frequency: 7 0 times per week     Types: Marijuana Comment: I exhaused all your ‘rx prescription-- NOTHING ELSE HELPED!! • Sexual activity: Not Currently     Partners: Male     Birth control/protection: Abstinence   Other Topics Concern   • None   Social History Narrative   • None     Social Determinants of Health     Financial Resource Strain: Not on file   Food Insecurity: No Food Insecurity   • Worried About Running Out of Food in the Last Year: Never true   • Ran Out of Food in the Last Year: Never true   Transportation Needs: Not on file   Physical Activity: Not on file   Stress: Not on file   Social Connections: Not on file   Intimate Partner Violence: Not on file   Housing Stability: [address]    • Unable to Pay for Housing in the Last Year: No   • Number of [place] in the Last Year: 1   • Unstable Housing in the Last Year: No     Current Outpatient Medications on File Prior to Visit   Medication Sig   • albuterol (ProAir HFA) 90 mcg/act inhaler Inhale 2 puffs every 6 (six) hours as needed for wheezing   • betamethasone valerate (LUXIQ) 0 12 % foam Apply topically daily   • fluticasone (FLONASE) 50 mcg/act nasal spray 1 spray into each nostril daily   • hydrOXYzine pamoate (VISTARIL) 25 mg capsule Take 1 capsule (25 mg total) by mouth 3 (three) times a day as needed for anxiety   • lisinopril (ZESTRIL) 20 mg tablet Take 1 tablet (20 mg total) by mouth in the morning  • norgestimate-ethinyl estradiol (Ortho Tri-Cyclen Lo) 0 18/0 215/0 25 MG-25 MCG per tablet Take 1 tablet by mouth daily   • prochlorperazine (COMPAZINE) 10 mg tablet Take 0 5 tablets (5 mg total) by mouth every 8 (eight) hours as needed for nausea or vomiting   • promethazine (PHENERGAN) 25 mg tablet Take 1 tablet (25 mg total) by mouth every 6 (six) hours as needed for nausea or vomiting   • sertraline (ZOLOFT) 100 mg tablet Take 1 tablet daily  With the 50 mg  Total 150 mg daily   • sertraline (ZOLOFT) 50 mg tablet Take 1 tablet (50 mg total) by mouth in the morning     • [DISCONTINUED] gabapentin (NEURONTIN) 300 mg capsule Take 2 capsules (600 mg total) by mouth 2 (two) times a day   • ondansetron (Zofran ODT) 4 mg disintegrating tablet Take 1 tablet (4 mg total) by mouth every 6 (six) hours as needed for nausea or vomiting (Patient not taking: Reported on 12/27/2022)   • predniSONE 10 mg tablet Take 1 tablet (10 mg total) by mouth daily 6 tab day 1, 5 tab day 2, 4 tab day 3, 3 tab day 4, 2 tab day 5, 1 tab day 6 (Patient not taking: Reported on 12/27/2022)     Allergies   Allergen Reactions   • Eggs Or Egg-Derived Products - Food Allergy Other (See Comments)     abd pain     Immunization History   Administered Date(s) Administered   • Pneumococcal Polysaccharide PPV23 11/29/2019       Objective     /70 (BP Location: Left arm, Patient Position: Sitting, Cuff Size: Adult)   Pulse 76   Temp 98 2 °F (36 8 °C) (Tympanic)   Resp 16   Ht 5' 0 25" (1 53 m)   Wt 75 8 kg (167 lb)   SpO2 99%   BMI 32 34 kg/m²     Physical Exam  Vitals and nursing note reviewed  Constitutional:       Appearance: Normal appearance  HENT:      Head: Normocephalic and atraumatic  Right Ear: External ear normal       Left Ear: External ear normal    Eyes:      Conjunctiva/sclera: Conjunctivae normal    Cardiovascular:      Rate and Rhythm: Normal rate and regular rhythm  Heart sounds: Normal heart sounds  Pulmonary:      Effort: Pulmonary effort is normal       Breath sounds: Normal breath sounds  Musculoskeletal:         General: Normal range of motion  Cervical back: Normal range of motion  Lymphadenopathy:      Cervical: No cervical adenopathy  Skin:     General: Skin is warm and dry  Neurological:      Mental Status: She is alert and oriented to person, place, and time  Cranial Nerves: No cranial nerve deficit  Sensory: Sensory deficit (decreased pin prick sensation to left ring and pinky fingers) present     Psychiatric:         Mood and Affect: Mood normal  Behavior: Behavior normal        MONICA Vaughn

## 2022-12-27 NOTE — ASSESSMENT & PLAN NOTE
- Patient reports history of neuropathy since age 21 after having foot run over by a forklift   -Currently on gabapentin 600 mg twice daily  Continue same  Refill sent   -We will continue to monitor

## 2022-12-27 NOTE — ASSESSMENT & PLAN NOTE
- Prescription sent for prednisone 50 mg daily x5 days  Advised of side effects   -If no improvement, see neurologist   Referral provided   -We will continue to follow-up

## 2023-01-09 ENCOUNTER — TELEPHONE (OUTPATIENT)
Dept: NEPHROLOGY | Facility: CLINIC | Age: 43
End: 2023-01-09

## 2023-01-09 NOTE — TELEPHONE ENCOUNTER
Called patient to remained to  please have blood and urine work done before the follow up appointment

## 2023-01-11 ENCOUNTER — TELEPHONE (OUTPATIENT)
Dept: NEPHROLOGY | Facility: CLINIC | Age: 43
End: 2023-01-11

## 2023-01-13 ENCOUNTER — TELEPHONE (OUTPATIENT)
Dept: NEPHROLOGY | Facility: CLINIC | Age: 43
End: 2023-01-13

## 2023-01-13 NOTE — TELEPHONE ENCOUNTER
Appointment Confirmation   Person confirmed appointment with  If not patient, name of the person Answering Machine    Date and time of appointment 01/16/23    Patient acknowledged and will be at appointment? no   Did you advise the patient that they will need a urine sample if they are a new patient? no   Did you advise the patient to bring their current medications for verification? (including any OTC) yes   Additional Information

## 2023-03-26 ENCOUNTER — HOSPITAL ENCOUNTER (EMERGENCY)
Facility: HOSPITAL | Age: 43
Discharge: HOME | End: 2023-03-26
Attending: EMERGENCY MEDICINE | Admitting: EMERGENCY MEDICINE

## 2023-03-26 VITALS
SYSTOLIC BLOOD PRESSURE: 189 MMHG | OXYGEN SATURATION: 99 % | DIASTOLIC BLOOD PRESSURE: 104 MMHG | HEART RATE: 91 BPM | RESPIRATION RATE: 20 BRPM

## 2023-03-26 DIAGNOSIS — F12.90 CANNABINOID HYPEREMESIS SYNDROME: Primary | ICD-10-CM

## 2023-03-26 DIAGNOSIS — R11.2 CANNABINOID HYPEREMESIS SYNDROME: Primary | ICD-10-CM

## 2023-03-26 DIAGNOSIS — R11.15 CYCLIC VOMITING SYNDROME: ICD-10-CM

## 2023-03-26 LAB
ALBUMIN SERPL BCP-MCNC: 4.5 G/DL (ref 3.5–5)
ALP SERPL-CCNC: 103 U/L (ref 46–116)
ALT SERPL W P-5'-P-CCNC: 30 U/L (ref 12–78)
ANION GAP SERPL CALCULATED.3IONS-SCNC: 7 MMOL/L (ref 4–13)
AST SERPL W P-5'-P-CCNC: 34 U/L (ref 5–45)
BASOPHILS # BLD AUTO: 0.02 THOUSANDS/ÂΜL (ref 0–0.1)
BASOPHILS NFR BLD AUTO: 0 % (ref 0–1)
BILIRUB SERPL-MCNC: 0.55 MG/DL (ref 0.2–1)
BUN SERPL-MCNC: 19 MG/DL (ref 5–25)
CALCIUM SERPL-MCNC: 10.6 MG/DL (ref 8.3–10.1)
CHLORIDE SERPL-SCNC: 106 MMOL/L (ref 96–108)
CO2 SERPL-SCNC: 20 MMOL/L (ref 21–32)
CREAT SERPL-MCNC: 0.88 MG/DL (ref 0.6–1.3)
EOSINOPHIL # BLD AUTO: 0 THOUSAND/ÂΜL (ref 0–0.61)
EOSINOPHIL NFR BLD AUTO: 0 % (ref 0–6)
ERYTHROCYTE [DISTWIDTH] IN BLOOD BY AUTOMATED COUNT: 13 % (ref 11.6–15.1)
GFR SERPL CREATININE-BSD FRML MDRD: 80 ML/MIN/1.73SQ M
GLUCOSE SERPL-MCNC: 138 MG/DL (ref 65–140)
HCT VFR BLD AUTO: 42.5 % (ref 34.8–46.1)
HGB BLD-MCNC: 14.8 G/DL (ref 11.5–15.4)
IMM GRANULOCYTES # BLD AUTO: 0.07 THOUSAND/UL (ref 0–0.2)
IMM GRANULOCYTES NFR BLD AUTO: 1 % (ref 0–2)
LIPASE SERPL-CCNC: 409 U/L (ref 73–393)
LYMPHOCYTES # BLD AUTO: 1.79 THOUSANDS/ÂΜL (ref 0.6–4.47)
LYMPHOCYTES NFR BLD AUTO: 15 % (ref 14–44)
MCH RBC QN AUTO: 32 PG (ref 26.8–34.3)
MCHC RBC AUTO-ENTMCNC: 34.8 G/DL (ref 31.4–37.4)
MCV RBC AUTO: 92 FL (ref 82–98)
MONOCYTES # BLD AUTO: 1.11 THOUSAND/ÂΜL (ref 0.17–1.22)
MONOCYTES NFR BLD AUTO: 9 % (ref 4–12)
NEUTROPHILS # BLD AUTO: 9.16 THOUSANDS/ÂΜL (ref 1.85–7.62)
NEUTS SEG NFR BLD AUTO: 75 % (ref 43–75)
NRBC BLD AUTO-RTO: 0 /100 WBCS
PLATELET # BLD AUTO: 335 THOUSANDS/UL (ref 149–390)
PMV BLD AUTO: 10.7 FL (ref 8.9–12.7)
POTASSIUM SERPL-SCNC: 4.1 MMOL/L (ref 3.5–5.3)
PROT SERPL-MCNC: 8.6 G/DL (ref 6.4–8.4)
RBC # BLD AUTO: 4.63 MILLION/UL (ref 3.81–5.12)
SODIUM SERPL-SCNC: 133 MMOL/L (ref 135–147)
WBC # BLD AUTO: 12.15 THOUSAND/UL (ref 4.31–10.16)

## 2023-03-26 RX ORDER — DROPERIDOL 2.5 MG/ML
0.62 INJECTION, SOLUTION INTRAMUSCULAR; INTRAVENOUS ONCE
Status: DISCONTINUED | OUTPATIENT
Start: 2023-03-26 | End: 2023-03-26

## 2023-03-26 RX ORDER — DROPERIDOL 2.5 MG/ML
1.25 INJECTION, SOLUTION INTRAMUSCULAR; INTRAVENOUS ONCE
Status: COMPLETED | OUTPATIENT
Start: 2023-03-26 | End: 2023-03-26

## 2023-03-26 RX ADMIN — DROPERIDOL 1.25 MG: 2.5 INJECTION, SOLUTION INTRAMUSCULAR; INTRAVENOUS at 01:43

## 2023-03-26 NOTE — ED ATTENDING ATTESTATION
"Fan Sanchez MD, saw and evaluated the patient  I have discussed the patient with the resident and agree with the resident's findings, Plan of Care, and MDM as documented in the resident's note, except where noted  All available labs and Radiology studies were reviewed  I was present for key portions of any procedure(s) performed by the resident and I was immediately available to provide assistance  At this point I agree with the current assessment done in the Emergency Department  I have conducted an independent evaluation of this patient a history and physical is as follows:    36 yo obese female with a history of HTN, depression, anxiety, osteoarthritis, CKD, GERD, asthma, major depressive disorder, and cyclic vomiting syndrome brought to the ED by EMS for evaluation of nausea and vomiting x 1 day  The patient reports multiple episodes of NBNB vomiting since yesterday  (+) Moderate \"cramping\" epigastric pain  These symptoms are consistent with her usual cyclic vomiting exacerbations  (+) Decreased appetite  No lower abdominal or pelvic pain  No vaginal bleeding or discharge  She denies dysuria and hematuria  No fevers or chills  No chest pain, shortness of breath, or cough  (+) Frequent marijuana use  No recent alcohol ingestion  No other specific complaints  ROS: per resident physician note    Gen: uncomfortable appearing, AA&Ox3  HEENT: PERRL, EOMI  Neck: supple  CV: RRR  Lungs: CTA B/L  Abdomen: soft, NT/ND  Ext: no swelling or deformity  Neuro: 5/5 strength all extremities, sensation grossly intact  Skin: no rash    ED Course  The patient is uncomfortable appearing but with stable vital signs and a benign exam  No abdominal tenderness  Symptoms are reportedly consistent with past episodes of cyclic vomiting  Will check basic labs and lipase  Droperidol administered, will continue to monitor closely in the ED  Disposition per workup and reassessment        Critical Care Time  Procedures  "

## 2023-03-26 NOTE — DISCHARGE INSTRUCTIONS
Your workup here was not concerning for anything dangerous  Therefore there is no need for you to stay at the hospital for further testing  We feel safe to send you home  You should follow up with your primary physician to assess for resolution of your symptoms and to determine if there is further evaluation that needs to be performed      Return to the emergency department if you have any symptoms of worsening vomiting or inability to eat

## 2023-03-26 NOTE — ED PROVIDER NOTES
Chief Complaint   Patient presents with   • Vomiting     Per Ems pt vomiting for last 24 hours  Diagnosed with cyclic vomiting syndrome  History of Present Illness and Review of Systems   This is a 38 y o  female with PMH significant for cyclic vomiting syndrome, frequent marijuana use, hypertension, kidney stones, obesity, , nephroscopy, cholecystectomy coming in today with complaint of vomiting  She reports that the symptoms have been ongoing for the last 24 hours  Reports numerous episodes, not blood-streaked  Denies any diarrhea or blood-streaked stools  She reports she has had frequent episodes of this  Reports this feels typical of her episodes  She says that she has epigastric abdominal pain, nonradiating  There are no alleviating factors  She has had decreased appetite, not drinking as much  Denies any chest pain, shortness of breath, episodes syncope  She reports she has not had a menstrual period for 7 years  Denies any vaginal bleeding or discharge  She has had multiple abdominal surgeries, last bowel movement was yesterday  She does report frequent marijuana use, every other day  Denies any other drug use  No history of alcohol use or alcohol withdrawal   No other symptoms currently  Remainder of ROS Reviewed and Non-Pertinent    No other complaints for this encounter     - No language barrier    - History obtained from patient and chart   - Reviewed relevant past medical/family/social history  - There are no limitations to the history obtained  Past Medical, Past Surgical History:    has a past medical history of Anxiety, Arthritis, Asthma, Calculus of gallbladder without cholecystitis without obstruction (2022), Chronic kidney disease, Depression, GERD (gastroesophageal reflux disease), bleeding disorder, Hypertension, Kidney stone, Obesity, and Seasonal allergies     has a past surgical history that includes  section; [place] tooth extraction; Diagnostic laparoscopy; pr laps abd prtm&omentum dx w/wo spec br/wa spx (N/A, 8/12/2016); pr laparoscopy w/rmvl adnexal structures (Bilateral, 8/12/2016); Dilation and curettage of uterus (2008); pr colonoscopy flx dx w/collj spec when pfrmd (N/A, 11/7/2018); pr laparoscopy surg cholecystectomy (N/A, 7/00/5799); pr rpr umbilical hrna 5 yrs/> reducible (N/A, 3/11/2022); Cholecystectomy; and Hernia repair  Allergies: Allergies   Allergen Reactions   • Eggs Or Egg-Derived Products - Food Allergy Other (See Comments)     abd pain       Social and Family History:     Social History     Substance and Sexual Activity   Alcohol Use Not Currently    Comment: social     Social History     Tobacco Use   Smoking Status Every Day   • Packs/day: 1 00   • Years: 23 00   • Pack years: 23 00   • Types: Cigarettes   Smokeless Tobacco Never     Social History     Substance and Sexual Activity   Drug Use Yes   • Frequency: 7 0 times per week   • Types: Marijuana    Comment: I exhaused all your ‘rx prescription-- NOTHING ELSE HELPED!! Physical Examination     Vitals:    03/26/23 0046   BP: (!) 189/104   BP Location: Right arm   Pulse: 91   Resp: 20   SpO2: 99%     Physical Exam  Vitals and nursing note reviewed  Constitutional:       General: She is not in acute distress  Appearance: She is well-developed  She is ill-appearing  HENT:      Head: Normocephalic and atraumatic  Mouth/Throat:      Mouth: Mucous membranes are dry  Eyes:      Extraocular Movements: Extraocular movements intact  Conjunctiva/sclera: Conjunctivae normal    Cardiovascular:      Rate and Rhythm: Normal rate and regular rhythm  Heart sounds: No murmur heard  Pulmonary:      Effort: Pulmonary effort is normal  No respiratory distress  Breath sounds: Normal breath sounds  Abdominal:      Palpations: Abdomen is soft  Tenderness: There is no abdominal tenderness  There is no guarding or rebound     Musculoskeletal: General: No swelling  Cervical back: Neck supple  Skin:     General: Skin is warm and dry  Capillary Refill: Capillary refill takes less than 2 seconds  Neurological:      General: No focal deficit present  Mental Status: She is alert  Psychiatric:         Mood and Affect: Mood normal          Behavior: Behavior normal             Risk Stratification Tools                Orders Placed This Encounter   Procedures   • CBC and differential   • Comprehensive metabolic panel   • Lipase       Labs:     Labs Reviewed   CBC AND DIFFERENTIAL - Abnormal       Result Value Ref Range Status    WBC 12 15 (*) 4 31 - 10 16 Thousand/uL Final    RBC 4 63  3 81 - 5 12 Million/uL Final    Hemoglobin 14 8  11 5 - 15 4 g/dL Final    Hematocrit 42 5  34 8 - 46 1 % Final    MCV 92  82 - 98 fL Final    MCH 32 0  26 8 - 34 3 pg Final    MCHC 34 8  31 4 - 37 4 g/dL Final    RDW 13 0  11 6 - 15 1 % Final    MPV 10 7  8 9 - 12 7 fL Final    Platelets 399  559 - 390 Thousands/uL Final    nRBC 0  /100 WBCs Final    Neutrophils Relative 75  43 - 75 % Final    Immat GRANS % 1  0 - 2 % Final    Lymphocytes Relative 15  14 - 44 % Final    Monocytes Relative 9  4 - 12 % Final    Eosinophils Relative 0  0 - 6 % Final    Basophils Relative 0  0 - 1 % Final    Neutrophils Absolute 9 16 (*) 1 85 - 7 62 Thousands/µL Final    Immature Grans Absolute 0 07  0 00 - 0 20 Thousand/uL Final    Lymphocytes Absolute 1 79  0 60 - 4 47 Thousands/µL Final    Monocytes Absolute 1 11  0 17 - 1 22 Thousand/µL Final    Eosinophils Absolute 0 00  0 00 - 0 61 Thousand/µL Final    Basophils Absolute 0 02  0 00 - 0 10 Thousands/µL Final   COMPREHENSIVE METABOLIC PANEL - Abnormal    Sodium 133 (*) 135 - 147 mmol/L Final    Potassium 4 1  3 5 - 5 3 mmol/L Final    Comment: Slightly Hemolyzed;  Results May be Affected    Chloride 106  96 - 108 mmol/L Final    CO2 20 (*) 21 - 32 mmol/L Final    ANION GAP 7  4 - 13 mmol/L Final    BUN 19  5 - 25 mg/dL Final    Creatinine 0 88  0 60 - 1 30 mg/dL Final    Comment: Standardized to IDMS reference method    Glucose 138  65 - 140 mg/dL Final    Comment: If the patient is fasting, the ADA then defines impaired fasting glucose as > 100 mg/dL and diabetes as > or equal to 123 mg/dL  Specimen collection should occur prior to Sulfasalazine administration due to the potential for falsely depressed results  Specimen collection should occur prior to Sulfapyridine administration due to the potential for falsely elevated results  Calcium 10 6 (*) 8 3 - 10 1 mg/dL Final    AST 34  5 - 45 U/L Final    Comment: Slightly Hemolyzed; Results May be Affected  Specimen collection should occur prior to Sulfasalazine administration due to the potential for falsely depressed results  ALT 30  12 - 78 U/L Final    Comment: Specimen collection should occur prior to Sulfasalazine and/or Sulfapyridine administration due to the potential for falsely depressed results  Alkaline Phosphatase 103  46 - 116 U/L Final    Total Protein 8 6 (*) 6 4 - 8 4 g/dL Final    Albumin 4 5  3 5 - 5 0 g/dL Final    Total Bilirubin 0 55  0 20 - 1 00 mg/dL Final    Comment: Use of this assay is not recommended for patients undergoing treatment with eltrombopag due to the potential for falsely elevated results      eGFR 80  ml/min/1 73sq m Final    Narrative:     [place] guidelines for Chronic Kidney Disease (CKD):   •  Stage 1 with normal or high GFR (GFR > 90 mL/min/1 73 square meters)  •  Stage 2 Mild CKD (GFR = 60-89 mL/min/1 73 square meters)  •  Stage 3A Moderate CKD (GFR = 45-59 mL/min/1 73 square meters)  •  Stage 3B Moderate CKD (GFR = 30-44 mL/min/1 73 square meters)  •  Stage 4 Severe CKD (GFR = 15-29 mL/min/1 73 square meters)  •  Stage 5 End Stage CKD (GFR <15 mL/min/1 73 square meters)  Note: GFR calculation is accurate only with a steady state creatinine   LIPASE - Abnormal    Lipase 409 (*) 73 - 393 u/L Final Imaging:     No orders to display          Procedures   Procedures      MDM:   Medical Decision Making  Melia Kowalski is a 38 y  o  who presents with complaints of vomiting    Vital signs are remarkable for hypertension, afebrile, physical exam shows patient is chronically ill-appearing, has dry mucous membranes, heart lungs are clear to auscultation, she does not have any reproducible abdominal tenderness, no evidence of mottling, she is well-perfused strong pulses distally no evidence of peripheral edema    Ddx/Plan: Overall this is likely consistent to her cyclic vomiting syndrome versus cannabinoid hyperemesis syndrome  Possibly related to pancreatitis as well  Less concern for SBO given her recent bowel movement  May also be related to pregnancy or ectopic pregnancy  Work-up will include CBC, CMP, lipase, UA, UPT, IVF, and droperidol    Will reassess if no improvement of her symptoms may consider imaging at that time  Reassessment/Disposition: Overall she improved notably following droperidol treatment  Appeared notably better therefore deferred further management or imaging at this point  Advised on return precautions, encouraged smoking cessation  Recommended follow-up  Amount and/or Complexity of Data Reviewed  Labs: ordered  Decision-making details documented in ED Course  Risk  Prescription drug management  ED Course as of 03/26/23 0639   Sun Mar 26, 2023   0153 Lipase(!): 409   0153 CO2(!): 20   0209 WBC(!): 12 15   0232 Pt notable improved at this time  She reports her sypmtoms have resolved  Tolerating PO  Therefore feel safe to send home  Advised on smoking cessation and strict return precautions  Final Dispo   Final Diagnosis:  1  Cannabinoid hyperemesis syndrome    2   Cyclic vomiting syndrome      Time reflects when diagnosis was documented in both MDM as applicable and the Disposition within this note     Time User Action Codes Description Comment "3/26/2023  2:33 AM Jose L Carbajal Add [R11 2,  F12 90] Cannabinoid hyperemesis syndrome     3/26/2023  2:33 AM Jose L Pedroer Add [O47 17] Cyclic vomiting syndrome       ED Disposition     ED Disposition   Discharge    Condition   Stable    Date/Time   Sun Mar 26, 2023  2:33 AM    Comment   Melia Lira discharge to home/self care  Follow-up Information     Follow up With Specialties Details Why Contact Info Additional Information    [facility] [place] Nurse Practitioner Call   [address] [place] [address] [phone]       [address] [place] [address] Emergency Department Emergency Medicine  If symptoms worsen [address] [id number]  [facility] Emergency Department, [address], [id number] [phone]        Medications   droperidol (INAPSINE) injection 1 25 mg (1 25 mg Intramuscular Given 3/26/23 0143)       All details of the evaluation and treatment plan were made clear and additionally all questions and concerns were addressed while under my care  Portions of the record may have been created with voice recognition software  Occasional wrong word or \"sound a like\" substitutions may have occurred due to the inherent limitations of voice recognition software  Read the chart carefully and recognize, using context, where substitutions have occurred  The attending physician physically available and evaluated the above patient alongside myself          Karan Hernandez MD  03/26/23 3500    "

## 2023-03-26 NOTE — ED NOTES
Attempted IV unsuccessfully x2  Another RN to try at this time        Jayy Valerio, RN  03/26/23 7676

## 2023-03-31 ENCOUNTER — HOSPITAL ENCOUNTER (INPATIENT)
Facility: HOSPITAL | Age: 43
LOS: 1 days | Discharge: LEFT BEFORE DISCHARGE | End: 2023-04-01
Attending: EMERGENCY MEDICINE | Admitting: INTERNAL MEDICINE

## 2023-03-31 ENCOUNTER — APPOINTMENT (EMERGENCY)
Dept: CT IMAGING | Facility: HOSPITAL | Age: 43
End: 2023-03-31

## 2023-03-31 VITALS
WEIGHT: 162.26 LBS | TEMPERATURE: 98.3 F | SYSTOLIC BLOOD PRESSURE: 97 MMHG | BODY MASS INDEX: 31.86 KG/M2 | RESPIRATION RATE: 17 BRPM | HEIGHT: 60 IN | HEART RATE: 87 BPM | DIASTOLIC BLOOD PRESSURE: 54 MMHG | OXYGEN SATURATION: 95 %

## 2023-03-31 DIAGNOSIS — K52.9 COLITIS: ICD-10-CM

## 2023-03-31 DIAGNOSIS — K62.5 BRBPR (BRIGHT RED BLOOD PER RECTUM): Primary | ICD-10-CM

## 2023-03-31 DIAGNOSIS — K42.9 UMBILICAL HERNIA: ICD-10-CM

## 2023-03-31 DIAGNOSIS — N17.9 AKI (ACUTE KIDNEY INJURY) (HCC): ICD-10-CM

## 2023-03-31 DIAGNOSIS — E87.6 HYPOKALEMIA: ICD-10-CM

## 2023-03-31 DIAGNOSIS — K44.9 HIATAL HERNIA: ICD-10-CM

## 2023-03-31 LAB
ABO GROUP BLD: NORMAL
ALBUMIN SERPL BCP-MCNC: 4.2 G/DL (ref 3.5–5)
ALP SERPL-CCNC: 80 U/L (ref 34–104)
ALT SERPL W P-5'-P-CCNC: 50 U/L (ref 7–52)
ANION GAP SERPL CALCULATED.3IONS-SCNC: 12 MMOL/L (ref 4–13)
AST SERPL W P-5'-P-CCNC: 26 U/L (ref 13–39)
ATRIAL RATE: 65 BPM
ATRIAL RATE: 76 BPM
BASOPHILS # BLD AUTO: 0.07 THOUSANDS/ÂΜL (ref 0–0.1)
BASOPHILS NFR BLD AUTO: 0 % (ref 0–1)
BILIRUB SERPL-MCNC: 0.96 MG/DL (ref 0.2–1)
BLD GP AB SCN SERPL QL: NEGATIVE
BUN SERPL-MCNC: 21 MG/DL (ref 5–25)
CALCIUM SERPL-MCNC: 9.5 MG/DL (ref 8.4–10.2)
CARDIAC TROPONIN I PNL SERPL HS: 27 NG/L
CHLORIDE SERPL-SCNC: 91 MMOL/L (ref 96–108)
CO2 SERPL-SCNC: 27 MMOL/L (ref 21–32)
CREAT SERPL-MCNC: 2.42 MG/DL (ref 0.6–1.3)
CRP SERPL QL: 11.8 MG/L
EOSINOPHIL # BLD AUTO: 0.01 THOUSAND/ÂΜL (ref 0–0.61)
EOSINOPHIL NFR BLD AUTO: 0 % (ref 0–6)
ERYTHROCYTE [DISTWIDTH] IN BLOOD BY AUTOMATED COUNT: 12.4 % (ref 11.6–15.1)
ERYTHROCYTE [SEDIMENTATION RATE] IN BLOOD: 5 MM/HOUR (ref 0–19)
GFR SERPL CREATININE-BSD FRML MDRD: 23 ML/MIN/1.73SQ M
GLUCOSE SERPL-MCNC: 138 MG/DL (ref 65–140)
HCG SERPL QL: NEGATIVE
HCT VFR BLD AUTO: 42.1 % (ref 34.8–46.1)
HGB BLD-MCNC: 15 G/DL (ref 11.5–15.4)
IMM GRANULOCYTES # BLD AUTO: 0.36 THOUSAND/UL (ref 0–0.2)
IMM GRANULOCYTES NFR BLD AUTO: 1 % (ref 0–2)
LIPASE SERPL-CCNC: 103 U/L (ref 11–82)
LYMPHOCYTES # BLD AUTO: 1.52 THOUSANDS/ÂΜL (ref 0.6–4.47)
LYMPHOCYTES NFR BLD AUTO: 6 % (ref 14–44)
MCH RBC QN AUTO: 32.7 PG (ref 26.8–34.3)
MCHC RBC AUTO-ENTMCNC: 35.6 G/DL (ref 31.4–37.4)
MCV RBC AUTO: 92 FL (ref 82–98)
MONOCYTES # BLD AUTO: 2.35 THOUSAND/ÂΜL (ref 0.17–1.22)
MONOCYTES NFR BLD AUTO: 9 % (ref 4–12)
NEUTROPHILS # BLD AUTO: 20.75 THOUSANDS/ÂΜL (ref 1.85–7.62)
NEUTS SEG NFR BLD AUTO: 84 % (ref 43–75)
NRBC BLD AUTO-RTO: 0 /100 WBCS
PLATELET # BLD AUTO: 292 THOUSANDS/UL (ref 149–390)
PMV BLD AUTO: 9.4 FL (ref 8.9–12.7)
POTASSIUM SERPL-SCNC: 2.7 MMOL/L (ref 3.5–5.3)
PR INTERVAL: 112 MS
PR INTERVAL: 98 MS
PROT SERPL-MCNC: 7 G/DL (ref 6.4–8.4)
QRS AXIS: 124 DEGREES
QRS AXIS: 132 DEGREES
QRSD INTERVAL: 78 MS
QRSD INTERVAL: 82 MS
QT INTERVAL: 412 MS
QT INTERVAL: 442 MS
QTC INTERVAL: 459 MS
QTC INTERVAL: 463 MS
RBC # BLD AUTO: 4.59 MILLION/UL (ref 3.81–5.12)
RH BLD: POSITIVE
SODIUM SERPL-SCNC: 130 MMOL/L (ref 135–147)
SPECIMEN EXPIRATION DATE: NORMAL
T WAVE AXIS: 137 DEGREES
T WAVE AXIS: 141 DEGREES
VENTRICULAR RATE: 65 BPM
VENTRICULAR RATE: 76 BPM
WBC # BLD AUTO: 25.06 THOUSAND/UL (ref 4.31–10.16)

## 2023-03-31 RX ORDER — ONDANSETRON 2 MG/ML
4 INJECTION INTRAMUSCULAR; INTRAVENOUS EVERY 6 HOURS PRN
Status: DISCONTINUED | OUTPATIENT
Start: 2023-03-31 | End: 2023-04-01 | Stop reason: HOSPADM

## 2023-03-31 RX ORDER — HALOPERIDOL 5 MG/ML
5 INJECTION INTRAMUSCULAR ONCE
Status: COMPLETED | OUTPATIENT
Start: 2023-03-31 | End: 2023-03-31

## 2023-03-31 RX ORDER — ALBUTEROL SULFATE 90 UG/1
2 AEROSOL, METERED RESPIRATORY (INHALATION) EVERY 6 HOURS PRN
Status: DISCONTINUED | OUTPATIENT
Start: 2023-03-31 | End: 2023-04-01 | Stop reason: HOSPADM

## 2023-03-31 RX ORDER — MIDODRINE HYDROCHLORIDE 5 MG/1
5 TABLET ORAL ONCE
Status: COMPLETED | OUTPATIENT
Start: 2023-03-31 | End: 2023-03-31

## 2023-03-31 RX ORDER — MAGNESIUM HYDROXIDE/ALUMINUM HYDROXICE/SIMETHICONE 120; 1200; 1200 MG/30ML; MG/30ML; MG/30ML
30 SUSPENSION ORAL EVERY 6 HOURS PRN
Status: DISCONTINUED | OUTPATIENT
Start: 2023-03-31 | End: 2023-04-01 | Stop reason: HOSPADM

## 2023-03-31 RX ORDER — POTASSIUM CHLORIDE 29.8 MG/ML
40 INJECTION INTRAVENOUS ONCE
Status: DISCONTINUED | OUTPATIENT
Start: 2023-03-31 | End: 2023-03-31

## 2023-03-31 RX ORDER — POTASSIUM CHLORIDE 14.9 MG/ML
20 INJECTION INTRAVENOUS
Status: COMPLETED | OUTPATIENT
Start: 2023-03-31 | End: 2023-03-31

## 2023-03-31 RX ORDER — SODIUM CHLORIDE, SODIUM GLUCONATE, SODIUM ACETATE, POTASSIUM CHLORIDE, MAGNESIUM CHLORIDE, SODIUM PHOSPHATE, DIBASIC, AND POTASSIUM PHOSPHATE .53; .5; .37; .037; .03; .012; .00082 G/100ML; G/100ML; G/100ML; G/100ML; G/100ML; G/100ML; G/100ML
100 INJECTION, SOLUTION INTRAVENOUS CONTINUOUS
Status: DISPENSED | OUTPATIENT
Start: 2023-03-31 | End: 2023-03-31

## 2023-03-31 RX ORDER — GABAPENTIN 300 MG/1
600 CAPSULE ORAL 2 TIMES DAILY
Status: DISCONTINUED | OUTPATIENT
Start: 2023-03-31 | End: 2023-04-01 | Stop reason: HOSPADM

## 2023-03-31 RX ORDER — FLUTICASONE PROPIONATE 50 MCG
1 SPRAY, SUSPENSION (ML) NASAL DAILY
Status: DISCONTINUED | OUTPATIENT
Start: 2023-03-31 | End: 2023-04-01 | Stop reason: HOSPADM

## 2023-03-31 RX ORDER — METRONIDAZOLE 500 MG/100ML
500 INJECTION, SOLUTION INTRAVENOUS EVERY 8 HOURS
Status: DISCONTINUED | OUTPATIENT
Start: 2023-03-31 | End: 2023-04-01 | Stop reason: HOSPADM

## 2023-03-31 RX ORDER — ACETAMINOPHEN 325 MG/1
650 TABLET ORAL EVERY 6 HOURS PRN
Status: DISCONTINUED | OUTPATIENT
Start: 2023-03-31 | End: 2023-04-01 | Stop reason: HOSPADM

## 2023-03-31 RX ADMIN — METRONIDAZOLE 500 MG: 500 INJECTION, SOLUTION INTRAVENOUS at 14:06

## 2023-03-31 RX ADMIN — SERTRALINE HYDROCHLORIDE 150 MG: 100 TABLET ORAL at 11:08

## 2023-03-31 RX ADMIN — SODIUM CHLORIDE, SODIUM GLUCONATE, SODIUM ACETATE, POTASSIUM CHLORIDE AND MAGNESIUM CHLORIDE 100 ML/HR: 526; 502; 368; 37; 30 INJECTION, SOLUTION INTRAVENOUS at 11:06

## 2023-03-31 RX ADMIN — IOHEXOL 100 ML: 350 INJECTION, SOLUTION INTRAVENOUS at 06:44

## 2023-03-31 RX ADMIN — POTASSIUM CHLORIDE 20 MEQ: 14.9 INJECTION, SOLUTION INTRAVENOUS at 08:13

## 2023-03-31 RX ADMIN — HALOPERIDOL LACTATE 5 MG: 5 INJECTION, SOLUTION INTRAMUSCULAR at 06:31

## 2023-03-31 RX ADMIN — POTASSIUM CHLORIDE 20 MEQ: 14.9 INJECTION, SOLUTION INTRAVENOUS at 10:13

## 2023-03-31 RX ADMIN — GABAPENTIN 600 MG: 300 CAPSULE ORAL at 18:43

## 2023-03-31 RX ADMIN — CEFTRIAXONE 2000 MG: 10 INJECTION, POWDER, FOR SOLUTION INTRAVENOUS at 16:08

## 2023-03-31 RX ADMIN — METRONIDAZOLE 500 MG: 500 INJECTION, SOLUTION INTRAVENOUS at 21:46

## 2023-03-31 RX ADMIN — GABAPENTIN 600 MG: 300 CAPSULE ORAL at 11:08

## 2023-03-31 RX ADMIN — NICOTINE 7 MG/24 HR DAILY TRANSDERMAL PATCH 1 PATCH: at 11:08

## 2023-03-31 RX ADMIN — SODIUM CHLORIDE 1000 ML: 0.9 INJECTION, SOLUTION INTRAVENOUS at 06:51

## 2023-03-31 RX ADMIN — SODIUM CHLORIDE 1000 ML: 0.9 INJECTION, SOLUTION INTRAVENOUS at 16:44

## 2023-03-31 RX ADMIN — MIDODRINE HYDROCHLORIDE 5 MG: 5 TABLET ORAL at 16:45

## 2023-03-31 NOTE — CONSULTS
Consultation - [facility]  Melia Laura 37 y o  female MRN: [id number]  Unit/Bed#: E5 -01 Encounter: [id number]        Inpatient consult to gastroenterology  Consult performed by: Nicolasa Lancaster PA-C  Consult ordered by: Pawan Stroud PA-C        ASSESSMENT and PLAN:      24-year-old female with history of asthma, hypertension, anxiety, depression, and cyclic vomiting syndrome presenting for evaluation of bright red blood per rectum and lower abdominal pain found to have colitis  1) Bright red blood per rectum  2) Lower abdominal pain  3) Colitis    She had intractable nausea and vomiting for the past 1 week, then felt dizzy and lightheaded last night followed by onset of crampy, lower abdominal pain and passage of bright red blood per rectum  She was found to have JACKI, hypokalemia, leukocytosis, and left sided colitis on CT scan  Hemoglobin normal 15 0  She is hemodynamically stable and afebrile although BP initially soft 97/52  She appears nontoxic on exam  Her abdomen is soft, non-distended, non-tender to palpation  Most likely etiology is ischemic colitis, less likely infectious or inflammatory     -Continue IV fluids for hydration  -Agree with holding antihypertensive medication for now  -Start antibiotics  -Check stool studies if diarrhea  -Advance diet as tolerated  -Would recommend elective colonoscopy in about 6-8 weeks to ensure healing of the colon and assess for polyps and malignancy    4) Cyclic vomiting syndrome: Her vomiting has resolved  Would recommend outpatient GI follow-up  Patient was seen and examined by Dr Jessica Johnston  All khan medical decisions were made by Dr Jessica Johnston  Thank you for allowing us to participate in the care of this present patient  We will follow-up with you closely        Reason for Consult / Principal Problem: Bright red blood per rectum    HPI: 24-year-old female with history of asthma, hypertension, anxiety, depression, and cyclic vomiting syndrome presenting for evaluation of bright red blood per rectum  Patient reports intractable nausea and vomiting for the past 1 week which she states is not unusual with her history of cyclic vomiting  Last night around midnight, she felt dizzy and lightheaded and then developed cramping, aching lower abdominal pain and began passing red blood and clots per rectum  She states she has passed blood 10-15 times since then  This has never happened before  She continues to have crampy lower abdominal pain  This is temporarily alleviated with passage of blood per rectum then the pain returns  She was having some cold sweats  She denies any diarrhea  She was passing solid stool prior to onset of bleeding  No recent antibiotics or sick contacts  She does not take blood thinners  She had incomplete colonoscopy in 2018  Procedure was aborted due to poor prep  REVIEW OF SYSTEMS:    CONSTITUTIONAL: Denies any fever  + Cold sweats  Good appetite, and no recent weight loss  HEENT: No earache or tinnitus  Denies hearing loss or visual disturbances  CARDIOVASCULAR: No chest pain or palpitations  + Lightheaded  RESPIRATORY: Denies any cough, hemoptysis, shortness of breath or dyspnea on exertion  GASTROINTESTINAL: As noted in the History of Present Illness  GENITOURINARY: No problems with urination  Denies any hematuria or dysuria  NEUROLOGIC: + Dizziness  Denies headaches  MUSCULOSKELETAL: Denies any muscle or joint pain  SKIN: Denies skin rashes or itching  ENDOCRINE: Denies excessive thirst  Denies intolerance to heat or cold  PSYCHOSOCIAL: + Depression and anxiety  Denies any recent memory loss         Historical Information   Past Medical History:   Diagnosis Date    Anxiety     Arthritis     OA  b/l knees    Asthma     Approx 2 years ago    Calculus of gallbladder without cholecystitis without obstruction 02/23/2022    Chronic kidney disease     Depression     GERD (gastroesophageal reflux disease) In my 25s  Approx 15 years ago    Hx of bleeding disorder     dysmennorrhea-dx lap today 2016    Hypertension     Kidney stone     Obesity     Seasonal allergies      Past Surgical History:   Procedure Laterality Date     SECTION      CHOLECYSTECTOMY      DIAGNOSTIC LAPAROSCOPY      DILATION AND CURETTAGE OF UTERUS      HERNIA REPAIR      NJ COLONOSCOPY FLX DX W/COLLJ SPEC WHEN PFRMD N/A 2018    Procedure: EGD AND COLONOSCOPY;  Surgeon: Aman Dean MD;  [facility]: [facility]; Service: Gastroenterology    NJ LAPAROSCOPY SURG CHOLECYSTECTOMY N/A 3/11/2022    Procedure: ROBOTIC LAPAROSCOPIC CHOLECYSTECTOMY;  Surgeon: Brenda Benz DO;  Location[facility];  Service: General    NJ LAPAROSCOPY W/RMVL ADNEXAL STRUCTURES Bilateral 2016    Procedure: CYSTECTOMY  OVARIAN;  Surgeon: Marixa Kaur DO;  [facility];  Service: Gynecology    NJ LAPS ABD PRTM&OMENTUM DX W/WO SPEC BR/WA SPX N/A 2016    Procedure: LAPAROSCOPY DIAGNOSTIC DAVID;  Surgeon: Marixa Kaur DO;  [facility];  Service: Gynecology    NJ RPR UMBILICAL HRNA 5 YRS/> REDUCIBLE N/A 3/11/2022    Procedure: UMBILICAL HERNIA REPAIR;  Surgeon: Brenda Benz DO;  [facility];  Service: General    WISDOM TOOTH EXTRACTION       Social History   Social History     Substance and Sexual Activity   Alcohol Use Never    Comment: social     Social History     Substance and Sexual Activity   Drug Use Yes    Frequency: 7 0 times per week    Types: Marijuana    Comment: I exhaused all your ‘rx prescription-- NOTHING ELSE HELPED!!      Social History     Tobacco Use   Smoking Status Every Day    Packs/day:  00    Years: 23 00    Pack years:     Types: Cigarettes   Smokeless Tobacco Never     Family History   Problem Relation Age of Onset    No Known Problems Sister     Autism Daughter     Lung cancer Maternal Grandmother     Cancer Maternal Grandmother         Deceassd 10 years    Diabetes Maternal Grandfather     Arthritis Maternal Grandfather          10 years    Aneurysm Paternal Grandmother     No Known Problems Sister        Meds/Allergies     Medications Prior to Admission   Medication    albuterol (ProAir HFA) 90 mcg/act inhaler    betamethasone valerate (LUXIQ) 0 12 % foam    fluticasone (FLONASE) 50 mcg/act nasal spray    gabapentin (NEURONTIN) 300 mg capsule    hydrOXYzine pamoate (VISTARIL) 25 mg capsule    lisinopril (ZESTRIL) 20 mg tablet    norgestimate-ethinyl estradiol (Ortho Tri-Cyclen Lo) 0 18/0 215/0 25 MG-25 MCG per tablet    ondansetron (Zofran ODT) 4 mg disintegrating tablet    predniSONE 10 mg tablet    prochlorperazine (COMPAZINE) 10 mg tablet    promethazine (PHENERGAN) 25 mg tablet    sertraline (ZOLOFT) 100 mg tablet    sertraline (ZOLOFT) 50 mg tablet     Current Facility-Administered Medications   Medication Dose Route Frequency    multi-electrolyte (PLASMALYTE-A/ISOLYTE-S PH 7 4) IV solution  100 mL/hr Intravenous Continuous    potassium chloride 20 mEq IVPB (premix)  20 mEq Intravenous Q2H       Allergies   Allergen Reactions    Eggs Or Egg-Derived Products - Food Allergy Other (See Comments)     abd pain           Objective     Blood pressure 110/65, pulse 76, temperature 97 7 °F (36 5 °C), temperature source Axillary, resp  rate 18, height 5' (1 524 m), weight 73 6 kg (162 lb 4 1 oz), SpO2 96 %  No intake or output data in the 24 hours ending 23 0959      PHYSICAL EXAM:      General Appearance:   Alert, cooperative, no distress, appears stated age    HEENT:   Normocephalic, atraumatic, anicteric       Neck:  Supple, symmetrical, trachea midline, no adenopathy   Lungs:   Clear to auscultation bilaterally   Heart[de-identified]   S1 and S2 normal; regular rate and rhythm   Abdomen:   Soft, non-tender, non-distended; normal bowel sounds   Genitalia:   Deferred    Rectal:   Deferred    Extremities:  No cyanosis, clubbing or edema    Pulses:  2+ and symmetric all extremities Skin:  Skin color, texture, turgor normal, no rashes or lesions    Lymph nodes:  No palpable cervical lymphadenopathy        Lab Results:   Results from last 7 days   Lab Units 03/31/23  0633   WBC Thousand/uL 25 06*   HEMOGLOBIN g/dL 15 0   HEMATOCRIT % 42 1   PLATELETS Thousands/uL 292   NEUTROS PCT % 84*   LYMPHS PCT % 6*   MONOS PCT % 9   EOS PCT % 0     Results from last 7 days   Lab Units 03/31/23  0633   POTASSIUM mmol/L 2 7*   CHLORIDE mmol/L 91*   CO2 mmol/L 27   BUN mg/dL 21   CREATININE mg/dL 2 42*   CALCIUM mg/dL 9 5   ALK PHOS U/L 80   ALT U/L 50   AST U/L 26         Results from last 7 days   Lab Units 03/31/23  0633   LIPASE u/L 103*       Imaging Studies: I have personally reviewed pertinent imaging studies  CT abdomen pelvis w contrast    Result Date: 3/31/2023  Impression: Findings most suggestive of colitis, as described above  Please see discussion  Gastroenterology consultation and follow-up is recommended  Evaluation of the urinary bladder is limited as it is nearly empty, however, the urinary bladder wall appears somewhat thickened and there is urothelial enhancement  Urinary tract infection should be excluded  Correlation with urinalysis and urine culture and sensitivity is recommended  Other nonemergent findings as described above  The study was marked in [facility] for immediate notification   Workstation performed: [id number]

## 2023-03-31 NOTE — ASSESSMENT & PLAN NOTE
Secondary to GI losses  Monitor on telemetry  Nausea control  Recent Labs     03/31/23  0633   K 2 7*

## 2023-03-31 NOTE — ASSESSMENT & PLAN NOTE
1  Acute renal failure: Most likely pre-renal JACKI 2/2 volume depletion  -cont IVF at 100cc/h for now; keep MAP > 65mmHg  - normal PLT, doubt HUS/TTP  - consider renal biopsy if no improvement of renal function over weekend  - No need for emergent HD at this point; close monitor   - avoid renal toxins;  hold RAAS blockers and diuretics  - renal dose all medications as eGFR < 15 ml/min  3  Hyponatremia: 2/2 vol depletion  - IVF as above    4  HTN: BP ok without AHT meds, monitor for now

## 2023-03-31 NOTE — ASSESSMENT & PLAN NOTE
Infectious work-up pending  Enteric stool pathogen, C  difficile are currently pending at this time  GI consultation placed should there be consideration for endoscopy

## 2023-03-31 NOTE — H&P
[facility]  H&P  Name: Casey Bean  MRN: [id number]  Unit/Bed#: ED-04 I Date of Admission: 3/31/2023   Date of Service: 3/31/2023 I Hospital Day: 0      Assessment and Plan  * JACKI (acute kidney injury) [facility])  Assessment & Plan  1  Acute renal failure: Most likely pre-renal JACKI 2/2 volume depletion  -cont IVF at 100cc/h for now; keep MAP > 65mmHg  - normal PLT, doubt HUS/TTP  - consider renal biopsy if no improvement of renal function over weekend  - No need for emergent HD at this point; close monitor   - avoid renal toxins;  hold RAAS blockers and diuretics  - renal dose all medications as eGFR < 15 ml/min  3  Hyponatremia: 2/2 vol depletion  - IVF as above  4  HTN: BP ok without AHT meds, monitor for now    Colitis  Assessment & Plan  Infectious work-up pending  Enteric stool pathogen, C  difficile are currently pending at this time  GI consultation placed should there be consideration for endoscopy    Hypokalemia  Assessment & Plan  Secondary to GI losses  Monitor on telemetry  Nausea control  Recent Labs     03/31/23  0633   K 2 7*         Cyclical vomiting syndrome  Assessment & Plan  Suspect from hyperemesis cannabis syndrome  Anti-emetics as needed  Essential hypertension  Assessment & Plan  Monitor of AHT for now    [place] home medication, mood is stable no homicidal or suicidal ideation  Code Status: Full code    VTE Prophylaxis: Early ambulation  / sequential compression device     POLST: There is no POLST form on file for this patient (pre-hospital)  Discussion with family: Discussed with  at bedside    Anticipated Length of Stay:  Patient will be admitted on an Inpatient basis with an anticipated length of stay of greater than 2 midnights  Justification for Hospital Stay: JACKI (acute kidney injury) [facility])     Total Time for Visit, including Counseling / Coordination of Care: 1 hour    Greater than 50% of this total time spent on direct patient counseling and coordination of care  Chief Complaint:     Chief Complaint   Patient presents with   • Rectal Bleeding     Pt reports BRBPR starting last night @ approximately midnight and lower abdominal pain  Pt also c/o vomiting since last Friday  EMS reports BP 86/45 en route  BP 97/52 in triage  Patient crying and moaning due to pain  History of Present Illness:    Melia Zhao is a 37 y o  female who presents with bright red blood per rectum as well as multiple episodes of abdominal pain and nausea and vomiting     Patient has a past medical history of cyclical vomiting syndrome secondary to hyperemesis from cannabis use  She also reports having episodes of hypokalemia related to this  She reports a colonoscopy and no history of inflammatory bowel disease  She reports bright red blood per rectum  She denies any recent antibiotic use, no recent travel or trip history  No sick contacts with any similar issues  She does work in a nursing home, 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  No recent consumption of any spoiled milk products, spoiled poultry, seafood, [place] food  She reports having difficulty urinating, but no urinary frequency or dysuria  She has no chest pain  Review of Systems:    A complete and comprehensive 14 point organ system review was performed and all other systems are negative other than stated above in the HPI    Past Medical and Surgical History:     Past Medical History:   Diagnosis Date   • Anxiety    • Arthritis     OA  b/l knees   • Asthma     Approx 2 years ago   • Calculus of gallbladder without cholecystitis without obstruction 02/23/2022   • Chronic kidney disease    • Depression    • GERD (gastroesophageal reflux disease)     In my 25s   Approx 15 years ago   • Hx of bleeding disorder     dysmennorrhea-dx lap today 8/12/2016   • Hypertension    • Kidney stone    • Obesity    • Seasonal allergies        Past Surgical History:   Procedure Laterality Date •  SECTION     • CHOLECYSTECTOMY     • DIAGNOSTIC LAPAROSCOPY     • DILATION AND CURETTAGE OF UTERUS     • HERNIA REPAIR     • TN COLONOSCOPY FLX DX W/COLLJ SPEC WHEN PFRMD N/A 2018    Procedure: EGD AND COLONOSCOPY;  Surgeon: Catalina Smith MD;  Location: [facility]; Service: Gastroenterology   • TN LAPAROSCOPY SURG CHOLECYSTECTOMY N/A 3/11/2022    Procedure: ROBOTIC LAPAROSCOPIC CHOLECYSTECTOMY;  Surgeon: Delon Gonzales DO;  Location: [facility] Main OR;  Service: General   • TN LAPAROSCOPY W/RMVL ADNEXAL STRUCTURES Bilateral 2016    Procedure: CYSTECTOMY  OVARIAN;  Surgeon: Bianca Crespo DO;  Location: [facility];  Service: Gynecology   • TN LAPS ABD PRTM&OMENTUM DX W/WO SPEC BR/WA SPX N/A 2016    Procedure: LAPAROSCOPY DIAGNOSTIC DAVID;  Surgeon: Bianca Crespo DO;  [facility];  Service: Gynecology   • TN RPR UMBILICAL HRNA 5 YRS/> REDUCIBLE N/A 3/11/2022    Procedure: UMBILICAL HERNIA REPAIR;  Surgeon: Delon Gonzales DO;  Location: [facility];  Service: General   • WISDOM TOOTH EXTRACTION         Meds/Allergies:    Prior to Admission medications    Medication Sig Start Date End Date Taking?  Authorizing Provider   albuterol (ProAir HFA) 90 mcg/act inhaler Inhale 2 puffs every 6 (six) hours as needed for wheezing 21   Mickie Cheema PA-C   betamethasone valerate (LUXIQ) 0 12 % foam Apply topically daily 22   Miguel Hopkins MD   fluticasone [facility]) 50 mcg/act nasal spray 1 spray into each nostril daily 22   Lew Rivers MD   gabapentin (NEURONTIN) 300 mg capsule Take 2 capsules (600 mg total) by mouth 2 (two) times a day 22   MONICA Virk   hydrOXYzine pamoate (VISTARIL) 25 mg capsule Take 1 capsule (25 mg total) by mouth 3 (three) times a day as needed for anxiety 22   Mickie Cheema PA-C   lisinopril (ZESTRIL) 20 mg tablet Take 1 tablet (20 mg total) by mouth in the morning  22   Miguel Hopkins MD   norgestimate-ethinyl estradiol (Ortho Tri-Cyclen Lo) 0 18/0 215/0 25 MG-25 MCG per tablet Take 1 tablet by mouth daily 7/11/22 12/27/22  SOFÍA Pelaez DO   ondansetron (Zofran ODT) 4 mg disintegrating tablet Take 1 tablet (4 mg total) by mouth every 6 (six) hours as needed for nausea or vomiting  Patient not taking: Reported on 12/27/2022 1/24/22   Catherine Harrison MD   predniSONE 10 mg tablet Take 1 tablet (10 mg total) by mouth daily 6 tab day 1, 5 tab day 2, 4 tab day 3, 3 tab day 4, 2 tab day 5, 1 tab day 6  Patient not taking: Reported on 12/27/2022 12/18/22   Laisha Terrell MD   prochlorperazine (COMPAZINE) 10 mg tablet Take 0 5 tablets (5 mg total) by mouth every 8 (eight) hours as needed for nausea or vomiting 6/8/22   MONICA Smith   promethazine (PHENERGAN) 25 mg tablet Take 1 tablet (25 mg total) by mouth every 6 (six) hours as needed for nausea or vomiting 3/25/22   Eulalio Aviles MD   sertraline (ZOLOFT) 100 mg tablet Take 1 tablet daily  With the 50 mg  Total 150 mg daily 5/11/22   Eulalio Aviles MD   sertraline (ZOLOFT) 50 mg tablet Take 1 tablet (50 mg total) by mouth in the morning  5/11/22   Eulalio Aviles MD     I have reviewed home medications with patient personally  Allergies: Allergies   Allergen Reactions   • Eggs Or Egg-Derived Products - Food Allergy Other (See Comments)     abd pain       Social History:     Marital Status: /Civil Union   Occupation: Works at a nursing home    Substance Use History:   Social History     Substance and Sexual Activity   Alcohol Use Never    Comment: social     Social History     Tobacco Use   Smoking Status Every Day   • Packs/day: 1 00   • Years: 23 00   • Pack years: 23 00   • Types: Cigarettes   Smokeless Tobacco Never     Social History     Substance and Sexual Activity   Drug Use Yes   • Frequency: 7 0 times per week   • Types: Marijuana    Comment: I exhaused all your ‘rx prescription-- NOTHING ELSE HELPED!!        Family History:    Family History   Problem Relation Age of Onset   • No Known Problems Sister    • Autism Daughter    • Lung cancer Maternal Grandmother    • Cancer Maternal Grandmother         Deceassd 8 years   • Diabetes Maternal Grandfather    • Arthritis Maternal Grandfather          8 years   • Aneurysm Paternal Grandmother    • No Known Problems Sister        Physical Exam:     Vitals:   Blood Pressure: 107/74 (23 08)  Pulse: 86 (23)  Temperature: 97 9 °F (36 6 °C) (23 0739)  Temp Source: Oral (23 5886)  Respirations: 20 (23)  SpO2: 100 % (23)      General: ill appearing, no acute distress  HEENT: atraumatic, PERRLA, moist mucosa, normal pharynx, normal tonsils and adenoids, normal tongue, no fluid in sinuses  Neck: Trachea midline, no carotid bruit, no masses  Respiratory: normal chest wall expansion, CTA B, no r/r/w, no rubs  Cardiovascular: RRR, no m/r/g, Normal S1 and S2  Abdomen: Soft, non-tender, non-distended, normal bowel sounds in all quadrants, no hepatosplenomegaly, no tympany  Rectal: deferred  Musculoskeletal: normal ROM in upper and lower extremities  Integumentary: warm, dry, and pink, with no rash, purpura, or petechia  Heme/Lymph: no lymphadenopathy, no bruises  Neurological: Cranial Nerves II-XII grossly intact  Psychiatric: cooperative with normal mood, affect, and cognition    Additional Data:     Lab Results: I have personally reviewed pertinent reports        Results from last 7 days   Lab Units 23  0633   WBC Thousand/uL 25 06*   HEMOGLOBIN g/dL 15 0   HEMATOCRIT % 42 1   PLATELETS Thousands/uL 292   NEUTROS PCT % 84*   LYMPHS PCT % 6*   MONOS PCT % 9   EOS PCT % 0     Results from last 7 days   Lab Units 23  0633   SODIUM mmol/L 130*   POTASSIUM mmol/L 2 7*   CHLORIDE mmol/L 91*   CO2 mmol/L 27   BUN mg/dL 21   CREATININE mg/dL 2 42*   ANION GAP mmol/L 12   CALCIUM mg/dL 9 5   ALBUMIN g/dL 4 2   TOTAL BILIRUBIN mg/dL 0 96   ALK PHOS U/L 80   ALT U/L 50 AST U/L 26   GLUCOSE RANDOM mg/dL 138                     Results from last 7 days   Lab Units 03/31/23  0633   HS TNI 0HR ng/L 27       Imaging: I have personally reviewed pertinent reports  CT abdomen pelvis w contrast   Final Result by Yvette Ferrell MD (03/31 6747)      Findings most suggestive of colitis, as described above  Please see discussion  Gastroenterology consultation and follow-up is recommended  Evaluation of the urinary bladder is limited as it is nearly empty, however, the urinary bladder wall appears somewhat thickened and there is urothelial enhancement  Urinary tract infection should be excluded  Correlation with urinalysis and urine    culture and sensitivity is recommended  Other nonemergent findings as described above  The study was marked in [facility] for immediate notification  Workstation performed: [id number]             EKG, Pathology, and Other Studies Reviewed on Admission:   · CT reviewed,    Allscripts / Epic Records Reviewed: Yes     ** Please Note: This note was completed in part utilizing M-Modal Fluency Direct Software  Grammatical errors, random word insertions, spelling mistakes, and incomplete sentences may be an occasional consequence of this system secondary to software limitations, ambient noise, and hardware issues  If you have any questions or concerns about the content, text, or information contained within the body of this dictation, please contact the provider for clarification  **

## 2023-03-31 NOTE — ED PROCEDURE NOTE
Procedure  CriticalCare Time    Date/Time: 3/31/2023 8:52 AM  Performed by: Sean Ellis PA-C  Authorized by: Sean Ellis PA-C     Critical care provider statement:     Critical care time (minutes):  15    Critical care time was exclusive of:  Separately billable procedures and treating other patients    Critical care was necessary to treat or prevent imminent or life-threatening deterioration of the following conditions:  Dehydration (JACKI, hypokalemia, dehydration)    Critical care was time spent personally by me on the following activities:  Interpretation of cardiac output measurements, obtaining history from patient or surrogate, ordering and performing treatments and interventions, development of treatment plan with patient or surrogate, ordering and review of laboratory studies, ordering and review of radiographic studies, discussions with primary provider, discussions with consultants, re-evaluation of patient's condition, review of old charts, examination of patient and evaluation of patient's response to treatment    I assumed direction of critical care for this patient from another provider in my specialty: no                       Sean Ellis PA-C  03/31/23 502

## 2023-03-31 NOTE — ED PROVIDER NOTES
History  Chief Complaint   Patient presents with   • Rectal Bleeding     Pt reports BRBPR starting last night @ approximately midnight and lower abdominal pain  Pt also c/o vomiting since last Friday  EMS reports BP 86/45 en route  BP 97/52 in triage  Patient crying and moaning due to pain  This is a 38 y o  female with PMH significant for cyclic vomiting syndrome, frequent marijuana use, hypertension, kidney stones, obesity, , nephroscopy, cholecystectomy coming in today with BRBPR that started last night  Blood is not mixed with any stool  States that she has filled a depends diaper with blood at home  Pt also reports severe lower abdominal pain  States that for the past week she has been vomiting due to her cyclic vomiting syndrome, but this stopped yesterday and then she started with the bleeding the pain  Abdominal pain is not her typical pain when she has the cyclic vomiting  Admits to cold sweats at home as well as LH  States that she had a near syncope event at home  Pt denies any CP, SOB, urinary symptoms  Prior to Admission Medications   Prescriptions Last Dose Informant Patient Reported? Taking? albuterol (ProAir HFA) 90 mcg/act inhaler   No No   Sig: Inhale 2 puffs every 6 (six) hours as needed for wheezing   betamethasone valerate (LUXIQ) 0 12 % foam   No No   Sig: Apply topically daily   fluticasone (FLONASE) 50 mcg/act nasal spray   No No   Si spray into each nostril daily   gabapentin (NEURONTIN) 300 mg capsule   No No   Sig: Take 2 capsules (600 mg total) by mouth 2 (two) times a day   hydrOXYzine pamoate (VISTARIL) 25 mg capsule   No No   Sig: Take 1 capsule (25 mg total) by mouth 3 (three) times a day as needed for anxiety   lisinopril (ZESTRIL) 20 mg tablet   No No   Sig: Take 1 tablet (20 mg total) by mouth in the morning     norgestimate-ethinyl estradiol (Ortho Tri-Cyclen Lo) 0 18/0 215/0 25 MG-25 MCG per tablet   No No   Sig: Take 1 tablet by mouth daily ondansetron (Zofran ODT) 4 mg disintegrating tablet   No No   Sig: Take 1 tablet (4 mg total) by mouth every 6 (six) hours as needed for nausea or vomiting   Patient not taking: Reported on 2022   predniSONE 10 mg tablet   No No   Sig: Take 1 tablet (10 mg total) by mouth daily 6 tab day 1, 5 tab day 2, 4 tab day 3, 3 tab day 4, 2 tab day 5, 1 tab day 6   Patient not taking: Reported on 2022   prochlorperazine (COMPAZINE) 10 mg tablet   No No   Sig: Take 0 5 tablets (5 mg total) by mouth every 8 (eight) hours as needed for nausea or vomiting   promethazine (PHENERGAN) 25 mg tablet   No No   Sig: Take 1 tablet (25 mg total) by mouth every 6 (six) hours as needed for nausea or vomiting   sertraline (ZOLOFT) 100 mg tablet   No No   Sig: Take 1 tablet daily  With the 50 mg  Total 150 mg daily   sertraline (ZOLOFT) 50 mg tablet   No No   Sig: Take 1 tablet (50 mg total) by mouth in the morning  Facility-Administered Medications: None       Past Medical History:   Diagnosis Date   • Anxiety    • Arthritis     OA  b/l knees   • Asthma     Approx 2 years ago   • Calculus of gallbladder without cholecystitis without obstruction 2022   • Chronic kidney disease    • Depression    • GERD (gastroesophageal reflux disease)     In my 25s  Approx 15 years ago   • Hx of bleeding disorder     dysmennorrhea-dx lap today 2016   • Hypertension    • Kidney stone    • Obesity    • Seasonal allergies        Past Surgical History:   Procedure Laterality Date   •  SECTION     • CHOLECYSTECTOMY     • DIAGNOSTIC LAPAROSCOPY     • DILATION AND CURETTAGE OF UTERUS     • HERNIA REPAIR     • TX COLONOSCOPY FLX DX W/COLLJ SPEC WHEN PFRMD N/A 2018    Procedure: EGD AND COLONOSCOPY;  Surgeon: Kendy Gray MD;  [facility]: [facility];   Service: Gastroenterology   • TX LAPAROSCOPY SURG CHOLECYSTECTOMY N/A 3/11/2022    Procedure: ROBOTIC LAPAROSCOPIC CHOLECYSTECTOMY;  Surgeon: Idalmis Dexter DO; Location: [facility];  Service: General   • NJ LAPAROSCOPY W/RMVL ADNEXAL STRUCTURES Bilateral 2016    Procedure: CYSTECTOMY  OVARIAN;  Surgeon: Refugia Olszewski, DO;  [facility];  Service: Gynecology   • NJ LAPS ABD PRTM&OMENTUM DX W/WO SPEC BR/WA SPX N/A 2016    Procedure: LAPAROSCOPY DIAGNOSTIC DAVID;  Surgeon: Refugia Olszewski, DO;  [facility];  Service: Gynecology   • NJ RPR UMBILICAL HRNA 5 YRS/> REDUCIBLE N/A 3/11/2022    Procedure: UMBILICAL HERNIA REPAIR;  Surgeon: Eliane Habermann, DO;  Location[facility];  Service: General   • WISDOM TOOTH EXTRACTION         Family History   Problem Relation Age of Onset   • No Known Problems Sister    • Autism Daughter    • Lung cancer Maternal Grandmother    • Cancer Maternal Grandmother         Deceassd 10 years   • Diabetes Maternal Grandfather    • Arthritis Maternal Grandfather          10 years   • Aneurysm Paternal Grandmother    • No Known Problems Sister      I have reviewed and agree with the history as documented  E-Cigarette/Vaping   • E-Cigarette Use Never User      E-Cigarette/Vaping Substances   • Nicotine No    • THC No    • CBD No    • Flavoring No    • Other No    • Unknown No      Social History     Tobacco Use   • Smoking status: Every Day     Packs/day:  00     Years:      Pack years:      Types: Cigarettes   • Smokeless tobacco: Never   Vaping Use   • Vaping Use: Never used   Substance Use Topics   • Alcohol use: Never     Comment: social   • Drug use: Yes     Frequency: 7 0 times per week     Types: Marijuana     Comment: I exhaused all your ‘rx prescription-- NOTHING ELSE HELPED!! Review of Systems   Constitutional: Positive for chills and diaphoresis  Negative for fever  Respiratory: Negative for shortness of breath  Cardiovascular: Negative for chest pain  Gastrointestinal: Positive for abdominal pain, anal bleeding and nausea  Negative for diarrhea and vomiting     Neurological: Positive for light-headedness  All other systems reviewed and are negative  Physical Exam  Physical Exam  Vitals and nursing note reviewed  Exam conducted with a chaperone present  Constitutional:       General: She is in acute distress  Appearance: Normal appearance  She is well-developed  She is ill-appearing  Comments: Moaning in pain   HENT:      Head: Normocephalic and atraumatic  Eyes:      Conjunctiva/sclera: Conjunctivae normal    Cardiovascular:      Rate and Rhythm: Normal rate  Pulmonary:      Effort: Pulmonary effort is normal    Abdominal:      Palpations: Abdomen is soft  Tenderness: There is abdominal tenderness  Genitourinary:     Comments: External rectum evaluated- no hemorrhoids  Pt was wearing a depends which had BRB mixed with mucous in it  This substance was also around pt's rectum  Cannot visualize a specific source at this time  Musculoskeletal:         General: Normal range of motion  Cervical back: Normal range of motion and neck supple  Skin:     General: Skin is warm and dry  Capillary Refill: Capillary refill takes less than 2 seconds  Neurological:      Mental Status: She is alert     Psychiatric:         Mood and Affect: Mood normal          Vital Signs  ED Triage Vitals   Temperature Pulse Respirations Blood Pressure SpO2   03/31/23 0613 03/31/23 0613 03/31/23 0613 03/31/23 0613 03/31/23 0630   97 9 °F (36 6 °C) 74 20 97/52 100 %      Temp Source Heart Rate Source Patient Position - Orthostatic VS BP Location FiO2 (%)   03/31/23 0613 03/31/23 0613 03/31/23 0613 03/31/23 0613 --   Oral Monitor Lying Left arm       Pain Score       03/31/23 0613       10 - Worst Possible Pain           Vitals:    03/31/23 0613 03/31/23 0630 03/31/23 0800   BP: 97/52 104/71 116/64   Pulse: 74 75 63   Patient Position - Orthostatic VS: Lying           Visual Acuity      ED Medications  Medications   potassium chloride 20 mEq IVPB (premix) (20 mEq Intravenous New Bag 3/31/23 0813)   multi-electrolyte (PLASMALYTE-A/ISOLYTE-S PH 7 4) IV solution (has no administration in time range)   sodium chloride 0 9 % bolus 1,000 mL (1,000 mL Intravenous New Bag 3/31/23 0651)   haloperidol lactate (HALDOL) injection 5 mg (5 mg Intramuscular Given 3/31/23 0631)   iohexol (OMNIPAQUE) 350 MG/ML injection (SINGLE-DOSE) 100 mL (100 mL Intravenous Given 3/31/23 0644)       Diagnostic Studies  Results Reviewed     Procedure Component Value Units Date/Time    Stool Enteric Bacterial Panel by PCR [[id number]]     Lab Status: No result Specimen: Stool from Rectum     Clostridium difficile toxin by PCR with EIA [[id number]]     Lab Status: No result Specimen: Stool from Per Rectum     Urinalysis with microscopic [[id number]]     Lab Status: No result Specimen: Urine, Clean Catch     Sedimentation rate, automated [[id number]]     Lab Status: No result Specimen: Blood     C-reactive protein [[id number]]     Lab Status: No result Specimen: Blood     Rapid drug screen, urine [[id number]]     Lab Status: No result Specimen: Urine     HS Troponin I 4hr [[id number]] Collected: 03/31/23 0820    Lab Status:  In process Specimen: Blood from Arm, Right Updated: 03/31/23 0822    Comprehensive metabolic panel [[id number]]  (Abnormal) Collected: 03/31/23 0633    Lab Status: Final result Specimen: Blood from Arm, Right Updated: 03/31/23 0731     Sodium 130 mmol/L      Potassium 2 7 mmol/L      Chloride 91 mmol/L      CO2 27 mmol/L      ANION GAP 12 mmol/L      BUN 21 mg/dL      Creatinine 2 42 mg/dL      Glucose 138 mg/dL      Calcium 9 5 mg/dL      AST 26 U/L      ALT 50 U/L      Alkaline Phosphatase 80 U/L      Total Protein 7 0 g/dL      Albumin 4 2 g/dL      Total Bilirubin 0 96 mg/dL      eGFR 23 ml/min/1 73sq m     Narrative:      [place] guidelines for Chronic Kidney Disease (CKD):   •  Stage 1 with normal or high GFR (GFR > 90 mL/min/1 73 square meters)  •  Stage 2 Mild CKD (GFR = 60-89 mL/min/1 73 square meters)  •  Stage 3A Moderate CKD (GFR = 45-59 mL/min/1 73 square meters)  •  Stage 3B Moderate CKD (GFR = 30-44 mL/min/1 73 square meters)  •  Stage 4 Severe CKD (GFR = 15-29 mL/min/1 73 square meters)  •  Stage 5 End Stage CKD (GFR <15 mL/min/1 73 square meters)  Note: GFR calculation is accurate only with a steady state creatinine    Lipase [[id number]]  (Abnormal) Collected: 03/31/23 0633    Lab Status: Final result Specimen: Blood from Arm, Right Updated: 03/31/23 0723     Lipase 103 u/L     HS Troponin I 2hr [[id number]]     Lab Status: No result Specimen: Blood     HS Troponin 0hr (reflex protocol) [[id number]]  (Normal) Collected: 03/31/23 0633    Lab Status: Final result Specimen: Blood from Arm, Right Updated: 03/31/23 0701     hs TnI 0hr 27 ng/L     CBC and differential [[id number]]  (Abnormal) Collected: 03/31/23 0633    Lab Status: Final result Specimen: Blood from Arm, Right Updated: 03/31/23 0642     WBC 25 06 Thousand/uL      RBC 4 59 Million/uL      Hemoglobin 15 0 g/dL      Hematocrit 42 1 %      MCV 92 fL      MCH 32 7 pg      MCHC 35 6 g/dL      RDW 12 4 %      MPV 9 4 fL      Platelets 988 Thousands/uL      nRBC 0 /100 WBCs      Neutrophils Relative 84 %      Immat GRANS % 1 %      Lymphocytes Relative 6 %      Monocytes Relative 9 %      Eosinophils Relative 0 %      Basophils Relative 0 %      Neutrophils Absolute 20 75 Thousands/µL      Immature Grans Absolute 0 36 Thousand/uL      Lymphocytes Absolute 1 52 Thousands/µL      Monocytes Absolute 2 35 Thousand/µL      Eosinophils Absolute 0 01 Thousand/µL      Basophils Absolute 0 07 Thousands/µL                  CT abdomen pelvis w contrast   Final Result by Idalia Barry MD (03/31 3707)      Findings most suggestive of colitis, as described above  Please see discussion  Gastroenterology consultation and follow-up is recommended        Evaluation of the urinary bladder is limited as it is nearly empty, however, the urinary bladder wall appears somewhat thickened and there is urothelial enhancement  Urinary tract infection should be excluded  Correlation with urinalysis and urine    culture and sensitivity is recommended  Other nonemergent findings as described above  The study was marked in [facility] for immediate notification  Workstation performed: [id number]                    Procedures  ECG 12 Lead Documentation Only    Date/Time: 3/31/2023 6:17 AM  Performed by: Era Martínez PA-C  Authorized by: Era Martínez PA-C     ECG reviewed by me, the ED Provider: yes    Patient location:  ED  Previous ECG:     Previous ECG:  Compared to current    Similarity:  No change  Rate:     ECG rate:  76    ECG rate assessment: normal    Rhythm:     Rhythm: sinus rhythm    Ectopy:     Ectopy: none    QRS:     QRS axis:  Normal  Conduction:     Conduction: normal    ST segments:     ST segments:  Normal  T waves:     T waves: normal               ED Course  ED Course as of 03/31/23 0844   Fri Mar 31, 2023   0632 ECG 12 lead  Sinus rhythm with short OK  Right axis deviation  Abnormal ECG   0644 WBC(!): 25 06  Not suspecting infection at this time  1881 Hemoglobin: 15 0  Not anemic    0702 hs TnI 0hr: 27  Will need 2 hour  5478 Potassium(!!): 2 7   0731 Sodium(!): 130   0731 Creatinine(!): 2 42   0748 CT abdomen pelvis w contrast  Findings most suggestive of colitis, as described above  Please see discussion  Gastroenterology consultation and follow-up is recommended      Evaluation of the urinary bladder is limited as it is nearly empty, however, the urinary bladder wall appears somewhat thickened and there is urothelial enhancement  Urinary tract infection should be excluded  Correlation with urinalysis and urine   culture and sensitivity is recommended  3263 TT to GI   0823 GI will see her as a consult   0063 TT to [facility] for admission   0834 Re-eval of pt- resting comfortably in bed   States the pain is better  Still does not have to urinate  SBIRT 20yo+    Flowsheet Row Most Recent Value   SBIRT (25 yo +)    In order to provide better care to our patients, we are screening all of our patients for alcohol and drug use  Would it be okay to ask you these screening questions? No Filed at: 2023 0654                    Medical Decision Making  This is a 38 y o  female with PMH significant for cyclic vomiting syndrome, frequent marijuana use, hypertension, kidney stones, obesity, , nephroscopy, cholecystectomy coming in today with BRBPR that started last night associated with severe lower abdominal pain  On arrival pt is in acute distress, moaning in pain, ill appearing  Slightly hypotensive  Has TTP of lower abdomen  Concern for acute intraabdominal pathology- CT, CBC (rule out anemia, infection), CMP, Lipase, Trop, UA  CBC showed stable hgb, WBC significantly elevated however no concern for acute bacterial infection at the time- no abx ordered  CMP- new JACKI with hypokalemia   Trop- 27: likely demand from dehydration however will need 2 hour   ECG- non ischemic  CT scan: suggestive of colitis, possible cystitis (correlate to UA), incidental findings- hiatal hernia, umbilical hernia    In the setting of colitis and JACKI with acute hypokalemia, feel it is best for pt to be admitted  Pt agreeable  Accepted to [facility]      JACKI (acute kidney injury) [facility]): acute illness or injury  BRBPR (bright red blood per rectum): acute illness or injury  Colitis: acute illness or injury  Hiatal hernia: undiagnosed new problem with uncertain prognosis  Hypokalemia: acute illness or injury  Umbilical hernia: undiagnosed new problem with uncertain prognosis  Amount and/or Complexity of Data Reviewed  External Data Reviewed: labs  Details: creatinine, potassium   Labs: ordered  Decision-making details documented in ED Course  Radiology: ordered   Decision-making details documented in ED Course  ECG/medicine tests: independent interpretation performed  Decision-making details documented in ED Course  Discussion of management or test interpretation with external provider(s): Blaze Bernal PA-C - GI     Risk  Prescription drug management  Disposition  Final diagnoses:   JACKI (acute kidney injury) ([place] [address] )   Hypokalemia   BRBPR (bright red blood per rectum)   Hiatal hernia   Umbilical hernia   Colitis     Time reflects when diagnosis was documented in both MDM as applicable and the Disposition within this note     Time User Action Codes Description Comment    3/31/2023  7:32 AM Leocadia Grow Add [N17 9] JACKI (acute kidney injury) ([facility] )     3/31/2023  7:32 AM Leocadia Grow Add [E87 6] Hypokalemia     3/31/2023  7:32 AM Leocadia Grow Modify [N17 9] JACKI (acute kidney injury) ([facility] )     3/31/2023  7:32 AM Leocadia Grow Modify [E87 6] Hypokalemia     3/31/2023  7:35 AM Leocadia Grow Add [K62 5] BRBPR (bright red blood per rectum)     3/31/2023  7:35 AM Leocadia Grow Modify [E87 6] Hypokalemia     3/31/2023  7:35 AM Leocadia Grow Modify [K62 5] BRBPR (bright red blood per rectum)     3/31/2023  7:48 AM Leocadia Grow Add [K44 9] Hiatal hernia     3/31/2023  7:49 AM Leocadia Grow Add [F86 9] Umbilical hernia     0/04/6062  7:49 AM Leocadia Grow Add [K52 9] Colitis       ED Disposition     ED Disposition   Admit    Condition   Stable    Date/Time   Fri Mar 31, 2023  8:44 AM    Comment   Case was discussed with DARIANA and the patient's admission status was agreed to be Admission Status: inpatient status to the service of Dr Jhon Benjamin   Follow-up Information    None         Patient's Medications   Discharge Prescriptions    No medications on file       No discharge procedures on file      PDMP Review       Value Time User    PDMP Reviewed  Yes 1/30/2022 10:57 AM Jacques Grovero,           ED Provider  Electronically Signed by           Ashley Elise PA-C  03/31/23 4410

## 2023-04-01 LAB — C DIFF TOX B TCDB STL QL NAA+PROBE: NEGATIVE

## 2023-04-01 NOTE — DISCHARGE SUMMARY
[facility]  Discharge- 1005 [facility] 1980, 37 y o  female MRN: [id number]  Unit/Bed#: E5 -01 Encounter: [id number]  Primary Care Provider: Gearld Blizzard, CRNP   Date and time admitted to hospital: 3/31/2023  6:06 AM    Admitting Provider:  Rubina Tejada DO  Discharge Provider:  Rubina Tejada DO  Admission Date: 3/31/2023       Discharge Date: 04/01/23   LOS: 1  Primary Care Physician at Discharge: Gearld Blizzard, Lizette Longford Jae:  [facility] is a 37 y o  female who presented revealed blood per rectum  She had multiple episodes of vomiting and poor and p o  intake for approximately 1 week  Patient was found to have colitis in the sigmoid and rectum distribution  The patient was evaluated in consultation by the GI service, she was started on IV hydration  Patient had significant hypokalemia and was placed on telemetry with potassium repletion  It was unclear what the patient's underlying pathology was  It was postulated that potentially she could have ischemic colitis  She had a previous colonoscopy in 2018 however this was aborted due to poor prep  Unfortunately the patient left AGAINST MEDICAL ADVICE before further care could be given  The patient was strongly counseled about remaining in the hospital due to life-threatening hypokalemia as well as unexplained hematochezia  Unfortunately the patient left AGAINST MEDICAL ADVICE and accepted all risks and potential death    The patient, initially admitted to the hospital as inpatient, was discharged earlier than expected given the following: Left [address]  DISCHARGE DIAGNOSES  * JACKI (acute kidney injury) ([address] )  Assessment & Plan  1  Acute renal failure: Most likely pre-renal JACKI 2/2 volume depletion  Patient started on IV fluid, however she left AGAINST MEDICAL ADVICE    Unable to determine discharge creatinine    Colitis  Assessment & Plan  Infectious work-up pending  Enteric stool pathogen, C  difficile are currently pending at this time  Needs outpatient GI follow-up    Hypokalemia  Assessment & Plan  Secondary to GI losses  Monitor on telemetry  Nausea control  Recent Labs     03/31/23  0633   K 2 7*         Cyclical vomiting syndrome  Assessment & Plan  Suspect from hyperemesis cannabis syndrome  Anti-emetics as needed  Essential hypertension  Assessment & Plan  Monitor of AHT for now    [place] home medication, mood is stable no homicidal or suicidal ideation  CONSULTING PROVIDERS   IP CONSULT TO GASTROENTEROLOGY    PROCEDURES PERFORMED  * No surgery found *    RADIOLOGY RESULTS  CT abdomen pelvis w contrast    Result Date: 3/31/2023    Impression: Findings most suggestive of colitis, as described above  Please see discussion  Gastroenterology consultation and follow-up is recommended  Evaluation of the urinary bladder is limited as it is nearly empty, however, the urinary bladder wall appears somewhat thickened and there is urothelial enhancement  Urinary tract infection should be excluded  Correlation with urinalysis and urine culture and sensitivity is recommended  Other nonemergent findings as described above          LABS  Results from last 7 days   Lab Units 03/31/23  0633 03/26/23  0113   WBC Thousand/uL 25 06* 12 15*   HEMOGLOBIN g/dL 15 0 14 8   HEMATOCRIT % 42 1 42 5   MCV fL 92 92   PLATELETS Thousands/uL 292 335     Results from last 7 days   Lab Units 03/31/23  0633 03/26/23  0113   SODIUM mmol/L 130* 133*   POTASSIUM mmol/L 2 7* 4 1   CHLORIDE mmol/L 91* 106   CO2 mmol/L 27 20*   BUN mg/dL 21 19   CREATININE mg/dL 2 42* 0 88   CALCIUM mg/dL 9 5 10 6*   ALBUMIN g/dL 4 2 4 5   TOTAL BILIRUBIN mg/dL 0 96 0 55   ALK PHOS U/L 80 103   ALT U/L 50 30   AST U/L 26 34   EGFR ml/min/1 73sq m 23 80   GLUCOSE RANDOM mg/dL 138 138     Results from last 7 days   Lab Units 03/31/23  0633   HS TNI 0HR ng/L 27 Cultures:         Invalid input(s): Hellen Zaragoza        Results from last 7 days   Lab Units 03/31/23  1636   C DIFF TOXIN B BY PCR  Negative       PHYSICAL EXAM:  Vitals:   Blood Pressure: 97/54 (03/31/23 2345)  Pulse: 87 (03/31/23 2345)  Temperature: 98 3 °F (36 8 °C) (03/31/23 2345)  Temp Source: Axillary (03/31/23 0956)  Respirations: 17 (03/31/23 2345)  Height: 5' (152 4 cm) (03/31/23 0956)  Weight - Scale: 73 6 kg (162 lb 4 1 oz) (03/31/23 0956)  SpO2: 95 % (03/31/23 2345)      General: well appearing, no acute distress  HEENT: atraumatic, PERRLA, moist mucosa, normal pharynx, normal tonsils and adenoids, normal tongue, no fluid in sinuses  Neck: Trachea midline, no carotid bruit, no masses  Respiratory: normal chest wall expansion, CTA B, no r/r/w, no rubs  Cardiovascular: RRR, no m/r/g, Normal S1 and S2  Abdomen: Soft, non-tender, non-distended, normal bowel sounds in all quadrants, no hepatosplenomegaly, no tympany  Rectal: deferred  Musculoskeletal: normal ROM in upper and lower extremities  Integumentary: warm, dry, and pink, with no rash, purpura, or petechia  Heme/Lymph: no lymphadenopathy, no bruises  Neurological: Cranial Nerves II-XII grossly intact, no tics, normal sensation to pressure and light touch  Psychiatric: cooperative with normal mood, affect, and cognition      Discharge Disposition: Admitted as an inpatient to this hospital, LEFT AMA      Test Results Pending at Discharge:   Pending Labs     Order Current Status    Stool Enteric Bacterial Panel by PCR In process              Medications   · Summary of Medication Adjustments made as a result of this hospitalization: Unable to determine as patient left AGAINST MEDICAL ADVICE  · Medication Dosing Tapers - Please refer to Discharge Medication List for details on any medication dosing tapers (if applicable to patient)  · Discharge Medication List: See after visit summary for reconciled discharge medications       Diet restrictions: Activity restrictions: No strenuous activity  Discharge Condition: poor    Outpatient Follow-Up and Discharge Instructions  See after visit summary section titled Discharge Instructions for information provided to patient and family  Code Status: Prior  Discharge Statement   I spent 35 minutes discharging the patient  This time was spent on the day of discharge  Greater than 50% of total time was spent with the patient and / or family counseling and / or coordination of care  ** Please Note: This note was completed in part utilizing M-Modal Fluency Direct Software  Grammatical errors, random word insertions, spelling mistakes, and incomplete sentences may be an occasional consequence of this system secondary to software limitations, ambient noise, and hardware issues  If you have any questions or concerns about the content, text, or information contained within the body of this dictation, please contact the provider for clarification  **

## 2023-04-01 NOTE — PROGRESS NOTES
"Pt having loose BM asked staff to clean her up, when staff entered room, pt was independently cleaning herself  Staff asked pt what she needed help with, pt proceeded to yell at staff that she needs \"to be wiped\"  Staff reinforced that pt was independent and was actively wiping herself  Pt stated her unsatisfactory w/ the staff and demanded to leave AMA  RN discussed w/ pt the risks of leaving AMA such as kidney failure and death as per diagnosis  Pt disregarded education  RN provided AMA papers, pt signed, provider notified  IV, tele, and masimo removed from pt  Pt discharged AMA via walking accompanied by herself w/ all belongings     "

## 2023-04-01 NOTE — ASSESSMENT & PLAN NOTE
1  Acute renal failure: Most likely pre-renal JACKI 2/2 volume depletion  Patient started on IV fluid, however she left AGAINST MEDICAL ADVICE    Unable to determine discharge creatinine

## 2023-04-01 NOTE — QUICK NOTE
Notified by RN, patient very agitated and wants to leave AMA  I went to see patient but she had already left the unit   Per RN, risks of signing out AMA explained, patient verbalized understanding and completed AMA documentation

## 2023-04-02 NOTE — UTILIZATION REVIEW
Initial Clinical Review    Admission: Date/Time/Statement:   Admission Orders (From admission, onward)     Ordered        03/31/23 0839  Inpatient Admission  Once                      Orders Placed This Encounter   Procedures   • Inpatient Admission     Standing Status:   Standing     Number of Occurrences:   1     Order Specific Question:   Level of Care     Answer:   Med Surg [16]     Order Specific Question:   Bed Type     Answer:   Tony [4]     Order Specific Question:   Estimated length of stay     Answer:   More than 2 Midnights     Order Specific Question:   Certification     Answer:   I certify that inpatient services are medically necessary for this patient for a duration of greater than two midnights  See H&P and MD Progress Notes for additional information about the patient's course of treatment  ED Arrival Information     Expected   -    Arrival   3/31/2023 06:06    Acuity   Emergent            Means of arrival   Ambulance    Escorted by   [facility]    Admission type   Emergency            Arrival complaint   rectal bleeding           Chief Complaint   Patient presents with   • Rectal Bleeding     Pt reports BRBPR starting last night @ approximately midnight and lower abdominal pain  Pt also c/o vomiting since last Friday  EMS reports BP 86/45 en route  BP 97/52 in triage  Patient crying and moaning due to pain  Initial Presentation: 37 y o  female w/ pmh htn, anxiety, to ED by ems admitted Inpatient d/t JACKI  Acute renal failure  Most likely pre-renal JACKI 2/2 volume depletion  presents with bright red blood per rectum as well as multiple episodes of abdominal pain and nausea and vomiting  Patient has a past medical history of cyclical vomiting syndrome secondary to hyperemesis from cannabis use  also reports having episodes of hypokalemia  consider renal biopsy if no improvement of renal function  No need for emergent HD  Creat 2 42   Enteric stool pathogen, C  difficile pending  avoid renal toxins;  hold RAAS blockers and diuretics  Tele  IVF  GI consult  3/31 GI consult:intractable nausea and vomiting for 1 week and admits to being dehydrated with poor fluid intake  dizzy and lightheaded  Last night, had 10/10 lower abdominal cramping and simultaneous hematochezia with  clots  pain has improved to a 4/10 although hematochezia persists  highly suggestive of ischemic colitis given poor p o  intake, hypotension, likely prerenal JACKI, and colitis around watershed areas  Recommend supportive care with IV fluids, bowel rest, and IV antibiotics  Differential also includes IBD (unlikely to present so acutely) and infection (although no diarrhea)  improving clinically, and if this continues, no indication for inpatient colonoscopy  Date: 4/1  Day 2: Left AMA      ED Triage Vitals   Temperature Pulse Respirations Blood Pressure SpO2   03/31/23 0613 03/31/23 0613 03/31/23 0613 03/31/23 0613 03/31/23 0630   97 9 °F (36 6 °C) 74 20 97/52 100 %      Temp Source Heart Rate Source Patient Position - Orthostatic VS BP Location FiO2 (%)   03/31/23 0613 03/31/23 0613 03/31/23 0613 03/31/23 0613 --   Oral Monitor Lying Left arm       Pain Score       03/31/23 0613       10 - Worst Possible Pain          Wt Readings from Last 1 Encounters:   03/31/23 73 6 kg (162 lb 4 1 oz)     Additional Vital Signs:   03/31/23 23:45:55 98 3 °F (36 8 °C) 87 17 97/54 68 95 % -- --   03/31/23 19:37:29 -- -- -- 97/47 Abnormal  64 Abnormal  -- -- --   03/31/23 16:32:21 -- -- -- 79/40 Abnormal   53 Abnormal  -- -- --   BP: md VALADEZ made aware, see ordersd at 03/31/23 1632   03/31/23 16:15:45 -- -- -- 77/37 Abnormal  50 Abnormal  -- -- --   03/31/23 09:56:13 97 7 °F (36 5 °C) 76 18 110/65 80 96 % None (Room air) Lying   03/31/23 0830 -- 86 20 107/74 88 100 % -- --   03/31/23 0800 -- 63 23 Abnormal  116/64 -- 98 % -- --   03/31/23 0630 -- 75 20 104/71 82 100 % -- --   03/31/23 0613 97 9 °F (36 6 °C) 74 20 97/52 -- -- None (Room air) Lying       Pertinent Labs/Diagnostic Test Results:   3/31 ekg:  Normal sinus rhythm  Right axis deviation  Nonspecific T wave abnormality  Abnormal ECG  When compared with ECG of 31-MAR-2023 06:09,  Nonspecific T wave abnormality now evident in Anterior leads  Confirmed by Raissa Pillai ([id number]) on 3/31/2023 10:08:30 AM    3/31 ekg:  Performed by: Radha Prabhakar PA-C   Authorized by: Radha Prabhakar PA-C     ECG reviewed by me, the ED Provider: yes     Patient location:  ED   Previous ECG:     Previous ECG:  Compared to current     Similarity:  No change   Rate:     ECG rate:  76     ECG rate assessment: normal     Rhythm:     Rhythm: sinus rhythm     Ectopy:     Ectopy: none     QRS:     QRS axis:  Normal   Conduction:     Conduction: normal     ST segments:     ST segments:  Normal   T waves:     T waves: normal      3/31 ekg:  Sinus rhythm with short AL  Right axis deviation  Abnormal ECG  Confirmed by Marco Dahl ([id number]) on 3/31/2023 6:31:43 AM      CT abdomen pelvis w contrast   Final Result by Juju Zamudio MD (03/31 7641)      Findings most suggestive of colitis, as described above  Please see discussion  Gastroenterology consultation and follow-up is recommended  Evaluation of the urinary bladder is limited as it is nearly empty, however, the urinary bladder wall appears somewhat thickened and there is urothelial enhancement  Urinary tract infection should be excluded  Correlation with urinalysis and urine    culture and sensitivity is recommended  Other nonemergent findings as described above  The study was marked in [facility] for immediate notification              Workstation performed: [id number]               Results from last 7 days   Lab Units 03/31/23  0633   WBC Thousand/uL 25 06*   HEMOGLOBIN g/dL 15 0   HEMATOCRIT % 42 1   PLATELETS Thousands/uL 292   NEUTROS ABS Thousands/µL 20 75*         Results from last 7 days   Lab Units 03/31/23  0633   SODIUM mmol/L 130*   POTASSIUM mmol/L 2 7*   CHLORIDE mmol/L 91*   CO2 mmol/L 27   ANION GAP mmol/L 12   BUN mg/dL 21   CREATININE mg/dL 2 42*   EGFR ml/min/1 73sq m 23   CALCIUM mg/dL 9 5     Results from last 7 days   Lab Units 03/31/23  0633   AST U/L 26   ALT U/L 50   ALK PHOS U/L 80   TOTAL PROTEIN g/dL 7 0   ALBUMIN g/dL 4 2   TOTAL BILIRUBIN mg/dL 0 96         Results from last 7 days   Lab Units 03/31/23  0633   GLUCOSE RANDOM mg/dL 138       Results from last 7 days   Lab Units 03/31/23  0633   HS TNI 0HR ng/L 27       Results from last 7 days   Lab Units 03/31/23  0633   LIPASE u/L 103*     Results from last 7 days   Lab Units 03/31/23  0633   CRP mg/L 11 8*   SED RATE mm/hour 5       Results from last 7 days   Lab Units 03/31/23  1636   C DIFF TOXIN B BY PCR  Negative       ED Treatment:   Medication Administration from 03/31/2023 0606 to 03/31/2023 5643       Date/Time Order Dose Route Action Action by Comments     03/31/2023 0651 EDT sodium chloride 0 9 % bolus 1,000 mL 1,000 mL Intravenous New Bag       03/31/2023 0631 EDT haloperidol lactate (HALDOL) injection 5 mg 5 mg Intramuscular Given                             03/31/2023 0813 EDT potassium chloride 20 mEq IVPB (premix) 20 mEq Intravenous New Bag          Past Medical History:   Diagnosis Date   • Anxiety    • Arthritis     OA  b/l knees   • Asthma     Approx 2 years ago   • Calculus of gallbladder without cholecystitis without obstruction 02/23/2022   • Chronic kidney disease    • Depression    • GERD (gastroesophageal reflux disease)     In my 25s   Approx 15 years ago   • Hx of bleeding disorder     dysmennorrhea-dx lap today 8/12/2016   • Hypertension    • Kidney stone    • Obesity    • Seasonal allergies      Present on Admission:  • Anxiety  • Cyclical vomiting syndrome  • Essential hypertension  • Hypokalemia      Admitting Diagnosis: Hiatal hernia [K44 9]  Hypokalemia [E87 6]  Colitis [K52 9]  Rectal bleeding [Y86 7]  Umbilical hernia [K91 5]  BRBPR (bright red blood per rectum) [K62 5]  [facility] [N17 9]  Age/Sex: 37 y o  female  Admission Orders:  Scheduled Medications:  cefTRIAXone (ROCEPHIN) 2,000 mg in dextrose 5 % 50 mL IVPB  Dose: 2,000 mg  Freq: Every 24 hours Route: IV  Last Dose: 2,000 mg (03/31/23 1608)  Start: 03/31/23 1430 End: 04/01/23 0631   Admin Instructions:   Do NOT coadminister with calcium-containing solutions  Refrigerate if in solution  LOOK ALIKE SOUND ALIKE MED   Order specific questions:   Indication: other  Specify indication: ischemic colitis            1608      0631-D/C'd      fluticasone (FLONASE) 50 mcg/act nasal spray 1 spray  Dose: 1 spray  Freq: Daily Route: NA  Start: 03/31/23 1100 End: 04/01/23 0631   Admin Instructions:   LOOK ALIKE SOUND ALIKE MED            (1108      0631-D/C'd      gabapentin (NEURONTIN) capsule 600 mg  Dose: 600 mg  Freq: 2 times daily Route: PO  Start: 03/31/23 1015 End: 04/01/23 0631   Admin Instructions:   LOOK ALIKE SOUND ALIKE MED            1109     1846      0631-D/C'd      haloperidol lactate (HALDOL) injection 5 mg  Dose: 5 mg  Freq: Once Route: IM  Start: 03/31/23 0630 End: 03/31/23 0631   Admin Instructions:   If administered intravenously cardiac/telemetry monitoring is required for 24 hours after last dose, except for patients on comfort care/hospice or emergency department  Emergency department to follow IV haloperidol for ED administration guideline  0631         metroNIDAZOLE (FLAGYL) IVPB (premix) 500 mg 100 mL  Dose: 500 mg  Freq: Every 8 hours Route: IV  Last Dose: 500 mg (03/31/23 2146)  Start: 03/31/23 1415 End: 04/01/23 0631   Admin Instructions:   Avoid ethanol during therapy and for 3 days after discontinuation  Protect from light  Do NOT refrigerate   LOOK ALIKE SOUND ALIKE MED   Order specific questions:   Indication: other  Specify indication: ischemic colitis            1406     2148      0631-D/C'd midodrine (PROAMATINE) tablet 5 mg  Dose: 5 mg  Freq: Once Route: PO  Start: 03/31/23 1645 End: 03/31/23 1645            1645         nicotine (NICODERM CQ) 7 mg/24hr TD 24 hr patch 1 patch  Dose: 1 patch  Freq: Daily Route: TD  Start: 03/31/23 1015 End: 04/01/23 0631   Admin Instructions:   Apply a new patch every 24 hours to a clean, dry, hairless site on the upper arm or hip  Hazardous agent; use appropriate precautions for handling and disposal  **DISPOSE EMPTY PACKAGING AND LEFTOVER/UNUSED MEDICATION IN 8 [employer] BL[employer] CONTAINER**             1108      0300 [C]     0631-D/C'd      potassium chloride 20 mEq IVPB (premix)  Dose: 20 mEq  Freq: Every 2 hours scheduled Route: IV  Last Dose: 20 mEq (03/31/23 1013)  Start: 03/31/23 0800 End: 03/31/23 1213   Admin Instructions:   Patient must be on telemetry if rate is greater than 10 meq/hour  Peripheral administration not to exceed 20 meq/hour  High alert medication            0813     1013         potassium chloride 40 mEq IVPB (premix)  Dose: 40 mEq  Freq: Once Route: IV  Start: 03/31/23 0745 End: 03/31/23 0746   Admin Instructions:   Patient must be on telemetry if rate is greater than 10 meq/hour  Must be administered via central line  High alert medication            0746-D/C'd  (0800)         sertraline (ZOLOFT) tablet 150 mg  Dose: 150 mg  Freq: Daily Route: PO  Start: 03/31/23 1015 End: 04/01/23 0631   Admin Instructions:   LOOK ALIKE SOUND ALIKE MED            1108      0631-D/C'd      sodium chloride 0 9 % bolus 1,000 mL  Dose: 1,000 mL  Freq: Once Route: IV  Last Dose: 1,000 mL (03/31/23 1644)  Start: 03/31/23 1700 End: 03/31/23 1844            1644         sodium chloride 0 9 % bolus 1,000 mL  Dose: 1,000 mL  Freq:  Once Route: IV  Last Dose: 1,000 mL (03/31/23 0651)  Start: 03/31/23 0630 End: 03/31/23 0751            8646         Medications 03/23 03/24 03/25 03/26 03/27 03/28 03/29 03/30 03/31 04/01         Continuous Meds Sorted by Name  for Jim Vivas as of 04/02/23 1628  Legend:                          Inactive     Active     Other Encounter     Linked                 Medications 03/23 03/24 03/25 03/26 03/27 03/28 03/29 03/30 03/31 04/01   multi-electrolyte (PLASMALYTE-A/ISOLYTE-S PH 7 4) IV solution  Rate: 100 mL/hr Dose: 100 mL/hr  Freq: Continuous Route: IV  Indications of Use: IV Hydration  Last Dose: 100 mL/hr (03/31/23 1934)  Start: 03/31/23 0845 End: 03/31/23 2305            1106     1642 [C]     1934     2305-D/C'd               Ambulate  Tele  I&O  Daily weights  Bladder scan  oob      IP CONSULT TO GASTROENTEROLOGY    Network Utilization Review Department  ATTENTION: Please call with any questions or concerns to [phone] and carefully listen to the prompts so that you are directed to the right person  All voicemails are confidential   Ivonne Saldana all requests for admission clinical reviews, approved or denied determinations and any other requests to dedicated fax number below belonging to the campus where the patient is receiving treatment   List of dedicated fax numbers for the Facilities:  [address] DENIALS (Administrative/Medical Necessity) [phone]   [address] (Maternity/NICU/Pediatrics) [phone]   5 [address] [place] [phone]   Brock Monge 77 [phone]   [address] [place] [address] Teresita [place] 28 [phone]   1559 [facility] Gabrielle Bermudez [facility] 134 815 [facility] [phone]

## 2023-04-02 NOTE — UTILIZATION REVIEW
NOTIFICATION OF OBSERVATION ADMISSION   AUTHORIZATION REQUEST   SERVICING FACILITY:   [place] [address] [facility]  Tax ID: [id number]  NPI: [id number]   ATTENDING PROVIDER:  Attending Name and NPI#: Sheila Gonzalez [[id number]]  Address: [address] [place] [address] [facility]  Phone: [phone]     ADMISSION INFORMATION:  Place of Service: On [address] [facility] Code: 22 CPT Code:   Admitting Diagnosis Code/Description:  Hiatal hernia [K44 9]  Hypokalemia [E87 6]  Colitis [K52 9]  Rectal bleeding [R71 1]  Umbilical hernia [Y68 9]  BRBPR (bright red blood per rectum) [K62 5]  JACKI (acute kidney injury) ([facility] ) [N17 9]  Observation Admission Date/Time:   03/31/2023  8:40AM  Discharge Date/Time: 4/1/2023  3:00 AM     UTILIZATION REVIEW CONTACT:  Bernerd Boast, Utilization   Network Utilization Review Department  Phone: [phone]  Fax: [phone]  Email: [email or web address]  Contact for approvals/pending authorizations, clinical reviews, and discharge  PHYSICIAN ADVISORY SERVICES:  Medical Necessity Denial & [employer] Review  Phone: [phone]  Fax: [phone]  Email: [email or web address]

## 2023-04-02 NOTE — UTILIZATION REVIEW
Notification of Unplanned, Urgent, or   Emergency Inpatient Admission   [address] [place] [address] [address] [facility]  Tax ID: [id number]  NPI: [id number]  Place of Service: [address] [place] [address]  [address]  Admission Level of Care: Inpatient  Place of Service Code: 21     Attending Physician Information  Attending Name and NPI#: Tito Pfeiffer [[id number]]  Phone: [phone]     Admission Information  Inpatient Admission Date/Time: 3/31/23  8:40 AM  Discharge Date/Time: 4/1/2023  3:00 AM  Admitting Diagnosis Code/Description:  Hiatal hernia [K44 9]  Hypokalemia [E87 6]  Colitis [K52 9]  Rectal bleeding [W12 4]  Umbilical hernia [A63 8]  BRBPR (bright red blood per rectum) [K62 5]  JACKI (acute kidney injury) [facility]) [N17 9]     Utilization Review Sushma Robles, Utilization   Phone: [phone]  Fax: [phone]  Email: [email or web address]  Contact for approvals/pending authorizations, clinical reviews, and discharge  Physician Advisory Services Contact  Medical Necessity Denial & Fgmg-zq-Rbm[facility] Discussion  Phone: [phone]  Fax: [phone]  Email: [email or web address]

## 2023-04-03 ENCOUNTER — TRANSITIONAL CARE MANAGEMENT (OUTPATIENT)
Dept: FAMILY MEDICINE CLINIC | Facility: CLINIC | Age: 43
End: 2023-04-03

## 2023-04-03 LAB
CAMPYLOBACTER DNA SPEC NAA+PROBE: NORMAL
SALMONELLA DNA SPEC QL NAA+PROBE: NORMAL
SHIGA TOXIN STX GENE SPEC NAA+PROBE: NORMAL
SHIGELLA DNA SPEC QL NAA+PROBE: NORMAL

## 2023-04-03 NOTE — UTILIZATION REVIEW
NOTIFICATION OF ADMISSION DISCHARGE   This is a Notification of Discharge from [address]  Please be advised that this patient has been discharge from our facility  Below you will find the admission and discharge date and time including the patient’s disposition  UTILIZATION REVIEW CONTACT:  [place] [facility]  Utilization   Network Utilization Review Department  Phone: [phone] x carefully listen to the prompts  All voicemails are confidential   Email: [email or web address]     ADMISSION INFORMATION  PRESENTATION DATE: 3/31/2023  6:06 AM  OBERVATION ADMISSION DATE:   INPATIENT ADMISSION DATE: 3/31/23  8:40 AM   DISCHARGE DATE: 4/1/2023  3:00 AM   DISPOSITION:Admitted as an inpatient to this hospital    IMPORTANT INFORMATION:  Send all requests for admission clinical reviews, approved or denied determinations and any other requests to dedicated fax number below belonging to the campus where the patient is receiving treatment   List of dedicated fax numbers:  [address] DENIALS (Administrative/Medical Necessity) [phone]   [address] (Maternity/NICU/Pediatrics) [phone]   [facility] [phone]   [place] [address] [phone]   [address] [phone]   220 [facility] [phone]   90 [facility] [phone]   [address] [place] [address] 119 [phone]   [facility]  [phone]   4057 [facility] [phone]   412 [facility] 850 [address] [facility] [phone]

## 2023-04-03 NOTE — UTILIZATION REVIEW
Notification of Unplanned, Urgent, or   Emergency Inpatient Admission   [address]  Tax ID: [id number]  NPI: [id number]  [facility]  [facility]  Admission Level of Care: Inpatient  Place of Service Code: 21     Attending Physician Information  Attending Name and NPI#: Nirmal Ferguson [[id number]]  Phone: [phone]     Admission Information  Inpatient Admission Date/Time: 3/31/23  8:40 AM  Discharge Date/Time: 4/1/2023  3:00 AM  Admitting Diagnosis Code/Description:  Hiatal hernia [K44 9]  Hypokalemia [E87 6]  Colitis [K52 9]  Rectal bleeding [N56 2]  Umbilical hernia [G20 4]  BRBPR (bright red blood per rectum) [K62 5]  JACKI (acute kidney injury) [facility]) [N17 9]     Utilization Review Sushma Robles, Utilization   Phone: [phone]  Fax: [phone]  Email: [email or web address]  Contact for approvals/pending authorizations, clinical reviews, and discharge  Physician Advisory Services Contact  Medical Necessity Denial & Xzsz-mk-Vmck Discussion  Phone: [phone]  Fax: [phone]  Email: [email or web address]

## 2023-04-05 ENCOUNTER — OFFICE VISIT (OUTPATIENT)
Dept: FAMILY MEDICINE CLINIC | Facility: CLINIC | Age: 43
End: 2023-04-05

## 2023-04-05 VITALS
OXYGEN SATURATION: 99 % | BODY MASS INDEX: 33.02 KG/M2 | RESPIRATION RATE: 16 BRPM | SYSTOLIC BLOOD PRESSURE: 118 MMHG | DIASTOLIC BLOOD PRESSURE: 68 MMHG | HEIGHT: 60 IN | TEMPERATURE: 98.5 F | HEART RATE: 72 BPM | WEIGHT: 168.2 LBS

## 2023-04-05 DIAGNOSIS — E87.6 HYPOKALEMIA: ICD-10-CM

## 2023-04-05 DIAGNOSIS — F41.9 ANXIETY: ICD-10-CM

## 2023-04-05 DIAGNOSIS — D72.829 LEUKOCYTOSIS, UNSPECIFIED TYPE: ICD-10-CM

## 2023-04-05 DIAGNOSIS — K52.9 COLITIS: Primary | ICD-10-CM

## 2023-04-05 DIAGNOSIS — R11.15 CYCLICAL VOMITING SYNDROME: ICD-10-CM

## 2023-04-05 DIAGNOSIS — N17.9 AKI (ACUTE KIDNEY INJURY) (HCC): ICD-10-CM

## 2023-04-05 RX ORDER — LORAZEPAM 0.5 MG/1
0.5 TABLET ORAL DAILY PRN
Qty: 30 TABLET | Refills: 0 | Status: SHIPPED | OUTPATIENT
Start: 2023-04-05

## 2023-04-05 NOTE — ASSESSMENT & PLAN NOTE
- Patient admitted on 3/31 - left AMA on 4/1    - Enteric stool pathogen and C diff studies both negative  - GI concerned for ischemic colitis given poor po intake, hypotension and JACKI - patient is now eating and drinking  No vomiting since last Thursday  No rectal bleeding but still reports diarrhea  BP is stable  - Will recheck CBC and CMP  - Urgent referral to GI    - Discussed returning to ER immediately for increase in abdominal pain, rectal bleeding, or vomiting - patient understands and agrees

## 2023-04-05 NOTE — ASSESSMENT & PLAN NOTE
- Not well controlled  Having increased anxiety due to current health situation and possible need to return to ER    - No improvement with hydroxyzine  - Prescription sent for Ativan 0 5 mg daily as needed  Discussed addictive nature of medication    - Will continue to monitor

## 2023-04-05 NOTE — ASSESSMENT & PLAN NOTE
- Suspect from cannabis hyperemesis syndrome    - Continue anti-emetics as needed    - Follow up with GI

## 2023-04-05 NOTE — LETTER
April 5, 2023     Patient: [address] [facility]  YOB: 1980  Date of Visit: 4/5/2023      To Whom it May Concern:    Ayush Lau is under my professional care  Melia was seen in my office on 4/5/2023  Please excuse her from work 3/31/2023 - 4/6/2023  If you have any questions or concerns, please don't hesitate to call           Sincerely,          MONICA Wells        CC: No Recipients

## 2023-04-05 NOTE — PROGRESS NOTES
Name: Randee Solorio      : 1980      MRN: [id number]  Encounter Provider: MONICA Russ  Encounter Date: 2023   Encounter department: [address] [place] [address]  Colitis  Assessment & Plan:  - Patient admitted on 3/31 - left AMA on     - Enteric stool pathogen and C diff studies both negative  - GI concerned for ischemic colitis given poor po intake, hypotension and JACKI - patient is now eating and drinking  No vomiting since last Thursday  No rectal bleeding but still reports diarrhea  BP is stable  - Will recheck CBC and CMP  - Urgent referral to GI    - Discussed returning to ER immediately for increase in abdominal pain, rectal bleeding, or vomiting - patient understands and agrees  Orders:  -     Ambulatory Referral to Gastroenterology; Future    2  JACKI (acute kidney injury) ([facility] [address] )  Assessment & Plan:  - JACKI likely secondary to dehydration    - On IVF while inpatient but left AMA  - Reports she is now eating and drinking  Denies any vomiting  Still having diarrhea  - Will repeat CMP - patient will have labs drawn after appt  - Did discuss need to potentially return to ER pending lab results  Patient understands and agrees  - Will continue to follow up  Orders:  -     Comprehensive metabolic panel; Future    3  Hypokalemia  Assessment & Plan:  - K+ 2 7 on 3/31  Secondary to dehydration    - Will recheck CMP and continue to follow up  Orders:  -     Comprehensive metabolic panel; Future    4  Leukocytosis, unspecified type  Assessment & Plan:  - Will recheck CBC and continue to follow up  Orders:  -     CBC and differential; Future    5  Cyclical vomiting syndrome  Assessment & Plan:  - Suspect from cannabis hyperemesis syndrome    - Continue anti-emetics as needed  - Follow up with GI  Orders:  -     Ambulatory Referral to Gastroenterology; Future    6  Anxiety  Assessment & Plan:  - Not well controlled   Having increased anxiety due to current health situation and possible need to return to ER    - No improvement with hydroxyzine  - Prescription sent for Ativan 0 5 mg daily as needed  Discussed addictive nature of medication    - Will continue to monitor  Orders:  -     LORazepam (Ativan) 0 5 mg tablet; Take 1 tablet (0 5 mg total) by mouth daily as needed for anxiety           Subjective     Patient with PMH significant for HTN, cyclic vomiting syndrome, frequent marijuana use, and kidney stones presents today for hospital follow up  She presented on 3/31 with complaints of BRBPR  Had filled 2 depends with blood  Also reported severe lower abdominal pain, vomiting, cold sweats, and poor po intake  CT of abdomen and pelvis was suggestive of colitis  GI was consulted who suspected ischemic colitis given poor po intake, hypotension, and JACKI  They recommended IV fluids and antibiotics  Also had significant hypokalemia and was placed on telemetry with potassium repletion  Unfortunately, patient left the hospital against medical advice due to dissatisfaction with nursing staff  She still reports lower abdominal pain but denies any BRBPR  Has not had an episode of vomiting since last Thursday  She is starting to eat and is drinking a lot of water  She resumed her lisinopril that was previously being held inpatient due to hypotension  Her BP is well controlled today  She complains of increased anxiety due to her current health situation  Discussed potential need to return to hospital and she requested medication for anxiety as hydroxyzine does not help  Review of Systems   Constitutional: Negative for fatigue and fever  HENT: Negative for trouble swallowing  Eyes: Negative for visual disturbance  Respiratory: Negative for cough and shortness of breath  Cardiovascular: Negative for chest pain and palpitations  Gastrointestinal: Positive for abdominal distention and abdominal pain   Negative for blood in stool    Endocrine: Negative for cold intolerance and heat intolerance  Genitourinary: Negative for difficulty urinating and dysuria  Musculoskeletal: Negative for gait problem  Skin: Negative for rash  Neurological: Negative for dizziness, syncope and headaches  Hematological: Negative for adenopathy  Psychiatric/Behavioral: Negative for behavioral problems  The patient is nervous/anxious  Rosalia KingstonMelia montaño 43F  Contact the [address] [place] [address]   YOB: 1980   Recent Address:  [address] [place] [address]   Status of States Queried:  [employer] Details   Status of States Bear [employer]ital[employer] Records (1)    Report Criteria  Linked Records  ? Tile cannot be moved due to a restriction by the [employer] Summary   Summary  Total Prescriptions  1    Total Private Pay  0    Total Prescribers  1    Total Pharmacies  1    Opioids* (excluding Buprenorphine)  Current Qty  0    Current MME/day  0 00    30 Day Avg MME/day  0 00    Buprenorphine*  Current Qty  0    Current mg/day  0 00    30 Day Avg mg/day  0 00      Tile cannot be moved due to a restriction by the Admin  Prescriptions   Total: 1   Private Pay: 0   Showing 1 Item   View   15 Items    1 of 1   Filled  Written  Sold  ID  Drug  QTY  Days  Prescriber  RX #  Dispenser  Refill  Daily Dose*  Pymt Type       03/11/2022 03/11/2022   1  Oxycodone Hcl (Ir) 5 Mg Tablet 10 00  2  Ri Con  [id number]   Pen (3361) 19 22 MME  Comm Ins  PA    Disclaimer   Disclaimer        Past Medical History:   Diagnosis Date   • Anxiety    • Arthritis     OA  b/l knees   • Asthma     Approx 2 years ago   • Calculus of gallbladder without cholecystitis without obstruction 02/23/2022   • Chronic kidney disease    • Depression    • GERD (gastroesophageal reflux disease)     In my 25s   Approx 15 years ago   • Hx of bleeding disorder     dysmennorrhea-dx lap today 8/12/2016   • Hypertension    • Kidney stone    • Obesity    • Seasonal allergies      Past Surgical History:   Procedure Laterality Date   •  SECTION     • CHOLECYSTECTOMY     • DIAGNOSTIC LAPAROSCOPY     • DILATION AND CURETTAGE OF UTERUS     • HERNIA REPAIR     • KS COLONOSCOPY FLX DX W/COLLJ SPEC WHEN PFRMD N/A 2018    Procedure: EGD AND COLONOSCOPY;  Surgeon: Ramone Mccall MD;  [facility]: [facility];   Service: Gastroenterology   • KS LAPAROSCOPY SURG CHOLECYSTECTOMY N/A 3/11/2022    Procedure: ROBOTIC LAPAROSCOPIC CHOLECYSTECTOMY;  Surgeon: Elliott Walls DO;  Location[facility];  Service: General   • KS LAPAROSCOPY W/RMVL ADNEXAL STRUCTURES Bilateral 2016    Procedure: CYSTECTOMY  OVARIAN;  Surgeon: Lima Antoine DO;  [facility];  Service: Gynecology   • KS LAPS ABD PRTM&OMENTUM DX W/WO SPEC BR/WA SPX N/A 2016    Procedure: LAPAROSCOPY DIAGNOSTIC DAVID;  Surgeon: Lima Antoine DO;  [facility];  Service: Gynecology   • KS RPR UMBILICAL HRNA 5 YRS/> REDUCIBLE N/A 3/11/2022    Procedure: UMBILICAL HERNIA REPAIR;  Surgeon: Elliott Walls DO;  [facility];  Service: General   • WISDOM TOOTH EXTRACTION       Family History   Problem Relation Age of Onset   • No Known Problems Sister    • Autism Daughter    • Lung cancer Maternal Grandmother    • Cancer Maternal Grandmother         Deceassd 8 years   • Diabetes Maternal Grandfather    • Arthritis Maternal Grandfather          10 years   • Aneurysm Paternal Grandmother    • No Known Problems Sister      Social History     Socioeconomic History   • Marital status: /Civil Union     Spouse name: None   • Number of children: None   • Years of education: None   • Highest education level: None   Occupational History   • None   Tobacco Use   • Smoking status: Every Day     Packs/day: 1 00     Years:      Pack years:      Types: Cigarettes   • Smokeless tobacco: Never   Vaping Use   • Vaping Use: Never used   Substance and Sexual Activity   • Alcohol use: Never     Comment: social   • Drug use: Yes     Frequency: 7 0 times per week     Types: Marijuana     Comment: I exhaused all your ‘rx prescription-- NOTHING ELSE HELPED!! • Sexual activity: Not Currently     Partners: Male     Birth control/protection: Abstinence   Other Topics Concern   • None   Social History Narrative   • None     Social Determinants of Health     Financial Resource Strain: Not on file   Food Insecurity: No Food Insecurity   • Worried About Running Out of Food in the Last Year: Never true   • Ran Out of Food in the Last Year: Never true   Transportation Needs: Not on file   Physical Activity: Not on file   Stress: Not on file   Social Connections: Not on file   Intimate Partner Violence: Not on file   Housing Stability: [address]    • Unable to Pay for Housing in the Last Year: No   • Number of [place] in the Last Year: 1   • Unstable Housing in the Last Year: No     Current Outpatient Medications on File Prior to Visit   Medication Sig   • albuterol (ProAir HFA) 90 mcg/act inhaler Inhale 2 puffs every 6 (six) hours as needed for wheezing   • betamethasone valerate (LUXIQ) 0 12 % foam Apply topically daily   • fluticasone (FLONASE) 50 mcg/act nasal spray 1 spray into each nostril daily   • gabapentin (NEURONTIN) 300 mg capsule Take 2 capsules (600 mg total) by mouth 2 (two) times a day   • hydrOXYzine pamoate (VISTARIL) 25 mg capsule Take 1 capsule (25 mg total) by mouth 3 (three) times a day as needed for anxiety   • lisinopril (ZESTRIL) 20 mg tablet Take 1 tablet (20 mg total) by mouth in the morning  • prochlorperazine (COMPAZINE) 10 mg tablet Take 0 5 tablets (5 mg total) by mouth every 8 (eight) hours as needed for nausea or vomiting   • promethazine (PHENERGAN) 25 mg tablet Take 1 tablet (25 mg total) by mouth every 6 (six) hours as needed for nausea or vomiting   • sertraline (ZOLOFT) 100 mg tablet Take 1 tablet daily  With the 50 mg    Total 150 mg daily   • sertraline (ZOLOFT) 50 mg tablet Take 1 tablet (50 mg total) by mouth in the morning  • norgestimate-ethinyl estradiol (Ortho Tri-Cyclen Lo) 0 18/0 215/0 25 MG-25 MCG per tablet Take 1 tablet by mouth daily   • ondansetron (Zofran ODT) 4 mg disintegrating tablet Take 1 tablet (4 mg total) by mouth every 6 (six) hours as needed for nausea or vomiting (Patient not taking: Reported on 12/27/2022)   • predniSONE 10 mg tablet Take 1 tablet (10 mg total) by mouth daily 6 tab day 1, 5 tab day 2, 4 tab day 3, 3 tab day 4, 2 tab day 5, 1 tab day 6 (Patient not taking: Reported on 12/27/2022)     Allergies   Allergen Reactions   • Eggs Or Egg-Derived Products - Food Allergy Other (See Comments)     abd pain     Immunization History   Administered Date(s) Administered   • Pneumococcal Polysaccharide PPV23 11/29/2019       Objective     /68 (BP Location: Left arm, Patient Position: Sitting, Cuff Size: Large)   Pulse 72   Temp 98 5 °F (36 9 °C) (Tympanic)   Resp 16   Ht 5' (1 524 m)   Wt 76 3 kg (168 lb 3 2 oz)   LMP  (LMP Unknown)   SpO2 99%   BMI 32 85 kg/m²     Physical Exam  Vitals and nursing note reviewed  Constitutional:       Appearance: She is not toxic-appearing or diaphoretic  HENT:      Head: Normocephalic and atraumatic  Right Ear: External ear normal       Left Ear: External ear normal       Nose: Nose normal    Eyes:      Conjunctiva/sclera: Conjunctivae normal    Cardiovascular:      Rate and Rhythm: Normal rate and regular rhythm  Heart sounds: Normal heart sounds  Pulmonary:      Effort: Pulmonary effort is normal       Breath sounds: Normal breath sounds  Abdominal:      General: Bowel sounds are normal  There is distension  Palpations: Abdomen is soft  Tenderness: There is abdominal tenderness in the right lower quadrant and left lower quadrant  Musculoskeletal:         General: Normal range of motion  Cervical back: Normal range of motion     Skin: General: Skin is warm and dry  Neurological:      Mental Status: She is alert and oriented to person, place, and time  Cranial Nerves: No cranial nerve deficit     Psychiatric:         Mood and Affect: Mood normal          Behavior: Behavior normal        MONICA Kirk

## 2023-04-05 NOTE — ASSESSMENT & PLAN NOTE
- JACKI likely secondary to dehydration    - On IVF while inpatient but left AMA  - Reports she is now eating and drinking  Denies any vomiting  Still having diarrhea  - Will repeat CMP - patient will have labs drawn after appt  - Did discuss need to potentially return to ER pending lab results  Patient understands and agrees  - Will continue to follow up

## 2023-04-06 ENCOUNTER — APPOINTMENT (OUTPATIENT)
Dept: LAB | Facility: IMAGING CENTER | Age: 43
End: 2023-04-06

## 2023-04-06 ENCOUNTER — NURSE TRIAGE (OUTPATIENT)
Dept: OTHER | Facility: OTHER | Age: 43
End: 2023-04-06

## 2023-04-06 DIAGNOSIS — I10 ESSENTIAL HYPERTENSION: ICD-10-CM

## 2023-04-06 DIAGNOSIS — E87.6 HYPOKALEMIA: ICD-10-CM

## 2023-04-06 DIAGNOSIS — N17.9 AKI (ACUTE KIDNEY INJURY) (HCC): ICD-10-CM

## 2023-04-06 DIAGNOSIS — D72.829 LEUKOCYTOSIS, UNSPECIFIED TYPE: ICD-10-CM

## 2023-04-06 DIAGNOSIS — R73.9 HYPERGLYCEMIA: ICD-10-CM

## 2023-04-06 LAB
ALBUMIN SERPL BCP-MCNC: 3.1 G/DL (ref 3.5–5)
ALP SERPL-CCNC: 84 U/L (ref 46–116)
ALT SERPL W P-5'-P-CCNC: 55 U/L (ref 12–78)
ANION GAP SERPL CALCULATED.3IONS-SCNC: 2 MMOL/L (ref 4–13)
AST SERPL W P-5'-P-CCNC: 51 U/L (ref 5–45)
BASOPHILS # BLD AUTO: 0.03 THOUSANDS/ÂΜL (ref 0–0.1)
BASOPHILS NFR BLD AUTO: 0 % (ref 0–1)
BILIRUB SERPL-MCNC: 0.22 MG/DL (ref 0.2–1)
BUN SERPL-MCNC: 7 MG/DL (ref 5–25)
CALCIUM ALBUM COR SERPL-MCNC: 10.3 MG/DL (ref 8.3–10.1)
CALCIUM SERPL-MCNC: 9.6 MG/DL (ref 8.3–10.1)
CHLORIDE SERPL-SCNC: 107 MMOL/L (ref 96–108)
CHOLEST SERPL-MCNC: 155 MG/DL
CO2 SERPL-SCNC: 26 MMOL/L (ref 21–32)
CREAT SERPL-MCNC: 0.84 MG/DL (ref 0.6–1.3)
EOSINOPHIL # BLD AUTO: 0.27 THOUSAND/ÂΜL (ref 0–0.61)
EOSINOPHIL NFR BLD AUTO: 4 % (ref 0–6)
ERYTHROCYTE [DISTWIDTH] IN BLOOD BY AUTOMATED COUNT: 13.5 % (ref 11.6–15.1)
GFR SERPL CREATININE-BSD FRML MDRD: 84 ML/MIN/1.73SQ M
GLUCOSE P FAST SERPL-MCNC: 96 MG/DL (ref 65–99)
HCT VFR BLD AUTO: 39 % (ref 34.8–46.1)
HDLC SERPL-MCNC: 30 MG/DL
HGB BLD-MCNC: 12.8 G/DL (ref 11.5–15.4)
IMM GRANULOCYTES # BLD AUTO: 0.08 THOUSAND/UL (ref 0–0.2)
IMM GRANULOCYTES NFR BLD AUTO: 1 % (ref 0–2)
LDLC SERPL CALC-MCNC: 89 MG/DL (ref 0–100)
LYMPHOCYTES # BLD AUTO: 3.21 THOUSANDS/ÂΜL (ref 0.6–4.47)
LYMPHOCYTES NFR BLD AUTO: 42 % (ref 14–44)
MCH RBC QN AUTO: 32.7 PG (ref 26.8–34.3)
MCHC RBC AUTO-ENTMCNC: 32.8 G/DL (ref 31.4–37.4)
MCV RBC AUTO: 100 FL (ref 82–98)
MONOCYTES # BLD AUTO: 1.35 THOUSAND/ÂΜL (ref 0.17–1.22)
MONOCYTES NFR BLD AUTO: 18 % (ref 4–12)
NEUTROPHILS # BLD AUTO: 2.67 THOUSANDS/ÂΜL (ref 1.85–7.62)
NEUTS SEG NFR BLD AUTO: 35 % (ref 43–75)
NRBC BLD AUTO-RTO: 0 /100 WBCS
PLATELET # BLD AUTO: 322 THOUSANDS/UL (ref 149–390)
PMV BLD AUTO: 10 FL (ref 8.9–12.7)
POTASSIUM SERPL-SCNC: 4 MMOL/L (ref 3.5–5.3)
PROT SERPL-MCNC: 6.6 G/DL (ref 6.4–8.4)
RBC # BLD AUTO: 3.92 MILLION/UL (ref 3.81–5.12)
SODIUM SERPL-SCNC: 135 MMOL/L (ref 135–147)
TRIGL SERPL-MCNC: 181 MG/DL
TSH SERPL DL<=0.05 MIU/L-ACNC: 3.18 UIU/ML (ref 0.45–4.5)
WBC # BLD AUTO: 7.61 THOUSAND/UL (ref 4.31–10.16)

## 2023-04-06 NOTE — TELEPHONE ENCOUNTER
"Regarding: concerned with blood results  ----- Message from Zack Cuellar sent at 4/6/2023  6:53 PM EDT -----  \" My test results came in and my doctor, Abimbola Frias said she was going to possibly admit in the hospital , so im just calling to find out of I need to go to the hospital because of my blood work results      "

## 2023-04-06 NOTE — TELEPHONE ENCOUNTER
"  Reason for Disposition  • [1] Follow-up call from patient regarding patient's clinical status AND [2] information urgent    Answer Assessment - Initial Assessment Questions  1  REASON FOR CALL or QUESTION: \"What is your reason for calling today? \" or \"How can I best  help you? \" or \"What question do you have that I can help answer? \"        Patient is calling because her lab results are back and she was told by her PCP that she may possibly have to be admitted to the hospital again after her results are back  She would like to know if she needs to go to the hospital     Contacted on-call provider who advised the patient does not have to go to the hospital because her blood work has improved  She does continue to recommend that patient be seen by gastroenterology as soon as possible  Contacted patient to make her aware of above information  Patient was appreciative  Patient is concerned because she needs her work note changed and is asking for a call back from the office tomorrow morning      Protocols used: PCP CALL - NO TRIAGE-ADULT-    "

## 2023-04-07 ENCOUNTER — TELEPHONE (OUTPATIENT)
Dept: GASTROENTEROLOGY | Facility: AMBULARY SURGERY CENTER | Age: 43
End: 2023-04-07

## 2023-04-07 ENCOUNTER — TELEPHONE (OUTPATIENT)
Dept: FAMILY MEDICINE CLINIC | Facility: CLINIC | Age: 43
End: 2023-04-07

## 2023-04-07 LAB
EST. AVERAGE GLUCOSE BLD GHB EST-MCNC: 108 MG/DL
HBA1C MFR BLD: 5.4 %

## 2023-04-07 NOTE — TELEPHONE ENCOUNTER
Pt saw Silvina Dye because she was having health concerns and we wrote her out of work from March 31-4/6. Pt stated she was in the hospital twice the week before and doesn't feel well enough to return until the following Tuesday April 11. She would like us to correct her out of work note beginning on 3/24, when I stated that she would need to obtain that from the hospital since her first ED appt was on 3/26 she wanted the provider to make that call. Please call pt to advise if we can write her a work note excusing her from march 24-April 11 , since her note from us excused jayla from 3/31-4/6    KEEGAN agreed to extend her note to return to work on 04/11/2023 but not to backdate it. KG also told her she need to see GI and at this time there was no upcoming appt in her chart.   I faxed to her emplyer if [facility][note 2] at

## 2023-04-07 NOTE — TELEPHONE ENCOUNTER
----- Message from Maite Tapia PA-C sent at 4/5/2023  2:30 PM EDT -----  This is a patient of Dr Kamille Calderon and Dr Holden Garza  She was hospitalized last week at [facility] and needs to be seen in the office for follow-up  Please schedule as soon as possible, thanks

## 2023-04-19 PROBLEM — Z00.00 HEALTHCARE MAINTENANCE: Status: ACTIVE | Noted: 2023-04-19

## 2023-04-19 PROBLEM — F12.90 CANNABINOID HYPEREMESIS SYNDROME: Status: ACTIVE | Noted: 2023-04-19

## 2023-04-19 PROBLEM — R11.2 CANNABINOID HYPEREMESIS SYNDROME: Status: ACTIVE | Noted: 2023-04-19

## 2023-04-19 PROBLEM — Z72.0 TOBACCO ABUSE: Status: ACTIVE | Noted: 2023-04-19

## 2023-04-29 ENCOUNTER — NURSE TRIAGE (OUTPATIENT)
Dept: OTHER | Facility: OTHER | Age: 43
End: 2023-04-29

## 2023-04-29 DIAGNOSIS — I10 ESSENTIAL HYPERTENSION: ICD-10-CM

## 2023-04-29 RX ORDER — LISINOPRIL 20 MG/1
20 TABLET ORAL DAILY
Qty: 90 TABLET | Refills: 3 | Status: SHIPPED | OUTPATIENT
Start: 2023-04-29

## 2023-04-29 NOTE — TELEPHONE ENCOUNTER
"  Reason for Disposition  • [1] Prescription refill request for ESSENTIAL medicine (i e , likelihood of harm to patient if not taken) AND [2] triager unable to refill per department policy    Answer Assessment - Initial Assessment Questions  1  DRUG NAME: \"What medicine do you need to have refilled? \"      Lisinopril 20 mg   2  REFILLS REMAINING: \"How many refills are remaining? \" (Note: The label on the medicine or pill bottle will show how many refills are remaining  If there are no refills remaining, then a renewal may be needed )      No refill, patient is out of the medication  4  PRESCRIBING HCP: \"Who prescribed it? \" Reason: If prescribed by specialist, call should be referred to that group         PCP    Protocols used: MEDICATION REFILL AND RENEWAL CALL-ADULT-    "

## 2023-04-29 NOTE — TELEPHONE ENCOUNTER
"Regarding: Urgent medication refill  ----- Message from Meryl Chou sent at 4/29/2023 11:19 AM EDT -----  \"I am all out of the Lisinopril 20 mg Oral Daily medication  I would like it sent to [facility] [address]  \"    "

## 2023-04-29 NOTE — TELEPHONE ENCOUNTER
Per BENITEZ Redd, Rx Lisinopril 20 mg once a day, 90 days, 3 refills was sent to [facility] pharmacy

## 2023-05-17 ENCOUNTER — OFFICE VISIT (OUTPATIENT)
Dept: FAMILY MEDICINE CLINIC | Facility: CLINIC | Age: 43
End: 2023-05-17

## 2023-05-17 VITALS
DIASTOLIC BLOOD PRESSURE: 98 MMHG | WEIGHT: 158 LBS | BODY MASS INDEX: 31.02 KG/M2 | HEART RATE: 71 BPM | TEMPERATURE: 98.4 F | HEIGHT: 60 IN | RESPIRATION RATE: 16 BRPM | OXYGEN SATURATION: 98 % | SYSTOLIC BLOOD PRESSURE: 160 MMHG

## 2023-05-17 DIAGNOSIS — F41.9 ANXIETY: ICD-10-CM

## 2023-05-17 DIAGNOSIS — K21.9 GASTROESOPHAGEAL REFLUX DISEASE WITHOUT ESOPHAGITIS: Primary | ICD-10-CM

## 2023-05-17 DIAGNOSIS — I10 ESSENTIAL HYPERTENSION: ICD-10-CM

## 2023-05-17 DIAGNOSIS — R11.0 NAUSEA: ICD-10-CM

## 2023-05-17 DIAGNOSIS — R11.15 CYCLICAL VOMITING SYNDROME: ICD-10-CM

## 2023-05-17 RX ORDER — BUSPIRONE HYDROCHLORIDE 5 MG/1
5 TABLET ORAL 2 TIMES DAILY
Qty: 180 TABLET | Refills: 1 | Status: SHIPPED | OUTPATIENT
Start: 2023-05-17 | End: 2023-08-15

## 2023-05-17 RX ORDER — METOCLOPRAMIDE 5 MG/1
5 TABLET ORAL 4 TIMES DAILY PRN
Qty: 30 TABLET | Refills: 0 | Status: SHIPPED | OUTPATIENT
Start: 2023-05-17

## 2023-05-17 RX ORDER — LORAZEPAM 1 MG/1
1 TABLET ORAL DAILY PRN
Qty: 30 TABLET | Refills: 0 | Status: SHIPPED | OUTPATIENT
Start: 2023-05-17

## 2023-05-17 RX ORDER — OMEPRAZOLE 40 MG/1
40 CAPSULE, DELAYED RELEASE ORAL DAILY
Qty: 90 CAPSULE | Refills: 1 | Status: SHIPPED | OUTPATIENT
Start: 2023-05-17

## 2023-05-17 NOTE — ASSESSMENT & PLAN NOTE
- Not well controlled  - Continue Zoloft 200 mg daily  - Will add Buspar 5 mg twice daily   - Ativan 1 mg daily as needed for increased anxiety  Discussed potential side effects and addictive nature of medication    - Recommend follow up in 1 month

## 2023-05-17 NOTE — ASSESSMENT & PLAN NOTE
- Not well controlled  - Will restart omeprazole 40 mg daily  - Avoid triggering food and beverage   - Follow up with GI as scheduled on 6/28  - Will continue to monitor

## 2023-05-17 NOTE — PROGRESS NOTES
Name: Derek Calvillo      : 1980      MRN: [id number]  Encounter Provider: MONICA Zhao  Encounter Date: 2023   Encounter department: [address] [place] [address]  Gastroesophageal reflux disease without esophagitis  Assessment & Plan:  - Not well controlled  - Will restart omeprazole 40 mg daily  - Avoid triggering food and beverage   - Follow up with GI as scheduled on   - Will continue to monitor  Orders:  -     omeprazole (PriLOSEC) 40 MG capsule; Take 1 capsule (40 mg total) by mouth daily    2  Cyclical vomiting syndrome  Assessment & Plan:  - Thought to be related to cannabis hyperemesis syndrome  Reports she has stopped ingesting/smoking marijuana  Still having nausea and vomiting    - no improvement with Zofran  Will prescribe Reglan  - Restart Omeprazole  - Follow up with GI as scheduled  - Proceed to ER with worsening symptoms  3  Anxiety  Assessment & Plan:  - Not well controlled  - Continue Zoloft 200 mg daily  - Will add Buspar 5 mg twice daily   - Ativan 1 mg daily as needed for increased anxiety  Discussed potential side effects and addictive nature of medication    - Recommend follow up in 1 month  Orders:  -     busPIRone (BUSPAR) 5 mg tablet; Take 1 tablet (5 mg total) by mouth 2 (two) times a day  -     LORazepam (ATIVAN) 1 mg tablet; Take 1 tablet (1 mg total) by mouth daily as needed for anxiety    4  Essential hypertension  Assessment & Plan:  - Not well controlled  - Patient extremely anxious during today's exam  Can also not keep medication down due to nausea and vomiting    - Continue lisinopril 20 mg daily  - Will follow up in 1 month or sooner if needed  5  Nausea  -     metoclopramide (REGLAN) 5 mg tablet; Take 1 tablet (5 mg total) by mouth 4 (four) times a day as needed (nausea)           Subjective     Patient presents today for follow up   Has complaints of increased anxiety and continuous nausea and vomiting  Does have a history of cyclic vomiting syndrome thought to be related to cannabis hyperemesis syndrome  She has stopped smoking/injesting marijuana  She was seen in the ER on 5/11 with vomiting  Labs and workup were unremarkable  She is unable to keep anything down  Has appointment with GI on 6/28  Blood pressure is also elevated today  Brit Tolliver Melia 43F  Contact the [address] [place] [address] Street    YOB: 1980   Recent Address:  [address] [place] [address]   Status of States Queried:  [employer] Details   Status of States [place] Records (1)    Report Criteria  Linked Records  ? Tile cannot be moved due to a restriction by the [place] [facility]   Summary  Total Prescriptions  2    Total Private Pay  0    Total Prescribers  2    Total Pharmacies  1    Opioids* (excluding Buprenorphine)  Current Qty  0    Current MME/day  0 00    30 Day Avg MME/day  0 00    Buprenorphine*  Current Qty  0    Current mg/day  0 00    30 Day Avg mg/day  0 00      Tile cannot be moved due to a restriction by the Admin  Prescriptions   Total: 2   Private Pay: 0   Showing 1-2 of 2 Items   View   15 Items    1 of 1   Filled  Written  Sold  ID  Drug  QTY  Days  Prescriber  RX #  Dispenser  Refill  Daily Dose*  Pymt Type       04/05/2023 04/05/2023   1  Lorazepam 0 5 Mg Tablet 30 00  30  Ka Gra  [id number]   Pen ([id number])   0 50 LME  Comm Ins  [place]     03/11/2022 03/11/2022   1  Oxycodone Hcl (Ir) 5 Mg Tablet 10 00  2  Ri Con  [id number]   Pen (8685)   37 50 MME  Comm Ins  [place]    Disclaimer   Disclaimer        Review of Systems   Constitutional: Negative for fatigue and fever  HENT: Negative for trouble swallowing  Eyes: Negative for visual disturbance  Respiratory: Negative for cough and shortness of breath  Cardiovascular: Negative for chest pain and palpitations  Gastrointestinal: Positive for nausea and vomiting   Negative for abdominal pain and blood in stool  Endocrine: Negative for cold intolerance and heat intolerance  Genitourinary: Negative for difficulty urinating and dysuria  Musculoskeletal: Negative for gait problem  Skin: Negative for rash  Neurological: Negative for dizziness, syncope and headaches  Hematological: Negative for adenopathy  Psychiatric/Behavioral: Positive for sleep disturbance  Negative for behavioral problems  The patient is nervous/anxious  Past Medical History:   Diagnosis Date   • Anxiety    • Arthritis     OA  b/l knees   • Asthma     Approx 2 years ago   • Calculus of gallbladder without cholecystitis without obstruction 2022   • Chronic kidney disease    • Depression    • GERD (gastroesophageal reflux disease)     In my 25s  Approx 15 years ago   • Hx of bleeding disorder     dysmennorrhea-dx lap today 2016   • Hypertension    • Kidney stone    • Obesity    • Seasonal allergies      Past Surgical History:   Procedure Laterality Date   •  SECTION     • CHOLECYSTECTOMY     • DIAGNOSTIC LAPAROSCOPY     • DILATION AND CURETTAGE OF UTERUS     • HERNIA REPAIR     • FL COLONOSCOPY FLX DX W/COLLJ SPEC WHEN PFRMD N/A 2018    Procedure: EGD AND COLONOSCOPY;  Surgeon: Aixa Cooley MD;  [facility];   Service: Gastroenterology   • FL LAPAROSCOPY SURG CHOLECYSTECTOMY N/A 3/11/2022    Procedure: ROBOTIC LAPAROSCOPIC CHOLECYSTECTOMY;  Surgeon: Rancho Wilson DO;  [facility];  Service: General   • FL LAPAROSCOPY W/RMVL ADNEXAL STRUCTURES Bilateral 2016    Procedure: CYSTECTOMY  OVARIAN;  Surgeon: Coral Jacobo DO;  [facility];  Service: Gynecology   • FL LAPS ABD PRTM&OMENTUM DX W/WO SPEC BR/WA SPX N/A 2016    Procedure: LAPAROSCOPY DIAGNOSTIC DAVID;  Surgeon: Coral Jacobo DO;  [facility];  Service: Gynecology   • FL RPR UMBILICAL HRNA 5 YRS/> REDUCIBLE N/A 3/11/2022    Procedure: UMBILICAL HERNIA REPAIR;  Surgeon: Jeff Stone DO Uziel;  Location[facility];  Service: General   • WISDOM TOOTH EXTRACTION       Family History   Problem Relation Age of Onset   • No Known Problems Sister    • Autism Daughter    • Lung cancer Maternal Grandmother    • Cancer Maternal Grandmother         Deceassd 8 years   • Diabetes Maternal Grandfather    • Arthritis Maternal Grandfather          8 years   • Aneurysm Paternal Grandmother    • No Known Problems Sister      Social History     Socioeconomic History   • Marital status: /Civil Union     Spouse name: None   • Number of children: None   • Years of education: None   • Highest education level: None   Occupational History   • None   Tobacco Use   • Smoking status: Every Day     Packs/day:  00     Years:      Pack years:      Types: Cigarettes   • Smokeless tobacco: Never   Vaping Use   • Vaping Use: Never used   Substance and Sexual Activity   • Alcohol use: Never     Comment: social   • Drug use: Yes     Frequency: 7 0 times per week     Types: Marijuana     Comment: I exhaused all your ‘rx prescription-- NOTHING ELSE HELPED!!    • Sexual activity: Not Currently     Partners: Male     Birth control/protection: Abstinence   Other Topics Concern   • None   Social History Narrative   • None     Social Determinants of Health     Financial Resource Strain: Not on file   Food Insecurity: No Food Insecurity   • Worried About Running Out of Food in the Last Year: Never true   • Ran Out of Food in the Last Year: Never true   Transportation Needs: Not on file   Physical Activity: Not on file   Stress: Not on file   Social Connections: Not on file   Intimate Partner Violence: Not on file   Housing Stability: [address]    • Unable to Pay for Housing in the Last Year: No   • Number of [place] in the Last Year: 1   • Unstable Housing in the Last Year: No     Current Outpatient Medications on File Prior to Visit   Medication Sig   • albuterol (ProAir HFA) 90 mcg/act inhaler Inhale 2 puffs every 6 (six) hours as needed for wheezing   • betamethasone valerate (LUXIQ) 0 12 % foam Apply topically daily   • fluticasone (FLONASE) 50 mcg/act nasal spray 1 spray into each nostril daily   • gabapentin (NEURONTIN) 300 mg capsule Take 2 capsules (600 mg total) by mouth 2 (two) times a day   • hydrOXYzine pamoate (VISTARIL) 25 mg capsule Take 1 capsule (25 mg total) by mouth 3 (three) times a day as needed for anxiety   • lisinopril (ZESTRIL) 20 mg tablet Take 1 tablet (20 mg total) by mouth daily   • nicotine (NICODERM CQ) 21 mg/24 hr TD 24 hr patch Place 1 patch on the skin over 24 hours every 24 hours   • ondansetron (Zofran ODT) 4 mg disintegrating tablet Take 1 tablet (4 mg total) by mouth every 6 (six) hours as needed for nausea or vomiting   • prochlorperazine (COMPAZINE) 10 mg tablet Take 0 5 tablets (5 mg total) by mouth every 8 (eight) hours as needed for nausea or vomiting   • promethazine (PHENERGAN) 25 mg tablet Take 1 tablet (25 mg total) by mouth every 6 (six) hours as needed for nausea or vomiting   • sertraline (ZOLOFT) 100 mg tablet Take 2 tablets (200 mg total) by mouth daily   • [DISCONTINUED] LORazepam (Ativan) 0 5 mg tablet Take 1 tablet (0 5 mg total) by mouth daily as needed for anxiety   • norgestimate-ethinyl estradiol (Ortho Tri-Cyclen Lo) 0 18/0 215/0 25 MG-25 MCG per tablet Take 1 tablet by mouth daily   • predniSONE 10 mg tablet Take 1 tablet (10 mg total) by mouth daily 6 tab day 1, 5 tab day 2, 4 tab day 3, 3 tab day 4, 2 tab day 5, 1 tab day 6 (Patient not taking: Reported on 5/17/2023)     Allergies   Allergen Reactions   • Eggs Or Egg-Derived Products - Food Allergy Other (See Comments)     abd pain     Immunization History   Administered Date(s) Administered   • Pneumococcal Polysaccharide PPV23 11/29/2019       Objective     /98 (BP Location: Left arm, Patient Position: Sitting, Cuff Size: Adult)   Pulse 71   Temp 98 4 °F (36 9 °C) (Tympanic)   Resp 16   Ht 5' (1 524 m)   Wt 71 7 kg (158 lb)   LMP  (LMP Unknown)   SpO2 98%   BMI 30 86 kg/m²     Physical Exam  Vitals and nursing note reviewed  Constitutional:       Appearance: Normal appearance  HENT:      Head: Normocephalic and atraumatic  Right Ear: External ear normal       Left Ear: External ear normal    Eyes:      Conjunctiva/sclera: Conjunctivae normal    Cardiovascular:      Rate and Rhythm: Normal rate and regular rhythm  Heart sounds: Normal heart sounds  Pulmonary:      Effort: Pulmonary effort is normal       Breath sounds: Normal breath sounds  Musculoskeletal:         General: Normal range of motion  Cervical back: Normal range of motion  Skin:     General: Skin is warm and dry  Neurological:      Mental Status: She is alert and oriented to person, place, and time  Cranial Nerves: No cranial nerve deficit  Psychiatric:         Mood and Affect: Mood is anxious  Affect is tearful  Behavior: Behavior is cooperative         MONICA Feliz

## 2023-05-17 NOTE — ASSESSMENT & PLAN NOTE
- Not well controlled  - Patient extremely anxious during today's exam  Can also not keep medication down due to nausea and vomiting    - Continue lisinopril 20 mg daily  - Will follow up in 1 month or sooner if needed

## 2023-05-17 NOTE — ASSESSMENT & PLAN NOTE
- Thought to be related to cannabis hyperemesis syndrome  Reports she has stopped ingesting/smoking marijuana  Still having nausea and vomiting    - no improvement with Zofran  Will prescribe Reglan  - Restart Omeprazole  - Follow up with GI as scheduled  - Proceed to ER with worsening symptoms

## 2023-05-26 DIAGNOSIS — F41.9 ANXIETY: ICD-10-CM

## 2023-05-26 RX ORDER — SERTRALINE HYDROCHLORIDE 100 MG/1
TABLET, FILM COATED ORAL
Qty: 180 TABLET | Refills: 1 | Status: SHIPPED | OUTPATIENT
Start: 2023-05-26

## 2023-06-17 DIAGNOSIS — Z72.0 TOBACCO ABUSE: ICD-10-CM

## 2023-06-18 PROBLEM — Z00.00 HEALTHCARE MAINTENANCE: Status: RESOLVED | Noted: 2023-04-19 | Resolved: 2023-06-18

## 2023-06-19 RX ORDER — NICOTINE 21 MG/24HR
1 PATCH, TRANSDERMAL 24 HOURS TRANSDERMAL EVERY 24 HOURS
Qty: 28 PATCH | Refills: 0 | Status: SHIPPED | OUTPATIENT
Start: 2023-06-19

## 2023-06-20 ENCOUNTER — OFFICE VISIT (OUTPATIENT)
Dept: FAMILY MEDICINE CLINIC | Facility: CLINIC | Age: 43
End: 2023-06-20
Payer: COMMERCIAL

## 2023-06-20 VITALS
SYSTOLIC BLOOD PRESSURE: 116 MMHG | HEART RATE: 66 BPM | WEIGHT: 167 LBS | OXYGEN SATURATION: 97 % | TEMPERATURE: 98.4 F | RESPIRATION RATE: 16 BRPM | BODY MASS INDEX: 32.79 KG/M2 | HEIGHT: 60 IN | DIASTOLIC BLOOD PRESSURE: 64 MMHG

## 2023-06-20 DIAGNOSIS — K21.9 GASTROESOPHAGEAL REFLUX DISEASE WITHOUT ESOPHAGITIS: ICD-10-CM

## 2023-06-20 DIAGNOSIS — R11.15 CYCLICAL VOMITING SYNDROME: ICD-10-CM

## 2023-06-20 DIAGNOSIS — I10 ESSENTIAL HYPERTENSION: ICD-10-CM

## 2023-06-20 DIAGNOSIS — F41.9 ANXIETY: Primary | ICD-10-CM

## 2023-06-20 PROCEDURE — 99214 OFFICE O/P EST MOD 30 MIN: CPT | Performed by: NURSE PRACTITIONER

## 2023-06-20 RX ORDER — LORAZEPAM 1 MG/1
1 TABLET ORAL DAILY PRN
Qty: 30 TABLET | Refills: 0 | Status: SHIPPED | OUTPATIENT
Start: 2023-06-20

## 2023-06-20 NOTE — PROGRESS NOTES
Name: Rosalinda Marc      : 1980      MRN: [id number]  Encounter Provider: MONICA Anne  Encounter Date: 2023   Encounter department: [address] [place] [address]  Anxiety  Assessment & Plan:  - Improving   - Continue Zoloft 200 mg daily and Buspar 5 mg BID  - Ativan as needed  Aware of side effects   - Will continue to monitor  Orders:  -     LORazepam (ATIVAN) 1 mg tablet; Take 1 tablet (1 mg total) by mouth daily as needed for anxiety    2  Gastroesophageal reflux disease without esophagitis  Assessment & Plan:  - Improving    - Continue omeprazole 40 mg daily  - Avoid triggering food and beverage   - Follow up with GI as scheduled on   - Will continue to monitor  3  Cyclical vomiting syndrome  Assessment & Plan:  - Thought to be related to cannabis hyperemesis syndrome  Reports she stopped ingesting/smoking marijuana  Nausea and vomiting have improved  - Continue Reglan as needed  - Omeprazole daily for GERD  - Follow up with GI as scheduled  4  Essential hypertension  Assessment & Plan:  - Well controlled on lisinopril 20 mg daily  Continue same    - Will continue to monitor  Subjective     Patient with PMH of hypertension, anxiety, and cyclic vomiting syndrome presents today for follow up  She is taking her prescribed medication and reports no side effects  Denies any continued episodes of vomiting  She has stopped smoking and ingesting marijuana  She has an appointment with GI on   Her anxiety is much more controlled today  She denies any other significant concerns or complaints today  Melia Marquez  Contact the [address] [place] [address]   YOB: 1980   Recent Address:  [address] [place] [address]   Status of States Queried:  [employer] Details   Status of States [place] Records (1)    Report Criteria  Linked Records  ?    [place] cannot be moved due to a restriction by the Admin  RX Summary   Summary  Total Prescriptions  3    Total Private Pay  0    Total Prescribers  2    Total Pharmacies  1    Narcotics (excluding Buprenorphine)  Current Qty  0    Current MME/day  0 00    30 Day Avg MME/day  0 00    Buprenorphine   Current Qty  0    Current mg/day  0 00    30 Day Avg mg/day  0 00      Tile cannot be moved due to a restriction by the Admin  Prescriptions   Total: 3   Private Pay: 0   Showing 1-3 of 3 Items   View   15 Items    1 of 1   Filled  Written  Sold  ID  Drug  QTY  Days  Prescriber  RX #  Dispenser  Refill  Daily Dose*  Pymt Type      05/17/2023 05/17/2023   1  Lorazepam 1 Mg Tablet 30 00  30  Ka Gra  [id number]   Pen (4257)   1 00 LME  Comm Ins  [place]    04/05/2023 04/05/2023   1  Lorazepam 0 5 Mg Tablet 30 00  30  Ka Gra  [id number]   Pen (8385)   0 50 LME  Comm Ins  [place]    03/11/2022 03/11/2022   1  Oxycodone Hcl (Ir) 5 Mg Tablet 10 00  2  Ri Con  [id number]   Pen (1051)   37 50 MME  Comm         Review of Systems   Constitutional: Negative for fatigue and fever  HENT: Negative for trouble swallowing  Eyes: Negative for visual disturbance  Respiratory: Negative for cough and shortness of breath  Cardiovascular: Negative for chest pain and palpitations  Gastrointestinal: Negative for abdominal pain and blood in stool  Endocrine: Negative for cold intolerance and heat intolerance  Genitourinary: Negative for difficulty urinating and dysuria  Musculoskeletal: Negative for gait problem  Skin: Negative for rash  Neurological: Negative for dizziness, syncope and headaches  Hematological: Negative for adenopathy  Psychiatric/Behavioral: Negative for behavioral problems         Past Medical History:   Diagnosis Date   • Anxiety    • Arthritis     OA  b/l knees   • Asthma     Approx 2 years ago   • Calculus of gallbladder without cholecystitis without obstruction 02/23/2022   • Chronic kidney disease    • Depression    • GERD (gastroesophageal reflux disease)     In my 25s  Approx 15 years ago   • Hx of bleeding disorder     dysmennorrhea-dx lap today 2016   • Hypertension    • Kidney stone    • Obesity    • Seasonal allergies      Past Surgical History:   Procedure Laterality Date   •  SECTION     • CHOLECYSTECTOMY     • DIAGNOSTIC LAPAROSCOPY     • DILATION AND CURETTAGE OF UTERUS     • HERNIA REPAIR     • AZ COLONOSCOPY FLX DX W/COLLJ SPEC WHEN PFRMD N/A 2018    Procedure: EGD AND COLONOSCOPY;  Surgeon: Danielito Montero MD;  [facility];   Service: Gastroenterology   • AZ LAPAROSCOPY SURG CHOLECYSTECTOMY N/A 3/11/2022    Procedure: ROBOTIC LAPAROSCOPIC CHOLECYSTECTOMY;  Surgeon: Zakiya Demarco DO;  [facility];  Service: General   • AZ LAPAROSCOPY W/RMVL ADNEXAL STRUCTURES Bilateral 2016    Procedure: CYSTECTOMY  OVARIAN;  Surgeon: Jen Harvey DO;  [facility];  Service: Gynecology   • AZ LAPS ABD PRTM&OMENTUM DX W/WO SPEC BR/WA SPX N/A 2016    Procedure: LAPAROSCOPY DIAGNOSTIC DAVID;  Surgeon: Jen Harvey DO;  [facility];  Service: Gynecology   • AZ RPR UMBILICAL HRNA 5 YRS/> REDUCIBLE N/A 3/11/2022    Procedure: UMBILICAL HERNIA REPAIR;  Surgeon: Zakiya Demarco DO;  [facility];  Service: General   • WISDOM TOOTH EXTRACTION       Family History   Problem Relation Age of Onset   • No Known Problems Sister    • Autism Daughter    • Lung cancer Maternal Grandmother    • Cancer Maternal Grandmother         Deceassd 8 years   • Diabetes Maternal Grandfather    • Arthritis Maternal Grandfather          10 years   • Aneurysm Paternal Grandmother    • No Known Problems Sister      Social History     Socioeconomic History   • Marital status: /Civil Union     Spouse name: None   • Number of children: None   • Years of education: None   • Highest education level: None   Occupational History   • None   Tobacco Use   • Smoking status: Every Day Packs/day: 1 00     Years: 23 00     Total pack years: 23 00     Types: Cigarettes   • Smokeless tobacco: Never   Vaping Use   • Vaping Use: Never used   Substance and Sexual Activity   • Alcohol use: Never     Comment: social   • Drug use: Yes     Frequency: 7 0 times per week     Types: Marijuana     Comment: I exhaused all your ‘rx prescription-- NOTHING ELSE HELPED!!    • Sexual activity: Not Currently     Partners: Male     Birth control/protection: Abstinence   Other Topics Concern   • None   Social History Narrative   • None     Social Determinants of Health     Financial Resource Strain: Not on file   Food Insecurity: No Food Insecurity (6/8/2022)    Hunger Vital Sign    • Worried About Running Out of Food in the Last Year: Never true    • Ran Out of Food in the Last Year: Never true   Transportation Needs: Not on file   Physical Activity: Not on file   Stress: Not on file   Social Connections: Not on file   Intimate Partner Violence: Not on file   Housing Stability: [address]  (6/8/2022)    Housing Stability Vital Sign    • Unable to Pay for Housing in the Last Year: No    • Number of Places Lived in the Last Year: 1    • Unstable Housing in the Last Year: No     Current Outpatient Medications on File Prior to Visit   Medication Sig   • albuterol (ProAir HFA) 90 mcg/act inhaler Inhale 2 puffs every 6 (six) hours as needed for wheezing   • betamethasone valerate (LUXIQ) 0 12 % foam Apply topically daily   • busPIRone (BUSPAR) 5 mg tablet Take 1 tablet (5 mg total) by mouth 2 (two) times a day   • fluticasone (FLONASE) 50 mcg/act nasal spray 1 spray into each nostril daily   • gabapentin (NEURONTIN) 300 mg capsule Take 2 capsules (600 mg total) by mouth 2 (two) times a day   • hydrOXYzine pamoate (VISTARIL) 25 mg capsule Take 1 capsule (25 mg total) by mouth 3 (three) times a day as needed for anxiety   • lisinopril (ZESTRIL) 20 mg tablet Take 1 tablet (20 mg total) by mouth daily   • metoclopramide (REGLAN) 5 mg tablet Take 1 tablet (5 mg total) by mouth 4 (four) times a day as needed (nausea)   • omeprazole (PriLOSEC) 40 MG capsule Take 1 capsule (40 mg total) by mouth daily   • ondansetron (Zofran ODT) 4 mg disintegrating tablet Take 1 tablet (4 mg total) by mouth every 6 (six) hours as needed for nausea or vomiting   • prochlorperazine (COMPAZINE) 10 mg tablet Take 0 5 tablets (5 mg total) by mouth every 8 (eight) hours as needed for nausea or vomiting   • promethazine (PHENERGAN) 25 mg tablet Take 1 tablet (25 mg total) by mouth every 6 (six) hours as needed for nausea or vomiting   • sertraline (ZOLOFT) 100 mg tablet TAKE 2 TABLETS BY MOUTH EVERY DAY   • [DISCONTINUED] LORazepam (ATIVAN) 1 mg tablet Take 1 tablet (1 mg total) by mouth daily as needed for anxiety   • nicotine (NICODERM CQ) 21 mg/24 hr TD 24 hr patch PLACE 1 PATCH ON THE SKIN OVER 24 HOURS EVERY 24 HOURS (Patient not taking: Reported on 6/20/2023)   • norgestimate-ethinyl estradiol (Ortho Tri-Cyclen Lo) 0 18/0 215/0 25 MG-25 MCG per tablet Take 1 tablet by mouth daily   • predniSONE 10 mg tablet Take 1 tablet (10 mg total) by mouth daily 6 tab day 1, 5 tab day 2, 4 tab day 3, 3 tab day 4, 2 tab day 5, 1 tab day 6 (Patient not taking: Reported on 5/17/2023)     Allergies   Allergen Reactions   • Eggs Or Egg-Derived Products - Food Allergy Other (See Comments)     abd pain     Immunization History   Administered Date(s) Administered   • Pneumococcal Polysaccharide PPV23 11/29/2019       Objective     /64 (BP Location: Left arm, Patient Position: Sitting, Cuff Size: Large)   Pulse 66   Temp 98 4 °F (36 9 °C) (Tympanic)   Resp 16   Ht 5' (1 524 m)   Wt 75 8 kg (167 lb)   LMP  (LMP Unknown)   SpO2 97%   BMI 32 61 kg/m²     Physical Exam  Vitals and nursing note reviewed  Constitutional:       Appearance: Normal appearance  HENT:      Head: Normocephalic and atraumatic        Right Ear: External ear normal       Left Ear: External ear normal    Eyes:      Conjunctiva/sclera: Conjunctivae normal    Cardiovascular:      Rate and Rhythm: Normal rate and regular rhythm  Heart sounds: Normal heart sounds  Pulmonary:      Effort: Pulmonary effort is normal       Breath sounds: Normal breath sounds  Musculoskeletal:         General: Normal range of motion  Cervical back: Normal range of motion  Lymphadenopathy:      Cervical: No cervical adenopathy  Skin:     General: Skin is warm and dry  Neurological:      Mental Status: She is alert and oriented to person, place, and time  Cranial Nerves: No cranial nerve deficit     Psychiatric:         Mood and Affect: Mood normal          Behavior: Behavior normal        MONICA Ballard

## 2023-06-20 NOTE — ASSESSMENT & PLAN NOTE
- Thought to be related to cannabis hyperemesis syndrome  Reports she stopped ingesting/smoking marijuana  Nausea and vomiting have improved  - Continue Reglan as needed  - Omeprazole daily for GERD  - Follow up with GI as scheduled

## 2023-06-20 NOTE — ASSESSMENT & PLAN NOTE
- Improving    - Continue omeprazole 40 mg daily  - Avoid triggering food and beverage   - Follow up with GI as scheduled on 6/28  - Will continue to monitor

## 2023-06-20 NOTE — ASSESSMENT & PLAN NOTE
- Improving   - Continue Zoloft 200 mg daily and Buspar 5 mg BID  - Ativan as needed  Aware of side effects   - Will continue to monitor

## 2023-06-26 NOTE — ASSESSMENT & PLAN NOTE
"Goal Outcome Evaluation:    Took over care of pt at 1900. Pt c/o lower back pain, T-pump on requested from the store room. Writer assisted the pt with repositioning. Pt declined PRN Tylenol. A&Ox4. O2 saturations were 87-88% on RA now in the mid 95% on 2Lnc.  Suprapubic catheter in place patent. Pt reports \"I cannot get a full breath.\" Writer observes pt is using pursed lip breathing. Pt now appears to be resting comfortably will continue to monitor pt.         Problem: Plan of Care - These are the overarching goals to be used throughout the patient stay.    Goal: Optimal Comfort and Wellbeing  Outcome: Progressing     Problem: Plan of Care - These are the overarching goals to be used throughout the patient stay.    Goal: Optimal Comfort and Wellbeing  Intervention: Monitor Pain and Promote Comfort  Recent Flowsheet Documentation  Taken 6/25/2023 1948 by Brooklynn Birch, RN  Pain Management Interventions: (T-pump ordered.)    emotional support    other (see comments)         Plan of Care Reviewed With: patient    Overall Patient Progress: no changeOverall Patient Progress: no change           " Infectious work-up pending  Enteric stool pathogen, C  difficile are currently pending at this time  Needs outpatient GI follow-up

## 2023-06-28 ENCOUNTER — OFFICE VISIT (OUTPATIENT)
Dept: GASTROENTEROLOGY | Facility: AMBULARY SURGERY CENTER | Age: 43
End: 2023-06-28
Payer: COMMERCIAL

## 2023-06-28 VITALS
SYSTOLIC BLOOD PRESSURE: 112 MMHG | HEIGHT: 60 IN | BODY MASS INDEX: 33.81 KG/M2 | RESPIRATION RATE: 18 BRPM | DIASTOLIC BLOOD PRESSURE: 60 MMHG | HEART RATE: 70 BPM | OXYGEN SATURATION: 100 % | WEIGHT: 172.2 LBS

## 2023-06-28 DIAGNOSIS — K21.00 GASTROESOPHAGEAL REFLUX DISEASE WITH ESOPHAGITIS WITHOUT HEMORRHAGE: ICD-10-CM

## 2023-06-28 DIAGNOSIS — R93.3 ABNORMAL CT SCAN, COLON: Primary | ICD-10-CM

## 2023-06-28 DIAGNOSIS — K21.9 GASTROESOPHAGEAL REFLUX DISEASE WITHOUT ESOPHAGITIS: ICD-10-CM

## 2023-06-28 PROCEDURE — 99214 OFFICE O/P EST MOD 30 MIN: CPT | Performed by: PHYSICIAN ASSISTANT

## 2023-06-28 RX ORDER — OMEPRAZOLE 40 MG/1
40 CAPSULE, DELAYED RELEASE ORAL DAILY
Qty: 90 CAPSULE | Refills: 1 | Status: SHIPPED | OUTPATIENT
Start: 2023-06-28

## 2023-06-28 NOTE — ASSESSMENT & PLAN NOTE
Recurrent vomiting, appears to be very gradually decreasing in frequency after patient stopped marijuana use in mid April; however she does still have persistent GERD symptomatology despite PPI use and she also has significant esophagitis at the time of her last EGD in 2018; should exclude developing Dodson's or malignancy    -Plan for EGD at the same time as colonoscopy    -Procedures were explained in detail to the patient at this time including risks and benefits    -Use omeprazole on a daily basis; I updated patient's prescription    -May continue to use antiemetics as needed    -Encourage patient to continue avoidance of marijuana use

## 2023-06-28 NOTE — PATIENT INSTRUCTIONS
Scheduled date of EGD/colonoscopy (as of today):  August 23rd, 2023   Physician performing EGD/colonoscopy: Dr Brannon Orozco   Location of EGD/colonoscopy:  [place] [id number]  Desired bowel prep reviewed with patient:  2 day prep   Instructions reviewed with patient by: Shelby Carballo  Clearances:   N/A

## 2023-06-28 NOTE — ASSESSMENT & PLAN NOTE
Patient was seen in the hospital at the end of March with what appeared to be an acute colitis with CT scan findings of generalized colonic wall thickening; her last colonoscopy in 2018 could not be completed due to poor prep so it should exclude underlying inflammatory bowel disease or malignancy    -Plan for colonoscopy at the same time as EGD; will plan for 2-day prep    -Prescription and instructions provided for 2-day bowel prep at this time    -Procedure was explained in detail to the patient at this time including associated risks and benefits

## 2023-06-28 NOTE — PROGRESS NOTES
Follow-up Note -  [facility]  Melia Landry 1980 37 y o  female         Reason: Follow-up; colitis, GERD with esophagitis    HPI: 73-year-old female with history of anxiety and depression who presents for follow-up; she has seen Dr Chai Patel for follow-up of what was believed to be cyclic vomiting syndrome about a year ago in February 2022, patient says she has had recurrent hospitalizations with recurrent vomiting and ultimately stopped smoking marijuana as of mid April this year  Her most recent hospitalizations were at [facility],  last time she had to go to the emergency room for vomiting was 5/11/2023  During hospitalization at the end of March she had been noted with a CT scan showing wall thickening throughout her colon and at that time she said she was also having blood in her stools; stool infectious studies including enteric panel and C  difficile at that time were negative  Her last colonoscopy and EGD were done in 2018; the colonoscopy was aborted due to extremely poor prep however, she says she had a difficult time tolerating the bowel prep  The EGD showed grade C esophagitis  She says she still experiences significant acid reflux symptoms, she takes omeprazole 40 mg on an as-needed basis but says she needs it fairly frequently  She says she has antiemetics at home that she will use on an as-needed basis, she says Zofran generally does not work but she also has Phenergan available  REVIEW OF SYSTEMS:      CONSTITUTIONAL: Denies any fever, chills, or rigors  Good appetite, and no recent weight loss  HEENT: No earache or tinnitus  Denies hearing loss or visual disturbances  CARDIOVASCULAR: No chest pain or palpitations  RESPIRATORY: Denies any cough, hemoptysis, shortness of breath or dyspnea on exertion  GASTROINTESTINAL: As noted in the History of Present Illness  GENITOURINARY: No problems with urination   Denies any hematuria or dysuria  NEUROLOGIC: No dizziness or vertigo, denies headaches  MUSCULOSKELETAL: Denies any muscle or joint pain  SKIN: Denies skin rashes or itching  ENDOCRINE: Denies excessive thirst  Denies intolerance to heat or cold  PSYCHOSOCIAL: Denies depression or anxiety  Denies any recent memory loss  Past Medical History:   Diagnosis Date   • Anxiety    • Arthritis     OA  b/l knees   • Asthma     Approx 2 years ago   • Calculus of gallbladder without cholecystitis without obstruction 2022   • Chronic kidney disease    • Depression    • GERD (gastroesophageal reflux disease)     In my 25s  Approx 15 years ago   • Hx of bleeding disorder     dysmennorrhea-dx lap today 2016   • Hypertension    • Kidney stone    • Obesity    • Seasonal allergies       Past Surgical History:   Procedure Laterality Date   •  SECTION     • CHOLECYSTECTOMY     • COLONOSCOPY     • DIAGNOSTIC LAPAROSCOPY     • DILATION AND CURETTAGE OF UTERUS     • HERNIA REPAIR     • NE COLONOSCOPY FLX DX W/COLLJ SPEC WHEN PFRMD N/A 2018    Procedure: EGD AND COLONOSCOPY;  Surgeon: Sheila Borrego MD;  [facility];   Service: Gastroenterology   • NE LAPAROSCOPY SURG CHOLECYSTECTOMY N/A 2022    Procedure: ROBOTIC LAPAROSCOPIC CHOLECYSTECTOMY;  Surgeon: Chuck Oakes DO;  [facility];  Service: General   • NE LAPAROSCOPY W/RMVL ADNEXAL STRUCTURES Bilateral 2016    Procedure: CYSTECTOMY  OVARIAN;  Surgeon: Martina Parekh DO;  [facility];  Service: Gynecology   • NE LAPS ABD PRTM&OMENTUM DX W/WO SPEC BR/WA SPX N/A 2016    Procedure: LAPAROSCOPY DIAGNOSTIC DAVID;  Surgeon: Martina Parekh DO;  [facility];  Service: Gynecology   • NE RPR UMBILICAL HRNA 5 YRS/> REDUCIBLE N/A 2022    Procedure: UMBILICAL HERNIA REPAIR;  Surgeon: Chuck Oakes DO;  [facility];  Service: General   • UPPER GASTROINTESTINAL ENDOSCOPY     • WISDOM TOOTH EXTRACTION Social History     Socioeconomic History   • Marital status: /Civil Union     Spouse name: Not on file   • Number of children: Not on file   • Years of education: Not on file   • Highest education level: Not on file   Occupational History   • Not on file   Tobacco Use   • Smoking status: Every Day     Packs/day: 1 00     Years: 23 00     Total pack years:      Types: Cigarettes   • Smokeless tobacco: Never   Vaping Use   • Vaping Use: Never used   Substance and Sexual Activity   • Alcohol use: Never     Comment: social   • Drug use: Not Currently     Frequency: 7 0 times per week     Types: Marijuana     Comment: I exhaused all your ‘rx prescription-- NOTHING ELSE HELPED!!    • Sexual activity: Not Currently     Partners: Male     Birth control/protection: Abstinence   Other Topics Concern   • Not on file   Social History Narrative   • Not on file     Social Determinants of Health     Financial Resource Strain: Not on file   Food Insecurity: No Food Insecurity (2022)    Hunger Vital Sign    • Worried About Running Out of Food in the Last Year: Never true    • Ran Out of Food in the Last Year: Never true   Transportation Needs: Not on file   Physical Activity: Not on file   Stress: Not on file   Social Connections: Not on file   Intimate Partner Violence: Not on file   Housing Stability: Low Risk  (2022)    Housing Stability Vital Sign    • Unable to Pay for Housing in the Last Year: No    • Number of Places Lived in the Last Year: 1    • Unstable Housing in the Last Year: No     Family History   Problem Relation Age of Onset   • No Known Problems Sister    • Autism Daughter    • Lung cancer Maternal Grandmother    • Cancer Maternal Grandmother         Deceassd 10 years   • Diabetes Maternal Grandfather    • Arthritis Maternal Grandfather          8 years   • Aneurysm Paternal Grandmother    • No Known Problems Sister      Eggs or egg-derived products - food allergy  Current Outpatient Medications   Medication Sig Dispense Refill   • albuterol (ProAir HFA) 90 mcg/act inhaler Inhale 2 puffs every 6 (six) hours as needed for wheezing 18 g 0   • betamethasone valerate (LUXIQ) 0 12 % foam Apply topically daily 100 g 1   • busPIRone (BUSPAR) 5 mg tablet Take 1 tablet (5 mg total) by mouth 2 (two) times a day 180 tablet 1   • fluticasone (FLONASE) 50 mcg/act nasal spray 1 spray into each nostril daily 15 8 mL 0   • gabapentin (NEURONTIN) 300 mg capsule Take 2 capsules (600 mg total) by mouth 2 (two) times a day 180 capsule 5   • hydrOXYzine pamoate (VISTARIL) 25 mg capsule Take 1 capsule (25 mg total) by mouth 3 (three) times a day as needed for anxiety 90 capsule 0   • lisinopril (ZESTRIL) 20 mg tablet Take 1 tablet (20 mg total) by mouth daily 90 tablet 3   • LORazepam (ATIVAN) 1 mg tablet Take 1 tablet (1 mg total) by mouth daily as needed for anxiety 30 tablet 0   • metoclopramide (REGLAN) 5 mg tablet Take 1 tablet (5 mg total) by mouth 4 (four) times a day as needed (nausea) 30 tablet 0   • omeprazole (PriLOSEC) 40 MG capsule Take 1 capsule (40 mg total) by mouth daily 90 capsule 1   • ondansetron (Zofran ODT) 4 mg disintegrating tablet Take 1 tablet (4 mg total) by mouth every 6 (six) hours as needed for nausea or vomiting 20 tablet 0   • prochlorperazine (COMPAZINE) 10 mg tablet Take 0 5 tablets (5 mg total) by mouth every 8 (eight) hours as needed for nausea or vomiting 3 tablet 0   • promethazine (PHENERGAN) 25 mg tablet Take 1 tablet (25 mg total) by mouth every 6 (six) hours as needed for nausea or vomiting 20 tablet 0   • sertraline (ZOLOFT) 100 mg tablet TAKE 2 TABLETS BY MOUTH EVERY  tablet 1   • nicotine (NICODERM CQ) 21 mg/24 hr TD 24 hr patch PLACE 1 PATCH ON THE SKIN OVER 24 HOURS EVERY 24 HOURS (Patient not taking: Reported on 6/20/2023) 28 patch 0   • norgestimate-ethinyl estradiol (Ortho Tri-Cyclen Lo) 0 18/0 215/0 25 MG-25 MCG per tablet Take 1 tablet by mouth daily 84 "tablet 3   • predniSONE 10 mg tablet Take 1 tablet (10 mg total) by mouth daily 6 tab day 1, 5 tab day 2, 4 tab day 3, 3 tab day 4, 2 tab day 5, 1 tab day 6 (Patient not taking: Reported on 5/17/2023) 21 tablet 0     No current facility-administered medications for this visit  Blood pressure 112/60, pulse 70, resp  rate 18, height 5' (1 524 m), weight 78 1 kg (172 lb 3 2 oz), SpO2 100 %  PHYSICAL EXAM:      General Appearance:   Alert, cooperative, no distress, appears stated age    HEENT:   Normocephalic, atraumatic, anicteric      Neck:  Supple, symmetrical, trachea midline, no adenopathy;    thyroid: no enlargement/tenderness/nodules; no carotid  bruit or JVD    Lungs:   Clear to auscultation bilaterally; no rales, rhonchi or wheezing; respirations unlabored    Heart[de-identified]   S1 and S2 normal; regular rate and rhythm; no murmur, rub, or gallop  Abdomen:   Soft, non-tender, non-distended; normal bowel sounds; no masses, no organomegaly    Extremities: No edema, erythema, wounds, rashes   Rectal:   Deferred                      Lab Results   Component Value Date    WBC 7 61 04/06/2023    HGB 12 8 04/06/2023    HCT 39 0 04/06/2023     (H) 04/06/2023     04/06/2023     Lab Results   Component Value Date    CALCIUM 9 6 04/06/2023    K 4 0 04/06/2023    CO2 26 04/06/2023     04/06/2023    BUN 7 04/06/2023    CREATININE 0 84 04/06/2023     Lab Results   Component Value Date    ALT 55 04/06/2023    AST 51 (H) 04/06/2023    ALKPHOS 84 04/06/2023     No results found for: \"INR\", \"PROTIME\"    CT abdomen pelvis w contrast    Result Date: 3/31/2023  Impression: Findings most suggestive of colitis, as described above  Please see discussion  Gastroenterology consultation and follow-up is recommended  Evaluation of the urinary bladder is limited as it is nearly empty, however, the urinary bladder wall appears somewhat thickened and there is urothelial enhancement    Urinary tract infection should be " excluded  Correlation with urinalysis and urine culture and sensitivity is recommended  Other nonemergent findings as described above  The study was marked in [facility] for immediate notification   Workstation performed: [id number]       ASSESSMENT & PLAN:    GERD (gastroesophageal reflux disease)  Recurrent vomiting, appears to be very gradually decreasing in frequency after patient stopped marijuana use in mid April; however she does still have persistent GERD symptomatology despite PPI use and she also has significant esophagitis at the time of her last EGD in 2018; should exclude developing Dodson's or malignancy    -Plan for EGD at the same time as colonoscopy    -Procedures were explained in detail to the patient at this time including risks and benefits    -Use omeprazole on a daily basis; I updated patient's prescription    -May continue to use antiemetics as needed    -Encourage patient to continue avoidance of marijuana use    Abnormal CT scan, colon  Patient was seen in the hospital at the end of March with what appeared to be an acute colitis with CT scan findings of generalized colonic wall thickening; her last colonoscopy in 2018 could not be completed due to poor prep so it should exclude underlying inflammatory bowel disease or malignancy    -Plan for colonoscopy at the same time as EGD; will plan for 2-day prep    -Prescription and instructions provided for 2-day bowel prep at this time    -Procedure was explained in detail to the patient at this time including associated risks and benefits

## 2023-07-11 NOTE — PLAN OF CARE

## 2023-08-02 DIAGNOSIS — F41.9 ANXIETY: ICD-10-CM

## 2023-08-03 NOTE — TELEPHONE ENCOUNTER
Pt requesting Lorazepam. Last seen 23. Patient Prescription Report    Patients      SELECT PATIENT ID NAME  [address]   [] 1 SANTOS PICKERING 1980 F 11 [facility] Shanita SANTIAGO           Search Criteria    NAME DATE OF BIRTH DATE RANGE   santos pickering 71- 2022 To 2023     REQUESTER NAME REQUESTED DATE   [facility] Aug 03 2023 08:44:54 GMT-0400 [facility]     Summary  Prescriptions  3  Prescribers  1  Pharmacies  1  View Map  Drug Classes  Benzodiazepines  0  Stimulants  0  Opioids  0  Muscle Relaxants  0  Opioid Dosage  Total MME for Active Prescriptions  0    Average MME  0.00  MME Graph   MME Calculator     • Prescriptions  • Notifications    • Prescribers  • Pharmacies  • MME Graph    [] [place] Drug Categories:     [] Others      Showentries  Search:  PATIENT ID PRESCRIPTION # FILLED WRITTEN DRUG LABEL QTY DAYS STRENGTH MME** PRESCRIBER PHARMACY PAYMENT REFILL #/AUTH STATE DETAIL   1 [id number] 2023 LORazepam 1 MG TABLET (non-liquid) 30.0 30 1 MG NA [facility]BE [facility] [employer] 0 / 0 [place]    1 [id number] 2023 LORazepam 1 MG TABLET (non-liquid) 30.0 30 1 MG NA ABE [facility] [employer] 0 / 0 [place]    1 [id number] 2023 LORazepam 05 MG TABLET (non-liquid) 30.0 30 0.5 MG NA ABE [facility]  [employer] 0 / 0 [place]

## 2023-08-06 RX ORDER — LORAZEPAM 1 MG/1
1 TABLET ORAL DAILY PRN
Qty: 30 TABLET | Refills: 0 | Status: SHIPPED | OUTPATIENT
Start: 2023-08-06

## 2023-08-20 DIAGNOSIS — Z72.0 TOBACCO ABUSE: ICD-10-CM

## 2023-08-21 RX ORDER — NICOTINE 21 MG/24HR
1 PATCH, TRANSDERMAL 24 HOURS TRANSDERMAL EVERY 24 HOURS
Qty: 28 PATCH | Refills: 0 | Status: SHIPPED | OUTPATIENT
Start: 2023-08-21

## 2023-08-22 ENCOUNTER — TELEPHONE (OUTPATIENT)
Age: 43
End: 2023-08-22

## 2023-09-13 DIAGNOSIS — F41.9 ANXIETY: ICD-10-CM

## 2023-09-13 RX ORDER — LORAZEPAM 1 MG/1
1 TABLET ORAL DAILY PRN
Qty: 30 TABLET | Refills: 0 | Status: SHIPPED | OUTPATIENT
Start: 2023-09-13 | End: 2023-09-14

## 2023-09-13 NOTE — TELEPHONE ENCOUNTER
Refills have been requested for the following medications:         LORazepam (ATIVAN) 1 mg tablet [place]]     Preferred pharmacy: [id number]- [place] - [address]     1 LUCILA COCHRAN 1980 F 11 [facility] [place]      Search Criteria  NAME DATE OF BIRTH DATE RANGE  Lucila Elizalde 05- 09- To 09-  REQUESTER NAME REQUESTED DATE  Edmundo Gay Wed Sep 13 2023 16:01:23 GMT-0400 Clari Correa Daylight Time)  Summary  Prescriptions  4  Prescribers  1  Pharmacies  1  Drug Classes  Benzodiazepines  0  Stimulants  0  Opioids  0  Muscle Relaxants  0  Opioid Dosage  Total MME for Active Prescriptions  0    Average MME  0.00         Prescriptions  Notifications    Prescribers  Pharmacies  MME Graph    Show All     [place]   Drug Categories:      Others     Show  10  entries  Search:  PATIENT ID PRESCRIPTION # FILLED WRITTEN DRUG LABEL QTY DAYS STRENGTH MME** PRESCRIBER PHARMACY PAYMENT REFILL #/AUTH STATE DETAIL  1 [id number] 08/06/2023 08/06/2023 LORazepam 1 MG TABLET (non-liquid) 30.0 30 1 MG NA [facility]BE [facility] [employer] 0 / 0 [place]   1 [id number] 06/20/2023 06/20/2023 LORazepam 1 MG TABLET (non-liquid) 30.0 30 1 MG NA ABE [facility] [employer] 0 / 0 [place]   1 [id number] 05/17/2023 05/17/2023 LORazepam 1 MG TABLET (non-liquid) 30.0 30 1 MG NA ABE [facility]  [employer] 0 / 0 [place]   1 [id number] 04/05/2023 04/05/2023 LORazepam 05 MG TABLET (non-liquid) 30.0 30 0.5 MG NA ABE [facility]

## 2023-09-14 ENCOUNTER — TELEPHONE (OUTPATIENT)
Dept: FAMILY MEDICINE CLINIC | Facility: CLINIC | Age: 43
End: 2023-09-14

## 2023-09-14 DIAGNOSIS — F41.9 ANXIETY: Primary | ICD-10-CM

## 2023-09-14 RX ORDER — LORAZEPAM 0.5 MG/1
1 TABLET ORAL DAILY PRN
Qty: 30 TABLET | Refills: 0 | Status: CANCELLED | OUTPATIENT
Start: 2023-09-14

## 2023-09-14 RX ORDER — LORAZEPAM 0.5 MG/1
1 TABLET ORAL DAILY PRN
Qty: 60 TABLET | Refills: 0 | Status: SHIPPED | OUTPATIENT
Start: 2023-09-14

## 2023-09-14 NOTE — TELEPHONE ENCOUNTER
[facility] sent fax to us stating pt Lorazepam 1mg tablets are on back order and that you could write for 0.5mg tablets.      Please advise

## 2023-09-22 ENCOUNTER — TELEPHONE (OUTPATIENT)
Dept: FAMILY MEDICINE CLINIC | Facility: CLINIC | Age: 43
End: 2023-09-22

## 2023-09-22 DIAGNOSIS — G47.00 INSOMNIA, UNSPECIFIED TYPE: Primary | ICD-10-CM

## 2023-09-22 DIAGNOSIS — F41.9 ANXIETY: ICD-10-CM

## 2023-09-22 RX ORDER — BUSPIRONE HYDROCHLORIDE 5 MG/1
5 TABLET ORAL 2 TIMES DAILY
Qty: 180 TABLET | Refills: 0 | Status: SHIPPED | OUTPATIENT
Start: 2023-09-22

## 2023-09-22 RX ORDER — QUETIAPINE FUMARATE 50 MG/1
50 TABLET, FILM COATED ORAL
Qty: 90 TABLET | Refills: 0 | Status: SHIPPED | OUTPATIENT
Start: 2023-09-22 | End: 2023-11-17 | Stop reason: SDUPTHER

## 2023-09-22 NOTE — TELEPHONE ENCOUNTER
Pt requesting refill on Quetiapine Fumarate 50mg 1 tablet at bedtime PRN. To [facility]. Medication not on current med list. previouslt prescribed by MATA Cheema. Pt out of medication. Please advise.

## 2023-10-09 ENCOUNTER — TELEPHONE (OUTPATIENT)
Dept: FAMILY MEDICINE CLINIC | Facility: CLINIC | Age: 43
End: 2023-10-09

## 2023-10-09 NOTE — TELEPHONE ENCOUNTER
Pt dropped of [facility] medical response form for Ativan. Pt aware Maicol Ly out of office this week. Form placed on Rajat's desk for review.

## 2023-10-17 NOTE — TELEPHONE ENCOUNTER
Patient called asking about her form she dropped off last week    She has a deadline by tomorrow or she cannot start work [employer]

## 2023-10-25 ENCOUNTER — APPOINTMENT (EMERGENCY)
Dept: RADIOLOGY | Facility: HOSPITAL | Age: 43
End: 2023-10-25
Payer: COMMERCIAL

## 2023-10-25 ENCOUNTER — HOSPITAL ENCOUNTER (EMERGENCY)
Facility: HOSPITAL | Age: 43
Discharge: HOME | End: 2023-10-25
Attending: EMERGENCY MEDICINE
Payer: COMMERCIAL

## 2023-10-25 VITALS
RESPIRATION RATE: 29 BRPM | SYSTOLIC BLOOD PRESSURE: 139 MMHG | TEMPERATURE: 98.4 F | HEART RATE: 76 BPM | OXYGEN SATURATION: 98 % | DIASTOLIC BLOOD PRESSURE: 88 MMHG

## 2023-10-25 DIAGNOSIS — E87.6 HYPOKALEMIA: ICD-10-CM

## 2023-10-25 DIAGNOSIS — R11.2 NAUSEA AND VOMITING: ICD-10-CM

## 2023-10-25 DIAGNOSIS — R10.9 ABDOMINAL PAIN: Primary | ICD-10-CM

## 2023-10-25 LAB
ALBUMIN SERPL BCP-MCNC: 4.6 G/DL (ref 3.5–5)
ALP SERPL-CCNC: 100 U/L (ref 34–104)
ALT SERPL W P-5'-P-CCNC: 29 U/L (ref 7–52)
ANION GAP SERPL CALCULATED.3IONS-SCNC: 12 MMOL/L
AST SERPL W P-5'-P-CCNC: 26 U/L (ref 13–39)
ATRIAL RATE: 119 BPM
BASOPHILS # BLD AUTO: 0.02 THOUSANDS/ÂΜL (ref 0–0.1)
BASOPHILS NFR BLD AUTO: 0 % (ref 0–1)
BILIRUB SERPL-MCNC: 1.25 MG/DL (ref 0.2–1)
BUN SERPL-MCNC: 20 MG/DL (ref 5–25)
CALCIUM SERPL-MCNC: 10.1 MG/DL (ref 8.4–10.2)
CARDIAC TROPONIN I PNL SERPL HS: 3 NG/L
CHLORIDE SERPL-SCNC: 90 MMOL/L (ref 96–108)
CO2 SERPL-SCNC: 29 MMOL/L (ref 21–32)
CREAT SERPL-MCNC: 0.95 MG/DL (ref 0.6–1.3)
EOSINOPHIL # BLD AUTO: 0.02 THOUSAND/ÂΜL (ref 0–0.61)
EOSINOPHIL NFR BLD AUTO: 0 % (ref 0–6)
ERYTHROCYTE [DISTWIDTH] IN BLOOD BY AUTOMATED COUNT: 13 % (ref 11.6–15.1)
GFR SERPL CREATININE-BSD FRML MDRD: 72 ML/MIN/1.73SQ M
GLUCOSE SERPL-MCNC: 112 MG/DL (ref 65–140)
HCG SERPL QL: NEGATIVE
HCT VFR BLD AUTO: 39.6 % (ref 34.8–46.1)
HGB BLD-MCNC: 13.7 G/DL (ref 11.5–15.4)
IMM GRANULOCYTES # BLD AUTO: 0.08 THOUSAND/UL (ref 0–0.2)
IMM GRANULOCYTES NFR BLD AUTO: 1 % (ref 0–2)
LIPASE SERPL-CCNC: 72 U/L (ref 11–82)
LYMPHOCYTES # BLD AUTO: 2.74 THOUSANDS/ÂΜL (ref 0.6–4.47)
LYMPHOCYTES NFR BLD AUTO: 21 % (ref 14–44)
MCH RBC QN AUTO: 32.5 PG (ref 26.8–34.3)
MCHC RBC AUTO-ENTMCNC: 34.6 G/DL (ref 31.4–37.4)
MCV RBC AUTO: 94 FL (ref 82–98)
MONOCYTES # BLD AUTO: 2.2 THOUSAND/ÂΜL (ref 0.17–1.22)
MONOCYTES NFR BLD AUTO: 17 % (ref 4–12)
NEUTROPHILS # BLD AUTO: 8 THOUSANDS/ÂΜL (ref 1.85–7.62)
NEUTS SEG NFR BLD AUTO: 61 % (ref 43–75)
NRBC BLD AUTO-RTO: 0 /100 WBCS
P AXIS: 67 DEGREES
PLATELET # BLD AUTO: 286 THOUSANDS/UL (ref 149–390)
PMV BLD AUTO: 10.1 FL (ref 8.9–12.7)
POTASSIUM SERPL-SCNC: 3 MMOL/L (ref 3.5–5.3)
PR INTERVAL: 106 MS
PROT SERPL-MCNC: 7.8 G/DL (ref 6.4–8.4)
QRS AXIS: 69 DEGREES
QRSD INTERVAL: 78 MS
QT INTERVAL: 384 MS
QTC INTERVAL: 420 MS
RBC # BLD AUTO: 4.22 MILLION/UL (ref 3.81–5.12)
SODIUM SERPL-SCNC: 131 MMOL/L (ref 135–147)
T WAVE AXIS: 62 DEGREES
VENTRICULAR RATE: 72 BPM
WBC # BLD AUTO: 13.06 THOUSAND/UL (ref 4.31–10.16)

## 2023-10-25 PROCEDURE — 80053 COMPREHEN METABOLIC PANEL: CPT

## 2023-10-25 PROCEDURE — 93010 ELECTROCARDIOGRAM REPORT: CPT | Performed by: INTERNAL MEDICINE

## 2023-10-25 PROCEDURE — 99284 EMERGENCY DEPT VISIT MOD MDM: CPT

## 2023-10-25 PROCEDURE — 36415 COLL VENOUS BLD VENIPUNCTURE: CPT

## 2023-10-25 PROCEDURE — 84484 ASSAY OF TROPONIN QUANT: CPT

## 2023-10-25 PROCEDURE — 85025 COMPLETE CBC W/AUTO DIFF WBC: CPT

## 2023-10-25 PROCEDURE — 96361 HYDRATE IV INFUSION ADD-ON: CPT

## 2023-10-25 PROCEDURE — 99285 EMERGENCY DEPT VISIT HI MDM: CPT | Performed by: EMERGENCY MEDICINE

## 2023-10-25 PROCEDURE — 96365 THER/PROPH/DIAG IV INF INIT: CPT

## 2023-10-25 PROCEDURE — 96375 TX/PRO/DX INJ NEW DRUG ADDON: CPT

## 2023-10-25 PROCEDURE — 83690 ASSAY OF LIPASE: CPT

## 2023-10-25 PROCEDURE — 93005 ELECTROCARDIOGRAM TRACING: CPT

## 2023-10-25 PROCEDURE — 84703 CHORIONIC GONADOTROPIN ASSAY: CPT

## 2023-10-25 PROCEDURE — G1004 CDSM NDSC: HCPCS

## 2023-10-25 PROCEDURE — 74177 CT ABD & PELVIS W/CONTRAST: CPT

## 2023-10-25 RX ORDER — POTASSIUM CHLORIDE 14.9 MG/ML
20 INJECTION INTRAVENOUS
Status: DISCONTINUED | OUTPATIENT
Start: 2023-10-25 | End: 2023-10-25 | Stop reason: HOSPADM

## 2023-10-25 RX ORDER — ONDANSETRON 2 MG/ML
4 INJECTION INTRAMUSCULAR; INTRAVENOUS ONCE
Status: DISCONTINUED | OUTPATIENT
Start: 2023-10-25 | End: 2023-10-25

## 2023-10-25 RX ORDER — HYDROMORPHONE HCL/PF 1 MG/ML
0.5 SYRINGE (ML) INJECTION ONCE
Status: COMPLETED | OUTPATIENT
Start: 2023-10-25 | End: 2023-10-25

## 2023-10-25 RX ORDER — DROPERIDOL 2.5 MG/ML
0.62 INJECTION, SOLUTION INTRAMUSCULAR; INTRAVENOUS ONCE
Status: COMPLETED | OUTPATIENT
Start: 2023-10-25 | End: 2023-10-25

## 2023-10-25 RX ORDER — POTASSIUM CHLORIDE 20 MEQ/1
40 TABLET, EXTENDED RELEASE ORAL ONCE
Status: COMPLETED | OUTPATIENT
Start: 2023-10-25 | End: 2023-10-25

## 2023-10-25 RX ORDER — HYDROMORPHONE HCL/PF 1 MG/ML
1 SYRINGE (ML) INJECTION ONCE
Status: DISCONTINUED | OUTPATIENT
Start: 2023-10-25 | End: 2023-10-25

## 2023-10-25 RX ADMIN — HYDROMORPHONE HYDROCHLORIDE 0.5 MG: 1 INJECTION, SOLUTION INTRAMUSCULAR; INTRAVENOUS; SUBCUTANEOUS at 07:11

## 2023-10-25 RX ADMIN — DROPERIDOL 0.62 MG: 2.5 INJECTION, SOLUTION INTRAMUSCULAR; INTRAVENOUS at 07:10

## 2023-10-25 RX ADMIN — SODIUM CHLORIDE 1000 ML: 0.9 INJECTION, SOLUTION INTRAVENOUS at 07:11

## 2023-10-25 RX ADMIN — POTASSIUM CHLORIDE 20 MEQ: 14.9 INJECTION, SOLUTION INTRAVENOUS at 08:48

## 2023-10-25 RX ADMIN — POTASSIUM CHLORIDE 40 MEQ: 1500 TABLET, EXTENDED RELEASE ORAL at 09:55

## 2023-10-25 RX ADMIN — IOHEXOL 100 ML: 350 INJECTION, SOLUTION INTRAVENOUS at 08:52

## 2023-10-25 NOTE — DISCHARGE INSTRUCTIONS
Please continue medications as prescribed. No acute abdominal pathology. Recommend following up with PCP in 3 days for re-evaluation of symptoms    Return to the ED with any new/concerning issues.

## 2023-10-25 NOTE — ED ATTENDING ATTESTATION
10/25/2023  INile DO, saw and evaluated the patient. I have discussed the patient with the resident/non-physician practitioner and agree with the resident's/non-physician practitioner's findings, Plan of Care, and MDM as documented in the resident's/non-physician practitioner's note, except where noted. All available labs and Radiology studies were reviewed. I was present for key portions of any procedure(s) performed by the resident/non-physician practitioner and I was immediately available to provide assistance. At this point I agree with the current assessment done in the Emergency Department. I have conducted an independent evaluation of this patient a history and physical is as follows:Medical Decision Making  Background: 37 y.o. female presents with chief complaint of diffuse  abdominal pain, nausea and vomiting, noted constipation. Differential:  bowel obtrusion, appendicitis, diverticulitis, mesenteric adenitis, ovarian cyst, cystitis, pyelonephritis, ureterolithiasis, constipation, colitis, gastric ulcer, mesenteric ischemia, perforated viscous, non specific abdominal pain    Plan: cbc, cmp, lipase, urinalysis, ct scan, symptom control         Amount and/or Complexity of Data Reviewed  Labs: ordered. Radiology: ordered. Risk  Prescription drug management. All labs reviewed and interpreted by myself   All radiology studies independently viewed by me and interpreted by myself     CT abdomen pelvis with contrast   Final Result      No acute findings within the abdomen or pelvis. Resident: Otto Sen, the attending radiologist, have reviewed the images and agree with the final report above.       Workstation performed: [id number]             Labs Reviewed   CBC AND DIFFERENTIAL - Abnormal       Result Value Ref Range Status    WBC 13.06 (*) 4.31 - 10.16 Thousand/uL Final    RBC 4.22  3.81 - 5.12 Million/uL Final    Hemoglobin 13.7  11.5 - 15.4 g/dL Final    Hematocrit 39.6  34.8 - 46.1 % Final    MCV 94  82 - 98 fL Final    MCH 32.5  26.8 - 34.3 pg Final    MCHC 34.6  31.4 - 37.4 g/dL Final    RDW 13.0  11.6 - 15.1 % Final    MPV 10.1  8.9 - 12.7 fL Final    Platelets 290  229 - 390 Thousands/uL Final    nRBC 0  /100 WBCs Final    Neutrophils Relative 61  43 - 75 % Final    Immat GRANS % 1  0 - 2 % Final    Lymphocytes Relative 21  14 - 44 % Final    Monocytes Relative 17 (*) 4 - 12 % Final    Eosinophils Relative 0  0 - 6 % Final    Basophils Relative 0  0 - 1 % Final    Neutrophils Absolute 8.00 (*) 1.85 - 7.62 Thousands/µL Final    Immature Grans Absolute 0.08  0.00 - 0.20 Thousand/uL Final    Lymphocytes Absolute 2.74  0.60 - 4.47 Thousands/µL Final    Monocytes Absolute 2.20 (*) 0.17 - 1.22 Thousand/µL Final    Eosinophils Absolute 0.02  0.00 - 0.61 Thousand/µL Final    Basophils Absolute 0.02  0.00 - 0.10 Thousands/µL Final   COMPREHENSIVE METABOLIC PANEL - Abnormal    Sodium 131 (*) 135 - 147 mmol/L Final    Potassium 3.0 (*) 3.5 - 5.3 mmol/L Final    Chloride 90 (*) 96 - 108 mmol/L Final    CO2 29  21 - 32 mmol/L Final    ANION GAP 12  mmol/L Final    BUN 20  5 - 25 mg/dL Final    Creatinine 0.95  0.60 - 1.30 mg/dL Final    Comment: Standardized to IDMS reference method    Glucose 112  65 - 140 mg/dL Final    Comment: If the patient is fasting, the ADA then defines impaired fasting glucose as > 100 mg/dL and diabetes as > or equal to 123 mg/dL. Calcium 10.1  8.4 - 10.2 mg/dL Final    AST 26  13 - 39 U/L Final    ALT 29  7 - 52 U/L Final    Comment: Specimen collection should occur prior to Sulfasalazine administration due to the potential for falsely depressed results.      Alkaline Phosphatase 100  34 - 104 U/L Final    Total Protein 7.8  6.4 - 8.4 g/dL Final    Albumin 4.6  3.5 - 5.0 g/dL Final    Total Bilirubin 1.25 (*) 0.20 - 1.00 mg/dL Final    Comment: Use of this assay is not recommended for patients undergoing treatment with eltrombopag due to the potential for falsely elevated results. N-acetyl-p-benzoquinone imine (metabolite of Acetaminophen) will generate erroneously low results in samples for patients that have taken an overdose of Acetaminophen. eGFR 72  ml/min/1.73sq m Final    Narrative:     [facility] (CKD):     Stage 1 with normal or high GFR (GFR > 90 mL/min/1.73 square meters)    Stage 2 Mild CKD (GFR = 60-89 mL/min/1.73 square meters)    Stage 3A Moderate CKD (GFR = 45-59 mL/min/1.73 square meters)    Stage 3B Moderate CKD (GFR = 30-44 mL/min/1.73 square meters)    Stage 4 Severe CKD (GFR = 15-29 mL/min/1.73 square meters)    Stage 5 End Stage CKD (GFR <15 mL/min/1.73 square meters)  Note: GFR calculation is accurate only with a steady state creatinine   LIPASE - Normal    Lipase 72  11 - 82 u/L Final   HS TROPONIN I 0HR - Normal    hs TnI 0hr 3  "Refer to ACS Flowchart"- see link ng/L Final    Comment:                                              Initial (time 0) result  If >=50 ng/L, Myocardial injury suggested ;  Type of myocardial injury and treatment strategy  to be determined. If 5-49 ng/L, a delta result at 2 hours and or 4 hours will be needed to further evaluate. If <4 ng/L, and chest pain has been >3 hours since onset, patient may qualify for discharge based on the HEART score in the ED. If <5 ng/L and <3hours since onset of chest pain, a delta result at 2 hours will be needed to further evaluate. HS Troponin 99th Percentile URL of a Health Population=12 ng/L with a 95% Confidence Interval of 8-18 ng/L. Second Troponin (time 2 hours)  If calculated delta >= 20 ng/L,  Myocardial injury suggested ; Type of myocardial injury and treatment strategy to be determined. If 5-49 ng/L and the calculated delta is 5-19 ng/L, consult medical service for evaluation.   Continue evaluation for ischemia on ecg and other possible etiology and repeat hs troponin at 4 hours.  If delta is <5 ng/L at 2 hours, consider discharge based on risk stratification via the HEART score (if in ED), or ALDO risk score in IP/Observation. HS Troponin 99th Percentile URL of a Health Population=12 ng/L with a 95% Confidence Interval of 8-18 ng/L.    PREGNANCY TEST (HCG QUALITATIVE) - Normal    Preg, Serum Negative  Negative Final       Clinical Impression:    Final diagnoses:   Abdominal pain   Nausea and vomiting   Hypokalemia         ED Course         Critical Care Time  Procedures

## 2023-10-25 NOTE — ED PROVIDER NOTES
History  Chief Complaint   Patient presents with    Constipation     Pt reports no bowel movement since last Wednesday. Pt reports being in a lot of pain. Pt reports not being able to keep anything down, every time she drinks water she vomits. Pt last vomiting episode was about 20 minutes before EMS . HPI    Patient is a 15-year-old female with past medical history of anxiety, asthma, CKD, GERD, hypertension, prior , cholecystectomy, presenting to the ED for evaluation of constipation for the past week, associated with progressively worsening lower abdominal pain and rectal pain. Patient feels urge to have a bowel movement, but experiences severe discomfort and scant bright red blood per rectum when straining. Patient reports that this has happened to her once before. She reports prior history of abdominal surgery. She has tried suppository and oral motility agents without success. Patient reports associated nausea and vomiting for the past 4 days, has been unable to take PO. She also endorses upper chest pain as well, non-radiating, not pleuritic. Prior to Admission Medications   Prescriptions Last Dose Informant Patient Reported? Taking?    LORazepam (Ativan) 0.5 mg tablet   No No   Sig: Take 2 tablets (1 mg total) by mouth daily as needed for anxiety   QUEtiapine (SEROquel) 50 mg tablet   No No   Sig: Take 1 tablet (50 mg total) by mouth daily at bedtime   albuterol (ProAir HFA) 90 mcg/act inhaler  Self No No   Sig: Inhale 2 puffs every 6 (six) hours as needed for wheezing   betamethasone valerate (LUXIQ) 0.12 % foam  Self No No   Sig: Apply topically daily   busPIRone (BUSPAR) 5 mg tablet   No No   Sig: TAKE 1 TABLET BY MOUTH TWICE A DAY   fluticasone (FLONASE) 50 mcg/act nasal spray  Self No No   Si spray into each nostril daily   gabapentin (NEURONTIN) 300 mg capsule   No No   Sig: Take 2 capsules (600 mg total) by mouth 2 (two) times a day   hydrOXYzine pamoate (VISTARIL) 25 mg capsule  Self No No   Sig: Take 1 capsule (25 mg total) by mouth 3 (three) times a day as needed for anxiety   lisinopril (ZESTRIL) 20 mg tablet   No No   Sig: Take 1 tablet (20 mg total) by mouth daily   metoclopramide (REGLAN) 5 mg tablet   No No   Sig: Take 1 tablet (5 mg total) by mouth 4 (four) times a day as needed (nausea)   nicotine (NICODERM CQ) 21 mg/24 hr TD 24 hr patch   No No   Sig: PLACE 1 PATCH ON THE SKIN OVER 24 HOURS EVERY 24 HOURS   norgestimate-ethinyl estradiol (Ortho Tri-Cyclen Lo) 0.18/0.215/0.25 MG-25 MCG per tablet  Self No No   Sig: Take 1 tablet by mouth daily   omeprazole (PriLOSEC) 40 MG capsule   No No   Sig: Take 1 capsule (40 mg total) by mouth daily   ondansetron (Zofran ODT) 4 mg disintegrating tablet  Self No No   Sig: Take 1 tablet (4 mg total) by mouth every 6 (six) hours as needed for nausea or vomiting   predniSONE 10 mg tablet   No No   Sig: Take 1 tablet (10 mg total) by mouth daily 6 tab day 1, 5 tab day 2, 4 tab day 3, 3 tab day 4, 2 tab day 5, 1 tab day 6   Patient not taking: Reported on 5/17/2023   prochlorperazine (COMPAZINE) 10 mg tablet  Self No No   Sig: Take 0.5 tablets (5 mg total) by mouth every 8 (eight) hours as needed for nausea or vomiting   promethazine (PHENERGAN) 25 mg tablet  Self No No   Sig: Take 1 tablet (25 mg total) by mouth every 6 (six) hours as needed for nausea or vomiting   sertraline (ZOLOFT) 100 mg tablet   No No   Sig: TAKE 2 TABLETS BY MOUTH EVERY DAY      Facility-Administered Medications: None       Past Medical History:   Diagnosis Date    Anxiety     Arthritis     OA  b/l knees    Asthma     Approx 2 years ago    Calculus of gallbladder without cholecystitis without obstruction 02/23/2022    Chronic kidney disease     Depression     GERD (gastroesophageal reflux disease)     In my 25s.  Approx 15 years ago    Hx of bleeding disorder     dysmennorrhea-dx lap today 8/12/2016    Hypertension     Kidney stone     Obesity     Seasonal allergies Past Surgical History:   Procedure Laterality Date     SECTION      CHOLECYSTECTOMY      COLONOSCOPY      DIAGNOSTIC LAPAROSCOPY      DILATION AND CURETTAGE OF UTERUS      HERNIA REPAIR      WY COLONOSCOPY FLX DX W/COLLJ SPEC WHEN PFRMD N/A 2018    Procedure: EGD AND COLONOSCOPY;  Surgeon: Edmundo Luque MD;  Location: [facility]; Service: Gastroenterology    WY LAPAROSCOPY SURG CHOLECYSTECTOMY N/A 2022    Procedure: ROBOTIC LAPAROSCOPIC CHOLECYSTECTOMY;  Surgeon: Graeme Matthews DO;  [facility];  Service: General    WY LAPAROSCOPY W/RMVL ADNEXAL STRUCTURES Bilateral 2016    Procedure: CYSTECTOMY  OVARIAN;  Surgeon: Jonathan Wylie DO;  Location: [facility];  Service: Gynecology    WY LAPS ABD PRTM&OMENTUM DX W/WO SPEC BR/WA 44 [facility] N/A 2016    Procedure: LAPAROSCOPY DIAGNOSTIC DAVID;  Surgeon: Jonathan Wylie DO;  [facility];  Service: Gynecology    WY RPR UMBILICAL HRNA 5 YRS/> REDUCIBLE N/A 2022    Procedure: UMBILICAL HERNIA REPAIR;  Surgeon: Graeme Mathtews DO;  [facility];  Service: General    UPPER GASTROINTESTINAL ENDOSCOPY      WISDOM TOOTH EXTRACTION         Family History   Problem Relation Age of Onset    No Known Problems Sister     Autism Daughter     Lung cancer Maternal Grandmother     Cancer Maternal Grandmother         Deceassd 10 years    Diabetes Maternal Grandfather     Arthritis Maternal Grandfather          10 years    Aneurysm Paternal Grandmother     No Known Problems Sister      I have reviewed and agree with the history as documented.     E-Cigarette/Vaping    E-Cigarette Use Never User      E-Cigarette/Vaping Substances    Nicotine No     THC No     CBD No     Flavoring No     Other No     Unknown No      Social History     Tobacco Use    Smoking status: Every Day     Packs/day: 1.00     Years: 23.00     Total pack years: 23.00     Types: Cigarettes    Smokeless tobacco: Never   Vaping Use    Vaping Use: Never used   Substance Use Topics    Alcohol use: Never     Comment: social    Drug use: Not Currently     Frequency: 7.0 times per week     Types: Marijuana     Comment: I exhaused all your ‘rx prescription-- NOTHING ELSE HELPED!! Review of Systems   All other systems reviewed and are negative. Physical Exam  ED Triage Vitals   Temperature Pulse Respirations Blood Pressure SpO2   10/25/23 0635 10/25/23 0635 10/25/23 0635 10/25/23 0635 10/25/23 0635   98.4 °F (36.9 °C) 76 20 144/82 100 %      Temp Source Heart Rate Source Patient Position - Orthostatic VS BP Location FiO2 (%)   10/25/23 0635 10/25/23 0635 10/25/23 0635 10/25/23 0635 --   Oral Monitor Lying Left arm       Pain Score       10/25/23 0711       5             Orthostatic Vital Signs  Vitals:    10/25/23 0635 10/25/23 0715 10/25/23 0808 10/25/23 1000   BP: 144/82 139/82 107/67 139/88   Pulse: 76 72 72 76   Patient Position - Orthostatic VS: Lying Lying         Physical Exam  Vitals and nursing note reviewed. Constitutional:       General: She is in acute distress (mild  painful). Appearance: She is well-developed. She is not ill-appearing or toxic-appearing. HENT:      Head: Normocephalic and atraumatic. Right Ear: External ear normal.      Left Ear: External ear normal.      Nose: Nose normal. No congestion. Eyes:      General: No scleral icterus. Conjunctiva/sclera: Conjunctivae normal.      Pupils: Pupils are equal, round, and reactive to light. Cardiovascular:      Rate and Rhythm: Normal rate and regular rhythm. Pulses: Normal pulses. Heart sounds: Normal heart sounds. No murmur heard. Pulmonary:      Effort: Pulmonary effort is normal. No respiratory distress. Breath sounds: Normal breath sounds. No wheezing, rhonchi or rales. Abdominal:      General: Abdomen is flat. Palpations: Abdomen is soft. There is no mass. Tenderness: There is abdominal tenderness (diffuse lower abdominal).  There is no guarding or rebound. Hernia: No hernia is present. Genitourinary:     Comments: External exam shows no active rectal bleeding; no hemorrhoids. ED RN present for entire examination. Musculoskeletal:         General: No swelling. Cervical back: Normal range of motion and neck supple. Skin:     General: Skin is warm and dry. Capillary Refill: Capillary refill takes less than 2 seconds. Neurological:      Mental Status: She is alert.    Psychiatric:         Mood and Affect: Mood normal.         ED Medications  Medications   potassium chloride 20 mEq IVPB (premix) ( Intravenous Canceled Entry 10/25/23 1001)   sodium chloride 0.9 % bolus 1,000 mL (0 mL Intravenous Stopped 10/25/23 1000)   droperidol (INAPSINE) injection 0.625 mg (0.625 mg Intravenous Given 10/25/23 0710)   HYDROmorphone (DILAUDID) injection 0.5 mg (0.5 mg Intravenous Given 10/25/23 0711)   iohexol (OMNIPAQUE) 350 MG/ML injection (MULTI-DOSE) 100 mL (100 mL Intravenous Given 10/25/23 0852)   potassium chloride (K-DUR,KLOR-CON) CR tablet 40 mEq (40 mEq Oral Given 10/25/23 0955)       Diagnostic Studies  Results Reviewed       Procedure Component Value Units Date/Time    hCG, qualitative pregnancy [[id number]]  (Normal) Collected: 10/25/23 0652    Lab Status: Final result Specimen: Blood from Arm, Right Updated: 10/25/23 0829     Preg, Serum Negative    Lipase [[id number]]  (Normal) Collected: 10/25/23 0652    Lab Status: Final result Specimen: Blood from Arm, Right Updated: 10/25/23 0740     Lipase 72 u/L     Comprehensive metabolic panel [[id number]]  (Abnormal) Collected: 10/25/23 0652    Lab Status: Final result Specimen: Blood from Arm, Right Updated: 10/25/23 0740     Sodium 131 mmol/L      Potassium 3.0 mmol/L      Chloride 90 mmol/L      CO2 29 mmol/L      ANION GAP 12 mmol/L      BUN 20 mg/dL      Creatinine 0.95 mg/dL      Glucose 112 mg/dL      Calcium 10.1 mg/dL      AST 26 U/L      ALT 29 U/L      Alkaline Phosphatase 100 U/L      Total Protein 7.8 g/dL      Albumin 4.6 g/dL      Total Bilirubin 1.25 mg/dL      eGFR 72 ml/min/1.73sq m     Narrative:      National Kidney Disease Foundation guidelines for Chronic Kidney Disease (CKD):     Stage 1 with normal or high GFR (GFR > 90 mL/min/1.73 square meters)    Stage 2 Mild CKD (GFR = 60-89 mL/min/1.73 square meters)    Stage 3A Moderate CKD (GFR = 45-59 mL/min/1.73 square meters)    Stage 3B Moderate CKD (GFR = 30-44 mL/min/1.73 square meters)    Stage 4 Severe CKD (GFR = 15-29 mL/min/1.73 square meters)    Stage 5 End Stage CKD (GFR <15 mL/min/1.73 square meters)  Note: GFR calculation is accurate only with a steady state creatinine    HS Troponin 0hr (reflex protocol) [[id number]]  (Normal) Collected: 10/25/23 0652    Lab Status: Final result Specimen: Blood from Arm, Right Updated: 10/25/23 0727     hs TnI 0hr 3 ng/L     CBC and differential [[id number]]  (Abnormal) Collected: 10/25/23 0652    Lab Status: Final result Specimen: Blood from Arm, Right Updated: 10/25/23 0717     WBC 13.06 Thousand/uL      RBC 4.22 Million/uL      Hemoglobin 13.7 g/dL      Hematocrit 39.6 %      MCV 94 fL      MCH 32.5 pg      MCHC 34.6 g/dL      RDW 13.0 %      MPV 10.1 fL      Platelets 609 Thousands/uL      nRBC 0 /100 WBCs      Neutrophils Relative 61 %      Immat GRANS % 1 %      Lymphocytes Relative 21 %      Monocytes Relative 17 %      Eosinophils Relative 0 %      Basophils Relative 0 %      Neutrophils Absolute 8.00 Thousands/µL      Immature Grans Absolute 0.08 Thousand/uL      Lymphocytes Absolute 2.74 Thousands/µL      Monocytes Absolute 2.20 Thousand/µL      Eosinophils Absolute 0.02 Thousand/µL      Basophils Absolute 0.02 Thousands/µL                    CT abdomen pelvis with contrast   Final Result by Evelin Vazquez MD (10/25 0108)      No acute findings within the abdomen or pelvis.             Resident: Skip Jain, the attending radiologist, have reviewed the images and agree with the final report above. Workstation performed: [id number]               Procedures  Procedures      ED Course  ED Course as of 10/25/23 1049   Wed Oct 25, 2023   0712 EKG: NSR at 72 bpm, normal axis, normal intervals, no acute ST-T changes as interpreted by me   0802 Potassium(!): 3.0  Will replete IV     1048 hs TnI 0hr: 3  Do not need delta troponin as patient has had nonspecific upper chest wall pain for the past few days. This is a 78-year-old female with past medical history of anxiety, depression, cyclic vomiting syndrome secondary to marijuana use, presenting to the ED for evaluation of 1 weeks worth of constipation, with associated progressive lower abdominal discomfort, and rectal pain. She also reports 4-day history of nausea, vomiting, and no p.o. intake. She reports prior history of  and cholecystectomy. On exam, patient is diffusely tender in the lower abdomen. External rectal exam chaperoned by ED RN shows no active rectal bleeding, no hemorrhoids. Ddx includes cyclic vomiting syndrome, colitis, bowel obstruction, intraabdominal abscess, diverticulitis. Will also obtain EKG and troponin to r/o ACS given presence of chest pain. Treated with IVF, Zofran, and Dilaudid. Per chart review, patient was seen in 2023 by GI. Her last colonoscopy and EGD were done in 2018; colonoscopy was aborted due to extremely poor prep however, she says she had a difficult time tolerating the bowel prep. The EGD showed grade C esophagitis. Patient taking 40 Omeprazole PRN. GI plans to do another colonoscopy and EGD. Diagnostics reviewed. Patient noted to be hypokalemic at 3.0. Will replete with 40 mEq IV while patient is n.p.o. pending CT scan. Patient with mild hyponatremia, but appears somewhat chronic. Other diagnostics unremarkable and patient's vitals are stable. On reexamination, patient appears more comfortable, her abdominal pain has improved.   She is not vomited since she has been in the ED. CT findings reviewed with patient. There is no evidence of constipation or acute abdominal pathology. Her abdomen is soft and nontender. Feels much improved. At this time, will provide patient with 40 mEq of p.o. potassium. Reviewed discharge plan with the patient. She feels comfortable discharging home. I reviewed all testing with the patient:  I gave oral return precautions for what to return for in addition to the written return precautions. The patient verbalized understanding of the discharge instructions and warnings that would necessitate return to the Emergency Department. I specifically highlighted areas of special concern regarding the written and verbal discharge instructions and return precautions. All questions were answered prior to discharge. SBIRT 22yo+      Flowsheet Row Most Recent Value   Initial Alcohol Screen: US AUDIT-C     1. How often do you have a drink containing alcohol? 0 Filed at: 10/25/2023 0637   2. How many drinks containing alcohol do you have on a typical day you are drinking? 0 Filed at: 10/25/2023 0637   3a. Male UNDER 65: How often do you have five or more drinks on one occasion? 0 Filed at: 10/25/2023 0637   3b. FEMALE Any Age, or MALE 65+: How often do you have 4 or more drinks on one occassion? 0 Filed at: 10/25/2023 3556   Audit-C Score 0 Filed at: 10/25/2023 0711   ZULMA: How many times in the past year have you. .. Used an illegal drug or used a prescription medication for non-medical reasons? Never Filed at: 10/25/2023 6310                  Medical Decision Making  Amount and/or Complexity of Data Reviewed  Labs: ordered. Decision-making details documented in ED Course. Radiology: ordered. Risk  Prescription drug management.           Disposition  Final diagnoses:   Abdominal pain   Nausea and vomiting   Hypokalemia     Time reflects when diagnosis was documented in both MDM as applicable and the Disposition within this note       Time User Action Codes Description Comment    10/25/2023  9:42 AM Easton Fuller Add [R10.9] Abdominal pain     10/25/2023  9:42 AM Easton Fuller Add [R11.2] Nausea and vomiting     10/25/2023  9:55 AM Easton Fuller Add [E87.6] Hypokalemia           ED Disposition       ED Disposition   Discharge    Condition   Stable    Date/Time   Wed Oct 25, 2023 6183 [address] discharge to home/self care.                    Follow-up Information       Follow up With Specialties Details Why Contact Info    Xochilt Herzog, [address] [place] [address] Nurse Practitioner Schedule an appointment as soon as possible for a visit in 3 days  [facility]  [phone]              Discharge Medication List as of 10/25/2023  9:55 AM        CONTINUE these medications which have NOT CHANGED    Details   albuterol (ProAir HFA) 90 mcg/act inhaler Inhale 2 puffs every 6 (six) hours as needed for wheezing, Starting Mon 12/6/2021, Normal      betamethasone valerate (LUXIQ) 0.12 % foam Apply topically daily, Starting Wed 5/11/2022, Normal      busPIRone (BUSPAR) 5 mg tablet TAKE 1 TABLET BY MOUTH TWICE A DAY, Starting Fri 9/22/2023, Normal      fluticasone (FLONASE) 50 mcg/act nasal spray 1 spray into each nostril daily, Starting Thu 5/5/2022, Normal      gabapentin (NEURONTIN) 300 mg capsule Take 2 capsules (600 mg total) by mouth 2 (two) times a day, Starting Tue 12/27/2022, Normal      hydrOXYzine pamoate (VISTARIL) 25 mg capsule Take 1 capsule (25 mg total) by mouth 3 (three) times a day as needed for anxiety, Starting Mon 2/7/2022, Normal      lisinopril (ZESTRIL) 20 mg tablet Take 1 tablet (20 mg total) by mouth daily, Starting Sat 4/29/2023, Normal      LORazepam (Ativan) 0.5 mg tablet Take 2 tablets (1 mg total) by mouth daily as needed for anxiety, Starting Thu 9/14/2023, Normal      metoclopramide (REGLAN) 5 mg tablet Take 1 tablet (5 mg total) by mouth 4 (four) times a day as needed (nausea), Starting Wed 5/17/2023, Normal      nicotine (NICODERM CQ) 21 mg/24 hr TD 24 hr patch PLACE 1 PATCH ON THE SKIN OVER 24 HOURS EVERY 24 HOURS, Starting Mon 8/21/2023, Normal      norgestimate-ethinyl estradiol (Ortho Tri-Cyclen Lo) 0.18/0.215/0.25 MG-25 MCG per tablet Take 1 tablet by mouth daily, Starting Mon 7/11/2022, Until Tue 12/27/2022, Normal      omeprazole (PriLOSEC) 40 MG capsule Take 1 capsule (40 mg total) by mouth daily, Starting Wed 6/28/2023, Normal      ondansetron (Zofran ODT) 4 mg disintegrating tablet Take 1 tablet (4 mg total) by mouth every 6 (six) hours as needed for nausea or vomiting, Starting Mon 1/24/2022, Normal      predniSONE 10 mg tablet Take 1 tablet (10 mg total) by mouth daily 6 tab day 1, 5 tab day 2, 4 tab day 3, 3 tab day 4, 2 tab day 5, 1 tab day 6, Starting Sun 12/18/2022, Normal      prochlorperazine (COMPAZINE) 10 mg tablet Take 0.5 tablets (5 mg total) by mouth every 8 (eight) hours as needed for nausea or vomiting, Starting Wed 6/8/2022, Normal      promethazine (PHENERGAN) 25 mg tablet Take 1 tablet (25 mg total) by mouth every 6 (six) hours as needed for nausea or vomiting, Starting Fri 3/25/2022, Normal      QUEtiapine (SEROquel) 50 mg tablet Take 1 tablet (50 mg total) by mouth daily at bedtime, Starting Fri 9/22/2023, Normal      sertraline (ZOLOFT) 100 mg tablet TAKE 2 TABLETS BY MOUTH EVERY DAY, Normal           No discharge procedures on file. PDMP Review         Value Time User    PDMP Reviewed  Yes 4/5/2023 10:18 AM Leslie Kim, [address] [place] [address]             ED Provider  Attending physically available and evaluated Dee Willson. I managed the patient along with the ED Attending.     Electronically Signed by           Anayeli Cline, DO  10/25/23 [address] [facility] [address], DO  10/25/23 8576

## 2023-11-17 ENCOUNTER — OFFICE VISIT (OUTPATIENT)
Dept: FAMILY MEDICINE CLINIC | Facility: CLINIC | Age: 43
End: 2023-11-17
Payer: COMMERCIAL

## 2023-11-17 VITALS
HEIGHT: 60 IN | TEMPERATURE: 98 F | WEIGHT: 168.4 LBS | BODY MASS INDEX: 33.06 KG/M2 | RESPIRATION RATE: 16 BRPM | SYSTOLIC BLOOD PRESSURE: 108 MMHG | DIASTOLIC BLOOD PRESSURE: 72 MMHG | OXYGEN SATURATION: 97 % | HEART RATE: 84 BPM

## 2023-11-17 DIAGNOSIS — Z72.0 TOBACCO ABUSE: ICD-10-CM

## 2023-11-17 DIAGNOSIS — F33.0 MILD EPISODE OF RECURRENT MAJOR DEPRESSIVE DISORDER (HCC): ICD-10-CM

## 2023-11-17 DIAGNOSIS — G47.00 INSOMNIA, UNSPECIFIED TYPE: ICD-10-CM

## 2023-11-17 DIAGNOSIS — K21.9 GASTROESOPHAGEAL REFLUX DISEASE WITHOUT ESOPHAGITIS: ICD-10-CM

## 2023-11-17 DIAGNOSIS — F41.9 ANXIETY: ICD-10-CM

## 2023-11-17 DIAGNOSIS — G63 NEUROPATHY ASSOCIATED WITH POLYNEUROPATHY, ORGANOMEGALY, ENDOCRINOPATHY, MONOCLONAL GAMMOPATHY, AND SKIN CHANGES SYNDROME (HCC): ICD-10-CM

## 2023-11-17 DIAGNOSIS — E88.09 NEUROPATHY ASSOCIATED WITH POLYNEUROPATHY, ORGANOMEGALY, ENDOCRINOPATHY, MONOCLONAL GAMMOPATHY, AND SKIN CHANGES SYNDROME (HCC): ICD-10-CM

## 2023-11-17 DIAGNOSIS — I10 ESSENTIAL HYPERTENSION: ICD-10-CM

## 2023-11-17 DIAGNOSIS — M54.41 ACUTE RIGHT-SIDED LOW BACK PAIN WITH RIGHT-SIDED SCIATICA: Primary | ICD-10-CM

## 2023-11-17 PROCEDURE — 99214 OFFICE O/P EST MOD 30 MIN: CPT | Performed by: NURSE PRACTITIONER

## 2023-11-17 RX ORDER — LORAZEPAM 0.5 MG/1
1 TABLET ORAL DAILY PRN
Qty: 60 TABLET | Refills: 0 | Status: SHIPPED | OUTPATIENT
Start: 2023-11-17

## 2023-11-17 RX ORDER — PREDNISONE 50 MG/1
50 TABLET ORAL DAILY
Qty: 5 TABLET | Refills: 0 | Status: SHIPPED | OUTPATIENT
Start: 2023-11-17 | End: 2023-11-22

## 2023-11-17 RX ORDER — PROCHLORPERAZINE 25 MG/1
SUPPOSITORY RECTAL
COMMUNITY
Start: 2023-10-23

## 2023-11-17 RX ORDER — NICOTINE 21 MG/24HR
1 PATCH, TRANSDERMAL 24 HOURS TRANSDERMAL EVERY 24 HOURS
Qty: 28 PATCH | Refills: 3 | Status: SHIPPED | OUTPATIENT
Start: 2023-11-17

## 2023-11-17 RX ORDER — GABAPENTIN 600 MG/1
600 TABLET ORAL 3 TIMES DAILY
Qty: 270 TABLET | Refills: 0 | Status: SHIPPED | OUTPATIENT
Start: 2023-11-17 | End: 2024-02-15

## 2023-11-17 RX ORDER — GABAPENTIN 300 MG/1
600 CAPSULE ORAL 2 TIMES DAILY
Qty: 180 CAPSULE | Refills: 5 | Status: CANCELLED | OUTPATIENT
Start: 2023-11-17

## 2023-11-17 RX ORDER — QUETIAPINE FUMARATE 50 MG/1
50 TABLET, FILM COATED ORAL
Qty: 90 TABLET | Refills: 1 | Status: SHIPPED | OUTPATIENT
Start: 2023-11-17

## 2023-11-17 NOTE — PROGRESS NOTES
Name: Lauren Caicedo      : 1980      MRN: [id number]  Encounter Provider: MONICA Grullon  Encounter Date: 2023   Encounter department: [facility]     1. Acute right-sided low back pain with right-sided sciatica  Assessment & Plan:  - Prescription sent for prednisone 50 mg daily x 5 days. Advised of side effects.  - Recommend home exercises. - Consider PT. Orders:  -     predniSONE 50 mg tablet; Take 1 tablet (50 mg total) by mouth daily for 5 days    2. Essential hypertension  Assessment & Plan:  - Well controlled on lisinopril 20 mg daily. Continue same.   - Will continue to monitor. 3. Neuropathy associated with polyneuropathy, organomegaly, endocrinopathy, monoclonal gammopathy, and skin changes syndrome (HCC)  Assessment & Plan:  - Not well controlled. - Will increase gabapentin to 600 mg three times daily. Advised of side effects.  - Will continue to monitor. Orders:  -     gabapentin (Neurontin) 600 MG tablet; Take 1 tablet (600 mg total) by mouth 3 (three) times a day    4. Insomnia, unspecified type  Assessment & Plan:  - Well controlled on Seroquel 50 mg at bedtime. Continue same.  - Will continue to monitor. Orders:  -     QUEtiapine (SEROquel) 50 mg tablet; Take 1 tablet (50 mg total) by mouth daily at bedtime    5. Gastroesophageal reflux disease without esophagitis  Assessment & Plan:  - Stable. - Continue omeprazole 40 mg daily. - Avoid triggering food and beverage.  - Will continue to monitor. 6. Anxiety  Assessment & Plan:  - Well controlled on Zoloft 200 mg daily and Buspar 5 mg BID. Continue same.  - Continue Ativan as needed. - Will continue to monitor. Orders:  -     LORazepam (Ativan) 0.5 mg tablet; Take 2 tablets (1 mg total) by mouth daily as needed for anxiety    7. Mild episode of recurrent major depressive disorder [facility])  Assessment & Plan:  - Well controlled on Zoloft and Seroquel. Continue same.   - Will continue to monitor. 8. Tobacco abuse  -     nicotine (NICODERM CQ) 21 mg/24 hr TD 24 hr patch; Place 1 patch on the skin over 24 hours every 24 hours           Subjective     Patient with PMH of hypertension, anxiety, and cyclic vomiting syndrome presents today with complaints of pain. States she started a new job and is on her feet for 12 hours. States that when she got home from work she has pain in her feet, knees, ankles, back, and shoulders. She does have mild degenerative changes in bilateral knees. Also has neuropathy and chronic lower back pain with sciatica. Had to call off of work yesterday due to her pain. She denies any other concerns or complaints today. Review of Systems   Constitutional:  Negative for fatigue and fever. HENT:  Negative for trouble swallowing. Eyes:  Negative for visual disturbance. Respiratory:  Negative for cough and shortness of breath. Cardiovascular:  Negative for chest pain and palpitations. Gastrointestinal:  Negative for abdominal pain and blood in stool. Endocrine: Negative for cold intolerance and heat intolerance. Genitourinary:  Negative for difficulty urinating and dysuria. Musculoskeletal:  Positive for arthralgias and back pain. Negative for gait problem. Skin:  Negative for rash. Neurological:  Negative for dizziness, syncope and headaches. Hematological:  Negative for adenopathy. Psychiatric/Behavioral:  Negative for behavioral problems. Past Medical History:   Diagnosis Date   • Anxiety    • Arthritis     OA  b/l knees   • Asthma     Approx 2 years ago   • Calculus of gallbladder without cholecystitis without obstruction 02/23/2022   • Chronic kidney disease    • Depression    • GERD (gastroesophageal reflux disease)     In my 25s.  Approx 15 years ago   • Hx of bleeding disorder     dysmennorrhea-dx lap today 8/12/2016   • Hypertension    • Kidney stone    • Obesity    • Seasonal allergies Past Surgical History:   Procedure Laterality Date   •  SECTION     • CHOLECYSTECTOMY     • COLONOSCOPY     • DIAGNOSTIC LAPAROSCOPY     • DILATION AND CURETTAGE OF UTERUS     • HERNIA REPAIR     • DC COLONOSCOPY FLX DX W/COLLJ SPEC WHEN PFRMD N/A 2018    Procedure: EGD AND COLONOSCOPY;  Surgeon: Kelly Godoy MD;  [facility]: [facility];   Service: Gastroenterology   • DC LAPAROSCOPY SURG CHOLECYSTECTOMY N/A 2022    Procedure: ROBOTIC LAPAROSCOPIC CHOLECYSTECTOMY;  Surgeon: Danielle Troncoso DO;  Location[facility];  Service: General   • DC LAPAROSCOPY W/RMVL ADNEXAL STRUCTURES Bilateral 2016    Procedure: CYSTECTOMY  OVARIAN;  Surgeon: Ibrahima Evans DO;  [facility];  Service: Gynecology   • DC LAPS ABD PRTM&OMENTUM DX W/WO SPEC BR/WA SPX N/A 2016    Procedure: LAPAROSCOPY DIAGNOSTIC DAVID;  Surgeon: Ibrahima Evans DO;  [facility];  Service: Gynecology   • DC RPR UMBILICAL HRNA 5 YRS/> REDUCIBLE N/A 2022    Procedure: UMBILICAL HERNIA REPAIR;  Surgeon: Danielle Troncoso DO;  [facility];  Service: General   • UPPER GASTROINTESTINAL ENDOSCOPY     • WISDOM TOOTH EXTRACTION       Family History   Problem Relation Age of Onset   • No Known Problems Sister    • Autism Daughter    • Lung cancer Maternal Grandmother    • Cancer Maternal Grandmother         Deceassd 8 years   • Diabetes Maternal Grandfather    • Arthritis Maternal Grandfather          8 years   • Aneurysm Paternal Grandmother    • No Known Problems Sister      Social History     Socioeconomic History   • Marital status: /Civil Union     Spouse name: None   • Number of children: None   • Years of education: None   • Highest education level: None   Occupational History   • None   Tobacco Use   • Smoking status: Every Day     Packs/day: 1.00     Years: 23.00     Total pack years: 23.00     Types: Cigarettes   • Smokeless tobacco: Never   Vaping Use   • Vaping Use: Never used   Substance and Sexual Activity   • Alcohol use: Never     Comment: social   • Drug use: Not Currently     Frequency: 7.0 times per week     Types: Marijuana     Comment: I exhaused all your ‘rx prescription-- NOTHING ELSE HELPED!!    • Sexual activity: Not Currently     Partners: Male     Birth control/protection: Abstinence   Other Topics Concern   • None   Social History Narrative   • None     Social Determinants of Health     Financial Resource Strain: Not on file   Food Insecurity: No Food Insecurity (6/8/2022)    Hunger Vital Sign    • Worried About Running Out of Food in the Last Year: Never true    • Ran Out of Food in the Last Year: Never true   Transportation Needs: Not on file   Physical Activity: Not on file   Stress: Not on file   Social Connections: Not on file   Intimate Partner Violence: Not on file   Housing Stability: [address]  (6/8/2022)    Housing Stability Vital Sign    • Unable to Pay for Housing in the Last Year: No    • Number of Places Lived in the Last Year: 1    • Unstable Housing in the Last Year: No     Current Outpatient Medications on File Prior to Visit   Medication Sig   • albuterol (ProAir HFA) 90 mcg/act inhaler Inhale 2 puffs every 6 (six) hours as needed for wheezing   • betamethasone valerate (LUXIQ) 0.12 % foam Apply topically daily   • busPIRone (BUSPAR) 5 mg tablet TAKE 1 TABLET BY MOUTH TWICE A DAY   • Compro 25 MG suppository INSERT 1 SUPPOSITORY RECTALLY (25 MG TOTAL) EVERY 12 HOURS AS NEEDED FOR NAUSEA AND VOMITING   • fluticasone (FLONASE) 50 mcg/act nasal spray 1 spray into each nostril daily   • hydrOXYzine pamoate (VISTARIL) 25 mg capsule Take 1 capsule (25 mg total) by mouth 3 (three) times a day as needed for anxiety   • lisinopril (ZESTRIL) 20 mg tablet Take 1 tablet (20 mg total) by mouth daily   • metoclopramide (REGLAN) 5 mg tablet Take 1 tablet (5 mg total) by mouth 4 (four) times a day as needed (nausea)   • omeprazole (PriLOSEC) 40 MG capsule Take 1 capsule (40 mg total) by mouth daily   • ondansetron (Zofran ODT) 4 mg disintegrating tablet Take 1 tablet (4 mg total) by mouth every 6 (six) hours as needed for nausea or vomiting   • prochlorperazine (COMPAZINE) 10 mg tablet Take 0.5 tablets (5 mg total) by mouth every 8 (eight) hours as needed for nausea or vomiting   • promethazine (PHENERGAN) 25 mg tablet Take 1 tablet (25 mg total) by mouth every 6 (six) hours as needed for nausea or vomiting   • sertraline (ZOLOFT) 100 mg tablet TAKE 2 TABLETS BY MOUTH EVERY DAY   • [DISCONTINUED] gabapentin (NEURONTIN) 300 mg capsule Take 2 capsules (600 mg total) by mouth 2 (two) times a day   • [DISCONTINUED] LORazepam (Ativan) 0.5 mg tablet Take 2 tablets (1 mg total) by mouth daily as needed for anxiety   • [DISCONTINUED] nicotine (NICODERM CQ) 21 mg/24 hr TD 24 hr patch PLACE 1 PATCH ON THE SKIN OVER 24 HOURS EVERY 24 HOURS   • [DISCONTINUED] QUEtiapine (SEROquel) 50 mg tablet Take 1 tablet (50 mg total) by mouth daily at bedtime   • norgestimate-ethinyl estradiol (Ortho Tri-Cyclen Lo) 0.18/0.215/0.25 MG-25 MCG per tablet Take 1 tablet by mouth daily   • predniSONE 10 mg tablet Take 1 tablet (10 mg total) by mouth daily 6 tab day 1, 5 tab day 2, 4 tab day 3, 3 tab day 4, 2 tab day 5, 1 tab day 6 (Patient not taking: Reported on 5/17/2023)     Allergies   Allergen Reactions   • Eggs Or Egg-Derived Products - Food Allergy Other (See Comments)     abd pain     Immunization History   Administered Date(s) Administered   • Pneumococcal Polysaccharide PPV23 11/29/2019       Objective     /72 (BP Location: Left arm, Patient Position: Sitting, Cuff Size: Large)   Pulse 84   Temp 98 °F (36.7 °C) (Tympanic)   Resp 16   Ht 5' (1.524 m)   Wt 76.4 kg (168 lb 6.4 oz)   LMP  (LMP Unknown)   SpO2 97%   BMI 32.89 kg/m²     Physical Exam  Vitals and nursing note reviewed. Constitutional:       General: She is not in acute distress. Appearance: Normal appearance.  She is not ill-appearing. HENT:      Head: Normocephalic and atraumatic. Right Ear: External ear normal.      Left Ear: External ear normal.   Eyes:      Conjunctiva/sclera: Conjunctivae normal.   Cardiovascular:      Rate and Rhythm: Normal rate and regular rhythm. Heart sounds: Normal heart sounds. Pulmonary:      Effort: Pulmonary effort is normal.      Breath sounds: Normal breath sounds. Abdominal:      Palpations: Abdomen is soft. Musculoskeletal:         General: Normal range of motion. Cervical back: Normal range of motion. Skin:     General: Skin is warm and dry. Neurological:      Mental Status: She is alert and oriented to person, place, and time.    Psychiatric:         Mood and Affect: Mood normal.         Behavior: Behavior normal.       MONICA Muñoz

## 2023-11-17 NOTE — ASSESSMENT & PLAN NOTE
- Not well controlled. - Will increase gabapentin to 600 mg three times daily. Advised of side effects.  - Will continue to monitor.

## 2023-11-17 NOTE — ASSESSMENT & PLAN NOTE
- Well controlled on Zoloft 200 mg daily and Buspar 5 mg BID. Continue same.  - Continue Ativan as needed. - Will continue to monitor.

## 2023-11-17 NOTE — ASSESSMENT & PLAN NOTE
- Prescription sent for prednisone 50 mg daily x 5 days. Advised of side effects.  - Recommend home exercises.   - Consider PT.

## 2023-11-17 NOTE — ASSESSMENT & PLAN NOTE
- Stable. - Continue omeprazole 40 mg daily. - Avoid triggering food and beverage.  - Will continue to monitor.

## 2023-11-17 NOTE — LETTER
November 17, 2023     Patient: Donnell Monteiro  YOB: 1980  Date of Visit: 11/17/2023      To Whom it May Concern:    Tiffanie Rodriguez is under my professional care. Melia was seen in my office on 11/17/2023. Melia may return to work on 11/20/2023 . If you have any questions or concerns, please don't hesitate to call.          Sincerely,          MONICA Harvey        CC: No Recipients

## 2023-12-06 DIAGNOSIS — F41.9 ANXIETY: ICD-10-CM

## 2023-12-06 RX ORDER — SERTRALINE HYDROCHLORIDE 100 MG/1
TABLET, FILM COATED ORAL
Qty: 180 TABLET | Refills: 1 | Status: SHIPPED | OUTPATIENT
Start: 2023-12-06

## 2023-12-15 DIAGNOSIS — F41.9 ANXIETY: ICD-10-CM

## 2023-12-15 DIAGNOSIS — G47.00 INSOMNIA, UNSPECIFIED TYPE: ICD-10-CM

## 2023-12-15 NOTE — TELEPHONE ENCOUNTER
Pt last seen 23.      Patients      SELECT PATIENT ID NAME  GENDER [facility]   [] 1 SANTOS COCHRAN 1980 F 11 FRANCI [id number] [place]           Search Criteria    NAME DATE OF BIRTH DATE RANGE   santos cochran 1980 12- To 10-     REQUESTER NAME REQUESTED DATE   KARLOS BURNETTA Fri Dec 15 2023 12:00:24 GMT-0500 (Eastern Standard Time)     Summary  Prescriptions  6  Prescribers  1  Pharmacies  1  View Map  Drug Classes  Benzodiazepines  1  Stimulants  0  Opioids  0  Muscle Relaxants  0  Opioid Dosage  Total MME for Active Prescriptions  0    Average MME  0.00  MME Graph   MME Calculator     Prescriptions  Notifications    Prescribers  Pharmacies  MME Graph    [] [place] Drug Categories:     [] Benzodiazepines      [] Others      Showentries  Search:  PATIENT ID PRESCRIPTION # FILLED WRITTEN DRUG LABEL QTY DAYS STRENGTH MME** PRESCRIBER PHARMACY PAYMENT REFILL #/AUTH STATE DETAIL   1 [id number] 2023 LORazepam 05 MG TABLET (non-liquid) 60.0 30 0.5 MG NA ABE [facility] [place] 0 / 0 [place]    1 [id number] 2023 LORazepam (Tablet) 60.0 30 0.5 MG NA ABE [facility] [place] 0 / 0 [place]    1 [id number] 2023 LORazepam 1 MG TABLET (non-liquid) 30.0 30 1 MG NA ABE [facility] [place] / 0 [place]    1 [id number] 2023 LORazepam 1 MG TABLET (non-liquid) 30.0 30 1 MG [facility] [place] / 0 [place]    1 [id number] 2023 LORazepam 1 MG TABLET (non-liquid) 30.0 30 1 MG [facility] [place] / 0 [place]    1 [id number] 2023 LORazepam 05 MG TABLET (non-liquid) 30.0 30 0.5 MG NA [facility]BE [facility] Commercial [place] 0  0 [place]

## 2023-12-19 RX ORDER — HYDROXYZINE PAMOATE 25 MG/1
25 CAPSULE ORAL 3 TIMES DAILY PRN
Qty: 90 CAPSULE | Refills: 0 | Status: SHIPPED | OUTPATIENT
Start: 2023-12-19

## 2023-12-20 RX ORDER — LORAZEPAM 0.5 MG/1
1 TABLET ORAL DAILY PRN
Qty: 60 TABLET | Refills: 0 | Status: SHIPPED | OUTPATIENT
Start: 2023-12-20

## 2023-12-20 RX ORDER — QUETIAPINE FUMARATE 50 MG/1
50 TABLET, FILM COATED ORAL
Qty: 90 TABLET | Refills: 0 | OUTPATIENT
Start: 2023-12-20

## 2024-01-10 DIAGNOSIS — F41.9 ANXIETY: ICD-10-CM

## 2024-01-10 RX ORDER — HYDROXYZINE PAMOATE 25 MG/1
CAPSULE ORAL
Qty: 270 CAPSULE | Refills: 1 | Status: SHIPPED | OUTPATIENT
Start: 2024-01-10

## 2024-01-29 ENCOUNTER — APPOINTMENT (EMERGENCY)
Dept: RADIOLOGY | Facility: HOSPITAL | Age: 44
End: 2024-01-29
Payer: COMMERCIAL

## 2024-01-29 ENCOUNTER — HOSPITAL ENCOUNTER (OUTPATIENT)
Facility: HOSPITAL | Age: 44
Setting detail: OBSERVATION
Discharge: LEFT BEFORE DISCHARGE | End: 2024-01-30
Attending: EMERGENCY MEDICINE | Admitting: INTERNAL MEDICINE
Payer: COMMERCIAL

## 2024-01-29 ENCOUNTER — HOSPITAL ENCOUNTER (EMERGENCY)
Facility: HOSPITAL | Age: 44
Discharge: HOME | End: 2024-01-29
Attending: EMERGENCY MEDICINE
Payer: COMMERCIAL

## 2024-01-29 VITALS
OXYGEN SATURATION: 96 % | TEMPERATURE: 99.3 F | DIASTOLIC BLOOD PRESSURE: 61 MMHG | SYSTOLIC BLOOD PRESSURE: 109 MMHG | RESPIRATION RATE: 24 BRPM | HEART RATE: 80 BPM

## 2024-01-29 DIAGNOSIS — J18.9 COMMUNITY ACQUIRED PNEUMONIA OF LEFT LOWER LOBE OF LUNG: Primary | ICD-10-CM

## 2024-01-29 DIAGNOSIS — R11.2 NAUSEA AND VOMITING, UNSPECIFIED VOMITING TYPE: Primary | ICD-10-CM

## 2024-01-29 DIAGNOSIS — R45.851 SUICIDE IDEATION: ICD-10-CM

## 2024-01-29 LAB
ALBUMIN SERPL BCP-MCNC: 3.8 G/DL (ref 3.5–5)
ALP SERPL-CCNC: 76 U/L (ref 34–104)
ALT SERPL W P-5'-P-CCNC: 20 U/L (ref 7–52)
ANION GAP SERPL CALCULATED.3IONS-SCNC: 9 MMOL/L
AST SERPL W P-5'-P-CCNC: 27 U/L (ref 13–39)
ATRIAL RATE: 75 BPM
BASOPHILS # BLD AUTO: 0.01 THOUSANDS/ÂΜL (ref 0–0.1)
BASOPHILS NFR BLD AUTO: 0 % (ref 0–1)
BILIRUB SERPL-MCNC: 0.51 MG/DL (ref 0.2–1)
BNP SERPL-MCNC: 21 PG/ML (ref 0–100)
BUN SERPL-MCNC: 10 MG/DL (ref 5–25)
CALCIUM SERPL-MCNC: 8.9 MG/DL (ref 8.4–10.2)
CARDIAC TROPONIN I PNL SERPL HS: 3 NG/L
CHLORIDE SERPL-SCNC: 100 MMOL/L (ref 96–108)
CO2 SERPL-SCNC: 22 MMOL/L (ref 21–32)
CREAT SERPL-MCNC: 0.98 MG/DL (ref 0.6–1.3)
EOSINOPHIL # BLD AUTO: 0 THOUSAND/ÂΜL (ref 0–0.61)
EOSINOPHIL NFR BLD AUTO: 0 % (ref 0–6)
ERYTHROCYTE [DISTWIDTH] IN BLOOD BY AUTOMATED COUNT: 13.1 % (ref 11.6–15.1)
ETHANOL EXG-MCNC: 0 MG/DL
GFR SERPL CREATININE-BSD FRML MDRD: 69 ML/MIN/1.73SQ M
GLUCOSE SERPL-MCNC: 123 MG/DL (ref 65–140)
HCT VFR BLD AUTO: 36.7 % (ref 34.8–46.1)
HGB BLD-MCNC: 12.7 G/DL (ref 11.5–15.4)
IMM GRANULOCYTES # BLD AUTO: 0.06 THOUSAND/UL (ref 0–0.2)
IMM GRANULOCYTES NFR BLD AUTO: 1 % (ref 0–2)
LIPASE SERPL-CCNC: 32 U/L (ref 11–82)
LYMPHOCYTES # BLD AUTO: 1.59 THOUSANDS/ÂΜL (ref 0.6–4.47)
LYMPHOCYTES NFR BLD AUTO: 17 % (ref 14–44)
MCH RBC QN AUTO: 32.5 PG (ref 26.8–34.3)
MCHC RBC AUTO-ENTMCNC: 34.6 G/DL (ref 31.4–37.4)
MCV RBC AUTO: 94 FL (ref 82–98)
MONOCYTES # BLD AUTO: 0.92 THOUSAND/ÂΜL (ref 0.17–1.22)
MONOCYTES NFR BLD AUTO: 10 % (ref 4–12)
NEUTROPHILS # BLD AUTO: 6.97 THOUSANDS/ÂΜL (ref 1.85–7.62)
NEUTS SEG NFR BLD AUTO: 72 % (ref 43–75)
NRBC BLD AUTO-RTO: 0 /100 WBCS
P AXIS: 36 DEGREES
PLATELET # BLD AUTO: 184 THOUSANDS/UL (ref 149–390)
PMV BLD AUTO: 9.9 FL (ref 8.9–12.7)
POTASSIUM SERPL-SCNC: 3.5 MMOL/L (ref 3.5–5.3)
PR INTERVAL: 108 MS
PROT SERPL-MCNC: 7.4 G/DL (ref 6.4–8.4)
QRS AXIS: 39 DEGREES
QRSD INTERVAL: 78 MS
QT INTERVAL: 364 MS
QTC INTERVAL: 406 MS
RBC # BLD AUTO: 3.91 MILLION/UL (ref 3.81–5.12)
SODIUM SERPL-SCNC: 131 MMOL/L (ref 135–147)
T WAVE AXIS: 28 DEGREES
VENTRICULAR RATE: 75 BPM
WBC # BLD AUTO: 9.55 THOUSAND/UL (ref 4.31–10.16)

## 2024-01-29 PROCEDURE — 96366 THER/PROPH/DIAG IV INF ADDON: CPT

## 2024-01-29 PROCEDURE — 71046 X-RAY EXAM CHEST 2 VIEWS: CPT

## 2024-01-29 PROCEDURE — 96375 TX/PRO/DX INJ NEW DRUG ADDON: CPT

## 2024-01-29 PROCEDURE — 96365 THER/PROPH/DIAG IV INF INIT: CPT

## 2024-01-29 PROCEDURE — 93005 ELECTROCARDIOGRAM TRACING: CPT

## 2024-01-29 PROCEDURE — 80053 COMPREHEN METABOLIC PANEL: CPT

## 2024-01-29 PROCEDURE — 84484 ASSAY OF TROPONIN QUANT: CPT

## 2024-01-29 PROCEDURE — 99285 EMERGENCY DEPT VISIT HI MDM: CPT | Performed by: EMERGENCY MEDICINE

## 2024-01-29 PROCEDURE — 36415 COLL VENOUS BLD VENIPUNCTURE: CPT

## 2024-01-29 PROCEDURE — 99285 EMERGENCY DEPT VISIT HI MDM: CPT

## 2024-01-29 PROCEDURE — 83690 ASSAY OF LIPASE: CPT

## 2024-01-29 PROCEDURE — 85025 COMPLETE CBC W/AUTO DIFF WBC: CPT

## 2024-01-29 PROCEDURE — 96376 TX/PRO/DX INJ SAME DRUG ADON: CPT

## 2024-01-29 PROCEDURE — 83880 ASSAY OF NATRIURETIC PEPTIDE: CPT

## 2024-01-29 PROCEDURE — 87636 SARSCOV2 & INF A&B AMP PRB: CPT

## 2024-01-29 PROCEDURE — 82075 ASSAY OF BREATH ETHANOL: CPT

## 2024-01-29 RX ORDER — DOXYCYCLINE HYCLATE 100 MG/1
100 CAPSULE ORAL EVERY 12 HOURS SCHEDULED
Qty: 10 CAPSULE | Refills: 0 | Status: SHIPPED | OUTPATIENT
Start: 2024-01-29 | End: 2024-02-03

## 2024-01-29 RX ORDER — AMOXICILLIN AND CLAVULANATE POTASSIUM 875; 125 MG/1; MG/1
1 TABLET, FILM COATED ORAL EVERY 12 HOURS
Qty: 10 TABLET | Refills: 0 | Status: SHIPPED | OUTPATIENT
Start: 2024-01-29 | End: 2024-02-03

## 2024-01-29 RX ORDER — ONDANSETRON 2 MG/ML
4 INJECTION INTRAMUSCULAR; INTRAVENOUS ONCE
Status: COMPLETED | OUTPATIENT
Start: 2024-01-29 | End: 2024-01-29

## 2024-01-29 RX ORDER — METHYLPREDNISOLONE SOD SUCC 125 MG
1 VIAL (EA) INJECTION ONCE
Status: COMPLETED | OUTPATIENT
Start: 2024-01-29 | End: 2024-01-29

## 2024-01-29 RX ORDER — DOXYCYCLINE HYCLATE 100 MG/1
100 CAPSULE ORAL ONCE
Status: COMPLETED | OUTPATIENT
Start: 2024-01-29 | End: 2024-01-29

## 2024-01-29 RX ORDER — ONDANSETRON 4 MG/1
4 TABLET, FILM COATED ORAL EVERY 8 HOURS PRN
Qty: 28 TABLET | Refills: 0 | Status: SHIPPED | OUTPATIENT
Start: 2024-01-29 | End: 2024-02-07

## 2024-01-29 RX ORDER — FAMOTIDINE 20 MG/1
20 TABLET, FILM COATED ORAL ONCE
Status: COMPLETED | OUTPATIENT
Start: 2024-01-29 | End: 2024-01-29

## 2024-01-29 RX ORDER — ALBUTEROL SULFATE 2.5 MG/3ML
5 SOLUTION RESPIRATORY (INHALATION) ONCE
Status: COMPLETED | OUTPATIENT
Start: 2024-01-29 | End: 2024-01-29

## 2024-01-29 RX ORDER — KETOROLAC TROMETHAMINE 30 MG/ML
15 INJECTION, SOLUTION INTRAMUSCULAR; INTRAVENOUS ONCE
Status: COMPLETED | OUTPATIENT
Start: 2024-01-29 | End: 2024-01-29

## 2024-01-29 RX ORDER — SUCRALFATE 1 G/1
1 TABLET ORAL ONCE
Status: COMPLETED | OUTPATIENT
Start: 2024-01-29 | End: 2024-01-29

## 2024-01-29 RX ORDER — SODIUM CHLORIDE, SODIUM GLUCONATE, SODIUM ACETATE, POTASSIUM CHLORIDE, MAGNESIUM CHLORIDE, SODIUM PHOSPHATE, DIBASIC, AND POTASSIUM PHOSPHATE .53; .5; .37; .037; .03; .012; .00082 G/100ML; G/100ML; G/100ML; G/100ML; G/100ML; G/100ML; G/100ML
1000 INJECTION, SOLUTION INTRAVENOUS ONCE
Status: COMPLETED | OUTPATIENT
Start: 2024-01-29 | End: 2024-01-29

## 2024-01-29 RX ORDER — ALBUTEROL SULFATE 2.5 MG/3ML
1 SOLUTION RESPIRATORY (INHALATION) ONCE
Status: COMPLETED | OUTPATIENT
Start: 2024-01-29 | End: 2024-01-29

## 2024-01-29 RX ORDER — MAGNESIUM HYDROXIDE/ALUMINUM HYDROXICE/SIMETHICONE 120; 1200; 1200 MG/30ML; MG/30ML; MG/30ML
30 SUSPENSION ORAL ONCE
Status: DISCONTINUED | OUTPATIENT
Start: 2024-01-29 | End: 2024-01-30 | Stop reason: HOSPADM

## 2024-01-29 RX ORDER — ACETAMINOPHEN 325 MG/1
650 TABLET ORAL ONCE
Status: COMPLETED | OUTPATIENT
Start: 2024-01-29 | End: 2024-01-29

## 2024-01-29 RX ORDER — IPRATROPIUM BROMIDE AND ALBUTEROL SULFATE .5; 3 MG/3ML; MG/3ML
1 SOLUTION RESPIRATORY (INHALATION) ONCE
Status: COMPLETED | OUTPATIENT
Start: 2024-01-29 | End: 2024-01-29

## 2024-01-29 RX ORDER — ONDANSETRON 2 MG/ML
1 INJECTION INTRAMUSCULAR; INTRAVENOUS ONCE
Status: COMPLETED | OUTPATIENT
Start: 2024-01-29 | End: 2024-01-29

## 2024-01-29 RX ORDER — KETOROLAC TROMETHAMINE 30 MG/ML
1 INJECTION, SOLUTION INTRAMUSCULAR; INTRAVENOUS ONCE
Status: COMPLETED | OUTPATIENT
Start: 2024-01-29 | End: 2024-01-29

## 2024-01-29 RX ORDER — AZITHROMYCIN 500 MG/1
500 TABLET, FILM COATED ORAL ONCE
Status: DISCONTINUED | OUTPATIENT
Start: 2024-01-29 | End: 2024-01-29

## 2024-01-29 RX ADMIN — ONDANSETRON 4 MG: 2 INJECTION INTRAMUSCULAR; INTRAVENOUS at 23:18

## 2024-01-29 RX ADMIN — IPRATROPIUM BROMIDE 0.5 MG: 0.5 SOLUTION RESPIRATORY (INHALATION) at 08:50

## 2024-01-29 RX ADMIN — FAMOTIDINE 20 MG: 20 TABLET, FILM COATED ORAL at 21:02

## 2024-01-29 RX ADMIN — KETOROLAC TROMETHAMINE 15 MG: 30 INJECTION, SOLUTION INTRAMUSCULAR at 08:48

## 2024-01-29 RX ADMIN — ACETAMINOPHEN 650 MG: 325 TABLET, FILM COATED ORAL at 08:50

## 2024-01-29 RX ADMIN — CEFTRIAXONE SODIUM 2000 MG: 10 INJECTION, POWDER, FOR SOLUTION INTRAVENOUS at 10:22

## 2024-01-29 RX ADMIN — ALBUTEROL SULFATE 5 MG: 2.5 SOLUTION RESPIRATORY (INHALATION) at 08:50

## 2024-01-29 RX ADMIN — ONDANSETRON 4 MG: 2 INJECTION INTRAMUSCULAR; INTRAVENOUS at 21:02

## 2024-01-29 RX ADMIN — SUCRALFATE 1 G: 1 TABLET ORAL at 21:02

## 2024-01-29 RX ADMIN — SODIUM CHLORIDE, SODIUM GLUCONATE, SODIUM ACETATE, POTASSIUM CHLORIDE, MAGNESIUM CHLORIDE, SODIUM PHOSPHATE, DIBASIC, AND POTASSIUM PHOSPHATE 1000 ML: .53; .5; .37; .037; .03; .012; .00082 INJECTION, SOLUTION INTRAVENOUS at 21:07

## 2024-01-29 RX ADMIN — DOXYCYCLINE 100 MG: 100 CAPSULE ORAL at 10:22

## 2024-01-29 NOTE — ED ATTENDING ATTESTATION
"I, Fili Banks MD, saw and evaluated the patient. I have discussed the patient with the resident and agree with the resident's findings, Plan of Care, and MDM as documented in the resident's note, except where noted. All available labs and Radiology studies were reviewed.  I was present for key portions of any procedure(s) performed by the resident and I was immediately available to provide assistance.    At this point I agree with the current assessment done in the Emergency Department.  I have conducted an independent evaluation of this patient a history and physical is as follows:    42 yo obese female with a history of anxiety, seasonal allergies, CKD, depression, HTN, asthma, and GERD brought to the ED by EMS for evaluation of shortness of breath and a cough. The patient reports a cough productive of \"yellow\" sputum, dyspnea, and anterior chest discomfort x 4 days. Symptoms have been progressively worsening since onset. (+) Intermittent wheezing at home. The patient attempted to check into a local urgent care this morning but it was not yet open so she called 9-1-1 instead. Medics administered a breathing treatment and steroids en route with some improvement. No fevers or chills. She denies nausea, vomiting, diaphoresis, back pain, and abdominal pain. No other specific complaints. (+) Tobacco user.    ROS: per resident physician note    Gen: (+) mild respiratory distress, AA&Ox3  HEENT: PERRL, EOMI  Neck: supple  CV: RRR  Lungs: (+) tachypnea, (+) scattered wheezes/rhonchi  Abdomen: soft, NT/ND  Ext: no swelling or deformity  Neuro: 5/5 strength all extremities, sensation grossly intact  Skin: no rash    ED Course  The patient arrives in mild respiratory distress, tachypneic with scattered wheezes/rhonchi on exam. Asthma/COPD exacerbation vs viral URI vs pneumonia? Lower clinical suspicion for new onset CHF, ACS, and PTX. Will check EKG, CXR, basic labs, troponin, and BNP. APAP, Toradol, and Duo-neb " ordered. Will continue to monitor in the ED. Disposition per workup and reassessment.      Critical Care Time  Procedures

## 2024-01-29 NOTE — Clinical Note
Case was discussed with Dr Ventura Castellanos and the patient's admission status was agreed to be Admission Status: observation status to the service of Dr. Terry .

## 2024-01-29 NOTE — Clinical Note
Melia Lira was seen and treated in our emergency department on 1/29/2024.                Diagnosis:     Melia  .    She may return on this date: 02/01/2024         If you have any questions or concerns, please don't hesitate to call.      Harvey Garcia MD    ______________________________           _______________          _______________  Hospital Representative                              Date                                Time

## 2024-01-29 NOTE — ED PROVIDER NOTES
History  Chief Complaint   Patient presents with    Shortness of Breath     Patient arrives via EMS from [facility] with increased SOB      43-year-old female with history of asthma presents with dyspnea at rest, chest pain, and cough for 4 days.  Symptoms worsening since onset.  Chest pain described as bilateral across the entire chest, burning in quality, pleuritic, nonexertional, and worse with cough.  Received bronchodilator treatment and steroids en route to hospital with minimal improvement.        Prior to Admission Medications   Prescriptions Last Dose Informant Patient Reported? Taking?   Compro 25 MG suppository   Yes No   Sig: INSERT 1 SUPPOSITORY RECTALLY (25 MG TOTAL) EVERY 12 HOURS AS NEEDED FOR NAUSEA AND VOMITING   LORazepam (Ativan) 0.5 mg tablet   No No   Sig: Take 2 tablets (1 mg total) by mouth daily as needed for anxiety   QUEtiapine (SEROquel) 50 mg tablet   No No   Sig: Take 1 tablet (50 mg total) by mouth daily at bedtime   albuterol (ProAir HFA) 90 mcg/act inhaler  Self No No   Sig: Inhale 2 puffs every 6 (six) hours as needed for wheezing   betamethasone valerate (LUXIQ) 0.12 % foam  Self No No   Sig: Apply topically daily   busPIRone (BUSPAR) 5 mg tablet   No No   Sig: TAKE 1 TABLET BY MOUTH TWICE A DAY   fluticasone (FLONASE) 50 mcg/act nasal spray  Self No No   Si spray into each nostril daily   gabapentin (Neurontin) 600 MG tablet   No No   Sig: Take 1 tablet (600 mg total) by mouth 3 (three) times a day   hydrOXYzine pamoate (VISTARIL) 25 mg capsule   No No   Sig: TAKE 1 CAPSULE BY MOUTH 3 TIMES A DAY AS NEEDED FOR ANXIETY.   lisinopril (ZESTRIL) 20 mg tablet   No No   Sig: Take 1 tablet (20 mg total) by mouth daily   metoclopramide (REGLAN) 5 mg tablet   No No   Sig: Take 1 tablet (5 mg total) by mouth 4 (four) times a day as needed (nausea)   nicotine (NICODERM CQ) 21 mg/24 hr TD 24 hr patch   No No   Sig: Place 1 patch on the skin over 24 hours every 24 hours    norgestimate-ethinyl estradiol (Ortho Tri-Cyclen Lo) 0.18/0.215/0.25 MG-25 MCG per tablet  Self No No   Sig: Take 1 tablet by mouth daily   omeprazole (PriLOSEC) 40 MG capsule   No No   Sig: Take 1 capsule (40 mg total) by mouth daily   ondansetron (Zofran ODT) 4 mg disintegrating tablet  Self No No   Sig: Take 1 tablet (4 mg total) by mouth every 6 (six) hours as needed for nausea or vomiting   predniSONE 10 mg tablet   No No   Sig: Take 1 tablet (10 mg total) by mouth daily 6 tab day 1, 5 tab day 2, 4 tab day 3, 3 tab day 4, 2 tab day 5, 1 tab day 6   Patient not taking: Reported on 2023   prochlorperazine (COMPAZINE) 10 mg tablet  Self No No   Sig: Take 0.5 tablets (5 mg total) by mouth every 8 (eight) hours as needed for nausea or vomiting   promethazine (PHENERGAN) 25 mg tablet  Self No No   Sig: Take 1 tablet (25 mg total) by mouth every 6 (six) hours as needed for nausea or vomiting   sertraline (ZOLOFT) 100 mg tablet   No No   Sig: TAKE 2 TABLETS BY MOUTH EVERY DAY      Facility-Administered Medications: None       Past Medical History:   Diagnosis Date    Anxiety     Arthritis     OA  b/l knees    Asthma     Approx 2 years ago    Calculus of gallbladder without cholecystitis without obstruction 2022    Chronic kidney disease     Depression     GERD (gastroesophageal reflux disease)     In my 20s. Approx 15 years ago    Hx of bleeding disorder     dysmennorrhea-dx lap today 2016    Hypertension     Kidney stone     Obesity     Seasonal allergies        Past Surgical History:   Procedure Laterality Date     SECTION      CHOLECYSTECTOMY      COLONOSCOPY      DIAGNOSTIC LAPAROSCOPY      DILATION AND CURETTAGE OF UTERUS      HERNIA REPAIR      IA COLONOSCOPY FLX DX W/COLLJ SPEC WHEN PFRMD N/A 2018    Procedure: EGD AND COLONOSCOPY;  Surgeon: Allan Duncan MD;  Location: [facility];  Service: Gastroenterology    IA LAPAROSCOPY SURG CHOLECYSTECTOMY N/A 2022    Procedure:  ROBOTIC LAPAROSCOPIC CHOLECYSTECTOMY;  Surgeon: Griffin Triplett DO;  Location: [facility];  Service: General    OR LAPAROSCOPY W/RMVL ADNEXAL STRUCTURES Bilateral 2016    Procedure: CYSTECTOMY  OVARIAN;  Surgeon: C William Riedel, DO;  Location: [facility];  Service: Gynecology    OR LAPS ABD PRTM&OMENTUM DX W/WO SPEC BR/WA SPX N/A 2016    Procedure: LAPAROSCOPY DIAGNOSTIC DAVID;  Surgeon: C William Riedel, DO;  [facility];  Service: Gynecology    OR RPR UMBILICAL HRNA 5 YRS/> REDUCIBLE N/A 2022    Procedure: UMBILICAL HERNIA REPAIR;  Surgeon: Griffin Triplett DO;  [facility];  Service: General    UPPER GASTROINTESTINAL ENDOSCOPY      WISDOM TOOTH EXTRACTION         Family History   Problem Relation Age of Onset    No Known Problems Sister     Autism Daughter     Lung cancer Maternal Grandmother     Cancer Maternal Grandmother         Deceassd 10 years    Diabetes Maternal Grandfather     Arthritis Maternal Grandfather          10 years    Aneurysm Paternal Grandmother     No Known Problems Sister      I have reviewed and agree with the history as documented.    E-Cigarette/Vaping    E-Cigarette Use Never User      E-Cigarette/Vaping Substances    Nicotine No     THC No     CBD No     Flavoring No     Other No     Unknown No      Social History     Tobacco Use    Smoking status: Every Day     Current packs/day: 1.00     Average packs/day: 1 pack/day for 23.0 years (23.0 ttl pk-yrs)     Types: Cigarettes    Smokeless tobacco: Never   Vaping Use    Vaping status: Never Used   Substance Use Topics    Alcohol use: Never     Comment: social    Drug use: Not Currently     Frequency: 7.0 times per week     Types: Marijuana     Comment: I exhaused all your ‘rx prescription-- NOTHING ELSE HELPED!!        Review of Systems   Constitutional:  Negative for chills and fever.   HENT:  Negative for ear pain and sore throat.    Eyes:  Negative for pain and visual disturbance.    Respiratory:  Positive for cough, shortness of breath and wheezing.    Cardiovascular:  Negative for chest pain and palpitations.   Gastrointestinal:  Negative for abdominal pain and vomiting.   Genitourinary:  Negative for dysuria and hematuria.   Musculoskeletal:  Negative for arthralgias and back pain.   Skin:  Negative for color change and rash.   Neurological:  Negative for seizures and syncope.   All other systems reviewed and are negative.      Physical Exam  ED Triage Vitals [01/29/24 0822]   Temperature Pulse Respirations Blood Pressure SpO2   99.3 °F (37.4 °C) 80 (!) 24 109/61 96 %      Temp Source Heart Rate Source Patient Position - Orthostatic VS BP Location FiO2 (%)   Oral -- Sitting Right arm --      Pain Score       --             Orthostatic Vital Signs  Vitals:    01/29/24 0822   BP: 109/61   Pulse: 80   Patient Position - Orthostatic VS: Sitting       Physical Exam  Vitals and nursing note reviewed.   Constitutional:       General: She is in acute distress.      Appearance: Normal appearance. She is ill-appearing. She is not toxic-appearing or diaphoretic.   HENT:      Head: Normocephalic and atraumatic.      Right Ear: External ear normal.      Left Ear: External ear normal.      Nose: Nose normal.      Mouth/Throat:      Mouth: Mucous membranes are moist.   Eyes:      General:         Right eye: No discharge.         Left eye: No discharge.      Conjunctiva/sclera: Conjunctivae normal.   Neck:      Vascular: No JVD.   Cardiovascular:      Rate and Rhythm: Normal rate and regular rhythm.      Pulses: Normal pulses.      Heart sounds: Normal heart sounds. No murmur heard.     No friction rub.   Pulmonary:      Effort: Pulmonary effort is normal. Tachypnea present. No respiratory distress.      Breath sounds: No stridor. Wheezing and rhonchi present. No rales.   Abdominal:      General: Abdomen is flat. Bowel sounds are normal. There is no distension.      Palpations: Abdomen is soft.       Tenderness: There is no abdominal tenderness. There is no guarding.   Musculoskeletal:         General: Normal range of motion.      Cervical back: Normal range of motion and neck supple.      Right lower leg: No tenderness. No edema.      Left lower leg: No tenderness. No edema.   Skin:     General: Skin is warm and dry.      Capillary Refill: Capillary refill takes less than 2 seconds.      Coloration: Skin is not pale.   Neurological:      Mental Status: She is alert and oriented to person, place, and time.   Psychiatric:         Mood and Affect: Mood normal.         Behavior: Behavior normal.         ED Medications  Medications   albuterol (FOR EMS ONLY) (2.5 mg/3 mL) 0.083 % inhalation solution 2.5 mg (0 mg Does not apply Given to EMS 1/29/24 0825)   ipratropium-albuterol (FOR EMS ONLY) (DUO-NEB) 0.5-2.5 mg/3 mL inhalation solution 3 mL (0 mL Does not apply Given to EMS 1/29/24 0825)   methylPREDNISolone sodium succinate (FOR EMS ONLY) (Solu-MEDROL) 125 MG injection 125 mg (0 mg Does not apply Given to EMS 1/29/24 0825)   ketorolac (TORADOL) injection 15 mg (15 mg Intravenous Given 1/29/24 0848)   acetaminophen (TYLENOL) tablet 650 mg (650 mg Oral Given 1/29/24 0850)   albuterol inhalation solution 5 mg (5 mg Nebulization Given 1/29/24 0850)   ipratropium (ATROVENT) 0.02 % inhalation solution 0.5 mg (0.5 mg Nebulization Given 1/29/24 0850)   ceftriaxone (ROCEPHIN) 2 g/50 mL in dextrose IVPB (0 mg Intravenous Stopped 1/29/24 1052)   doxycycline hyclate (VIBRAMYCIN) capsule 100 mg (100 mg Oral Given 1/29/24 1022)       Diagnostic Studies  Results Reviewed       Procedure Component Value Units Date/Time    HS Troponin 0hr (reflex protocol) [[id number]]  (Normal) Collected: 01/29/24 0853    Lab Status: Final result Specimen: Blood from Arm, Right Updated: 01/29/24 0933     hs TnI 0hr 3 ng/L     B-Type Natriuretic Peptide(BNP) [[id number]]  (Normal) Collected: 01/29/24 0853    Lab Status: Final result Specimen: Blood  from Arm, Right Updated: 01/29/24 0928     BNP 21 pg/mL     Comprehensive metabolic panel [[id number]]  (Abnormal) Collected: 01/29/24 0853    Lab Status: Final result Specimen: Blood from Arm, Right Updated: 01/29/24 0924     Sodium 131 mmol/L      Potassium 3.5 mmol/L      Chloride 100 mmol/L      CO2 22 mmol/L      ANION GAP 9 mmol/L      BUN 10 mg/dL      Creatinine 0.98 mg/dL      Glucose 123 mg/dL      Calcium 8.9 mg/dL      AST 27 U/L      ALT 20 U/L      Alkaline Phosphatase 76 U/L      Total Protein 7.4 g/dL      Albumin 3.8 g/dL      Total Bilirubin 0.51 mg/dL      eGFR 69 ml/min/1.73sq m     Narrative:      National Kidney Disease Foundation guidelines for Chronic Kidney Disease (CKD):     Stage 1 with normal or high GFR (GFR > 90 mL/min/1.73 square meters)    Stage 2 Mild CKD (GFR = 60-89 mL/min/1.73 square meters)    Stage 3A Moderate CKD (GFR = 45-59 mL/min/1.73 square meters)    Stage 3B Moderate CKD (GFR = 30-44 mL/min/1.73 square meters)    Stage 4 Severe CKD (GFR = 15-29 mL/min/1.73 square meters)    Stage 5 End Stage CKD (GFR <15 mL/min/1.73 square meters)  Note: GFR calculation is accurate only with a steady state creatinine    CBC and differential [[id number]] Collected: 01/29/24 0853    Lab Status: Final result Specimen: Blood from Arm, Right Updated: 01/29/24 0903     WBC 9.55 Thousand/uL      RBC 3.91 Million/uL      Hemoglobin 12.7 g/dL      Hematocrit 36.7 %      MCV 94 fL      MCH 32.5 pg      MCHC 34.6 g/dL      RDW 13.1 %      MPV 9.9 fL      Platelets 184 Thousands/uL      nRBC 0 /100 WBCs      Neutrophils Relative 72 %      Immat GRANS % 1 %      Lymphocytes Relative 17 %      Monocytes Relative 10 %      Eosinophils Relative 0 %      Basophils Relative 0 %      Neutrophils Absolute 6.97 Thousands/µL      Immature Grans Absolute 0.06 Thousand/uL      Lymphocytes Absolute 1.59 Thousands/µL      Monocytes Absolute 0.92 Thousand/µL      Eosinophils Absolute 0.00 Thousand/µL      Basophils  Absolute 0.01 Thousands/µL                    XR chest 2 views   ED Interpretation by Harvey Garcia MD (01/29 7316)   Infiltrate in the left lower lobe      Final Result by Jero Drake MD (01/29 5697)      Accentuated lung markings in the left lower lung zone could represent atelectasis versus pneumonia.      Prominent peribronchial cuffing could be related to bronchitis.      Infiltrate in the left lower lobe was noted on the ER preliminary result.               Resident: TOO CHEEMA I, the attending radiologist, have reviewed the images and agree with the final report above.      Workstation performed: [id number]               Procedures  Procedures      ED Course                                       Medical Decision Making  43-year-old female with history of asthma presents with tachypnea.  Patient is normoxic on room air.  Minimal wheezing.  Bilateral rhonchi.  Presentation suspicious for pneumonia plus or minus reactive airway exacerbation.  No findings on exam to suggest acute heart failure exacerbation or pulmonary embolism.  Will order labs and chest x-ray to assess for ACS, pneumonia, pneumothorax.    Chest x-ray suggestive of pneumonia.  Shared decision making conducted with patient regarding inpatient versus outpatient treatment.  Offered patient patient treatment due to her subjective shortness of breath.  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  At time of discharge patient's vitals are within normal limits.  She is in no acute respiratory distress.    Amount and/or Complexity of Data Reviewed  Labs: ordered.  Radiology: ordered and independent interpretation performed.    Risk  OTC drugs.  Prescription drug management.          Disposition  Final diagnoses:   Community acquired pneumonia of left lower lobe of lung     Time reflects when diagnosis  was documented in both MDM as applicable and the Disposition within this note       Time User Action Codes Description Comment    1/29/2024  9:50 AM Harvey Garcia Add [J18.9] Community acquired pneumonia of left lower lobe of lung           ED Disposition       ED Disposition   Discharge    Condition   Stable    Date/Time   Mon Jan 29, 2024  9:50 AM    Comment   Melia Lira discharge to home/self care.                   Follow-up Information       Follow up With Specialties Details Why Contact Info    MONICA Colbert Nurse Practitioner   [id number] [place] [id number] [phone]              Discharge Medication List as of 1/29/2024 10:44 AM        START taking these medications    Details   amoxicillin-clavulanate (AUGMENTIN) 875-125 mg per tablet Take 1 tablet by mouth every 12 (twelve) hours for 5 days, Starting Mon 1/29/2024, Until Sat 2/3/2024, Normal      doxycycline hyclate (VIBRAMYCIN) 100 mg capsule Take 1 capsule (100 mg total) by mouth every 12 (twelve) hours for 5 days, Starting Mon 1/29/2024, Until Sat 2/3/2024, Normal           CONTINUE these medications which have NOT CHANGED    Details   albuterol (ProAir HFA) 90 mcg/act inhaler Inhale 2 puffs every 6 (six) hours as needed for wheezing, Starting Mon 12/6/2021, Normal      betamethasone valerate (LUXIQ) 0.12 % foam Apply topically daily, Starting Wed 5/11/2022, Normal      busPIRone (BUSPAR) 5 mg tablet TAKE 1 TABLET BY MOUTH TWICE A DAY, Starting Fri 9/22/2023, Normal      Compro 25 MG suppository INSERT 1 SUPPOSITORY RECTALLY (25 MG TOTAL) EVERY 12 HOURS AS NEEDED FOR NAUSEA AND VOMITING, Historical Med      fluticasone (FLONASE) 50 mcg/act nasal spray 1 spray into each nostril daily, Starting Thu 5/5/2022, Normal      gabapentin (Neurontin) 600 MG tablet Take 1 tablet (600 mg total) by mouth 3 (three) times a day, Starting Fri 11/17/2023, Until Thu 2/15/2024, Normal      hydrOXYzine pamoate (VISTARIL) 25 mg capsule TAKE 1  CAPSULE BY MOUTH 3 TIMES A DAY AS NEEDED FOR ANXIETY., Normal      lisinopril (ZESTRIL) 20 mg tablet Take 1 tablet (20 mg total) by mouth daily, Starting Sat 4/29/2023, Normal      LORazepam (Ativan) 0.5 mg tablet Take 2 tablets (1 mg total) by mouth daily as needed for anxiety, Starting Wed 12/20/2023, Normal      metoclopramide (REGLAN) 5 mg tablet Take 1 tablet (5 mg total) by mouth 4 (four) times a day as needed (nausea), Starting Wed 5/17/2023, Normal      nicotine (NICODERM CQ) 21 mg/24 hr TD 24 hr patch Place 1 patch on the skin over 24 hours every 24 hours, Starting Fri 11/17/2023, Normal      norgestimate-ethinyl estradiol (Ortho Tri-Cyclen Lo) 0.18/0.215/0.25 MG-25 MCG per tablet Take 1 tablet by mouth daily, Starting Mon 7/11/2022, Until Tue 12/27/2022, Normal      omeprazole (PriLOSEC) 40 MG capsule Take 1 capsule (40 mg total) by mouth daily, Starting Wed 6/28/2023, Normal      ondansetron (Zofran ODT) 4 mg disintegrating tablet Take 1 tablet (4 mg total) by mouth every 6 (six) hours as needed for nausea or vomiting, Starting Mon 1/24/2022, Normal      predniSONE 10 mg tablet Take 1 tablet (10 mg total) by mouth daily 6 tab day 1, 5 tab day 2, 4 tab day 3, 3 tab day 4, 2 tab day 5, 1 tab day 6, Starting Sun 12/18/2022, Normal      prochlorperazine (COMPAZINE) 10 mg tablet Take 0.5 tablets (5 mg total) by mouth every 8 (eight) hours as needed for nausea or vomiting, Starting Wed 6/8/2022, Normal      promethazine (PHENERGAN) 25 mg tablet Take 1 tablet (25 mg total) by mouth every 6 (six) hours as needed for nausea or vomiting, Starting Fri 3/25/2022, Normal      QUEtiapine (SEROquel) 50 mg tablet Take 1 tablet (50 mg total) by mouth daily at bedtime, Starting Fri 11/17/2023, Normal      sertraline (ZOLOFT) 100 mg tablet TAKE 2 TABLETS BY MOUTH EVERY DAY, Normal           No discharge procedures on file.    PDMP Review         Value Time User    PDMP Reviewed  Yes 12/20/2023  9:53 AM Chela Redd,  MONICA             ED Provider  Attending physically available and evaluated Melia Lira. I managed the patient along with the ED Attending.    Electronically Signed by           Harvey Garcia MD  01/29/24 3078

## 2024-01-29 NOTE — DISCHARGE INSTRUCTIONS
Take the antibiotics as prescribed.  Take Tylenol 500 mg and ibuprofen 400 mg every 6 hours as needed for chest pain.  Return to ER for any worsening shortness of breath.

## 2024-01-30 VITALS
DIASTOLIC BLOOD PRESSURE: 79 MMHG | RESPIRATION RATE: 20 BRPM | SYSTOLIC BLOOD PRESSURE: 129 MMHG | OXYGEN SATURATION: 91 % | HEART RATE: 76 BPM | TEMPERATURE: 97.5 F

## 2024-01-30 PROBLEM — R10.9 ABDOMINAL PAIN: Status: ACTIVE | Noted: 2024-01-30

## 2024-01-30 PROBLEM — R45.851 SUICIDE IDEATION: Status: ACTIVE | Noted: 2024-01-30

## 2024-01-30 PROBLEM — J18.9 CAP (COMMUNITY ACQUIRED PNEUMONIA): Status: ACTIVE | Noted: 2024-01-30

## 2024-01-30 LAB
AMPHETAMINES SERPL QL SCN: NEGATIVE
ANION GAP SERPL CALCULATED.3IONS-SCNC: 13 MMOL/L
BARBITURATES UR QL: NEGATIVE
BASOPHILS # BLD AUTO: 0 THOUSANDS/ÂΜL (ref 0–0.1)
BASOPHILS NFR BLD AUTO: 0 % (ref 0–1)
BENZODIAZ UR QL: POSITIVE
BUN SERPL-MCNC: 13 MG/DL (ref 5–25)
CALCIUM SERPL-MCNC: 9.4 MG/DL (ref 8.4–10.2)
CHLORIDE SERPL-SCNC: 102 MMOL/L (ref 96–108)
CO2 SERPL-SCNC: 21 MMOL/L (ref 21–32)
COCAINE UR QL: NEGATIVE
CREAT SERPL-MCNC: 0.61 MG/DL (ref 0.6–1.3)
EOSINOPHIL # BLD AUTO: 0.02 THOUSAND/ÂΜL (ref 0–0.61)
EOSINOPHIL NFR BLD AUTO: 0 % (ref 0–6)
ERYTHROCYTE [DISTWIDTH] IN BLOOD BY AUTOMATED COUNT: 13.1 % (ref 11.6–15.1)
FLUAV RNA RESP QL NAA+PROBE: NEGATIVE
FLUBV RNA RESP QL NAA+PROBE: POSITIVE
GFR SERPL CREATININE-BSD FRML MDRD: 109 ML/MIN/1.73SQ M
GLUCOSE SERPL-MCNC: 128 MG/DL (ref 65–140)
HCT VFR BLD AUTO: 35.5 % (ref 34.8–46.1)
HGB BLD-MCNC: 12.4 G/DL (ref 11.5–15.4)
IMM GRANULOCYTES # BLD AUTO: 0.05 THOUSAND/UL (ref 0–0.2)
IMM GRANULOCYTES NFR BLD AUTO: 1 % (ref 0–2)
LYMPHOCYTES # BLD AUTO: 1.15 THOUSANDS/ÂΜL (ref 0.6–4.47)
LYMPHOCYTES NFR BLD AUTO: 11 % (ref 14–44)
MAGNESIUM SERPL-MCNC: 2 MG/DL (ref 1.9–2.7)
MCH RBC QN AUTO: 32.5 PG (ref 26.8–34.3)
MCHC RBC AUTO-ENTMCNC: 34.9 G/DL (ref 31.4–37.4)
MCV RBC AUTO: 93 FL (ref 82–98)
METHADONE UR QL: NEGATIVE
MONOCYTES # BLD AUTO: 0.71 THOUSAND/ÂΜL (ref 0.17–1.22)
MONOCYTES NFR BLD AUTO: 7 % (ref 4–12)
NEUTROPHILS # BLD AUTO: 8.78 THOUSANDS/ÂΜL (ref 1.85–7.62)
NEUTS SEG NFR BLD AUTO: 81 % (ref 43–75)
NRBC BLD AUTO-RTO: 0 /100 WBCS
OPIATES UR QL SCN: NEGATIVE
OXYCODONE+OXYMORPHONE UR QL SCN: NEGATIVE
PCP UR QL: NEGATIVE
PHOSPHATE SERPL-MCNC: 3.8 MG/DL (ref 2.7–4.5)
PLATELET # BLD AUTO: 186 THOUSANDS/UL (ref 149–390)
PMV BLD AUTO: 9.8 FL (ref 8.9–12.7)
POTASSIUM SERPL-SCNC: 3.6 MMOL/L (ref 3.5–5.3)
PROCALCITONIN SERPL-MCNC: 0.09 NG/ML
RBC # BLD AUTO: 3.82 MILLION/UL (ref 3.81–5.12)
SARS-COV-2 RNA RESP QL NAA+PROBE: NEGATIVE
SODIUM SERPL-SCNC: 136 MMOL/L (ref 135–147)
THC UR QL: NEGATIVE
WBC # BLD AUTO: 10.71 THOUSAND/UL (ref 4.31–10.16)

## 2024-01-30 PROCEDURE — C9113 INJ PANTOPRAZOLE SODIUM, VIA: HCPCS

## 2024-01-30 PROCEDURE — 80307 DRUG TEST PRSMV CHEM ANLYZR: CPT

## 2024-01-30 PROCEDURE — 84100 ASSAY OF PHOSPHORUS: CPT

## 2024-01-30 PROCEDURE — 99223 1ST HOSP IP/OBS HIGH 75: CPT | Performed by: INTERNAL MEDICINE

## 2024-01-30 PROCEDURE — 83735 ASSAY OF MAGNESIUM: CPT

## 2024-01-30 PROCEDURE — 80048 BASIC METABOLIC PNL TOTAL CA: CPT

## 2024-01-30 PROCEDURE — 36415 COLL VENOUS BLD VENIPUNCTURE: CPT

## 2024-01-30 PROCEDURE — 84145 PROCALCITONIN (PCT): CPT

## 2024-01-30 PROCEDURE — 85025 COMPLETE CBC W/AUTO DIFF WBC: CPT

## 2024-01-30 PROCEDURE — NC001 PR NO CHARGE: Performed by: INTERNAL MEDICINE

## 2024-01-30 RX ORDER — PANTOPRAZOLE SODIUM 40 MG/10ML
40 INJECTION, POWDER, LYOPHILIZED, FOR SOLUTION INTRAVENOUS
Status: DISCONTINUED | OUTPATIENT
Start: 2024-01-30 | End: 2024-01-30 | Stop reason: HOSPADM

## 2024-01-30 RX ORDER — LISINOPRIL 20 MG/1
20 TABLET ORAL DAILY
Status: DISCONTINUED | OUTPATIENT
Start: 2024-01-30 | End: 2024-01-30 | Stop reason: HOSPADM

## 2024-01-30 RX ORDER — PANTOPRAZOLE SODIUM 40 MG/10ML
40 INJECTION, POWDER, LYOPHILIZED, FOR SOLUTION INTRAVENOUS
Status: DISCONTINUED | OUTPATIENT
Start: 2024-01-30 | End: 2024-01-30

## 2024-01-30 RX ORDER — FLUTICASONE PROPIONATE 50 MCG
1 SPRAY, SUSPENSION (ML) NASAL DAILY
Status: DISCONTINUED | OUTPATIENT
Start: 2024-01-30 | End: 2024-01-30 | Stop reason: HOSPADM

## 2024-01-30 RX ORDER — PROCHLORPERAZINE MALEATE 10 MG
5 TABLET ORAL EVERY 6 HOURS PRN
Status: DISCONTINUED | OUTPATIENT
Start: 2024-01-30 | End: 2024-01-30 | Stop reason: HOSPADM

## 2024-01-30 RX ORDER — DIAZEPAM 5 MG/ML
2.5 INJECTION, SOLUTION INTRAMUSCULAR; INTRAVENOUS ONCE
Status: COMPLETED | OUTPATIENT
Start: 2024-01-30 | End: 2024-01-30

## 2024-01-30 RX ORDER — METOCLOPRAMIDE HYDROCHLORIDE 5 MG/ML
10 INJECTION INTRAMUSCULAR; INTRAVENOUS ONCE
Status: COMPLETED | OUTPATIENT
Start: 2024-01-30 | End: 2024-01-30

## 2024-01-30 RX ORDER — ALBUTEROL SULFATE 90 UG/1
2 AEROSOL, METERED RESPIRATORY (INHALATION) EVERY 6 HOURS PRN
Status: DISCONTINUED | OUTPATIENT
Start: 2024-01-30 | End: 2024-01-30 | Stop reason: HOSPADM

## 2024-01-30 RX ORDER — ONDANSETRON 2 MG/ML
4 INJECTION INTRAMUSCULAR; INTRAVENOUS EVERY 8 HOURS PRN
Status: DISCONTINUED | OUTPATIENT
Start: 2024-01-30 | End: 2024-01-30 | Stop reason: HOSPADM

## 2024-01-30 RX ORDER — SERTRALINE HYDROCHLORIDE 100 MG/1
200 TABLET, FILM COATED ORAL DAILY
Status: DISCONTINUED | OUTPATIENT
Start: 2024-01-30 | End: 2024-01-30 | Stop reason: HOSPADM

## 2024-01-30 RX ORDER — BUSPIRONE HYDROCHLORIDE 5 MG/1
5 TABLET ORAL 2 TIMES DAILY
Status: DISCONTINUED | OUTPATIENT
Start: 2024-01-30 | End: 2024-01-30 | Stop reason: HOSPADM

## 2024-01-30 RX ORDER — HYDROXYZINE HYDROCHLORIDE 25 MG/1
25 TABLET, FILM COATED ORAL ONCE
Status: DISCONTINUED | OUTPATIENT
Start: 2024-01-30 | End: 2024-01-30

## 2024-01-30 RX ORDER — QUETIAPINE FUMARATE 25 MG/1
50 TABLET, FILM COATED ORAL
Status: DISCONTINUED | OUTPATIENT
Start: 2024-01-30 | End: 2024-01-30 | Stop reason: HOSPADM

## 2024-01-30 RX ORDER — ONDANSETRON 2 MG/ML
4 INJECTION INTRAMUSCULAR; INTRAVENOUS ONCE
Status: DISCONTINUED | OUTPATIENT
Start: 2024-01-30 | End: 2024-01-30

## 2024-01-30 RX ORDER — GABAPENTIN 300 MG/1
600 CAPSULE ORAL 3 TIMES DAILY
Status: DISCONTINUED | OUTPATIENT
Start: 2024-01-30 | End: 2024-01-30 | Stop reason: HOSPADM

## 2024-01-30 RX ADMIN — BUSPIRONE HYDROCHLORIDE 5 MG: 5 TABLET ORAL at 08:55

## 2024-01-30 RX ADMIN — DIAZEPAM 2.5 MG: 10 INJECTION, SOLUTION INTRAMUSCULAR; INTRAVENOUS at 00:40

## 2024-01-30 RX ADMIN — FLUTICASONE PROPIONATE 1 SPRAY: 50 SPRAY, METERED NASAL at 08:56

## 2024-01-30 RX ADMIN — CEFTRIAXONE SODIUM 1000 MG: 10 INJECTION, POWDER, FOR SOLUTION INTRAVENOUS at 01:06

## 2024-01-30 RX ADMIN — METOCLOPRAMIDE 10 MG: 5 INJECTION, SOLUTION INTRAMUSCULAR; INTRAVENOUS at 05:17

## 2024-01-30 RX ADMIN — QUETIAPINE FUMARATE 50 MG: 25 TABLET ORAL at 01:16

## 2024-01-30 RX ADMIN — LISINOPRIL 20 MG: 20 TABLET ORAL at 08:56

## 2024-01-30 RX ADMIN — GABAPENTIN 600 MG: 300 CAPSULE ORAL at 08:56

## 2024-01-30 RX ADMIN — SERTRALINE 200 MG: 100 TABLET, FILM COATED ORAL at 08:56

## 2024-01-30 RX ADMIN — PANTOPRAZOLE SODIUM 40 MG: 40 INJECTION, POWDER, FOR SOLUTION INTRAVENOUS at 01:01

## 2024-01-30 NOTE — ED NOTES
Informed admitting team that patient wanted to leave. Admitting provider reported they were rounding and would be down to see patient. Patient did not want to wait for provider. Pt started taking IV out and left through the ER.      Kelli Armstrong RN  01/30/24 0155

## 2024-01-30 NOTE — ED NOTES
"Pt requesting to go home and states \"I'm going to start ripping shit out.\" Admitting provider made aware.      Kelli Armstrong RN  01/30/24 3106    "

## 2024-01-30 NOTE — ASSESSMENT & PLAN NOTE
-Patient was admitted to ER with complaint of cough, dyspnea, chest pain.  -CXR: Accentuated lung markings in the left lower lung zone could represent atelectasis versus pneumonia.  Prominent peribronchial cuffing could be related to bronchitis.  Infiltrate in the left lower lobe  -Diagnosed with CAP was discharged on azithromycin and doxycycline.  - Revisited ER due to N/V and abdominal after taking doxycycline empty stomach.  -D/c augmentin and doxycycline due to N/V and abdominal pain.  - Started IV ceftriaxone 1 gm  d/c'd by patient   -Pending covid/flu panel.

## 2024-01-30 NOTE — ASSESSMENT & PLAN NOTE
-Patient presented to ER with abdominal pain along with N/V after taking doxycycline empty stomach.  -Known history of GERD.  -Most likely doxycycline induced GI upset.  -Ordered IV Protonix 40 mg.   -IV Zofran as needed

## 2024-01-30 NOTE — PROGRESS NOTES
"    INTERNAL MEDICINE RESIDENCY SENIOR ADMISSION NOTE     Name: Melia Lira   Age & Sex: 43 y.o. female   MRN: [id number]  Unit/Bed#: ED 03   Encounter: [id number]  Primary Care Provider: MONICA Colbert    Admit to team: SOD Team B     Patient seen and examined. Reviewed H&P per Dr. Olson . Agree with the assessment and plan with any exception/addition as noted below:    Melia Lira is a 43-year-old female with past medical history of panic disorder, GERD, asthma, hypertension who initially presented earlier in the day to the ED with shortness of breath, pleuritic chest pain and cough for 3-4 days.  She was afebrile and hemodynamically stable saturating well on room air.  Chest x-ray showed left lower lobe atelectasis versus pneumonia and findings concerning for bronchitis.  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  She was discharged home with Augmentin and doxycycline.  Patient presented later in the day to the ED again with complaint of upper abdominal pain, nausea and vomiting after she took doxycycline empty stomach.  She was given acid reducing medications in the ED and plan is to discharge home but when she was given the papers for discharge she said \" I will kill myself if I get discharged\".  Hence ED crisis worker was consulted and inpatient admission was requested for suicidal ideation.  On evaluation patient is afebrile, hemodynamically stable and saturating well on room air.  On interview patient constantly crying and only mentions that her nerves are really bad from being sick.  Does not give any further history.  On further questioning she did mention upper abdominal pain.  No associated fever, chills, hematemesis, blood in stool, melena, chest pain, headache.  On exam mild tenderness in the epigastric region palpated without any obvious mass, " guarding, rigidity, rebound.  No tenderness in the rest of the abdomen.  Labs showed normal CBC, mild hyponatremia, normal BNP, normal troponin, normal lipase.  Patient was admitted for inpatient treatment of pneumonia and suicidal ideation.    Active Problems:    Anxiety    Mild intermittent asthma without complication    Peripheral neuropathy    Primary hypertension    CAP (community acquired pneumonia)    Suicide ideation    Abdominal pain    Plan  Switch to IV ceftriaxone for pneumonia in view of oral intolerability  Follow-up on COVID test  IV pantoprazole every 24 hours  IV Zofran as needed  Every 15 minute safety checks due to suicidal ideation  One time diazepam IV 2.5 mg for panic attack  Continue home antianxiety medications  Follow-up on UDS  Consider psychiatry consult in morning  Continue home antihypertensive and inhaler for asthma      Code Status: Level 1 - Full Code  Admission Status: OBSERVATION  Disposition: Patient requires Med/Surg  Expected Length of Stay: <2 MN    Ventura Castellanos MD  PGY-2, Internal Medicine  [facility]

## 2024-01-30 NOTE — DISCHARGE INSTRUCTIONS
It is necessary to take the antibiotic prescribed today on a full stomach. Please take it with zofran to help with the nausea and vomitting.

## 2024-01-30 NOTE — ED PROVIDER NOTES
History  Chief Complaint   Patient presents with    Abdominal Pain     Pt seen earlier today for PNA. Pt called EMS d/t + n/v/diarrhea and abdominal pain.      HPI  43-year-old female with past medical history of asthma comes in with chest pain cough and shortness of breath for the past 4 days.  The patient says that the symptoms are worse over the past 24 hours and describes it as pain in a bandlike fashion across her entire chest that is burning and pleuritic.  Patient states that this is worse with cough.  She was seen earlier today and was discharged with a diagnosis of pneumonia.  The patient was offered inpatient treatment and she stated that she would like to go home due to her taking care of her special needs son.  Patient came back in today with nausea abdominal pain following the administration of her antibiotic given for her pneumonia.  Prior to Admission Medications   Prescriptions Last Dose Informant Patient Reported? Taking?   Compro 25 MG suppository   Yes No   Sig: INSERT 1 SUPPOSITORY RECTALLY (25 MG TOTAL) EVERY 12 HOURS AS NEEDED FOR NAUSEA AND VOMITING   LORazepam (Ativan) 0.5 mg tablet   No No   Sig: Take 2 tablets (1 mg total) by mouth daily as needed for anxiety   QUEtiapine (SEROquel) 50 mg tablet 2024  No Yes   Sig: Take 1 tablet (50 mg total) by mouth daily at bedtime   amoxicillin-clavulanate (AUGMENTIN) 875-125 mg per tablet   No No   Sig: Take 1 tablet by mouth every 12 (twelve) hours for 5 days   betamethasone valerate (LUXIQ) 0.12 % foam  Self No No   Sig: Apply topically daily   busPIRone (BUSPAR) 5 mg tablet 2024  No Yes   Sig: TAKE 1 TABLET BY MOUTH TWICE A DAY   doxycycline hyclate (VIBRAMYCIN) 100 mg capsule   No No   Sig: Take 1 capsule (100 mg total) by mouth every 12 (twelve) hours for 5 days   fluticasone (FLONASE) 50 mcg/act nasal spray  Self No No   Si spray into each nostril daily   gabapentin (Neurontin) 600 MG tablet Past Month  No Yes   Sig: Take 1 tablet  (600 mg total) by mouth 3 (three) times a day   hydrOXYzine pamoate (VISTARIL) 25 mg capsule   No No   Sig: TAKE 1 CAPSULE BY MOUTH 3 TIMES A DAY AS NEEDED FOR ANXIETY.   lisinopril (ZESTRIL) 20 mg tablet 1/29/2024  No Yes   Sig: Take 1 tablet (20 mg total) by mouth daily   metoclopramide (REGLAN) 5 mg tablet   No No   Sig: Take 1 tablet (5 mg total) by mouth 4 (four) times a day as needed (nausea)   nicotine (NICODERM CQ) 21 mg/24 hr TD 24 hr patch   No No   Sig: Place 1 patch on the skin over 24 hours every 24 hours   norgestimate-ethinyl estradiol (Ortho Tri-Cyclen Lo) 0.18/0.215/0.25 MG-25 MCG per tablet  Self No No   Sig: Take 1 tablet by mouth daily   omeprazole (PriLOSEC) 40 MG capsule   No No   Sig: Take 1 capsule (40 mg total) by mouth daily   ondansetron (Zofran ODT) 4 mg disintegrating tablet  Self No No   Sig: Take 1 tablet (4 mg total) by mouth every 6 (six) hours as needed for nausea or vomiting   Patient not taking: Reported on 2/2/2024   predniSONE 10 mg tablet   No No   Sig: Take 1 tablet (10 mg total) by mouth daily 6 tab day 1, 5 tab day 2, 4 tab day 3, 3 tab day 4, 2 tab day 5, 1 tab day 6   Patient not taking: Reported on 5/17/2023   prochlorperazine (COMPAZINE) 10 mg tablet  Self No No   Sig: Take 0.5 tablets (5 mg total) by mouth every 8 (eight) hours as needed for nausea or vomiting   promethazine (PHENERGAN) 25 mg tablet  Self No No   Sig: Take 1 tablet (25 mg total) by mouth every 6 (six) hours as needed for nausea or vomiting   sertraline (ZOLOFT) 100 mg tablet 1/29/2024  No Yes   Sig: TAKE 2 TABLETS BY MOUTH EVERY DAY      Facility-Administered Medications: None       Past Medical History:   Diagnosis Date    Anxiety     Arthritis     OA  b/l knees    Asthma     Approx 2 years ago    Calculus of gallbladder without cholecystitis without obstruction 02/23/2022    Chronic kidney disease     Depression     GERD (gastroesophageal reflux disease)     In my 20s. Approx 15 years ago    Hx of  bleeding disorder     dysmennorrhea-dx lap today 2016    Hypertension     Kidney stone     Obesity     Seasonal allergies        Past Surgical History:   Procedure Laterality Date     SECTION      CHOLECYSTECTOMY      COLONOSCOPY      DIAGNOSTIC LAPAROSCOPY      DILATION AND CURETTAGE OF UTERUS      HERNIA REPAIR      GA COLONOSCOPY FLX DX W/COLLJ SPEC WHEN PFRMD N/A 2018    Procedure: EGD AND COLONOSCOPY;  Surgeon: Allan Duncan MD;  Location: [facility];  Service: Gastroenterology    GA LAPAROSCOPY SURG CHOLECYSTECTOMY N/A 2022    Procedure: ROBOTIC LAPAROSCOPIC CHOLECYSTECTOMY;  Surgeon: Griffin Triplett DO;  Location: [facility];  Service: General    GA LAPAROSCOPY W/RMVL ADNEXAL STRUCTURES Bilateral 2016    Procedure: CYSTECTOMY  OVARIAN;  Surgeon: C William Riedel, DO;  Location: [facility];  Service: Gynecology    GA LAPS ABD PRTM&OMENTUM DX W/WO SPEC BR/WA SPX N/A 2016    Procedure: LAPAROSCOPY DIAGNOSTIC DAVID;  Surgeon: C William Riedel, DO;  Location: [facility];  Service: Gynecology    GA RPR UMBILICAL HRNA 5 YRS/> REDUCIBLE N/A 2022    Procedure: UMBILICAL HERNIA REPAIR;  Surgeon: Griffin Triplett DO;  [facility];  Service: General    UPPER GASTROINTESTINAL ENDOSCOPY      WISDOM TOOTH EXTRACTION         Family History   Problem Relation Age of Onset    No Known Problems Sister     Autism Daughter     Lung cancer Maternal Grandmother     Cancer Maternal Grandmother         Deceassd 10 years    Diabetes Maternal Grandfather     Arthritis Maternal Grandfather          10 years    Aneurysm Paternal Grandmother     No Known Problems Sister      I have reviewed and agree with the history as documented.    E-Cigarette/Vaping    E-Cigarette Use Never User      E-Cigarette/Vaping Substances    Nicotine No     THC No     CBD No     Flavoring No     Other No     Unknown No      Social History     Tobacco Use    Smoking status: Every Day     Current  packs/day: 1.00     Average packs/day: 1 pack/day for 23.0 years (23.0 ttl pk-yrs)     Types: Cigarettes    Smokeless tobacco: Never   Vaping Use    Vaping status: Never Used   Substance Use Topics    Alcohol use: Never     Comment: social    Drug use: Not Currently     Frequency: 7.0 times per week     Types: Marijuana     Comment: I exhaused all your ‘rx prescription-- NOTHING ELSE HELPED!!        Review of Systems   Constitutional:  Negative for chills and fever.   HENT:  Negative for ear pain and sore throat.    Eyes:  Negative for pain and visual disturbance.   Respiratory:  Positive for shortness of breath. Negative for cough.    Cardiovascular:  Negative for chest pain and palpitations.   Gastrointestinal:  Positive for abdominal pain, nausea and vomiting.   Genitourinary:  Negative for dysuria and hematuria.   Musculoskeletal:  Negative for arthralgias and back pain.   Skin:  Negative for color change and rash.   Neurological:  Negative for seizures and syncope.   Psychiatric/Behavioral:  Positive for agitation, behavioral problems, dysphoric mood and suicidal ideas. The patient is nervous/anxious.    All other systems reviewed and are negative.      Physical Exam  ED Triage Vitals [01/29/24 2048]   Temperature Pulse Respirations Blood Pressure SpO2   97.5 °F (36.4 °C) 75 20 151/94 96 %      Temp Source Heart Rate Source Patient Position - Orthostatic VS BP Location FiO2 (%)   Oral Monitor Sitting Left arm --      Pain Score       --             Orthostatic Vital Signs  Vitals:    01/30/24 0300 01/30/24 0600 01/30/24 0700 01/30/24 0800   BP: 134/87 108/71 128/74 129/79   Pulse: 77 77 72 76   Patient Position - Orthostatic VS: Lying Lying         Physical Exam  Vitals and nursing note reviewed.   Constitutional:       General: She is not in acute distress.     Appearance: She is well-developed.   HENT:      Head: Normocephalic and atraumatic.   Eyes:      Conjunctiva/sclera: Conjunctivae normal.    Cardiovascular:      Rate and Rhythm: Normal rate and regular rhythm.      Heart sounds: No murmur heard.  Pulmonary:      Effort: Pulmonary effort is normal. No respiratory distress.      Breath sounds: Normal breath sounds.   Abdominal:      General: Abdomen is flat. There is no distension or abdominal bruit. There are no signs of injury.      Palpations: Abdomen is soft. There is no shifting dullness, fluid wave, hepatomegaly, splenomegaly, mass or pulsatile mass.      Tenderness: There is generalized abdominal tenderness. There is no right CVA tenderness, left CVA tenderness, guarding or rebound. Negative signs include Nevarez's sign, Rovsing's sign, McBurney's sign, psoas sign and obturator sign.   Musculoskeletal:         General: No swelling.      Cervical back: Neck supple.   Skin:     General: Skin is warm and dry.      Capillary Refill: Capillary refill takes less than 2 seconds.   Neurological:      Mental Status: She is alert.   Psychiatric:         Mood and Affect: Mood normal.         ED Medications  Medications   ketorolac (FOR EMS ONLY) (TORADOL) injection 30 mg (0 mg Does not apply Given to EMS 1/29/24 2042)   ondansetron (FOR EMS ONLY) (ZOFRAN) 4 mg/2 mL injection 4 mg (0 mg Does not apply Given to EMS 1/29/24 2042)   ondansetron (ZOFRAN) injection 4 mg (4 mg Intravenous Given 1/29/24 2102)   multi-electrolyte (ISOLYTE-S PH 7.4) bolus 1,000 mL (0 mL Intravenous Stopped 1/29/24 2317)   sucralfate (CARAFATE) tablet 1 g (1 g Oral Given 1/29/24 2102)   famotidine (PEPCID) tablet 20 mg (20 mg Oral Given 1/29/24 2102)   ondansetron (ZOFRAN) injection 4 mg (4 mg Intravenous Given 1/29/24 2318)   diazepam (VALIUM) injection 2.5 mg (2.5 mg Intravenous Given 1/30/24 0040)   ceftriaxone (ROCEPHIN) 1 g/50 mL in dextrose IVPB (0 mg Intravenous Stopped 1/30/24 0136)   metoclopramide (REGLAN) injection 10 mg (10 mg Intravenous Given 1/30/24 0517)       Diagnostic Studies  Results Reviewed       Procedure  Component Value Units Date/Time    FLU/COVID - if FLU clinically relevant [[id number]]  (Abnormal) Collected: 01/29/24 2334    Lab Status: Final result Specimen: Nares from Nose Updated: 01/30/24 1157     SARS-CoV-2 Negative     INFLUENZA A PCR Negative     INFLUENZA B PCR Positive    Narrative:      FOR PEDIATRIC PATIENTS - copy/paste COVID Guidelines URL to browser: [email or web address]    SARS-CoV-2 assay is a Nucleic Acid Amplification assay intended for the  qualitative detection of nucleic acid from SARS-CoV-2 in nasopharyngeal  swabs. Results are for the presumptive identification of SARS-CoV-2 RNA.    Positive results are indicative of infection with SARS-CoV-2, the virus  causing COVID-19, but do not rule out bacterial infection or co-infection  with other viruses. Laboratories within the [place] and its  territories are required to report all positive results to the appropriate  public health authorities. Negative results do not preclude SARS-CoV-2  infection and should not be used as the sole basis for treatment or other  patient management decisions. Negative results must be combined with  clinical observations, patient history, and epidemiological information.  This test has not been FDA cleared or approved.    This test has been authorized by FDA under an Emergency Use Authorization  (EUA). This test is only authorized for the duration of time the  declaration that circumstances exist justifying the authorization of the  emergency use of an in vitro diagnostic tests for detection of SARS-CoV-2  virus and/or diagnosis of COVID-19 infection under section 564(b)(1) of  the Act, 21 U.S.C. 360bbb-3(b)(1), unless the authorization is terminated  or revoked sooner. The test has been validated but independent review by FDA  and CLIA is pending.    Test performed using the Roche cassandra TouchFrame0 System: This RT-PCR assay  targets ORF1, a region unique to  SARS-CoV-2. A conserved region in the  E-gene was chosen for pan-Sarbecovirus detection which includes  SARS-CoV-2.    According to CMS-2020-01-R, this platform meets the definition of high-throughput technology.      Procalcitonin [[id number]]  (Normal) Collected: 01/30/24 0414    Lab Status: Final result Specimen: Blood from Arm, Left Updated: 01/30/24 1113     Procalcitonin 0.09 ng/ml     Rapid drug screen, urine [[id number]]  (Abnormal) Collected: 01/30/24 0414    Lab Status: Final result Specimen: Urine, Clean Catch Updated: 01/30/24 0454     Amph/Meth UR Negative     Barbiturate Ur Negative     Benzodiazepine Urine Positive     Cocaine Urine Negative     Methadone Urine Negative     Opiate Urine Negative     PCP Ur Negative     THC Urine Negative     Oxycodone Urine Negative    Narrative:      Presumptive report. If requested, specimen will be sent to reference lab for confirmation.  FOR MEDICAL PURPOSES ONLY.   IF CONFIRMATION NEEDED PLEASE CONTACT THE LAB WITHIN 5 DAYS.    Drug Screen Cutoff Levels:  AMPHETAMINE/METHAMPHETAMINES  1000 ng/mL  BARBITURATES     200 ng/mL  BENZODIAZEPINES     200 ng/mL  COCAINE      300 ng/mL  METHADONE      300 ng/mL  OPIATES      300 ng/mL  PHENCYCLIDINE     25 ng/mL  THC       50 ng/mL  OXYCODONE      100 ng/mL    Basic metabolic panel [[id number]] Collected: 01/30/24 0414    Lab Status: Final result Specimen: Blood from Arm, Left Updated: 01/30/24 0449     Sodium 136 mmol/L      Potassium 3.6 mmol/L      Chloride 102 mmol/L      CO2 21 mmol/L      ANION GAP 13 mmol/L      BUN 13 mg/dL      Creatinine 0.61 mg/dL      Glucose 128 mg/dL      Calcium 9.4 mg/dL      eGFR 109 ml/min/1.73sq m     Narrative:      National Kidney Disease Foundation guidelines for Chronic Kidney Disease (CKD):     Stage 1 with normal or high GFR (GFR > 90 mL/min/1.73 square meters)    Stage 2 Mild CKD (GFR = 60-89 mL/min/1.73 square meters)    Stage 3A Moderate CKD (GFR = 45-59 mL/min/1.73 square  meters)    Stage 3B Moderate CKD (GFR = 30-44 mL/min/1.73 square meters)    Stage 4 Severe CKD (GFR = 15-29 mL/min/1.73 square meters)    Stage 5 End Stage CKD (GFR <15 mL/min/1.73 square meters)  Note: GFR calculation is accurate only with a steady state creatinine    Magnesium [[id number]]  (Normal) Collected: 01/30/24 0414    Lab Status: Final result Specimen: Blood from Arm, Left Updated: 01/30/24 0449     Magnesium 2.0 mg/dL     Phosphorus [[id number]]  (Normal) Collected: 01/30/24 0414    Lab Status: Final result Specimen: Blood from Arm, Left Updated: 01/30/24 0449     Phosphorus 3.8 mg/dL     CBC and differential [[id number]]  (Abnormal) Collected: 01/30/24 0414    Lab Status: Final result Specimen: Blood from Arm, Left Updated: 01/30/24 0428     WBC 10.71 Thousand/uL      RBC 3.82 Million/uL      Hemoglobin 12.4 g/dL      Hematocrit 35.5 %      MCV 93 fL      MCH 32.5 pg      MCHC 34.9 g/dL      RDW 13.1 %      MPV 9.8 fL      Platelets 186 Thousands/uL      nRBC 0 /100 WBCs      Neutrophils Relative 81 %      Immat GRANS % 1 %      Lymphocytes Relative 11 %      Monocytes Relative 7 %      Eosinophils Relative 0 %      Basophils Relative 0 %      Neutrophils Absolute 8.78 Thousands/µL      Immature Grans Absolute 0.05 Thousand/uL      Lymphocytes Absolute 1.15 Thousands/µL      Monocytes Absolute 0.71 Thousand/µL      Eosinophils Absolute 0.02 Thousand/µL      Basophils Absolute 0.00 Thousands/µL     POCT alcohol breath test [[id number]]  (Normal) Resulted: 01/29/24 2336    Lab Status: Final result Updated: 01/29/24 2336     EXTBreath Alcohol 0.00    Lipase [[id number]]  (Normal) Collected: 01/29/24 2108    Lab Status: Final result Specimen: Blood from Arm, Left Updated: 01/29/24 2140     Lipase 32 u/L                    No orders to display         Procedures  Procedures      ED Course  ED Course as of 02/06/24 1113   Mon Jan 29, 2024   2314 Patient upon discharge stated I can't take this anymore I'm going to  kill myslelf. No HI or hallucinations.                             SBIRT 22yo+      Flowsheet Row Most Recent Value   Initial Alcohol Screen: US AUDIT-C     1. How often do you have a drink containing alcohol? 0 Filed at: 01/29/2024 2050   3b. FEMALE Any Age, or MALE 65+: How often do you have 4 or more drinks on one occassion? 0 Filed at: 01/29/2024 2050   Audit-C Score 0 Filed at: 01/29/2024 2050   ZULMA: How many times in the past year have you...    Used an illegal drug or used a prescription medication for non-medical reasons? Never Filed at: 01/29/2024 2050                  Medical Decision Making  Amount and/or Complexity of Data Reviewed  Labs: ordered.    Risk  OTC drugs.  Prescription drug management.    43-year-old woman seen earlier in the day for pneumonia that was diagnosed on chest x-ray and given a dose of ceftriaxone/doxycycline and discharge per request of the patient so that she can take care of her autistic child.  Patient went home and had immense abdominal pain nausea and vomiting following administration of the doxycycline on an empty stomach.  On examination, the patient is crying nervous with emesis.  She does not have any cranial nerve abnormality her abdomen is soft with general diffuse abdominal tenderness.  Patient has a normal cardiac examination without any signs of murmurs gallops or thrills.  Patient peripheral examination is normal without any signs or symptoms of rash.  Patient is likely suffering from gastritis following administration of antibiotics and supportive measures will be done for the patient she was ultimately going to be discharged and the patient stated that she was going to kill herself.  Due to this and the patient's previous diagnosis of pneumonia, the patient was deemed too sick to go home and was admitted to internal medicine for treatment of her pneumonia and psychiatric evaluation in the morning.  Disposition  Final diagnoses:   Nausea and vomiting, unspecified  vomiting type - due to doxycycline on an empty stomach     Time reflects when diagnosis was documented in both MDM as applicable and the Disposition within this note       Time User Action Codes Description Comment    1/29/2024 10:59 PM Terrence Kelley Add [R11.2] Nausea and vomiting, unspecified vomiting type     1/29/2024 10:59 PM Terrence Kelley Modify [R11.2] Nausea and vomiting, unspecified vomiting type due to doxycycline on an empty stomach    1/30/2024 12:24 AM OlsonChapoma Add [F33.0] Mild episode of recurrent major depressive disorder (HCC)     1/30/2024 12:24 AM Olson, Aashima Remove [F33.0] Mild episode of recurrent major depressive disorder (HCC)     1/30/2024 12:24 AM Olson Aashima Add [F41.9] Anxiety     1/30/2024 12:25 AM Olson Aashima Remove [F41.9] Anxiety     1/30/2024 12:29 AM OlsonKiara moctezuma Add [R45.851] Suicide ideation           ED Disposition       ED Disposition   Left from Room after Provider Exam    Condition   --    Date/Time   Tue Jan 30, 2024 1028    Comment   Case was discussed with Dr Ventura Castellanos and the patient's admission status was agreed to be Admission Status: observation status to the service of Dr. Terry .               Follow-up Information       Follow up With Specialties Details Why Contact Info    MONICA Colbert Nurse Practitioner   [address] [place] [id number] [phone]              Discharge Medication List as of 1/29/2024 11:01 PM        START taking these medications    Details   ondansetron (ZOFRAN) 4 mg tablet Take 1 tablet (4 mg total) by mouth every 8 (eight) hours as needed for nausea or vomiting for up to 9 days, Starting Mon 1/29/2024, Until Wed 2/7/2024 at 2359, Normal           CONTINUE these medications which have NOT CHANGED    Details   busPIRone (BUSPAR) 5 mg tablet TAKE 1 TABLET BY MOUTH TWICE A DAY, Starting Fri 9/22/2023, Normal      gabapentin (Neurontin) 600 MG tablet Take 1 tablet (600 mg total) by mouth 3 (three) times  a day, Starting Fri 11/17/2023, Until Thu 2/15/2024, Normal      lisinopril (ZESTRIL) 20 mg tablet Take 1 tablet (20 mg total) by mouth daily, Starting Sat 4/29/2023, Normal      QUEtiapine (SEROquel) 50 mg tablet Take 1 tablet (50 mg total) by mouth daily at bedtime, Starting Fri 11/17/2023, Normal      sertraline (ZOLOFT) 100 mg tablet TAKE 2 TABLETS BY MOUTH EVERY DAY, Normal      albuterol (ProAir HFA) 90 mcg/act inhaler Inhale 2 puffs every 6 (six) hours as needed for wheezing, Starting Mon 12/6/2021, Normal      amoxicillin-clavulanate (AUGMENTIN) 875-125 mg per tablet Take 1 tablet by mouth every 12 (twelve) hours for 5 days, Starting Mon 1/29/2024, Until Sat 2/3/2024, Normal      betamethasone valerate (LUXIQ) 0.12 % foam Apply topically daily, Starting Wed 5/11/2022, Normal      Compro 25 MG suppository INSERT 1 SUPPOSITORY RECTALLY (25 MG TOTAL) EVERY 12 HOURS AS NEEDED FOR NAUSEA AND VOMITING, Historical Med      doxycycline hyclate (VIBRAMYCIN) 100 mg capsule Take 1 capsule (100 mg total) by mouth every 12 (twelve) hours for 5 days, Starting Mon 1/29/2024, Until Sat 2/3/2024, Normal      fluticasone (FLONASE) 50 mcg/act nasal spray 1 spray into each nostril daily, Starting Thu 5/5/2022, Normal      hydrOXYzine pamoate (VISTARIL) 25 mg capsule TAKE 1 CAPSULE BY MOUTH 3 TIMES A DAY AS NEEDED FOR ANXIETY., Normal      LORazepam (Ativan) 0.5 mg tablet Take 2 tablets (1 mg total) by mouth daily as needed for anxiety, Starting Wed 12/20/2023, Normal      metoclopramide (REGLAN) 5 mg tablet Take 1 tablet (5 mg total) by mouth 4 (four) times a day as needed (nausea), Starting Wed 5/17/2023, Normal      nicotine (NICODERM CQ) 21 mg/24 hr TD 24 hr patch Place 1 patch on the skin over 24 hours every 24 hours, Starting Fri 11/17/2023, Normal      norgestimate-ethinyl estradiol (Ortho Tri-Cyclen Lo) 0.18/0.215/0.25 MG-25 MCG per tablet Take 1 tablet by mouth daily, Starting Mon 7/11/2022, Until Tue 12/27/2022, Normal       omeprazole (PriLOSEC) 40 MG capsule Take 1 capsule (40 mg total) by mouth daily, Starting Wed 6/28/2023, Normal      ondansetron (Zofran ODT) 4 mg disintegrating tablet Take 1 tablet (4 mg total) by mouth every 6 (six) hours as needed for nausea or vomiting, Starting Mon 1/24/2022, Normal      prochlorperazine (COMPAZINE) 10 mg tablet Take 0.5 tablets (5 mg total) by mouth every 8 (eight) hours as needed for nausea or vomiting, Starting Wed 6/8/2022, Normal      promethazine (PHENERGAN) 25 mg tablet Take 1 tablet (25 mg total) by mouth every 6 (six) hours as needed for nausea or vomiting, Starting Fri 3/25/2022, Normal      predniSONE 10 mg tablet Take 1 tablet (10 mg total) by mouth daily 6 tab day 1, 5 tab day 2, 4 tab day 3, 3 tab day 4, 2 tab day 5, 1 tab day 6, Starting Sun 12/18/2022, Normal           No discharge procedures on file.    PDMP Review         Value Time User    PDMP Reviewed  Yes 12/20/2023  9:53 AM MONICA Colbert             ED Provider  Attending physically available and evaluated Melia Lira. I managed the patient along with the ED Attending.    Electronically Signed by           Terrence Kelley MD  02/06/24 5504

## 2024-01-30 NOTE — ED NOTES
[place] [facility] attempted to speak with patient before patient left the ED     Kelli Armstrong, THANH  01/30/24 7023

## 2024-01-30 NOTE — PROGRESS NOTES
INTERNAL MEDICINE RESIDENCY PROGRESS NOTE     Name: Melia Lira   Age & Sex: 43 y.o. female   MRN: [id number]  Unit/Bed#: ED 03   Encounter: [id number]  Team: {SOD Team:[id number]}    PATIENT INFORMATION     Name: Melia Lira   Age & Sex: 43 y.o. female   MRN: [id number]  Hospital Stay Days: 0    ASSESSMENT/PLAN     Principal Problem:    Suicide ideation  Active Problems:    Anxiety    Mild intermittent asthma without complication    Peripheral neuropathy    Primary hypertension    CAP (community acquired pneumonia)    Abdominal pain      Abdominal pain  Assessment & Plan  -Patient presented to ER with abdominal pain along with N/V after taking doxycycline empty stomach.  -Known history of GERD.  -Most likely doxycycline induced GI upset.  -Ordered IV Protonix 40 mg.   -IV Zofran as needed    CAP (community acquired pneumonia)  Assessment & Plan  -Patient was admitted to ER with complaint of cough, dyspnea, chest pain.  -CXR: Accentuated lung markings in the left lower lung zone could represent atelectasis versus pneumonia.  Prominent peribronchial cuffing could be related to bronchitis.  Infiltrate in the left lower lobe  -Diagnosed with CAP was discharged on azithromycin and doxycycline.  - Revisited ER due to N/V and abdominal after taking doxycycline empty stomach.  -D/c augmentin and doxycycline due to N/V and abdominal pain.  - Started IV ceftriaxone 1 gm   -Pending covid/flu panel.    Primary hypertension  Assessment & Plan  -Continue home lisinopril 20 mg.    Peripheral neuropathy  Assessment & Plan  -Continue Gabapentin 600 mg TID.     Mild episode of recurrent major depressive disorder (HCC)  Assessment & Plan  -Continue home Zoloft 200 mg and buspar 5 mg.    Essential hypertension  Assessment & Plan  -Ordered home lisinopril 20 mg.    Mild intermittent asthma without complication  Assessment & Plan  -Added as needed Albuterol inhaler for SOB.    Anxiety  Assessment & Plan  -Known history of  "anxiety.  -Continue home Zoloft 200 mg and buspar 5 mg.   -Takes as needed hydroxyzine 25 mg TID and Ativan.  - On examination, Patient is crying and very anxious.  -Ordered Diazepam 2.5 mg once.  - Can consider home hydroxyzine and ativan if needed.    * Suicide ideation  Assessment & Plan  -Patient stated she wants to kill herself because she cannot take the pain anymore.  -Anxious and crying on examination.  -On chart checking, noticed to have suicide ideation in the past due to stressors. Asked police to \"shoot her\".  -Q15 safety checks orderd  -Consider psychiatry consult.          Disposition: ***     SUBJECTIVE     Patient seen and examined. No acute events overnight. Patient was able to sleep, but was not able to take any food or water PO due to persisting nausea and over 5 bouts of emesis since arrival in ED. Pt reports that she initially presented to the ED yesterday morning with chest pain, dyspnea, and cough productive of thick green sputum x 4 days, worsening in intensity. Pt also reports concurrent fever, chills, headache, cold sweats. She had sick contacts of daughter and  who she reports also had URI, but were not seen and treated, and who have recovered today.     OBJECTIVE     Vitals:    24 0300 24 0600 24 0700 24 0800   BP: 134/87 108/71 128/74 129/79   BP Location: Left arm Right arm     Pulse: 77 77 72 76   Resp: 20 20     Temp:       TempSrc:       SpO2: 97% 93% 94% 91%      Temperature:   Temp (24hrs), Av.5 °F (36.4 °C), Min:97.5 °F (36.4 °C), Max:97.5 °F (36.4 °C)    Temperature: 97.5 °F (36.4 °C)  Intake & Output:  I/O          07 07 07 07 07    I.V.  1000     IV Piggyback  50     Total Intake  1050     Net  +1050                  Weights:        There is no height or weight on file to calculate BMI.  Weight (last 2 days)       None          Physical Exam  LABORATORY DATA     Labs: I have personally " reviewed pertinent reports.  Results from last 7 days   Lab Units 01/30/24  0414 01/29/24  0853   WBC Thousand/uL 10.71* 9.55   HEMOGLOBIN g/dL 12.4 12.7   HEMATOCRIT % 35.5 36.7   PLATELETS Thousands/uL 186 184   NEUTROS PCT % 81* 72   MONOS PCT % 7 10   EOS PCT % 0 0      Results from last 7 days   Lab Units 01/30/24  0414 01/29/24  0853   POTASSIUM mmol/L 3.6 3.5   CHLORIDE mmol/L 102 100   CO2 mmol/L 21 22   BUN mg/dL 13 10   CREATININE mg/dL 0.61 0.98   CALCIUM mg/dL 9.4 8.9   ALK PHOS U/L  --  76   ALT U/L  --  20   AST U/L  --  27     Results from last 7 days   Lab Units 01/30/24  0414   MAGNESIUM mg/dL 2.0     Results from last 7 days   Lab Units 01/30/24  0414   PHOSPHORUS mg/dL 3.8                    IMAGING & DIAGNOSTIC TESTING     Radiology Results: I have personally reviewed pertinent reports.  XR chest 2 views    Result Date: 1/29/2024  Impression: Accentuated lung markings in the left lower lung zone could represent atelectasis versus pneumonia. Prominent peribronchial cuffing could be related to bronchitis. Infiltrate in the left lower lobe was noted on the ER preliminary result. Resident: TOO CHEEMA I, the attending radiologist, have reviewed the images and agree with the final report above. Workstation performed: [id number]     Other Diagnostic Testing: I have personally reviewed pertinent reports.    ACTIVE MEDICATIONS     Current Facility-Administered Medications   Medication Dose Route Frequency    albuterol (PROVENTIL HFA,VENTOLIN HFA) inhaler 2 puff  2 puff Inhalation Q6H PRN    aluminum-magnesium hydroxide-simethicone (MAALOX) oral suspension 30 mL  30 mL Oral Once    busPIRone (BUSPAR) tablet 5 mg  5 mg Oral BID    ceftriaxone (ROCEPHIN) 1 g/50 mL in dextrose IVPB  1,000 mg Intravenous Q24H    fluticasone (FLONASE) 50 mcg/act nasal spray 1 spray  1 spray Nasal Daily    gabapentin (NEURONTIN) capsule 600 mg  600 mg Oral TID    lisinopril (ZESTRIL) tablet 20 mg  20 mg Oral Daily     "ondansetron (ZOFRAN) injection 4 mg  4 mg Intravenous Q8H PRN    pantoprazole (PROTONIX) injection 40 mg  40 mg Intravenous Q24H NINI    QUEtiapine (SEROquel) tablet 50 mg  50 mg Oral HS    sertraline (ZOLOFT) tablet 200 mg  200 mg Oral Daily       VTE Pharmacologic Prophylaxis: {Pharmacologic VTE Prophylaxis:163467771}  VTE Mechanical Prophylaxis: {Mechanical VTE Prophylaxis:88431}    Portions of the record may have been created with voice recognition software.  Occasional wrong word or \"sound a like\" substitutions may have occurred due to the inherent limitations of voice recognition software.  Read the chart carefully and recognize, using context, where substitutions have occurred.  ==  Marge Lopez  [facility]  Internal Medicine Residency PGY-***      "

## 2024-01-30 NOTE — ASSESSMENT & PLAN NOTE
"-Patient stated she wants to kill herself because she cannot take the pain anymore. Following day, patient denies SI, stating she \"just can't take the pain anymore\"  -Anxious and crying on examination.  -On chart checking, noticed to have suicide ideation in the past due to stressors. Asked police to \"shoot her\".  -Q15 safety checks orderd  -Inpatient psychiatry consult was going to be ordered, not completed due to pt leaving AMA.    "

## 2024-01-30 NOTE — ED NOTES
Discussed with pt that the admitting team was notified of her request and will be down for evaluation.       Enrico Nicolas RN  01/30/24 0992

## 2024-01-30 NOTE — ED CARE HANDOFF
Emergency Department Sign Out Note        Sign out and transfer of care from [facility]. See Separate Emergency Department note.     The patient, Melia Lira, was evaluated by the previous provider for adverse reaction to doxycycline with nausea and vomiting that she was started on to treat her pneumonia..    Workup Completed:  Patient had lab work and x-ray done earlier in the day and was diagnosed with pneumonia.    ED Course / Workup Pending (followup):  Plan per signout physician was to discharge the patient.  Patient feels too sick to go home.  Plan to have the internal medicine team SOD team admit the patient to the hospital for treatment of her pneumonia.  Results Reviewed       Procedure Component Value Units Date/Time    FLU/COVID - if FLU clinically relevant [[id number]] Collected: 01/29/24 2334    Lab Status: In process Specimen: Nares from Nose Updated: 01/29/24 2342    POCT alcohol breath test [[id number]]  (Normal) Resulted: 01/29/24 2336    Lab Status: Final result Updated: 01/29/24 2336     EXTBreath Alcohol 0.00    Rapid drug screen, urine [[id number]]     Lab Status: No result Specimen: Urine     Lipase [[id number]]  (Normal) Collected: 01/29/24 2108    Lab Status: Final result Specimen: Blood from Arm, Left Updated: 01/29/24 2140     Lipase 32 u/L           No orders to display     Results Reviewed       Procedure Component Value Units Date/Time    FLU/COVID - if FLU clinically relevant [[id number]] Collected: 01/29/24 2334    Lab Status: In process Specimen: Nares from Nose Updated: 01/29/24 2342    POCT alcohol breath test [[id number]]  (Normal) Resulted: 01/29/24 2336    Lab Status: Final result Updated: 01/29/24 2336     EXTBreath Alcohol 0.00    Rapid drug screen, urine [[id number]]     Lab Status: No result Specimen: Urine     Lipase [[id number]]  (Normal) Collected: 01/29/24 2108    Lab Status: Final result Specimen: Blood from Arm, Left Updated: 01/29/24 2140     Lipase 32 u/L            No orders to display                                         Procedures  Medical Decision Making  Amount and/or Complexity of Data Reviewed  Labs: ordered.    Risk  OTC drugs.  Prescription drug management.            Disposition  Final diagnoses:   Nausea and vomiting, unspecified vomiting type - due to doxycycline on an empty stomach     Time reflects when diagnosis was documented in both MDM as applicable and the Disposition within this note       Time User Action Codes Description Comment    1/29/2024 10:59 PM Terrence Kelley Add [R11.2] Nausea and vomiting, unspecified vomiting type     1/29/2024 10:59 PM Terrence Kelley Modify [R11.2] Nausea and vomiting, unspecified vomiting type due to doxycycline on an empty stomach          ED Disposition       ED Disposition   Discharge    Condition   Stable    Date/Time   Mon Jan 29, 2024 11:01 PM    Comment   Melia Lira discharge to home/self care.                   Follow-up Information       Follow up With Specialties Details Why Contact Info    MONICA Colbert Nurse Practitioner   [address] [place] [id number] [phone]            Patient's Medications   Discharge Prescriptions    ONDANSETRON (ZOFRAN) 4 MG TABLET    Take 1 tablet (4 mg total) by mouth every 8 (eight) hours as needed for nausea or vomiting for up to 9 days       Start Date: 1/29/2024 End Date: 2/7/2024       Order Dose: 4 mg       Quantity: 28 tablet    Refills: 0     No discharge procedures on file.       ED Provider  Electronically Signed by     Sinan Julien MD  01/29/24 2777

## 2024-01-30 NOTE — DISCHARGE SUMMARY
"      INTERNAL MEDICINE RESIDENCY DISCHARGE SUMMARY     Melia Lira   43 y.o. female  MRN: [id number]  Room/Bed: ED 03/ED 03     [facility]    Encounter: [id number]    Principal Problem:    CAP (community acquired pneumonia)  Active Problems:    Anxiety    Mild intermittent asthma without complication    Peripheral neuropathy    Primary hypertension    Suicide ideation    Abdominal pain      * CAP (community acquired pneumonia)  Assessment & Plan  -Patient was admitted to ER with complaint of cough, dyspnea, chest pain.  -CXR: Accentuated lung markings in the left lower lung zone could represent atelectasis versus pneumonia.  Prominent peribronchial cuffing could be related to bronchitis.  Infiltrate in the left lower lobe  -Diagnosed with CAP was discharged on azithromycin and doxycycline.  - Revisited ER due to N/V and abdominal after taking doxycycline empty stomach.  -D/c augmentin and doxycycline due to N/V and abdominal pain.  - Started IV ceftriaxone 1 gm  d/c'd by patient   -Pending covid/flu panel.    Abdominal pain  Assessment & Plan  -Patient presented to ER with abdominal pain along with N/V after taking doxycycline empty stomach.  -Known history of GERD.  -Most likely doxycycline induced GI upset.  -Ordered IV Protonix 40 mg.   -IV Zofran as needed    Suicide ideation  Assessment & Plan  -Patient stated she wants to kill herself because she cannot take the pain anymore. Following day, patient denies SI, stating she \"just can't take the pain anymore\"  -Anxious and crying on examination.  -On chart checking, noticed to have suicide ideation in the past due to stressors. Asked police to \"shoot her\".  -Q15 safety checks orderd  -Inpatient psychiatry consult was going to be ordered, not completed due to pt leaving AMA.      Primary hypertension  Assessment & Plan  -Continue home lisinopril 20 mg.    Peripheral neuropathy  Assessment & Plan  -Continue Gabapentin 600 " "mg TID.     Mild episode of recurrent major depressive disorder (HCC)  Assessment & Plan  -Continue home Zoloft 200 mg and buspar 5 mg.    Essential hypertension  Assessment & Plan  -Ordered home lisinopril 20 mg.    Mild intermittent asthma without complication  Assessment & Plan  -Added as needed Albuterol inhaler for SOB.    Anxiety  Assessment & Plan  -Known history of anxiety.  -Continue home Zoloft 200 mg and buspar 5 mg.   -Takes as needed hydroxyzine 25 mg TID and Ativan.  - On examination, Patient is crying and very anxious.  -Ordered Diazepam 2.5 mg once.  - Can consider home hydroxyzine and ativan if needed.        DETAILS OF HOSPITAL COURSE     HPI as per Dr. Olson, \"43-year-old female with known history of HTN, Anxiety, GERD, Asthma presenting today with complaint of abdominal pain, N/V and diarrhea after taking prescribed doxycycline empty stomach. She was admitted earlier in the ER due to cough, chest pain, SOB with CXR indicative of focal pneumonia and was discharged on Doxycycline and augmentin for CAP. Patient was poor historian. She was crying and kept saying \"she does not know\" to most of the questions. She mentioned that \"she will kill herself, if she gets discharged\". She states she is feeling horrible because of illness she has not been able to eat and drink properly. She did not notice any blood in emesis.\"    During ED course, patient received fluid bolus, IM zofran, and GI cocktail. ED Crisis worker was consulted and inpatient admission was requested for suicidal ideation. Home medications were continued including lisinopril, zoloft, buspar, and gabapentin. Pt was switched to IV ceftriaxone for CAP treatment given oral intolerance. Flu/COVID panel pending, UDS negative except benzodiazepine, pt on PRN ativan at home.     Patient seen and examined. No acute events overnight. Patient was able to sleep, but was not able to tolerate PO due to persisting nausea and had over 5 bouts of emesis " "since arrival in ED. Additional history was obtained.  Pt reports that she initially presented to the ED yesterday morning with chest pain, dyspnea, and cough productive of thick green sputum x 4 days, worsening in intensity. Pt also reports concurrent fever, chills, headache, cold sweats. She had sick contacts of daughter and  who she reports also had URI, but were not seen and treated, and who have recovered today. Pt reports her depression and anxiety are well controlled, and denied any SI or HI at the present moment, stating she \"just can't take the pain anymore\".     Before rounding with attending, she left AMA due to \"just need to get out of the hospital\". The plan was to obtain psychiatric consult to evaluate for possible involuntary hospitalization due to suicidal ideation on presentation which however was not able to be done due to pt leaving AMA.       Physical Exam  Constitutional:       Appearance: Normal appearance. She is obese.   HENT:      Head: Normocephalic and atraumatic.   Cardiovascular:      Rate and Rhythm: Normal rate and regular rhythm.      Pulses: Normal pulses.      Heart sounds: Normal heart sounds.   Pulmonary:      Effort: Pulmonary effort is normal.      Breath sounds: Normal breath sounds.   Abdominal:      General: Abdomen is flat. Bowel sounds are normal.   Neurological:      General: No focal deficit present.      Mental Status: She is alert and oriented to person, place, and time.   Psychiatric:      Comments: Pt denies SI, HI. Labile affect and possibly impaired judgment           DISCHARGE INFORMATION     PCP at Discharge:  MONICA Colbert    Admitting Provider: Anjel Terry MD  Admission Date: 1/29/2024    Discharge Provider: No att. providers found  Discharge Date: 1/30/2024    Discharge Disposition: Home/Self Care  Discharge Condition: poor  Discharge with Lines: no    Discharge Diet: regular diet, encourage fluids, and encouraged food and water PO as " tolerated  Activity Restrictions: none  Test Results Pending at Discharge: Flu/COVID swab    Discharge Diagnoses:  Principal Problem:    CAP (community acquired pneumonia)  Active Problems:    Anxiety    Mild intermittent asthma without complication    Peripheral neuropathy    Primary hypertension    Suicide ideation    Abdominal pain  Resolved Problems:    * No resolved hospital problems. *      Consulting Providers: none      Diagnostic & Therapeutic Procedures Performed:  XR chest 2 views    Result Date: 1/29/2024  Impression: Accentuated lung markings in the left lower lung zone could represent atelectasis versus pneumonia. Prominent peribronchial cuffing could be related to bronchitis. Infiltrate in the left lower lobe was noted on the ER preliminary result. Resident: TOO CHEEMA I, the attending radiologist, have reviewed the images and agree with the final report above. Workstation performed: [id number]       Code Status: Prior  Advance Directive & Living Will: <no information>  Power of :    POLST:      Medications:  Current Discharge Medication List        Current Discharge Medication List        START taking these medications    Details   ondansetron (ZOFRAN) 4 mg tablet Take 1 tablet (4 mg total) by mouth every 8 (eight) hours as needed for nausea or vomiting for up to 9 days  Qty: 28 tablet, Refills: 0    Associated Diagnoses: Nausea and vomiting, unspecified vomiting type           Current Discharge Medication List        CONTINUE these medications which have NOT CHANGED    Details   albuterol (ProAir HFA) 90 mcg/act inhaler Inhale 2 puffs every 6 (six) hours as needed for wheezing  Qty: 18 g, Refills: 0    Comments: Substitution to a formulary equivalent within the same pharmaceutical class is authorized.  Associated Diagnoses: Mild intermittent asthma without complication      busPIRone (BUSPAR) 5 mg tablet TAKE 1 TABLET BY MOUTH TWICE A DAY  Qty: 180 tablet, Refills: 0    Associated  Diagnoses: Anxiety      gabapentin (Neurontin) 600 MG tablet Take 1 tablet (600 mg total) by mouth 3 (three) times a day  Qty: 270 tablet, Refills: 0    Associated Diagnoses: Neuropathy associated with polyneuropathy, organomegaly, endocrinopathy, monoclonal gammopathy, and skin changes syndrome (HCC)      lisinopril (ZESTRIL) 20 mg tablet Take 1 tablet (20 mg total) by mouth daily  Qty: 90 tablet, Refills: 3    Associated Diagnoses: Essential hypertension      QUEtiapine (SEROquel) 50 mg tablet Take 1 tablet (50 mg total) by mouth daily at bedtime  Qty: 90 tablet, Refills: 1    Associated Diagnoses: Insomnia, unspecified type      sertraline (ZOLOFT) 100 mg tablet TAKE 2 TABLETS BY MOUTH EVERY DAY  Qty: 180 tablet, Refills: 1    Associated Diagnoses: Anxiety      amoxicillin-clavulanate (AUGMENTIN) 875-125 mg per tablet Take 1 tablet by mouth every 12 (twelve) hours for 5 days  Qty: 10 tablet, Refills: 0    Associated Diagnoses: Community acquired pneumonia of left lower lobe of lung      betamethasone valerate (LUXIQ) 0.12 % foam Apply topically daily  Qty: 100 g, Refills: 1    Associated Diagnoses: Dermatitis      Compro 25 MG suppository INSERT 1 SUPPOSITORY RECTALLY (25 MG TOTAL) EVERY 12 HOURS AS NEEDED FOR NAUSEA AND VOMITING      doxycycline hyclate (VIBRAMYCIN) 100 mg capsule Take 1 capsule (100 mg total) by mouth every 12 (twelve) hours for 5 days  Qty: 10 capsule, Refills: 0    Associated Diagnoses: Community acquired pneumonia of left lower lobe of lung      fluticasone (FLONASE) 50 mcg/act nasal spray 1 spray into each nostril daily  Qty: 15.8 mL, Refills: 0    Associated Diagnoses: Acute otitis externa of both ears, unspecified type      hydrOXYzine pamoate (VISTARIL) 25 mg capsule TAKE 1 CAPSULE BY MOUTH 3 TIMES A DAY AS NEEDED FOR ANXIETY.  Qty: 270 capsule, Refills: 1    Associated Diagnoses: Anxiety      LORazepam (Ativan) 0.5 mg tablet Take 2 tablets (1 mg total) by mouth daily as needed for  anxiety  Qty: 60 tablet, Refills: 0    Associated Diagnoses: Anxiety      metoclopramide (REGLAN) 5 mg tablet Take 1 tablet (5 mg total) by mouth 4 (four) times a day as needed (nausea)  Qty: 30 tablet, Refills: 0    Associated Diagnoses: Nausea      nicotine (NICODERM CQ) 21 mg/24 hr TD 24 hr patch Place 1 patch on the skin over 24 hours every 24 hours  Qty: 28 patch, Refills: 3    Associated Diagnoses: Tobacco abuse      norgestimate-ethinyl estradiol (Ortho Tri-Cyclen Lo) 0.18/0.215/0.25 MG-25 MCG per tablet Take 1 tablet by mouth daily  Qty: 84 tablet, Refills: 3    Associated Diagnoses: Menopausal syndrome (hot flashes)      omeprazole (PriLOSEC) 40 MG capsule Take 1 capsule (40 mg total) by mouth daily  Qty: 90 capsule, Refills: 1    Associated Diagnoses: Gastroesophageal reflux disease without esophagitis      ondansetron (Zofran ODT) 4 mg disintegrating tablet Take 1 tablet (4 mg total) by mouth every 6 (six) hours as needed for nausea or vomiting  Qty: 20 tablet, Refills: 0    Associated Diagnoses: Nausea and vomiting      predniSONE 10 mg tablet Take 1 tablet (10 mg total) by mouth daily 6 tab day 1, 5 tab day 2, 4 tab day 3, 3 tab day 4, 2 tab day 5, 1 tab day 6  Qty: 21 tablet, Refills: 0    Associated Diagnoses: Numbness and tingling in left hand      prochlorperazine (COMPAZINE) 10 mg tablet Take 0.5 tablets (5 mg total) by mouth every 8 (eight) hours as needed for nausea or vomiting  Qty: 3 tablet, Refills: 0    Associated Diagnoses: Nausea and vomiting, unspecified vomiting type      promethazine (PHENERGAN) 25 mg tablet Take 1 tablet (25 mg total) by mouth every 6 (six) hours as needed for nausea or vomiting  Qty: 20 tablet, Refills: 0    Associated Diagnoses: Cyclical vomiting syndrome             Allergies:  Allergies   Allergen Reactions    Eggs Or Egg-Derived Products - Food Allergy Other (See Comments)     abd pain       FOLLOW-UP     PCP Outpatient Follow-up:  Yes    Consulting Providers  "Follow-up:  none required     Active Issues Requiring Follow-up:   none    Discharge Statement:   I spent  0  minutes discharging the patient. This time was spent on the day of discharge. I had direct contact with the patient on the day of discharge. Additional documentation is required if more than 30 minutes were spent on discharge.    Portions of the record may have been created with voice recognition software.  Occasional wrong word or \"sound a like\" substitutions may have occurred due to the inherent limitations of voice recognition software.  Read the chart carefully and recognize, using context, where substitutions have occurred.    ==  Salinas Jeong,   [facility]        "

## 2024-01-30 NOTE — ASSESSMENT & PLAN NOTE
-Known history of anxiety.  -Continue home Zoloft 200 mg and buspar 5 mg.   -Takes as needed hydroxyzine 25 mg TID and Ativan.  - On examination, Patient is crying and very anxious.  -Ordered Diazepam 2.5 mg once.  - Can consider home hydroxyzine and ativan if needed.

## 2024-01-30 NOTE — ED ATTENDING ATTESTATION
Final Diagnoses:     1. Nausea and vomiting, unspecified vomiting type           I, William Villafana MD, saw and evaluated the patient. All available labs and X-rays were ordered by me or the resident / non-physician and have been reviewed by myself. I discussed the patient with the resident / non-physician and agree with the resident's / non-physician practitioner's findings and plan as documented in the resident's / non-physician practicitioner's note, except where noted.   At this point, I agree with the current assessment done in the ED.   I was present during key portions of all procedures performed unless otherwise stated.     HPI:  NURSING TRIAGE:    This is a 43 y.o. female presenting for evaluation of SOB.  Had a pneumonia diagnosed earlier, LLL PNA.   Treated with ceftriaxone + doxycycline.   She felt nauseous, crying. +abdominal pain.   Chief Complaint   Patient presents with    Abdominal Pain     Pt seen earlier today for PNA. Pt called EMS d/t + n/v/diarrhea and abdominal pain.       PHYSICAL: ASSESSMENT + PLAN:   Pertinent: crying, nauseous  nNBNB emesis.     General: VS reviewed  awake, alert.   Well-nourished, well-developed. Appears stated age.   Head: Normocephalic, atraumatic  Eyes: EOM-I. No diplopia.   No hyphema.   No subconjunctival hemorrhages.  Symmetrical lids.   ENT: Atraumatic external nose and ears.    MMM  No malocclusion. No stridor. Normal phonation. No drooling. Normal swallowing.   Neck: No JVD.  CV: No pallor noted  Lungs:   No tachypnea  No respiratory distress  Abd: soft nt nd no rebound/guarding  MSK:   FROM spontaneously  Skin: Dry, intact.   Neuro: Awake, alert, GCS15, CN II-XII grossly intact.   Motor grossly intact.  Psychiatric/Behavioral: interacting normally; appropriate mood/affect.    Exam: deferred    Vitals:    01/29/24 2048   BP: 151/94   BP Location: Left arm   Pulse: 75   Resp: 20   Temp: 97.5 °F (36.4 °C)   TempSrc: Oral   SpO2: 96%    Gastritis 2/2  doxycycline on an empty stomach  Supportive measures  Lipase for pancreatitis, but this is lower yield.  Likely DC with supportive med.s      There are no obvious limitations to social determinants of care.   Nursing note reviewed.   Vitals reviewed.   Orders placed by myself and/or advanced practitioner / resident.    Previous chart was reviewed  No language barrier.   History obtained from patient.    There are no limitations to the history obtained:     Past Medical: Past Surgical:    has a past medical history of Anxiety, Arthritis, Asthma, Calculus of gallbladder without cholecystitis without obstruction (2022), Chronic kidney disease, Depression, GERD (gastroesophageal reflux disease), bleeding disorder, Hypertension, Kidney stone, Obesity, and Seasonal allergies.  has a past surgical history that includes  section; [place] tooth extraction; Diagnostic laparoscopy; pr laps abd prtm&omentum dx w/wo spec br/wa spx (N/A, 2016); pr laparoscopy w/rmvl adnexal structures (Bilateral, 2016); Dilation and curettage of uterus (); pr colonoscopy flx dx w/collj spec when pfrmd (N/A, 2018); pr laparoscopy surg cholecystectomy (N/A, 2022); pr rpr umbilical hrna 5 yrs/> reducible (N/A, 2022); Cholecystectomy; Hernia repair; Colonoscopy; and Upper gastrointestinal endoscopy.   Social: Cardiac (Echo/Cath)   Social History     Substance and Sexual Activity   Alcohol Use Never    Comment: social     Social History     Tobacco Use   Smoking Status Every Day    Current packs/day: 1.00    Average packs/day: 1 pack/day for 23.0 years (23.0 ttl pk-yrs)    Types: Cigarettes   Smokeless Tobacco Never     Social History     Substance and Sexual Activity   Drug Use Not Currently    Frequency: 7.0 times per week    Types: Marijuana    Comment: I exhaused all your ‘rx prescription-- NOTHING ELSE HELPED!!    No results found for this or any previous visit.    No results found for this or any  previous visit.    No results found for this or any previous visit.     Labs: Imaging:   Labs Reviewed   LIPASE - Normal       Result Value Ref Range Status    Lipase 32  11 - 82 u/L Final    No orders to display      Medications: Code Status:   Medications   aluminum-magnesium hydroxide-simethicone (MAALOX) oral suspension 30 mL (30 mL Oral Not Given 1/29/24 2102)   ketorolac (FOR EMS ONLY) (TORADOL) injection 30 mg (0 mg Does not apply Given to EMS 1/29/24 2042)   ondansetron (FOR EMS ONLY) (ZOFRAN) 4 mg/2 mL injection 4 mg (0 mg Does not apply Given to EMS 1/29/24 2042)   ondansetron (ZOFRAN) injection 4 mg (4 mg Intravenous Given 1/29/24 2102)   multi-electrolyte (ISOLYTE-S PH 7.4) bolus 1,000 mL (1,000 mL Intravenous New Bag 1/29/24 2107)   sucralfate (CARAFATE) tablet 1 g (1 g Oral Given 1/29/24 2102)   famotidine (PEPCID) tablet 20 mg (20 mg Oral Given 1/29/24 2102)    Code Status: Prior  Advance Directive and Living Will:      Power of :    POLST:       Orders Placed This Encounter   Procedures    Lipase     Time reflects when diagnosis was documented in both MDM as applicable and the Disposition within this note       Time User Action Codes Description Comment    1/29/2024 10:59 PM Terrence Kelley Add [R11.2] Nausea and vomiting, unspecified vomiting type     1/29/2024 10:59 PM Terrence Kelley Modify [R11.2] Nausea and vomiting, unspecified vomiting type due to doxycycline on an empty stomach          ED Disposition       ED Disposition   Discharge    Condition   Stable    Date/Time   Mon Jan 29, 2024 11:01 PM    Comment   Melia LALA Pankaj discharge to home/self care.                   Follow-up Information       Follow up With Specialties Details Why Contact Info    MONICA Colbert Nurse Practitioner   [id number] [place] [id number] [phone]            Patient's Medications   Discharge Prescriptions    ONDANSETRON (ZOFRAN) 4 MG TABLET    Take 1 tablet (4 mg total) by mouth every  8 (eight) hours as needed for nausea or vomiting for up to 9 days       Start Date: 2024 End Date: 2024       Order Dose: 4 mg       Quantity: 28 tablet    Refills: 0     No discharge procedures on file.  Prior to Admission Medications   Prescriptions Last Dose Informant Patient Reported? Taking?   Compro 25 MG suppository   Yes No   Sig: INSERT 1 SUPPOSITORY RECTALLY (25 MG TOTAL) EVERY 12 HOURS AS NEEDED FOR NAUSEA AND VOMITING   LORazepam (Ativan) 0.5 mg tablet   No No   Sig: Take 2 tablets (1 mg total) by mouth daily as needed for anxiety   QUEtiapine (SEROquel) 50 mg tablet   No No   Sig: Take 1 tablet (50 mg total) by mouth daily at bedtime   albuterol (ProAir HFA) 90 mcg/act inhaler  Self No No   Sig: Inhale 2 puffs every 6 (six) hours as needed for wheezing   amoxicillin-clavulanate (AUGMENTIN) 875-125 mg per tablet   No No   Sig: Take 1 tablet by mouth every 12 (twelve) hours for 5 days   betamethasone valerate (LUXIQ) 0.12 % foam  Self No No   Sig: Apply topically daily   busPIRone (BUSPAR) 5 mg tablet   No No   Sig: TAKE 1 TABLET BY MOUTH TWICE A DAY   doxycycline hyclate (VIBRAMYCIN) 100 mg capsule   No No   Sig: Take 1 capsule (100 mg total) by mouth every 12 (twelve) hours for 5 days   fluticasone (FLONASE) 50 mcg/act nasal spray  Self No No   Si spray into each nostril daily   gabapentin (Neurontin) 600 MG tablet   No No   Sig: Take 1 tablet (600 mg total) by mouth 3 (three) times a day   hydrOXYzine pamoate (VISTARIL) 25 mg capsule   No No   Sig: TAKE 1 CAPSULE BY MOUTH 3 TIMES A DAY AS NEEDED FOR ANXIETY.   lisinopril (ZESTRIL) 20 mg tablet   No No   Sig: Take 1 tablet (20 mg total) by mouth daily   metoclopramide (REGLAN) 5 mg tablet   No No   Sig: Take 1 tablet (5 mg total) by mouth 4 (four) times a day as needed (nausea)   nicotine (NICODERM CQ) 21 mg/24 hr TD 24 hr patch   No No   Sig: Place 1 patch on the skin over 24 hours every 24 hours   norgestimate-ethinyl estradiol (Ortho  "Tri-Cyclen Lo) 0.18/0.215/0.25 MG-25 MCG per tablet  Self No No   Sig: Take 1 tablet by mouth daily   omeprazole (PriLOSEC) 40 MG capsule   No No   Sig: Take 1 capsule (40 mg total) by mouth daily   ondansetron (Zofran ODT) 4 mg disintegrating tablet  Self No No   Sig: Take 1 tablet (4 mg total) by mouth every 6 (six) hours as needed for nausea or vomiting   predniSONE 10 mg tablet   No No   Sig: Take 1 tablet (10 mg total) by mouth daily 6 tab day 1, 5 tab day 2, 4 tab day 3, 3 tab day 4, 2 tab day 5, 1 tab day 6   Patient not taking: Reported on 5/17/2023   prochlorperazine (COMPAZINE) 10 mg tablet  Self No No   Sig: Take 0.5 tablets (5 mg total) by mouth every 8 (eight) hours as needed for nausea or vomiting   promethazine (PHENERGAN) 25 mg tablet  Self No No   Sig: Take 1 tablet (25 mg total) by mouth every 6 (six) hours as needed for nausea or vomiting   sertraline (ZOLOFT) 100 mg tablet   No No   Sig: TAKE 2 TABLETS BY MOUTH EVERY DAY      Facility-Administered Medications: None                        Portions of the record may have been created with voice recognition software. Occasional wrong word or \"sound a like\" substitutions may have occurred due to the inherent limitations of voice recognition software. Read the chart carefully and recognize, using context, where substitutions have occurred.    Electronically signed by:  William Villafana  "

## 2024-01-30 NOTE — H&P
"      INTERNAL MEDICINE RESIDENCY ADMISSION H&P     Name: Melia Lira   Age & Sex: 43 y.o. female   MRN: [id number]  Unit/Bed#: ED 03   Encounter: [id number]  Primary Care Provider: MONICA Colbert    Code Status: Level 1 - Full Code  Admission Status: OBSERVATION  Disposition: Patient requires Med/Surg    Admit to team: SOD Team B     ASSESSMENT/PLAN     Active Problems:    Anxiety    Mild intermittent asthma without complication    Peripheral neuropathy    Primary hypertension    CAP (community acquired pneumonia)    Suicide ideation    Abdominal pain      Abdominal pain  Assessment & Plan  -Patient presented to ER with abdominal pain along with N/V after taking doxycycline empty stomach.  -Known history of GERD.  -Most likely doxycycline induced GI upset.  -Ordered IV Protonix 40 mg.   -IV Zofran as needed    Suicide ideation  Assessment & Plan  -Patient stated she wants to kill herself because she cannot take the pain anymore.  -Anxious and crying on examination.  -On chart checking, noticed to have suicide ideation in the past due to stressors. Asked police to \"shoot her\".  -Q15 safety checks orderd  -Consider psychiatry consult.      Anxiety  Assessment & Plan  -Known history of anxiety.  -Continue home Zoloft 200 mg and buspar 5 mg.   -Takes as needed hydroxyzine 25 mg TID and Ativan.  - On examination, Patient is crying and very anxious.  -Ordered Diazepam 2.5 mg once.  - Can consider home hydroxyzine and ativan if needed.    CAP (community acquired pneumonia)  Assessment & Plan  -Patient was admitted to ER with complaint of cough, dyspnea, chest pain.  -CXR: Accentuated lung markings in the left lower lung zone could represent atelectasis versus pneumonia.  Prominent peribronchial cuffing could be related to bronchitis.  Infiltrate in the left lower lobe  -Diagnosed with CAP was discharged on azithromycin and doxycycline.  - Revisited ER due to N/V and abdominal after taking doxycycline empty " "stomach.  -D/c augmentin and doxycycline due to N/V and abdominal pain.  - Started IV ceftriaxone 1 gm   -Pending covid/flu panel.    Primary hypertension  Assessment & Plan  -Continue home lisinopril 20 mg.    Peripheral neuropathy  Assessment & Plan  -Continue Gabapentin 600 mg TID.     Mild episode of recurrent major depressive disorder (HCC)  Assessment & Plan  -Continue home Zoloft 200 mg and buspar 5 mg.    Essential hypertension  Assessment & Plan  -Ordered home lisinopril 20 mg.    Mild intermittent asthma without complication  Assessment & Plan  -Added as needed Albuterol inhaler for SOB.        VTE Pharmacologic Prophylaxis: Reason for no pharmacologic prophylaxis Low risk for VTE.  VTE Mechanical Prophylaxis: sequential compression device    CHIEF COMPLAINT     Chief Complaint   Patient presents with    Abdominal Pain     Pt seen earlier today for PNA. Pt called EMS d/t + n/v/diarrhea and abdominal pain.       HISTORY OF PRESENT ILLNESS     43-year-old female with known history of HTN, Anxiety, GERD, Asthma presenting today with complaint of abdominal pain, N/V and diarrhea after taking prescribed doxycycline empty stomach. She was admitted earlier in the ER due to cough, chest pain, SOB and was discharged on Doxycycline and augmentin for CAP. Patient is a poor historian. She was crying and kept saying \"she does not know\" to most of the questions. She mentioned that \"she will kill herself, if she gets discharged\". She states she is feeling horrible because of illness she has not been able to eat and drink properly. She did not notice any blood in emesis.    REVIEW OF SYSTEMS     Review of Systems   Constitutional:  Negative for chills and fever.   Respiratory:  Positive for cough and shortness of breath. Negative for apnea.    Cardiovascular:  Positive for chest pain. Negative for palpitations and leg swelling.   Gastrointestinal:  Positive for abdominal pain, diarrhea, nausea and vomiting. "   Genitourinary:  Negative for dysuria and hematuria.   Neurological:  Negative for headaches.   Psychiatric/Behavioral:  Positive for suicidal ideas. The patient is nervous/anxious.      OBJECTIVE     Vitals:    24 2048 24 2300   BP: 151/94 139/90   BP Location: Left arm Left arm   Pulse: 75 73   Resp: 20 20   Temp: 97.5 °F (36.4 °C)    TempSrc: Oral    SpO2: 96% 94%      Temperature:   Temp (24hrs), Av.4 °F (36.9 °C), Min:97.5 °F (36.4 °C), Max:99.3 °F (37.4 °C)    Temperature: 97.5 °F (36.4 °C)  Intake & Output:  I/O          0701   0700  0701   0700    I.V.  1000    IV Piggyback  50    Total Intake  1050    Net  +1050                Weights:        There is no height or weight on file to calculate BMI.  Weight (last 2 days)       None          Physical Exam  Vitals and nursing note reviewed.   Constitutional:       General: She is in acute distress.   HENT:      Head: Normocephalic and atraumatic.      Mouth/Throat:      Mouth: Mucous membranes are dry.   Eyes:      Pupils: Pupils are equal, round, and reactive to light.   Cardiovascular:      Rate and Rhythm: Normal rate and regular rhythm.      Pulses: Normal pulses.      Heart sounds: Normal heart sounds.   Pulmonary:      Effort: Pulmonary effort is normal.      Breath sounds: Wheezing present.   Abdominal:      Palpations: Abdomen is soft.      Tenderness: There is no abdominal tenderness.   Musculoskeletal:      Right lower leg: No edema.      Left lower leg: No edema.   Skin:     General: Skin is warm.   Neurological:      Mental Status: She is alert and oriented to person, place, and time.   Psychiatric:      Comments: Anxious, crying       PAST MEDICAL HISTORY     Past Medical History:   Diagnosis Date    Anxiety     Arthritis     OA  b/l knees    Asthma     Approx 2 years ago    Calculus of gallbladder without cholecystitis without obstruction 2022    Chronic kidney disease     Depression     GERD  (gastroesophageal reflux disease)     In my 20s. Approx 15 years ago    Hx of bleeding disorder     dysmennorrhea-dx lap today 2016    Hypertension     Kidney stone     Obesity     Seasonal allergies      PAST SURGICAL HISTORY     Past Surgical History:   Procedure Laterality Date     SECTION      CHOLECYSTECTOMY      COLONOSCOPY      DIAGNOSTIC LAPAROSCOPY      DILATION AND CURETTAGE OF UTERUS      HERNIA REPAIR      CO COLONOSCOPY FLX DX W/COLLJ SPEC WHEN PFRMD N/A 2018    Procedure: EGD AND COLONOSCOPY;  Surgeon: Allan Duncan MD;  Location: [facility];  Service: Gastroenterology    CO LAPAROSCOPY SURG CHOLECYSTECTOMY N/A 2022    Procedure: ROBOTIC LAPAROSCOPIC CHOLECYSTECTOMY;  Surgeon: Griffin Triplett DO;  [facility];  Service: General    CO LAPAROSCOPY W/RMVL ADNEXAL STRUCTURES Bilateral 2016    Procedure: CYSTECTOMY  OVARIAN;  Surgeon: C William Riedel, DO;  Location: [facility];  Service: Gynecology    CO LAPS ABD PRTM&OMENTUM DX W/WO SPEC BR/WA SPX N/A 2016    Procedure: LAPAROSCOPY DIAGNOSTIC DAVID;  Surgeon: C William Riedel, DO;  [facility];  Service: Gynecology    CO RPR UMBILICAL HRNA 5 YRS/> REDUCIBLE N/A 2022    Procedure: UMBILICAL HERNIA REPAIR;  Surgeon: Griffin Triplett DO;  [facility];  Service: General    UPPER GASTROINTESTINAL ENDOSCOPY      WISDOM TOOTH EXTRACTION       SOCIAL & FAMILY HISTORY     Social History     Substance and Sexual Activity   Alcohol Use Never    Comment: social       Social History     Substance and Sexual Activity   Drug Use Not Currently    Frequency: 7.0 times per week    Types: Marijuana    Comment: I exhaused all your ‘rx prescription-- NOTHING ELSE HELPED!!     Social History     Tobacco Use   Smoking Status Every Day    Current packs/day: 1.00    Average packs/day: 1 pack/day for 23.0 years (23.0 ttl pk-yrs)    Types: Cigarettes   Smokeless Tobacco Never     Family History   Problem  "Relation Age of Onset    No Known Problems Sister     Autism Daughter     Lung cancer Maternal Grandmother     Cancer Maternal Grandmother         Deceassd 10 years    Diabetes Maternal Grandfather     Arthritis Maternal Grandfather          10 years    Aneurysm Paternal Grandmother     No Known Problems Sister      LABORATORY DATA     Labs: I have personally reviewed pertinent reports.    Results from last 7 days   Lab Units 24  0853   WBC Thousand/uL 9.55   HEMOGLOBIN g/dL 12.7   HEMATOCRIT % 36.7   PLATELETS Thousands/uL 184   NEUTROS PCT % 72   MONOS PCT % 10   EOS PCT % 0      Results from last 7 days   Lab Units 24  0853   POTASSIUM mmol/L 3.5   CHLORIDE mmol/L 100   CO2 mmol/L 22   BUN mg/dL 10   CREATININE mg/dL 0.98   CALCIUM mg/dL 8.9   ALK PHOS U/L 76   ALT U/L 20   AST U/L 27                          Micro:  No results found for: \"BLOODCX\", \"URINECX\", \"WOUNDCULT\", \"SPUTUMCULTUR\"  IMAGING & DIAGNOSTIC TESTS     Imaging: I have personally reviewed pertinent reports.    XR chest 2 views    Result Date: 2024  Impression: Accentuated lung markings in the left lower lung zone could represent atelectasis versus pneumonia. Prominent peribronchial cuffing could be related to bronchitis. Infiltrate in the left lower lobe was noted on the ER preliminary result. Resident: TOO CHEEMA I, the attending radiologist, have reviewed the images and agree with the final report above. Workstation performed: [id number]     EKG, Pathology, and Other Studies: I have personally reviewed pertinent reports.     ALLERGIES     Allergies   Allergen Reactions    Eggs Or Egg-Derived Products - Food Allergy Other (See Comments)     abd pain     MEDICATIONS PRIOR TO ARRIVAL     Prior to Admission medications    Medication Sig Start Date End Date Taking? Authorizing Provider   albuterol (ProAir HFA) 90 mcg/act inhaler Inhale 2 puffs every 6 (six) hours as needed for wheezing 21  Yes Mickie Cheema PA-C "   busPIRone (BUSPAR) 5 mg tablet TAKE 1 TABLET BY MOUTH TWICE A DAY 9/22/23  Yes MONICA Colbert   gabapentin (Neurontin) 600 MG tablet Take 1 tablet (600 mg total) by mouth 3 (three) times a day 11/17/23 2/15/24 Yes MONICA Colbert   lisinopril (ZESTRIL) 20 mg tablet Take 1 tablet (20 mg total) by mouth daily 4/29/23  Yes MONICA Colbert   ondansetron (ZOFRAN) 4 mg tablet Take 1 tablet (4 mg total) by mouth every 8 (eight) hours as needed for nausea or vomiting for up to 9 days 1/29/24 2/7/24 Yes Terrence Kelley MD   QUEtiapine (SEROquel) 50 mg tablet Take 1 tablet (50 mg total) by mouth daily at bedtime 11/17/23  Yes MONICA Colbert   sertraline (ZOLOFT) 100 mg tablet TAKE 2 TABLETS BY MOUTH EVERY DAY 12/6/23  Yes MONICA Colbert   amoxicillin-clavulanate (AUGMENTIN) 875-125 mg per tablet Take 1 tablet by mouth every 12 (twelve) hours for 5 days 1/29/24 2/3/24  Harvey Garcia MD   betamethasone valerate (LUXIQ) 0.12 % foam Apply topically daily 5/11/22   Lucas Marr MD   Compro 25 MG suppository INSERT 1 SUPPOSITORY RECTALLY (25 MG TOTAL) EVERY 12 HOURS AS NEEDED FOR NAUSEA AND VOMITING 10/23/23   Ever Mchugh MD   doxycycline hyclate (VIBRAMYCIN) 100 mg capsule Take 1 capsule (100 mg total) by mouth every 12 (twelve) hours for 5 days 1/29/24 2/3/24  Harvey Garcia MD   fluticasone (FLONASE) 50 mcg/act nasal spray 1 spray into each nostril daily 5/5/22   Pérez Olivas MD   hydrOXYzine pamoate (VISTARIL) 25 mg capsule TAKE 1 CAPSULE BY MOUTH 3 TIMES A DAY AS NEEDED FOR ANXIETY. 1/10/24   MONICA Colbert   LORazepam (Ativan) 0.5 mg tablet Take 2 tablets (1 mg total) by mouth daily as needed for anxiety 12/20/23   MONICA Colbert   metoclopramide (REGLAN) 5 mg tablet Take 1 tablet (5 mg total) by mouth 4 (four) times a day as needed (nausea) 5/17/23   MONICA Colbert   nicotine (NICODERM CQ) 21 mg/24 hr TD 24 hr patch Place 1 patch on  the skin over 24 hours every 24 hours 11/17/23   MONICA Colbert   norgestimate-ethinyl estradiol (Ortho Tri-Cyclen Lo) 0.18/0.215/0.25 MG-25 MCG per tablet Take 1 tablet by mouth daily 7/11/22 12/27/22  C William Riedel, DO   omeprazole (PriLOSEC) 40 MG capsule Take 1 capsule (40 mg total) by mouth daily 6/28/23   Ministerio Rangel PA-C   ondansetron (Zofran ODT) 4 mg disintegrating tablet Take 1 tablet (4 mg total) by mouth every 6 (six) hours as needed for nausea or vomiting 1/24/22   Prem Wilson MD   predniSONE 10 mg tablet Take 1 tablet (10 mg total) by mouth daily 6 tab day 1, 5 tab day 2, 4 tab day 3, 3 tab day 4, 2 tab day 5, 1 tab day 6  Patient not taking: Reported on 5/17/2023 12/18/22   Aroldo Chavez MD   prochlorperazine (COMPAZINE) 10 mg tablet Take 0.5 tablets (5 mg total) by mouth every 8 (eight) hours as needed for nausea or vomiting 6/8/22   MONICA Barron   promethazine (PHENERGAN) 25 mg tablet Take 1 tablet (25 mg total) by mouth every 6 (six) hours as needed for nausea or vomiting 3/25/22   Lucas Marr MD     MEDICATIONS ADMINISTERED IN LAST 24 HOURS     Medication Administration - last 24 hours from 01/29/2024 0218 to 01/30/2024 0218         Date/Time Order Dose Route Action Action by     01/29/2024 2042 EST ketorolac (FOR EMS ONLY) (TORADOL) injection 30 mg 0 mg Does not apply Given to EMS Shelli Philippe RN     01/29/2024 2042 EST ondansetron (FOR EMS ONLY) (ZOFRAN) 4 mg/2 mL injection 4 mg 0 mg Does not apply Given to EMS Shelli Philippe RN     01/29/2024 2102 EST ondansetron (ZOFRAN) injection 4 mg 4 mg Intravenous Given Chidi Costello RN     01/29/2024 2317 EST multi-electrolyte (ISOLYTE-S PH 7.4) bolus 1,000 mL 0 mL Intravenous Stopped Chidi Costello RN     01/29/2024 2107 EST multi-electrolyte (ISOLYTE-S PH 7.4) bolus 1,000 mL 1,000 mL Intravenous New Bag Chidi Costello RN     01/29/2024 2102 EST sucralfate (CARAFATE) tablet 1 g 1 g Oral Given Chidi Costello,  RN     01/29/2024 2102 EST aluminum-magnesium hydroxide-simethicone (MAALOX) oral suspension 30 mL 30 mL Oral Not Given Chidi Costello RN     01/29/2024 2102 EST famotidine (PEPCID) tablet 20 mg 20 mg Oral Given Chidi Costello RN     01/29/2024 2318 EST ondansetron (ZOFRAN) injection 4 mg 4 mg Intravenous Given Chidi Costello RN     01/30/2024 0040 EST diazepam (VALIUM) injection 2.5 mg 2.5 mg Intravenous Given Chidi Costello RN     01/30/2024 0136 EST ceftriaxone (ROCEPHIN) 1 g/50 mL in dextrose IVPB 0 mg Intravenous Stopped Chidi Costello RN     01/30/2024 0106 EST ceftriaxone (ROCEPHIN) 1 g/50 mL in dextrose IVPB 1,000 mg Intravenous New Bag Chidi Costello RN     01/30/2024 0116 EST QUEtiapine (SEROquel) tablet 50 mg 50 mg Oral Given Chidi Costello RN     01/30/2024 0101 EST pantoprazole (PROTONIX) injection 40 mg 40 mg Intravenous Given Chidi Costello RN          CURRENT MEDICATIONS     Current Facility-Administered Medications   Medication Dose Route Frequency Provider Last Rate    albuterol  2 puff Inhalation Q6H PRN Kiara Olson MD      aluminum-magnesium hydroxide-simethicone  30 mL Oral Once Terrence Kelley MD      busPIRone  5 mg Oral BID Kiara Olson MD      fluticasone  1 spray Nasal Daily Kiara Olson MD      gabapentin  600 mg Oral TID Kiara Olson MD      lisinopril  20 mg Oral Daily Kiara Olson MD      ondansetron  4 mg Intravenous Q8H PRN Kiara Olson MD      pantoprazole  40 mg Intravenous Q24H [facility] Vent[facility] Castell[facility] MD      QUEtiapine  50 mg Oral HS Kiara Olson MD      sertraline  200 mg Oral Daily Kiara Olson MD          albuterol, 2 puff, Q6H PRN  ondansetron, 4 mg, Q8H PRN        Admission Time  I spent 20 minutes admitting the patient.  This involved direct patient contact where I performed a full history and physical, reviewing previous records, and reviewing laboratory and other diagnostic studies.    Portions of the record may have been created with voice  "recognition software.  Occasional wrong word or \"sound a like\" substitutions may have occurred due to the inherent limitations of voice recognition software.  Read the chart carefully and recognize, using context, where substitutions have occurred.    ==  Kiara Olson MD  [facility]  Internal Medicine Residency PGY-1   "

## 2024-01-31 ENCOUNTER — TELEPHONE (OUTPATIENT)
Age: 44
End: 2024-01-31

## 2024-01-31 ENCOUNTER — TRANSITIONAL CARE MANAGEMENT (OUTPATIENT)
Dept: FAMILY MEDICINE CLINIC | Facility: CLINIC | Age: 44
End: 2024-01-31

## 2024-01-31 NOTE — TELEPHONE ENCOUNTER
Mon into Tuesday admitted. Was originally at [facility] but was transported by ambulance to [facility]. William'jose to home tues. Needs TCM

## 2024-02-02 ENCOUNTER — OFFICE VISIT (OUTPATIENT)
Dept: FAMILY MEDICINE CLINIC | Facility: CLINIC | Age: 44
End: 2024-02-02
Payer: COMMERCIAL

## 2024-02-02 VITALS
WEIGHT: 165.2 LBS | OXYGEN SATURATION: 95 % | RESPIRATION RATE: 16 BRPM | HEART RATE: 67 BPM | DIASTOLIC BLOOD PRESSURE: 80 MMHG | TEMPERATURE: 98.1 F | SYSTOLIC BLOOD PRESSURE: 120 MMHG | BODY MASS INDEX: 32.43 KG/M2 | HEIGHT: 60 IN

## 2024-02-02 DIAGNOSIS — K21.9 GASTROESOPHAGEAL REFLUX DISEASE WITHOUT ESOPHAGITIS: ICD-10-CM

## 2024-02-02 DIAGNOSIS — F41.9 ANXIETY: ICD-10-CM

## 2024-02-02 DIAGNOSIS — J18.9 COMMUNITY ACQUIRED PNEUMONIA OF LEFT LOWER LOBE OF LUNG: Primary | ICD-10-CM

## 2024-02-02 DIAGNOSIS — I10 ESSENTIAL HYPERTENSION: ICD-10-CM

## 2024-02-02 DIAGNOSIS — Z12.31 SCREENING MAMMOGRAM, ENCOUNTER FOR: ICD-10-CM

## 2024-02-02 DIAGNOSIS — F33.0 MILD EPISODE OF RECURRENT MAJOR DEPRESSIVE DISORDER (HCC): ICD-10-CM

## 2024-02-02 PROCEDURE — 99496 TRANSJ CARE MGMT HIGH F2F 7D: CPT | Performed by: NURSE PRACTITIONER

## 2024-02-02 RX ORDER — ALBUTEROL SULFATE 90 UG/1
2 AEROSOL, METERED RESPIRATORY (INHALATION) EVERY 6 HOURS PRN
Qty: 18 G | Refills: 0 | Status: SHIPPED | OUTPATIENT
Start: 2024-02-02

## 2024-02-02 NOTE — PROGRESS NOTES
Assessment & Plan     1. Community acquired pneumonia of left lower lobe of lung  Assessment & Plan:  - Stable.   - Continue Doxycycline and Augmentin.   - Albuterol inhaler as needed.  - Increase oral hydration.   - Ordered follow up CXR in 4-6 weeks.  - Contact office or proceed back to ER with worsening symptoms.      Orders:  -     XR chest pa & lateral; Future; Expected date: 02/02/2024  -     albuterol (ProAir HFA) 90 mcg/act inhaler; Inhale 2 puffs every 6 (six) hours as needed for wheezing    2. Essential hypertension  Assessment & Plan:  - Well controlled on lisinopril 20 mg daily. Continue same.   - Will continue to monitor.      3. Gastroesophageal reflux disease without esophagitis  Assessment & Plan:  - Stable on omeprazole 40 mg daily. Continue same.   - Avoid triggering food and beverage.  - Will continue to monitor.      4. Anxiety  Assessment & Plan:  - Stable on Zoloft 200 mg daily and Buspar 5 mg BID.  Continue same.   - Continue Ativan as needed.  - Will continue to monitor.       5. Mild episode of recurrent major depressive disorder (HCC)  Assessment & Plan:  - Stable on Zoloft 200 mg daily. Continue same.   - Will continue to monitor.       6. Screening mammogram, encounter for  -     Mammo screening bilateral w 3d & cad; Future; Expected date: 02/02/2024         Subjective     Transitional Care Management Review:   Melia Lira is a 43 y.o. female here for TCM follow up.     During the TCM phone call patient stated:  TCM Call       Date and time call was made  1/31/2024 10:13 AM    Hospital care reviewed  Records reviewed    Patient was hospitial[facility] at  [facility]    Date of Admission  01/29/24    Date of discharge  01/30/24    Diagnosis  Nausea and vomiting, unspecified vomiting type    Disposition  Home    Were the patients medications reviewed and updated  No          TCM Call       Did you obtain your prescribed medications  Yes    Do you need help managing your  "prescriptions or medications  No    Is transportation to your appointment needed  No    I have advised the patient to call PCP with any new or worsening symptoms  Loni Ann RN    Are you recieving any outpatient services  No    Are you recieving home care services  No    Are you using any community resources  No    Current waiver services  No    Have you fallen in the last 12 months  No    Interperter language line needed  No    Comments  tcm scjed 2/2/24          Patient with PMH of HTN, GERD, anxiety, and asthma presents to office today for hospital follow up. She was seen in the ER on 1/29 with cough, chest pain, and SOB. CXR showed focal pneumonia. She was discharged home on doxycycline and augmentin. She presented again later in the day with abdominal pain, nausea and vomiting. Thought to be related to taking doxycycline on an empty stomach. Patient made comments about killing herself if she was discharged from the hospital because she felt so horrible. She received IV fluids, IM Zofran, and GI cocktail. She was switched to IV ceftriaxone for CAP. She also tested positive for flu B. Crisis worker was consulted regarding her suicidal ideation. Ultimately patient ended up leaving [place] because she \"just needed to get out of the hospital.\" This was being Psychiatric consult was performed. She denies any current SI or HI. States anxiety and depression symptoms are well controlled. Still complains of cough and chest tightness. She continues to take her prescribed doxycycline and Augmentin. Denies any other concerns or complaints today.           Review of Systems   Constitutional:  Negative for fatigue and fever.   HENT:  Negative for trouble swallowing.    Eyes:  Negative for visual disturbance.   Respiratory:  Positive for cough, chest tightness and shortness of breath.    Cardiovascular:  Negative for chest pain and palpitations.   Gastrointestinal:  Negative for abdominal pain and blood in stool.   Endocrine: " Negative for cold intolerance and heat intolerance.   Genitourinary:  Negative for difficulty urinating and dysuria.   Musculoskeletal:  Negative for gait problem.   Skin:  Negative for rash.   Neurological:  Negative for dizziness, syncope and headaches.   Hematological:  Negative for adenopathy.   Psychiatric/Behavioral:  Negative for behavioral problems.        Objective     /80 (BP Location: Left arm, Patient Position: Sitting, Cuff Size: Standard)   Pulse 67   Temp 98.1 °F (36.7 °C) (Tympanic)   Resp 16   Ht 5' (1.524 m)   Wt 74.9 kg (165 lb 3.2 oz)   LMP  (LMP Unknown)   SpO2 95%   BMI 32.26 kg/m²      Physical Exam  Vitals and nursing note reviewed.   Constitutional:       General: She is not in acute distress.     Appearance: Normal appearance. She is well-developed. She is ill-appearing.   HENT:      Head: Normocephalic and atraumatic.      Right Ear: External ear normal.      Left Ear: External ear normal.   Eyes:      Conjunctiva/sclera: Conjunctivae normal.   Cardiovascular:      Rate and Rhythm: Normal rate and regular rhythm.      Heart sounds: Normal heart sounds.   Pulmonary:      Effort: Pulmonary effort is normal.      Breath sounds: Wheezing and rhonchi present.   Musculoskeletal:         General: Normal range of motion.      Cervical back: Normal range of motion.   Skin:     General: Skin is warm and dry.   Neurological:      Mental Status: She is alert and oriented to person, place, and time.   Psychiatric:         Mood and Affect: Mood normal.         Behavior: Behavior normal.       Medications have been reviewed by provider in current encounter    MONICA Colbert

## 2024-02-02 NOTE — ASSESSMENT & PLAN NOTE
- Stable.   - Continue Doxycycline and Augmentin.   - Albuterol inhaler as needed.  - Increase oral hydration.   - Ordered follow up CXR in 4-6 weeks.  - Contact office or proceed back to ER with worsening symptoms.

## 2024-02-02 NOTE — ASSESSMENT & PLAN NOTE
- Stable on Zoloft 200 mg daily and Buspar 5 mg BID.  Continue same.   - Continue Ativan as needed.  - Will continue to monitor.

## 2024-02-02 NOTE — ASSESSMENT & PLAN NOTE
- Stable on omeprazole 40 mg daily. Continue same.   - Avoid triggering food and beverage.  - Will continue to monitor.

## 2024-02-02 NOTE — LETTER
February 2, 2024     Patient: Melia Lira  YOB: 1980  Date of Visit: 2/2/2024      To Whom it May Concern:    Melia Lira is under my professional care. Melia was seen in my office on 2/2/2024. Please excuse her from work 2/1/2024 and 2/2/2024. Melia may return to work on 2/5/2024 .    If you have any questions or concerns, please don't hesitate to call.         Sincerely,          MONICA Colbert        CC: No Recipients

## 2024-02-11 DIAGNOSIS — F41.9 ANXIETY: ICD-10-CM

## 2024-02-12 RX ORDER — BUSPIRONE HYDROCHLORIDE 5 MG/1
5 TABLET ORAL 2 TIMES DAILY
Qty: 180 TABLET | Refills: 0 | Status: SHIPPED | OUTPATIENT
Start: 2024-02-12

## 2024-02-21 PROBLEM — Z13.220 LIPID SCREENING: Status: RESOLVED | Noted: 2019-09-12 | Resolved: 2024-02-21

## 2024-02-21 PROBLEM — J18.9 CAP (COMMUNITY ACQUIRED PNEUMONIA): Status: RESOLVED | Noted: 2024-01-30 | Resolved: 2024-02-21

## 2024-02-24 DIAGNOSIS — J18.9 COMMUNITY ACQUIRED PNEUMONIA OF LEFT LOWER LOBE OF LUNG: ICD-10-CM

## 2024-02-24 RX ORDER — ALBUTEROL SULFATE 90 UG/1
AEROSOL, METERED RESPIRATORY (INHALATION)
Qty: 6.7 G | Refills: 0 | Status: SHIPPED | OUTPATIENT
Start: 2024-02-24

## 2024-04-03 ENCOUNTER — OFFICE VISIT (OUTPATIENT)
Dept: FAMILY MEDICINE CLINIC | Facility: CLINIC | Age: 44
End: 2024-04-03
Payer: COMMERCIAL

## 2024-04-03 VITALS
BODY MASS INDEX: 33.85 KG/M2 | RESPIRATION RATE: 16 BRPM | TEMPERATURE: 97.9 F | DIASTOLIC BLOOD PRESSURE: 70 MMHG | OXYGEN SATURATION: 99 % | HEIGHT: 60 IN | WEIGHT: 172.4 LBS | HEART RATE: 71 BPM | SYSTOLIC BLOOD PRESSURE: 110 MMHG

## 2024-04-03 DIAGNOSIS — M54.41 ACUTE BILATERAL LOW BACK PAIN WITH RIGHT-SIDED SCIATICA: Primary | ICD-10-CM

## 2024-04-03 DIAGNOSIS — I10 ESSENTIAL HYPERTENSION: ICD-10-CM

## 2024-04-03 DIAGNOSIS — G63 NEUROPATHY ASSOCIATED WITH POLYNEUROPATHY, ORGANOMEGALY, ENDOCRINOPATHY, MONOCLONAL GAMMOPATHY, AND SKIN CHANGES SYNDROME (HCC): ICD-10-CM

## 2024-04-03 DIAGNOSIS — E88.09 NEUROPATHY ASSOCIATED WITH POLYNEUROPATHY, ORGANOMEGALY, ENDOCRINOPATHY, MONOCLONAL GAMMOPATHY, AND SKIN CHANGES SYNDROME (HCC): ICD-10-CM

## 2024-04-03 DIAGNOSIS — J45.20 MILD INTERMITTENT ASTHMA WITHOUT COMPLICATION: ICD-10-CM

## 2024-04-03 PROCEDURE — 99214 OFFICE O/P EST MOD 30 MIN: CPT | Performed by: NURSE PRACTITIONER

## 2024-04-03 RX ORDER — PREDNISONE 50 MG/1
50 TABLET ORAL DAILY
Qty: 5 TABLET | Refills: 0 | Status: SHIPPED | OUTPATIENT
Start: 2024-04-03 | End: 2024-04-08

## 2024-04-03 RX ORDER — CYCLOBENZAPRINE HCL 5 MG
5 TABLET ORAL 3 TIMES DAILY PRN
Qty: 30 TABLET | Refills: 1 | Status: SHIPPED | OUTPATIENT
Start: 2024-04-03

## 2024-04-03 RX ORDER — MELOXICAM 15 MG/1
15 TABLET ORAL DAILY PRN
Qty: 30 TABLET | Refills: 3 | Status: SHIPPED | OUTPATIENT
Start: 2024-04-03

## 2024-04-03 RX ORDER — GABAPENTIN 600 MG/1
600 TABLET ORAL 3 TIMES DAILY
Qty: 270 TABLET | Refills: 0 | Status: SHIPPED | OUTPATIENT
Start: 2024-04-03 | End: 2024-07-02

## 2024-04-03 NOTE — ASSESSMENT & PLAN NOTE
- Not well controlled.  - Prescriptions sent for prednisone and flexeril for current episode of pain. Discussed side effects.  - Will also start meloxicam 15 mg daily PRN. Can start once finished with prednisone.   - Will obtain x-ray of lumbar spine.   - Lower back and core exercises provided in After Visit summary.  - Referred to Ortho if no improvement.

## 2024-04-03 NOTE — PROGRESS NOTES
Name: Melia Lira      : 1980      MRN: [id number]  Encounter Provider: MONICA Colbert  Encounter Date: 4/3/2024   Encounter department: [facility]    Assessment & Plan     1. Acute bilateral low back pain with right-sided sciatica  Assessment & Plan:  - Not well controlled.  - Prescriptions sent for prednisone and flexeril for current episode of pain. Discussed side effects.  - Will also start meloxicam 15 mg daily PRN. Can start once finished with prednisone.   - Will obtain x-ray of lumbar spine.   - Lower back and core exercises provided in After Visit summary.  - Referred to Ortho if no improvement.     Orders:  -     XR spine lumbar minimum 4 views non injury; Future; Expected date: 2024  -     predniSONE 50 mg tablet; Take 1 tablet (50 mg total) by mouth daily for 5 days  -     cyclobenzaprine (FLEXERIL) 5 mg tablet; Take 1 tablet (5 mg total) by mouth 3 (three) times a day as needed for muscle spasms  -     meloxicam (MOBIC) 15 mg tablet; Take 1 tablet (15 mg total) by mouth daily as needed for moderate pain  -     Ambulatory Referral to Orthopedic Surgery; Future    2. Essential hypertension  Assessment & Plan:  - Well controlled on lisinopril 20 mg daily. Continue same.   - Will continue to monitor.    Orders:  -     CBC and differential; Future  -     Comprehensive metabolic panel; Future  -     Lipid Panel with Direct LDL reflex; Future  -     TSH, 3rd generation with Free T4 reflex; Future    3. Mild intermittent asthma without complication  Assessment & Plan:  - Well controlled.  - Continue albuterol as needed.  - Will continue to monitor.       4. Neuropathy associated with polyneuropathy, organomegaly, endocrinopathy, monoclonal gammopathy, and skin changes syndrome (HCC)  Assessment & Plan:  - Stable on gabapentin 600 mg three times daily. Continue same.   - Will continue to monitor.     Orders:  -     gabapentin (Neurontin) 600 MG tablet; Take  1 tablet (600 mg total) by mouth 3 (three) times a day           Subjective     Patient with PMH of HTN, GERD, neuropathy, anxiety, and asthma presents to office today with complaints of lower back pain. States she has had intermittent lower back pain since she had her daughter 14 years ago. She has never had any imaging. Back pain is bilateral and sometimes she gets sharp shooting pain down the right leg from her hip to her knee. She does have a history of neuropathy but denies any new numbness or tingling sensation. Denies any loss of bowel or bladder function. She has tried applying topical creams, heat, and taping the area and none have provided improvement. She denies any other concerns or complaints today.           Review of Systems   Constitutional:  Negative for fatigue and fever.   HENT:  Negative for trouble swallowing.    Eyes:  Negative for visual disturbance.   Respiratory:  Negative for cough and shortness of breath.    Cardiovascular:  Negative for chest pain and palpitations.   Gastrointestinal:  Negative for abdominal pain and blood in stool.   Endocrine: Negative for cold intolerance and heat intolerance.   Genitourinary:  Negative for difficulty urinating and dysuria.   Musculoskeletal:  Positive for back pain. Negative for gait problem.   Skin:  Negative for rash.   Neurological:  Negative for dizziness, syncope and headaches.   Hematological:  Negative for adenopathy.   Psychiatric/Behavioral:  Negative for behavioral problems.        Past Medical History:   Diagnosis Date   • Anxiety    • Arthritis     OA  b/l knees   • Asthma     Approx 2 years ago   • Calculus of gallbladder without cholecystitis without obstruction 02/23/2022   • Chronic kidney disease    • Depression    • GERD (gastroesophageal reflux disease)     In my 20s. Approx 15 years ago   • Hx of bleeding disorder     dysmennorrhea-dx lap today 8/12/2016   • Hypertension    • Kidney stone    • Obesity    • Seasonal allergies       Past Surgical History:   Procedure Laterality Date   •  SECTION     • CHOLECYSTECTOMY     • COLONOSCOPY     • DIAGNOSTIC LAPAROSCOPY     • DILATION AND CURETTAGE OF UTERUS     • HERNIA REPAIR     • CO COLONOSCOPY FLX DX W/COLLJ SPEC WHEN PFRMD N/A 2018    Procedure: EGD AND COLONOSCOPY;  Surgeon: Allan Duncan MD;  Location: [facility];  Service: Gastroenterology   • CO LAPAROSCOPY SURG CHOLECYSTECTOMY N/A 2022    Procedure: ROBOTIC LAPAROSCOPIC CHOLECYSTECTOMY;  Surgeon: Griffin Triplett DO;  [facility];  Service: General   • CO LAPAROSCOPY W/RMVL ADNEXAL STRUCTURES Bilateral 2016    Procedure: CYSTECTOMY  OVARIAN;  Surgeon: C William Riedel, DO;  Location: [facility];  Service: Gynecology   • CO LAPS ABD PRTM&OMENTUM DX W/WO SPEC BR/WA SPX N/A 2016    Procedure: LAPAROSCOPY DIAGNOSTIC DAVID;  Surgeon: C William Riedel, DO;  Location: [facility];  Service: Gynecology   • CO RPR UMBILICAL HRNA 5 YRS/> REDUCIBLE N/A 2022    Procedure: UMBILICAL HERNIA REPAIR;  Surgeon: Griffin Triplett DO;  [facility];  Service: General   • UPPER GASTROINTESTINAL ENDOSCOPY     • WISDOM TOOTH EXTRACTION       Family History   Problem Relation Age of Onset   • No Known Problems Sister    • Autism Daughter    • Lung cancer Maternal Grandmother    • Cancer Maternal Grandmother         Deceassd 10 years   • Diabetes Maternal Grandfather    • Arthritis Maternal Grandfather          10 years   • Aneurysm Paternal Grandmother    • No Known Problems Sister      Social History     Socioeconomic History   • Marital status: /Civil Union     Spouse name: None   • Number of children: None   • Years of education: None   • Highest education level: None   Occupational History   • None   Tobacco Use   • Smoking status: Every Day     Current packs/day: 1.00     Average packs/day: 1 pack/day for 23.0 years (23.0 ttl pk-yrs)     Types: Cigarettes   • Smokeless tobacco: Never    Vaping Use   • Vaping status: Never Used   Substance and Sexual Activity   • Alcohol use: Never     Comment: social   • Drug use: Not Currently     Frequency: 7.0 times per week     Types: Marijuana     Comment: I exhaused all your ‘rx prescription-- NOTHING ELSE HELPED!!   • Sexual activity: Not Currently     Partners: Male     Birth control/protection: Abstinence   Other Topics Concern   • None   Social History Narrative   • None     Social Determinants of Health     Financial Resource Strain: Not on file   Food Insecurity: No Food Insecurity (6/8/2022)    Hunger Vital Sign    • Worried About Running Out of Food in the Last Year: Never true    • Ran Out of Food in the Last Year: Never true   Transportation Needs: Not on file   Physical Activity: Not on file   Stress: Not on file   Social Connections: Not on file   Intimate Partner Violence: Not on file   Housing Stability: Low Risk  (6/8/2022)    Housing Stability Vital Sign    • Unable to Pay for Housing in the Last Year: No    • Number of Places Lived in the Last Year: 1    • Unstable Housing in the Last Year: No     Current Outpatient Medications on File Prior to Visit   Medication Sig   • albuterol (PROVENTIL HFA,VENTOLIN HFA) 90 mcg/act inhaler INHALE 2 PUFFS EVERY 6 HOURS AS NEEDED FOR WHEEZING   • betamethasone valerate (LUXIQ) 0.12 % foam Apply topically daily   • busPIRone (BUSPAR) 5 mg tablet TAKE 1 TABLET BY MOUTH TWICE A DAY   • Compro 25 MG suppository INSERT 1 SUPPOSITORY RECTALLY (25 MG TOTAL) EVERY 12 HOURS AS NEEDED FOR NAUSEA AND VOMITING   • hydrOXYzine pamoate (VISTARIL) 25 mg capsule TAKE 1 CAPSULE BY MOUTH 3 TIMES A DAY AS NEEDED FOR ANXIETY.   • lisinopril (ZESTRIL) 20 mg tablet Take 1 tablet (20 mg total) by mouth daily   • LORazepam (Ativan) 0.5 mg tablet Take 2 tablets (1 mg total) by mouth daily as needed for anxiety   • omeprazole (PriLOSEC) 40 MG capsule Take 1 capsule (40 mg total) by mouth daily   • prochlorperazine (COMPAZINE) 10  mg tablet Take 0.5 tablets (5 mg total) by mouth every 8 (eight) hours as needed for nausea or vomiting   • promethazine (PHENERGAN) 25 mg tablet Take 1 tablet (25 mg total) by mouth every 6 (six) hours as needed for nausea or vomiting   • QUEtiapine (SEROquel) 50 mg tablet Take 1 tablet (50 mg total) by mouth daily at bedtime   • sertraline (ZOLOFT) 100 mg tablet TAKE 2 TABLETS BY MOUTH EVERY DAY   • [DISCONTINUED] gabapentin (Neurontin) 600 MG tablet Take 1 tablet (600 mg total) by mouth 3 (three) times a day   • fluticasone (FLONASE) 50 mcg/act nasal spray 1 spray into each nostril daily (Patient not taking: Reported on 4/3/2024)   • metoclopramide (REGLAN) 5 mg tablet Take 1 tablet (5 mg total) by mouth 4 (four) times a day as needed (nausea) (Patient not taking: Reported on 4/3/2024)   • nicotine (NICODERM CQ) 21 mg/24 hr TD 24 hr patch Place 1 patch on the skin over 24 hours every 24 hours (Patient not taking: Reported on 4/3/2024)   • norgestimate-ethinyl estradiol (Ortho Tri-Cyclen Lo) 0.18/0.215/0.25 MG-25 MCG per tablet Take 1 tablet by mouth daily   • ondansetron (Zofran ODT) 4 mg disintegrating tablet Take 1 tablet (4 mg total) by mouth every 6 (six) hours as needed for nausea or vomiting (Patient not taking: Reported on 2/2/2024)   • ondansetron (ZOFRAN) 4 mg tablet Take 1 tablet (4 mg total) by mouth every 8 (eight) hours as needed for nausea or vomiting for up to 9 days     Allergies   Allergen Reactions   • Eggs Or Egg-Derived Products - Food Allergy Other (See Comments)     abd pain     Immunization History   Administered Date(s) Administered   • Pneumococcal Polysaccharide PPV23 11/29/2019       Objective     /70 (BP Location: Left arm, Patient Position: Sitting, Cuff Size: Standard)   Pulse 71   Temp 97.9 °F (36.6 °C) (Tympanic)   Resp 16   Ht 5' (1.524 m)   Wt 78.2 kg (172 lb 6.4 oz)   LMP  (LMP Unknown)   SpO2 99%   BMI 33.67 kg/m²     Physical Exam  Vitals and nursing note  reviewed.   Constitutional:       Appearance: Normal appearance.   HENT:      Head: Normocephalic and atraumatic.      Right Ear: External ear normal.      Left Ear: External ear normal.   Eyes:      Conjunctiva/sclera: Conjunctivae normal.   Cardiovascular:      Rate and Rhythm: Normal rate and regular rhythm.      Heart sounds: Normal heart sounds.   Pulmonary:      Effort: Pulmonary effort is normal.      Breath sounds: Normal breath sounds.   Musculoskeletal:      Cervical back: Normal range of motion.      Lumbar back: Positive right straight leg raise test.   Skin:     General: Skin is warm and dry.   Neurological:      Mental Status: She is alert and oriented to person, place, and time.   Psychiatric:         Mood and Affect: Mood normal.         Behavior: Behavior normal.       MONICA Colbert

## 2024-04-10 ENCOUNTER — HOSPITAL ENCOUNTER (OUTPATIENT)
Dept: RADIOLOGY | Facility: IMAGING CENTER | Age: 44
Discharge: HOME | End: 2024-04-10
Payer: COMMERCIAL

## 2024-04-10 DIAGNOSIS — M54.41 ACUTE BILATERAL LOW BACK PAIN WITH RIGHT-SIDED SCIATICA: ICD-10-CM

## 2024-04-10 DIAGNOSIS — J18.9 COMMUNITY ACQUIRED PNEUMONIA OF LEFT LOWER LOBE OF LUNG: ICD-10-CM

## 2024-04-10 PROCEDURE — 71046 X-RAY EXAM CHEST 2 VIEWS: CPT

## 2024-04-10 PROCEDURE — 72110 X-RAY EXAM L-2 SPINE 4/>VWS: CPT

## 2024-04-11 ENCOUNTER — TELEPHONE (OUTPATIENT)
Dept: FAMILY MEDICINE CLINIC | Facility: CLINIC | Age: 44
End: 2024-04-11

## 2024-04-11 DIAGNOSIS — M54.50 LUMBAR PAIN: Primary | ICD-10-CM

## 2024-04-11 DIAGNOSIS — J18.9 PNEUMONIA DUE TO INFECTIOUS ORGANISM, UNSPECIFIED LATERALITY, UNSPECIFIED PART OF LUNG: ICD-10-CM

## 2024-04-11 DIAGNOSIS — J98.11 ATELECTASIS: Primary | ICD-10-CM

## 2024-04-11 RX ORDER — AMOXICILLIN 500 MG/1
1000 CAPSULE ORAL EVERY 8 HOURS SCHEDULED
Qty: 30 CAPSULE | Refills: 0 | Status: SHIPPED | OUTPATIENT
Start: 2024-04-11 | End: 2024-04-16

## 2024-04-11 NOTE — TELEPHONE ENCOUNTER
Patient contacted the office back returning missed call. Relayed result notes, patient verbalized understanding, will complete 5 day abx and schedule repeat CXR for 4-6 weeks. Patient is asking with the XR lumbar spine coming back normal, she is asking what the provider would recommend doing as she is still in a lot of pain, she had to call off of work Tuesday and Wednesday because of this.

## 2024-04-11 NOTE — TELEPHONE ENCOUNTER
----- Message from MONICA Colbert sent at 4/11/2024  9:28 AM EDT -----  Xray of lumbar spine was normal.   CXR showed new atelectasis which is partial collapse of part of the lung. Happens when tiny air sacs in lung lose air. Could be due to residual pneumonia. Im going to start her on Amoxicillin three times daily for the next 5 days. I ordered another repeat CXR for 4-6 weeks after she is done with abx.

## 2024-04-11 NOTE — TELEPHONE ENCOUNTER
Pt returned call. Told her referral was placed for ortho. Gave her recommendations from [facility]. Pt agreed to sched appt with ortho.

## 2024-04-11 NOTE — TELEPHONE ENCOUNTER
Pt asking recommendation since Xray of lumbar spine was normal. She states she is still in a lot of pain and couldn't go to work Monday and Tuesday because of it. Please advise.

## 2024-04-16 ENCOUNTER — HOSPITAL ENCOUNTER (OUTPATIENT)
Dept: RADIOLOGY | Age: 44
Discharge: HOME | End: 2024-04-16
Payer: COMMERCIAL

## 2024-04-16 DIAGNOSIS — Z12.31 SCREENING MAMMOGRAM, ENCOUNTER FOR: ICD-10-CM

## 2024-04-16 PROCEDURE — 77067 SCR MAMMO BI INCL CAD: CPT

## 2024-04-16 PROCEDURE — 77063 BREAST TOMOSYNTHESIS BI: CPT

## 2024-04-18 ENCOUNTER — TELEPHONE (OUTPATIENT)
Dept: FAMILY MEDICINE CLINIC | Facility: CLINIC | Age: 44
End: 2024-04-18

## 2024-04-18 NOTE — TELEPHONE ENCOUNTER
----- Message from MONICA Colbert sent at 4/18/2024 10:51 AM EDT -----  Mammogram showed no evidence of malignancy. Recommend routine screening mammogram in 1 year.

## 2024-04-23 ENCOUNTER — OFFICE VISIT (OUTPATIENT)
Dept: FAMILY MEDICINE CLINIC | Facility: CLINIC | Age: 44
End: 2024-04-23
Payer: COMMERCIAL

## 2024-04-23 VITALS
WEIGHT: 171.2 LBS | TEMPERATURE: 99.2 F | BODY MASS INDEX: 33.61 KG/M2 | HEART RATE: 90 BPM | DIASTOLIC BLOOD PRESSURE: 64 MMHG | HEIGHT: 60 IN | OXYGEN SATURATION: 98 % | RESPIRATION RATE: 16 BRPM | SYSTOLIC BLOOD PRESSURE: 100 MMHG

## 2024-04-23 DIAGNOSIS — I10 ESSENTIAL HYPERTENSION: Primary | ICD-10-CM

## 2024-04-23 DIAGNOSIS — F33.0 MILD EPISODE OF RECURRENT MAJOR DEPRESSIVE DISORDER (HCC): ICD-10-CM

## 2024-04-23 DIAGNOSIS — K21.9 GASTROESOPHAGEAL REFLUX DISEASE WITHOUT ESOPHAGITIS: ICD-10-CM

## 2024-04-23 DIAGNOSIS — M54.41 ACUTE BILATERAL LOW BACK PAIN WITH RIGHT-SIDED SCIATICA: ICD-10-CM

## 2024-04-23 PROCEDURE — 99214 OFFICE O/P EST MOD 30 MIN: CPT | Performed by: NURSE PRACTITIONER

## 2024-04-23 RX ORDER — CYCLOBENZAPRINE HCL 5 MG
5 TABLET ORAL 3 TIMES DAILY PRN
Qty: 30 TABLET | Refills: 1 | Status: SHIPPED | OUTPATIENT
Start: 2024-04-23

## 2024-04-23 NOTE — LETTER
April 23, 2024     Patient: Melia Lira  YOB: 1980  Date of Visit: 4/23/2024      To Whom it May Concern:    Melia Lira is under my professional care. Melia was seen in my office on 4/23/2024. Melia may return to work on 4/24/2024 .    If you have any questions or concerns, please don't hesitate to call.         Sincerely,          MONICA Colbert        CC: No Recipients

## 2024-04-23 NOTE — PROGRESS NOTES
"Name: Melia Lira      : 1980      MRN: [id number]  Encounter Provider: MONICA Colbert  Encounter Date: 2024   Encounter department: [facility]    Assessment & Plan     1. Essential hypertension  Assessment & Plan:  - Well controlled on lisinopril 20 mg daily. Continue same.   - Will continue to monitor.      2. Acute bilateral low back pain with right-sided sciatica  Assessment & Plan:  - Recommend meloxicam and flexeril as needed. Discussed side effects.   - Recommend application of heat.  - Recommend home exercises.   - Referred to Ortho.     Orders:  -     cyclobenzaprine (FLEXERIL) 5 mg tablet; Take 1 tablet (5 mg total) by mouth 3 (three) times a day as needed for muscle spasms    3. Gastroesophageal reflux disease without esophagitis  Assessment & Plan:  - Stable on omeprazole 40 mg daily. Continue same.   - Avoid triggering food and beverage.  - Will continue to monitor.       4. Mild episode of recurrent major depressive disorder (HCC)  Assessment & Plan:  - Stable on Zoloft 200 mg daily. Continue same.   - Will continue to monitor.            Subjective     Patient presents to office today with complaints of pain on the left side under her rib cage. Does radiate towards her back. Pain is aching in nature. Is worse at the end of the day and if she is doing a lot of activity. Has also been dealing with some lower back pain. Xray that was completed on 4/10 was unremarkable. She states that she went to a chiropractor who also did x-rays and told her that she has a \"disc issue\" in her lower back and also a curvature of the spine. She is interested in referral to Orthopedics for her back pain. Denies any other concerns or complaints today.       Review of Systems   Constitutional:  Negative for fatigue and fever.   HENT:  Negative for trouble swallowing.    Eyes:  Negative for visual disturbance.   Respiratory:  Negative for cough and shortness of breath.  "   Cardiovascular:  Negative for chest pain and palpitations.   Gastrointestinal:  Negative for abdominal pain and blood in stool.   Endocrine: Negative for cold intolerance and heat intolerance.   Genitourinary:  Negative for difficulty urinating and dysuria.   Musculoskeletal:  Positive for back pain. Negative for gait problem.   Skin:  Negative for rash.   Neurological:  Negative for dizziness, syncope and headaches.   Hematological:  Negative for adenopathy.   Psychiatric/Behavioral:  Negative for behavioral problems.        Past Medical History:   Diagnosis Date   • Anxiety    • Arthritis     OA  b/l knees   • Asthma     Approx 2 years ago   • Calculus of gallbladder without cholecystitis without obstruction 2022   • Chronic kidney disease    • Depression    • GERD (gastroesophageal reflux disease)     In my 20s. Approx 15 years ago   • Hx of bleeding disorder     dysmennorrhea-dx lap today 2016   • Hypertension    • Kidney stone    • Obesity    • Seasonal allergies      Past Surgical History:   Procedure Laterality Date   •  SECTION     • CHOLECYSTECTOMY     • COLONOSCOPY     • DIAGNOSTIC LAPAROSCOPY     • DILATION AND CURETTAGE OF UTERUS     • HERNIA REPAIR     • OR COLONOSCOPY FLX DX W/COLLJ SPEC WHEN PFRMD N/A 2018    Procedure: EGD AND COLONOSCOPY;  Surgeon: Allan Duncan MD;  Location: [facility];  Service: Gastroenterology   • OR LAPAROSCOPY SURG CHOLECYSTECTOMY N/A 2022    Procedure: ROBOTIC LAPAROSCOPIC CHOLECYSTECTOMY;  Surgeon: Griffin Triplett DO;  [facility];  Service: General   • OR LAPAROSCOPY W/RMVL ADNEXAL STRUCTURES Bilateral 2016    Procedure: CYSTECTOMY  OVARIAN;  Surgeon: C William Riedel, DO;  [facility];  Service: Gynecology   • OR LAPS ABD PRTM&OMENTUM DX W/WO SPEC BR/WA SPX N/A 2016    Procedure: LAPAROSCOPY DIAGNOSTIC DAVID;  Surgeon: C William Riedel, DO;  [facility];  Service: Gynecology   • OR RPR UMBILICAL  HRNA 5 YRS/> REDUCIBLE N/A 2022    Procedure: UMBILICAL HERNIA REPAIR;  Surgeon: Griffin Triplett DO;  [facility];  Service: General   • UPPER GASTROINTESTINAL ENDOSCOPY     • WISDOM TOOTH EXTRACTION       Family History   Problem Relation Age of Onset   • No Known Problems Sister    • Autism Daughter    • Lung cancer Maternal Grandmother    • Cancer Maternal Grandmother         Deceassd 10 years   • Diabetes Maternal Grandfather    • Arthritis Maternal Grandfather          10 years   • Aneurysm Paternal Grandmother    • No Known Problems Sister      Social History     Socioeconomic History   • Marital status: /Civil Union     Spouse name: None   • Number of children: None   • Years of education: None   • Highest education level: None   Occupational History   • None   Tobacco Use   • Smoking status: Every Day     Current packs/day: 1.00     Average packs/day: 1 pack/day for 23.0 years (23.0 ttl pk-yrs)     Types: Cigarettes   • Smokeless tobacco: Never   Vaping Use   • Vaping status: Never Used   Substance and Sexual Activity   • Alcohol use: Never     Comment: social   • Drug use: Not Currently     Frequency: 7.0 times per week     Types: Marijuana     Comment: I exhaused all your ‘rx prescription-- NOTHING ELSE HELPED!!   • Sexual activity: Not Currently     Partners: Male     Birth control/protection: Abstinence   Other Topics Concern   • None   Social History Narrative   • None     Social Determinants of Health     Financial Resource Strain: Not on file   Food Insecurity: No Food Insecurity (2022)    Hunger Vital Sign    • Worried About Running Out of Food in the Last Year: Never true    • Ran Out of Food in the Last Year: Never true   Transportation Needs: Not on file   Physical Activity: Not on file   Stress: Not on file   Social Connections: Not on file   Intimate Partner Violence: Not on file   Housing Stability: Low Risk  (2022)    Housing Stability Vital Sign    •  Unable to Pay for Housing in the Last Year: No    • Number of Places Lived in the Last Year: 1    • Unstable Housing in the Last Year: No     Current Outpatient Medications on File Prior to Visit   Medication Sig   • albuterol (PROVENTIL HFA,VENTOLIN HFA) 90 mcg/act inhaler INHALE 2 PUFFS EVERY 6 HOURS AS NEEDED FOR WHEEZING   • betamethasone valerate (LUXIQ) 0.12 % foam Apply topically daily   • busPIRone (BUSPAR) 5 mg tablet TAKE 1 TABLET BY MOUTH TWICE A DAY   • Compro 25 MG suppository INSERT 1 SUPPOSITORY RECTALLY (25 MG TOTAL) EVERY 12 HOURS AS NEEDED FOR NAUSEA AND VOMITING   • gabapentin (Neurontin) 600 MG tablet Take 1 tablet (600 mg total) by mouth 3 (three) times a day   • hydrOXYzine pamoate (VISTARIL) 25 mg capsule TAKE 1 CAPSULE BY MOUTH 3 TIMES A DAY AS NEEDED FOR ANXIETY.   • LORazepam (Ativan) 0.5 mg tablet Take 2 tablets (1 mg total) by mouth daily as needed for anxiety   • meloxicam (MOBIC) 15 mg tablet Take 1 tablet (15 mg total) by mouth daily as needed for moderate pain   • omeprazole (PriLOSEC) 40 MG capsule Take 1 capsule (40 mg total) by mouth daily   • ondansetron (Zofran ODT) 4 mg disintegrating tablet Take 1 tablet (4 mg total) by mouth every 6 (six) hours as needed for nausea or vomiting   • prochlorperazine (COMPAZINE) 10 mg tablet Take 0.5 tablets (5 mg total) by mouth every 8 (eight) hours as needed for nausea or vomiting   • promethazine (PHENERGAN) 25 mg tablet Take 1 tablet (25 mg total) by mouth every 6 (six) hours as needed for nausea or vomiting   • QUEtiapine (SEROquel) 50 mg tablet Take 1 tablet (50 mg total) by mouth daily at bedtime   • sertraline (ZOLOFT) 100 mg tablet TAKE 2 TABLETS BY MOUTH EVERY DAY   • [DISCONTINUED] lisinopril (ZESTRIL) 20 mg tablet Take 1 tablet (20 mg total) by mouth daily   • fluticasone (FLONASE) 50 mcg/act nasal spray 1 spray into each nostril daily (Patient not taking: Reported on 4/3/2024)   • metoclopramide (REGLAN) 5 mg tablet Take 1 tablet (5  mg total) by mouth 4 (four) times a day as needed (nausea) (Patient not taking: Reported on 4/3/2024)   • nicotine (NICODERM CQ) 21 mg/24 hr TD 24 hr patch Place 1 patch on the skin over 24 hours every 24 hours (Patient not taking: Reported on 4/3/2024)   • norgestimate-ethinyl estradiol (Ortho Tri-Cyclen Lo) 0.18/0.215/0.25 MG-25 MCG per tablet Take 1 tablet by mouth daily   • ondansetron (ZOFRAN) 4 mg tablet Take 1 tablet (4 mg total) by mouth every 8 (eight) hours as needed for nausea or vomiting for up to 9 days     Allergies   Allergen Reactions   • Eggs Or Egg-Derived Products - Food Allergy Other (See Comments)     abd pain     Immunization History   Administered Date(s) Administered   • Pneumococcal Polysaccharide PPV23 11/29/2019       Objective     /64 (BP Location: Left arm, Patient Position: Sitting, Cuff Size: Large)   Pulse 90   Temp 99.2 °F (37.3 °C) (Tympanic)   Resp 16   Ht 5' (1.524 m)   Wt 77.7 kg (171 lb 3.2 oz)   LMP  (LMP Unknown)   SpO2 98%   BMI 33.44 kg/m²     Physical Exam  Vitals and nursing note reviewed.   Constitutional:       General: She is not in acute distress.     Appearance: Normal appearance.   HENT:      Head: Normocephalic and atraumatic.      Right Ear: External ear normal.      Left Ear: External ear normal.   Eyes:      Conjunctiva/sclera: Conjunctivae normal.   Cardiovascular:      Rate and Rhythm: Normal rate and regular rhythm.      Heart sounds: Normal heart sounds.   Pulmonary:      Effort: Pulmonary effort is normal.      Breath sounds: Normal breath sounds.   Musculoskeletal:         General: Normal range of motion.      Cervical back: Normal range of motion.      Thoracic back: Tenderness present.        Back:    Skin:     General: Skin is warm and dry.   Neurological:      Mental Status: She is alert and oriented to person, place, and time.   Psychiatric:         Mood and Affect: Mood normal.         Behavior: Behavior normal.       Chela Redd,  CRNP

## 2024-04-23 NOTE — LETTER
April 23, 2024     Patient: Melia Lira  YOB: 1980  Date of Visit: 4/23/2024      To Whom it May Concern:    Melia Lira is under my professional care. Melia was seen in my office on 4/23/2024. Melia may return to work on 4/25/2024 .    If you have any questions or concerns, please don't hesitate to call.         Sincerely,          MONICA Colbert        CC: No Recipients

## 2024-04-24 DIAGNOSIS — I10 ESSENTIAL HYPERTENSION: ICD-10-CM

## 2024-04-24 RX ORDER — LISINOPRIL 20 MG/1
20 TABLET ORAL DAILY
Qty: 90 TABLET | Refills: 3 | Status: SHIPPED | OUTPATIENT
Start: 2024-04-24

## 2024-04-24 NOTE — ASSESSMENT & PLAN NOTE
- Recommend meloxicam and flexeril as needed. Discussed side effects.   - Recommend application of heat.  - Recommend home exercises.   - Referred to Ortho.

## 2024-05-11 DIAGNOSIS — F41.9 ANXIETY: ICD-10-CM

## 2024-05-13 RX ORDER — BUSPIRONE HYDROCHLORIDE 5 MG/1
5 TABLET ORAL 2 TIMES DAILY
Qty: 180 TABLET | Refills: 1 | Status: SHIPPED | OUTPATIENT
Start: 2024-05-13

## 2024-05-16 ENCOUNTER — HOSPITAL ENCOUNTER (EMERGENCY)
Facility: HOSPITAL | Age: 44
Discharge: HOME | End: 2024-05-16
Attending: EMERGENCY MEDICINE
Payer: COMMERCIAL

## 2024-05-16 VITALS
OXYGEN SATURATION: 99 % | RESPIRATION RATE: 20 BRPM | WEIGHT: 166.67 LBS | HEART RATE: 91 BPM | BODY MASS INDEX: 32.55 KG/M2 | DIASTOLIC BLOOD PRESSURE: 81 MMHG | SYSTOLIC BLOOD PRESSURE: 121 MMHG

## 2024-05-16 DIAGNOSIS — R11.2 NAUSEA & VOMITING: Primary | ICD-10-CM

## 2024-05-16 DIAGNOSIS — N17.9 AKI (ACUTE KIDNEY INJURY) (HCC): ICD-10-CM

## 2024-05-16 LAB
ALBUMIN SERPL BCP-MCNC: 4.6 G/DL (ref 3.5–5)
ALP SERPL-CCNC: 116 U/L (ref 34–104)
ALT SERPL W P-5'-P-CCNC: 16 U/L (ref 7–52)
ANION GAP SERPL CALCULATED.3IONS-SCNC: 13 MMOL/L (ref 4–13)
AST SERPL W P-5'-P-CCNC: 17 U/L (ref 13–39)
BASOPHILS # BLD AUTO: 0.03 THOUSANDS/ÂΜL (ref 0–0.1)
BASOPHILS NFR BLD AUTO: 0 % (ref 0–1)
BILIRUB SERPL-MCNC: 0.55 MG/DL (ref 0.2–1)
BUN SERPL-MCNC: 36 MG/DL (ref 5–25)
CALCIUM SERPL-MCNC: 10 MG/DL (ref 8.4–10.2)
CHLORIDE SERPL-SCNC: 98 MMOL/L (ref 96–108)
CO2 SERPL-SCNC: 21 MMOL/L (ref 21–32)
CREAT SERPL-MCNC: 1.54 MG/DL (ref 0.6–1.3)
EOSINOPHIL # BLD AUTO: 0.07 THOUSAND/ÂΜL (ref 0–0.61)
EOSINOPHIL NFR BLD AUTO: 1 % (ref 0–6)
ERYTHROCYTE [DISTWIDTH] IN BLOOD BY AUTOMATED COUNT: 13.4 % (ref 11.6–15.1)
GFR SERPL CREATININE-BSD FRML MDRD: 40 ML/MIN/1.73SQ M
GLUCOSE SERPL-MCNC: 143 MG/DL (ref 65–140)
HCT VFR BLD AUTO: 38.5 % (ref 34.8–46.1)
HGB BLD-MCNC: 13.5 G/DL (ref 11.5–15.4)
IMM GRANULOCYTES # BLD AUTO: 0.05 THOUSAND/UL (ref 0–0.2)
IMM GRANULOCYTES NFR BLD AUTO: 1 % (ref 0–2)
LIPASE SERPL-CCNC: 55 U/L (ref 11–82)
LYMPHOCYTES # BLD AUTO: 2.32 THOUSANDS/ÂΜL (ref 0.6–4.47)
LYMPHOCYTES NFR BLD AUTO: 26 % (ref 14–44)
MCH RBC QN AUTO: 31.7 PG (ref 26.8–34.3)
MCHC RBC AUTO-ENTMCNC: 35.1 G/DL (ref 31.4–37.4)
MCV RBC AUTO: 90 FL (ref 82–98)
MONOCYTES # BLD AUTO: 1.01 THOUSAND/ÂΜL (ref 0.17–1.22)
MONOCYTES NFR BLD AUTO: 11 % (ref 4–12)
NEUTROPHILS # BLD AUTO: 5.55 THOUSANDS/ÂΜL (ref 1.85–7.62)
NEUTS SEG NFR BLD AUTO: 61 % (ref 43–75)
NRBC BLD AUTO-RTO: 0 /100 WBCS
PLATELET # BLD AUTO: 328 THOUSANDS/UL (ref 149–390)
PMV BLD AUTO: 9.1 FL (ref 8.9–12.7)
POTASSIUM SERPL-SCNC: 4 MMOL/L (ref 3.5–5.3)
PROT SERPL-MCNC: 8.4 G/DL (ref 6.4–8.4)
RBC # BLD AUTO: 4.26 MILLION/UL (ref 3.81–5.12)
SODIUM SERPL-SCNC: 132 MMOL/L (ref 135–147)
WBC # BLD AUTO: 9.03 THOUSAND/UL (ref 4.31–10.16)

## 2024-05-16 PROCEDURE — 99284 EMERGENCY DEPT VISIT MOD MDM: CPT | Performed by: EMERGENCY MEDICINE

## 2024-05-16 PROCEDURE — 96375 TX/PRO/DX INJ NEW DRUG ADDON: CPT

## 2024-05-16 PROCEDURE — 80053 COMPREHEN METABOLIC PANEL: CPT | Performed by: EMERGENCY MEDICINE

## 2024-05-16 PROCEDURE — 83690 ASSAY OF LIPASE: CPT | Performed by: EMERGENCY MEDICINE

## 2024-05-16 PROCEDURE — 99285 EMERGENCY DEPT VISIT HI MDM: CPT

## 2024-05-16 PROCEDURE — 96365 THER/PROPH/DIAG IV INF INIT: CPT

## 2024-05-16 PROCEDURE — 36415 COLL VENOUS BLD VENIPUNCTURE: CPT | Performed by: EMERGENCY MEDICINE

## 2024-05-16 PROCEDURE — 85025 COMPLETE CBC W/AUTO DIFF WBC: CPT | Performed by: EMERGENCY MEDICINE

## 2024-05-16 RX ORDER — HALOPERIDOL 5 MG/ML
5 INJECTION INTRAMUSCULAR ONCE
Status: COMPLETED | OUTPATIENT
Start: 2024-05-16 | End: 2024-05-16

## 2024-05-16 RX ADMIN — HALOPERIDOL LACTATE 5 MG: 5 INJECTION, SOLUTION INTRAMUSCULAR at 03:43

## 2024-05-16 RX ADMIN — SODIUM CHLORIDE, SODIUM LACTATE, POTASSIUM CHLORIDE, AND CALCIUM CHLORIDE 1000 ML: .6; .31; .03; .02 INJECTION, SOLUTION INTRAVENOUS at 03:43

## 2024-05-16 RX ADMIN — SODIUM CHLORIDE, SODIUM LACTATE, POTASSIUM CHLORIDE, AND CALCIUM CHLORIDE 1000 ML: .6; .31; .03; .02 INJECTION, SOLUTION INTRAVENOUS at 04:26

## 2024-05-16 NOTE — Clinical Note
Melia Lira was seen and treated in our emergency department on 5/16/2024.    No restrictions            Diagnosis:     Melia  may return to work on return date.    She may return on this date: 05/17/2024         If you have any questions or concerns, please don't hesitate to call.      Brett Boss MD    ______________________________           _______________          _______________  Hospital Representative                              Date                                Time

## 2024-05-16 NOTE — ED PROVIDER NOTES
History  Chief Complaint   Patient presents with    Vomiting     Pt reports upper abdominal pain and vomiting since Monday. Denies diarrhea. + lightheadedness. Unable to tolerate PO intake. Hx cyclic vomiting syndrome.     44-year-old female who presents with nausea/vomiting.  States that this has been ongoing since Monday.  Vomit is nonbloody and nonbilious.  Denies any diarrhea.  States that she tried taking Zofran without relief.  States that she has had issues with this in the past due to cyclic vomiting syndrome.  Denies any marijuana use.  States that she quit last year.        Prior to Admission Medications   Prescriptions Last Dose Informant Patient Reported? Taking?   Compro 25 MG suppository   Yes No   Sig: INSERT 1 SUPPOSITORY RECTALLY (25 MG TOTAL) EVERY 12 HOURS AS NEEDED FOR NAUSEA AND VOMITING   LORazepam (Ativan) 0.5 mg tablet   No No   Sig: Take 2 tablets (1 mg total) by mouth daily as needed for anxiety   QUEtiapine (SEROquel) 50 mg tablet   No No   Sig: Take 1 tablet (50 mg total) by mouth daily at bedtime   albuterol (PROVENTIL HFA,VENTOLIN HFA) 90 mcg/act inhaler   No No   Sig: INHALE 2 PUFFS EVERY 6 HOURS AS NEEDED FOR WHEEZING   betamethasone valerate (LUXIQ) 0.12 % foam  Self No No   Sig: Apply topically daily   busPIRone (BUSPAR) 5 mg tablet   No No   Sig: TAKE 1 TABLET BY MOUTH TWICE A DAY   cyclobenzaprine (FLEXERIL) 5 mg tablet   No No   Sig: Take 1 tablet (5 mg total) by mouth 3 (three) times a day as needed for muscle spasms   fluticasone (FLONASE) 50 mcg/act nasal spray  Self No No   Si spray into each nostril daily   Patient not taking: Reported on 4/3/2024   gabapentin (Neurontin) 600 MG tablet   No No   Sig: Take 1 tablet (600 mg total) by mouth 3 (three) times a day   hydrOXYzine pamoate (VISTARIL) 25 mg capsule   No No   Sig: TAKE 1 CAPSULE BY MOUTH 3 TIMES A DAY AS NEEDED FOR ANXIETY.   lisinopril (ZESTRIL) 20 mg tablet   No No   Sig: TAKE 1 TABLET BY MOUTH EVERY DAY    meloxicam (MOBIC) 15 mg tablet   No No   Sig: Take 1 tablet (15 mg total) by mouth daily as needed for moderate pain   metoclopramide (REGLAN) 5 mg tablet   No No   Sig: Take 1 tablet (5 mg total) by mouth 4 (four) times a day as needed (nausea)   Patient not taking: Reported on 4/3/2024   nicotine (NICODERM CQ) 21 mg/24 hr TD 24 hr patch   No No   Sig: Place 1 patch on the skin over 24 hours every 24 hours   Patient not taking: Reported on 4/3/2024   norgestimate-ethinyl estradiol (Ortho Tri-Cyclen Lo) 0.18/0.215/0.25 MG-25 MCG per tablet  Self No No   Sig: Take 1 tablet by mouth daily   omeprazole (PriLOSEC) 40 MG capsule   No No   Sig: Take 1 capsule (40 mg total) by mouth daily   ondansetron (ZOFRAN) 4 mg tablet   No No   Sig: Take 1 tablet (4 mg total) by mouth every 8 (eight) hours as needed for nausea or vomiting for up to 9 days   ondansetron (Zofran ODT) 4 mg disintegrating tablet  Self No No   Sig: Take 1 tablet (4 mg total) by mouth every 6 (six) hours as needed for nausea or vomiting   prochlorperazine (COMPAZINE) 10 mg tablet  Self No No   Sig: Take 0.5 tablets (5 mg total) by mouth every 8 (eight) hours as needed for nausea or vomiting   promethazine (PHENERGAN) 25 mg tablet  Self No No   Sig: Take 1 tablet (25 mg total) by mouth every 6 (six) hours as needed for nausea or vomiting   sertraline (ZOLOFT) 100 mg tablet   No No   Sig: TAKE 2 TABLETS BY MOUTH EVERY DAY      Facility-Administered Medications: None       Past Medical History:   Diagnosis Date    Anxiety     Arthritis     OA  b/l knees    Asthma     Approx 2 years ago    Calculus of gallbladder without cholecystitis without obstruction 02/23/2022    Chronic kidney disease     Depression     GERD (gastroesophageal reflux disease)     In my 20s. Approx 15 years ago    Hx of bleeding disorder     dysmennorrhea-dx lap today 8/12/2016    Hypertension     Kidney stone     Obesity     Seasonal allergies        Past Surgical History:   Procedure  Laterality Date     SECTION      CHOLECYSTECTOMY      COLONOSCOPY      DIAGNOSTIC LAPAROSCOPY      DILATION AND CURETTAGE OF UTERUS      HERNIA REPAIR      AR COLONOSCOPY FLX DX W/COLLJ SPEC WHEN PFRMD N/A 2018    Procedure: EGD AND COLONOSCOPY;  Surgeon: Allan Duncan MD;  Location: [facility];  Service: Gastroenterology    AR LAPAROSCOPY SURG CHOLECYSTECTOMY N/A 2022    Procedure: ROBOTIC LAPAROSCOPIC CHOLECYSTECTOMY;  Surgeon: Griffin Triplett DO;  Location: [facility];  Service: General    AR LAPAROSCOPY W/RMVL ADNEXAL STRUCTURES Bilateral 2016    Procedure: CYSTECTOMY  OVARIAN;  Surgeon: C William Riedel, DO;  Location: [facility];  Service: Gynecology    AR LAPS ABD PRTM&OMENTUM DX W/WO SPEC BR/WA SPX N/A 2016    Procedure: LAPAROSCOPY DIAGNOSTIC DAVID;  Surgeon: C William Riedel, DO;  Location: [facility];  Service: Gynecology    AR RPR UMBILICAL HRNA 5 YRS/> REDUCIBLE N/A 2022    Procedure: UMBILICAL HERNIA REPAIR;  Surgeon: Griffin Triplett DO;  Location: [facility];  Service: General    UPPER GASTROINTESTINAL ENDOSCOPY      WISDOM TOOTH EXTRACTION         Family History   Problem Relation Age of Onset    No Known Problems Sister     Autism Daughter     Lung cancer Maternal Grandmother     Cancer Maternal Grandmother         Deceassd 10 years    Diabetes Maternal Grandfather     Arthritis Maternal Grandfather          10 years    Aneurysm Paternal Grandmother     No Known Problems Sister      I have reviewed and agree with the history as documented.    E-Cigarette/Vaping    E-Cigarette Use Never User      E-Cigarette/Vaping Substances    Nicotine No     THC No     CBD No     Flavoring No     Other No     Unknown No      Social History     Tobacco Use    Smoking status: Every Day     Current packs/day: 1.00     Average packs/day: 1 pack/day for 23.0 years (23.0 ttl pk-yrs)     Types: Cigarettes    Smokeless tobacco: Never   Vaping Use    Vaping status: Never  Used   Substance Use Topics    Alcohol use: Never     Comment: social    Drug use: Not Currently     Frequency: 7.0 times per week     Types: Marijuana     Comment: No longer using marijuana 5/16/2024       Review of Systems   Constitutional:  Negative for chills and fever.   HENT:  Negative for rhinorrhea, sore throat and trouble swallowing.    Eyes:  Negative for photophobia and visual disturbance.   Respiratory:  Negative for cough, chest tightness and shortness of breath.    Cardiovascular:  Negative for chest pain, palpitations and leg swelling.   Gastrointestinal:  Positive for abdominal pain, nausea and vomiting. Negative for blood in stool and diarrhea.   Endocrine: Negative for polyuria.   Genitourinary:  Negative for dysuria, flank pain, hematuria, vaginal bleeding and vaginal discharge.   Musculoskeletal:  Negative for back pain and neck pain.   Skin:  Negative for color change and rash.   Allergic/Immunologic: Negative for immunocompromised state.   Neurological:  Negative for dizziness, weakness, light-headedness, numbness and headaches.   All other systems reviewed and are negative.      Physical Exam  Physical Exam  Vitals and nursing note reviewed.   Constitutional:       Appearance: She is well-developed.      Comments: Actively vomiting.   HENT:      Head: Normocephalic and atraumatic.      Mouth/Throat:      Lips: Pink.      Mouth: Mucous membranes are moist.   Eyes:      General: Lids are normal.      Extraocular Movements: Extraocular movements intact.      Conjunctiva/sclera: Conjunctivae normal.      Pupils: Pupils are equal, round, and reactive to light.   Cardiovascular:      Rate and Rhythm: Normal rate and regular rhythm.      Heart sounds: Normal heart sounds. No murmur heard.  Pulmonary:      Effort: Pulmonary effort is normal.      Breath sounds: Normal breath sounds.   Abdominal:      General: There is no distension.      Palpations: Abdomen is soft.      Tenderness: There is no  abdominal tenderness. There is no guarding or rebound.   Musculoskeletal:         General: No swelling.      Cervical back: Full passive range of motion without pain, normal range of motion and neck supple.   Skin:     General: Skin is warm.      Capillary Refill: Capillary refill takes less than 2 seconds.   Neurological:      General: No focal deficit present.      Mental Status: She is alert.   Psychiatric:         Mood and Affect: Mood normal.         Speech: Speech normal.         Behavior: Behavior normal.         Vital Signs  ED Triage Vitals [05/16/24 0329]   Temp Pulse Respirations Blood Pressure SpO2   -- 91 20 121/81 99 %      Temp src Heart Rate Source Patient Position - Orthostatic VS BP Location FiO2 (%)   -- Monitor Sitting Right arm --      Pain Score       9           Vitals:    05/16/24 0329   BP: 121/81   Pulse: 91   Patient Position - Orthostatic VS: Sitting         Visual Acuity      ED Medications  Medications   lactated ringers bolus 1,000 mL (0 mL Intravenous Stopped 5/16/24 0512)   haloperidol lactate (HALDOL) injection 5 mg (5 mg Intravenous Given 5/16/24 0343)   lactated ringers bolus 1,000 mL (0 mL Intravenous Stopped 5/16/24 0512)       Diagnostic Studies  Results Reviewed       Procedure Component Value Units Date/Time    Comprehensive metabolic panel [[id number]]  (Abnormal) Collected: 05/16/24 0341    Lab Status: Final result Specimen: Blood from Arm, Right Updated: 05/16/24 0402     Sodium 132 mmol/L      Potassium 4.0 mmol/L      Chloride 98 mmol/L      CO2 21 mmol/L      ANION GAP 13 mmol/L      BUN 36 mg/dL      Creatinine 1.54 mg/dL      Glucose 143 mg/dL      Calcium 10.0 mg/dL      AST 17 U/L      ALT 16 U/L      Alkaline Phosphatase 116 U/L      Total Protein 8.4 g/dL      Albumin 4.6 g/dL      Total Bilirubin 0.55 mg/dL      eGFR 40 ml/min/1.73sq m     Narrative:      National Kid[employer] Foundation guidelines for Chronic Kidney Disease (CKD):     Stage 1 with normal or  high GFR (GFR > 90 mL/min/1.73 square meters)    Stage 2 Mild CKD (GFR = 60-89 mL/min/1.73 square meters)    Stage 3A Moderate CKD (GFR = 45-59 mL/min/1.73 square meters)    Stage 3B Moderate CKD (GFR = 30-44 mL/min/1.73 square meters)    Stage 4 Severe CKD (GFR = 15-29 mL/min/1.73 square meters)    Stage 5 End Stage CKD (GFR <15 mL/min/1.73 square meters)  Note: GFR calculation is accurate only with a steady state creatinine    Lipase [[id number]]  (Normal) Collected: 05/16/24 0341    Lab Status: Final result Specimen: Blood from Arm, Right Updated: 05/16/24 0402     Lipase 55 u/L     CBC and differential [[id number]] Collected: 05/16/24 0341    Lab Status: Final result Specimen: Blood from Arm, Right Updated: 05/16/24 0348     WBC 9.03 Thousand/uL      RBC 4.26 Million/uL      Hemoglobin 13.5 g/dL      Hematocrit 38.5 %      MCV 90 fL      MCH 31.7 pg      MCHC 35.1 g/dL      RDW 13.4 %      MPV 9.1 fL      Platelets 328 Thousands/uL      nRBC 0 /100 WBCs      Segmented % 61 %      Immature Grans % 1 %      Lymphocytes % 26 %      Monocytes % 11 %      Eosinophils Relative 1 %      Basophils Relative 0 %      Absolute Neutrophils 5.55 Thousands/µL      Absolute Immature Grans 0.05 Thousand/uL      Absolute Lymphocytes 2.32 Thousands/µL      Absolute Monocytes 1.01 Thousand/µL      Eosinophils Absolute 0.07 Thousand/µL      Basophils Absolute 0.03 Thousands/µL                    No orders to display              Procedures  Procedures         ED Course  ED Course as of 05/16/24 0513   Thu May 16, 2024   0408 Creatinine(!): 1.54  Alonso, will give more fluids   0431 Feeling much better, will PO challenge.                               SBIRT 20yo+      Flowsheet Row Most Recent Value   Initial Alcohol Screen: US AUDIT-C     1. How often do you have a drink containing alcohol? 1 Filed at: 05/16/2024 0348   2. How many drinks containing alcohol do you have on a typical day you are drinking?  0 Filed at: 05/16/2024 0348    3a. Male UNDER 65: How often do you have five or more drinks on one occasion? 0 Filed at: 05/16/2024 0348   3b. FEMALE Any Age, or MALE 65+: How often do you have 4 or more drinks on one occassion? 0 Filed at: 05/16/2024 0348   Audit-C Score 1 Filed at: 05/16/2024 0348   ZULMA: How many times in the past year have you...    Used an illegal drug or used a prescription medication for non-medical reasons? Never Filed at: 05/16/2024 0348                      Medical Decision Making  - given the presentation, will check CBC for marked leukocytosis  - CMP for liver enzyme elevation that could signal cholecystitis, biliary obstructive disease. Check RFTs for JACKI / markers of dehydration.  - Lipase given abdominal pain to evaluate specifically for pancreatitis.  -Plan to hold off on abdominal imaging at this time.  She has an unremarkable abdominal exam and has similar presentations in the past with unremarkable CT scans.  Likely cyclic vomiting syndrome.  Treat with IV fluids and IV Haldol.  -If lab work concerning for intra-abdominal pathology, will pursue CT abdomen/pelvis with contrast.  - Disposition per workup.      Problems Addressed:  JACKI (acute kidney injury) (HCC): complicated acute illness or injury with systemic symptoms  Nausea & vomiting: acute illness or injury    Amount and/or Complexity of Data Reviewed  Labs: ordered. Decision-making details documented in ED Course.    Risk  Prescription drug management.             Disposition  Final diagnoses:   Nausea & vomiting   JACKI (acute kidney injury) (HCC)     Time reflects when diagnosis was documented in both MDM as applicable and the Disposition within this note       Time User Action Codes Description Comment    5/16/2024  4:54 AM Brett Boss Add [R11.2] Nausea & vomiting     5/16/2024  4:54 AM Brett Boss Add [N17.9] JACKI (acute kidney injury) (HCC)           ED Disposition       ED Disposition   Discharge    Condition   Stable    Date/Time   Thu  May 16, 2024 0454    Comment   Melia Lira discharge to home/self care.                   Follow-up Information       Follow up With Specialties Details Why Contact Info Additional Information    MONICA Colbert Nurse Practitioner Schedule an appointment as soon as possible for a visit   [address] [phone] [phone]       [facility] Emergency Department Emergency Medicine Go to  If symptoms worsen [address] [place] [id number] [phone] [facility] Emergency Department, [address] [place] [address], [phone]            Patient's Medications   Discharge Prescriptions    No medications on file       No discharge procedures on file.    PDMP Review         Value Time User    PDMP Reviewed  Yes 12/20/2023  9:53 AM MONICA Colbert            ED Provider  Electronically Signed by             Brett Boss MD  05/16/24 0549

## 2024-05-20 ENCOUNTER — OFFICE VISIT (OUTPATIENT)
Dept: URGENT CARE | Age: 44
End: 2024-05-20
Payer: COMMERCIAL

## 2024-05-20 ENCOUNTER — HOSPITAL ENCOUNTER (INPATIENT)
Facility: HOSPITAL | Age: 44
LOS: 1 days | Discharge: HOME | DRG: 683 | End: 2024-05-21
Attending: EMERGENCY MEDICINE | Admitting: INTERNAL MEDICINE
Payer: COMMERCIAL

## 2024-05-20 ENCOUNTER — APPOINTMENT (EMERGENCY)
Dept: RADIOLOGY | Facility: HOSPITAL | Age: 44
DRG: 683 | End: 2024-05-20
Payer: COMMERCIAL

## 2024-05-20 ENCOUNTER — APPOINTMENT (EMERGENCY)
Dept: CT IMAGING | Facility: HOSPITAL | Age: 44
DRG: 683 | End: 2024-05-20
Payer: COMMERCIAL

## 2024-05-20 VITALS
WEIGHT: 166.2 LBS | OXYGEN SATURATION: 98 % | HEART RATE: 80 BPM | HEIGHT: 60 IN | DIASTOLIC BLOOD PRESSURE: 42 MMHG | TEMPERATURE: 97.1 F | BODY MASS INDEX: 32.63 KG/M2 | RESPIRATION RATE: 18 BRPM | SYSTOLIC BLOOD PRESSURE: 62 MMHG

## 2024-05-20 DIAGNOSIS — I95.9 HYPOTENSION, UNSPECIFIED HYPOTENSION TYPE: Primary | ICD-10-CM

## 2024-05-20 DIAGNOSIS — R11.10 VOMITING: ICD-10-CM

## 2024-05-20 DIAGNOSIS — H66.90 OTITIS: ICD-10-CM

## 2024-05-20 DIAGNOSIS — N17.9 AKI (ACUTE KIDNEY INJURY) (HCC): ICD-10-CM

## 2024-05-20 DIAGNOSIS — H66.90 OTITIS MEDIA: ICD-10-CM

## 2024-05-20 DIAGNOSIS — E87.1 HYPONATREMIA: ICD-10-CM

## 2024-05-20 DIAGNOSIS — R42 DIZZINESS: Primary | ICD-10-CM

## 2024-05-20 DIAGNOSIS — H92.01 RIGHT EAR PAIN: ICD-10-CM

## 2024-05-20 DIAGNOSIS — E86.0 DEHYDRATION: ICD-10-CM

## 2024-05-20 LAB
ALBUMIN SERPL BCP-MCNC: 4.1 G/DL (ref 3.5–5)
ALP SERPL-CCNC: 108 U/L (ref 34–104)
ALT SERPL W P-5'-P-CCNC: 56 U/L (ref 7–52)
ANION GAP SERPL CALCULATED.3IONS-SCNC: 11 MMOL/L (ref 4–13)
AST SERPL W P-5'-P-CCNC: 35 U/L (ref 13–39)
ATRIAL RATE: 68 BPM
ATRIAL RATE: 69 BPM
ATRIAL RATE: 73 BPM
BACTERIA UR QL AUTO: ABNORMAL /HPF
BASOPHILS # BLD AUTO: 0.04 THOUSANDS/ÂΜL (ref 0–0.1)
BASOPHILS NFR BLD AUTO: 0 % (ref 0–1)
BILIRUB SERPL-MCNC: 0.4 MG/DL (ref 0.2–1)
BILIRUB UR QL STRIP: NEGATIVE
BUN SERPL-MCNC: 46 MG/DL (ref 5–25)
CALCIUM SERPL-MCNC: 9.6 MG/DL (ref 8.4–10.2)
CARDIAC TROPONIN I PNL SERPL HS: <2 NG/L
CHLORIDE SERPL-SCNC: 95 MMOL/L (ref 96–108)
CLARITY UR: CLEAR
CO2 SERPL-SCNC: 23 MMOL/L (ref 21–32)
COLOR UR: YELLOW
CREAT SERPL-MCNC: 5.11 MG/DL (ref 0.6–1.3)
CREAT UR-MCNC: 140.8 MG/DL
EOSINOPHIL # BLD AUTO: 0.15 THOUSAND/ÂΜL (ref 0–0.61)
EOSINOPHIL NFR BLD AUTO: 1 % (ref 0–6)
ERYTHROCYTE [DISTWIDTH] IN BLOOD BY AUTOMATED COUNT: 13.4 % (ref 11.6–15.1)
FLUAV RNA RESP QL NAA+PROBE: NEGATIVE
FLUBV RNA RESP QL NAA+PROBE: NEGATIVE
GFR SERPL CREATININE-BSD FRML MDRD: 9 ML/MIN/1.73SQ M
GLUCOSE SERPL-MCNC: 100 MG/DL (ref 65–140)
GLUCOSE UR STRIP-MCNC: NEGATIVE MG/DL
HCT VFR BLD AUTO: 32.9 % (ref 34.8–46.1)
HGB BLD-MCNC: 11.3 G/DL (ref 11.5–15.4)
HGB UR QL STRIP.AUTO: ABNORMAL
HYALINE CASTS #/AREA URNS LPF: ABNORMAL /LPF
IMM GRANULOCYTES # BLD AUTO: 0.04 THOUSAND/UL (ref 0–0.2)
IMM GRANULOCYTES NFR BLD AUTO: 0 % (ref 0–2)
KETONES UR STRIP-MCNC: NEGATIVE MG/DL
LEUKOCYTE ESTERASE UR QL STRIP: NEGATIVE
LYMPHOCYTES # BLD AUTO: 2.83 THOUSANDS/ÂΜL (ref 0.6–4.47)
LYMPHOCYTES NFR BLD AUTO: 27 % (ref 14–44)
MCH RBC QN AUTO: 32.1 PG (ref 26.8–34.3)
MCHC RBC AUTO-ENTMCNC: 34.3 G/DL (ref 31.4–37.4)
MCV RBC AUTO: 94 FL (ref 82–98)
MONOCYTES # BLD AUTO: 1.34 THOUSAND/ÂΜL (ref 0.17–1.22)
MONOCYTES NFR BLD AUTO: 13 % (ref 4–12)
MUCOUS THREADS UR QL AUTO: ABNORMAL
NEUTROPHILS # BLD AUTO: 6.16 THOUSANDS/ÂΜL (ref 1.85–7.62)
NEUTS SEG NFR BLD AUTO: 59 % (ref 43–75)
NITRITE UR QL STRIP: NEGATIVE
NON-SQ EPI CELLS URNS QL MICRO: ABNORMAL /HPF
NRBC BLD AUTO-RTO: 0 /100 WBCS
P AXIS: 43 DEGREES
P AXIS: 52 DEGREES
P AXIS: 55 DEGREES
PH UR STRIP.AUTO: 5.5 [PH] (ref 4.5–8)
PLATELET # BLD AUTO: 231 THOUSANDS/UL (ref 149–390)
PMV BLD AUTO: 9.1 FL (ref 8.9–12.7)
POTASSIUM SERPL-SCNC: 3.7 MMOL/L (ref 3.5–5.3)
PR INTERVAL: 118 MS
PR INTERVAL: 126 MS
PR INTERVAL: 134 MS
PROT SERPL-MCNC: 7.6 G/DL (ref 6.4–8.4)
PROT UR STRIP-MCNC: ABNORMAL MG/DL
QRS AXIS: 38 DEGREES
QRS AXIS: 50 DEGREES
QRS AXIS: 56 DEGREES
QRSD INTERVAL: 82 MS
QRSD INTERVAL: 82 MS
QRSD INTERVAL: 86 MS
QT INTERVAL: 376 MS
QT INTERVAL: 378 MS
QT INTERVAL: 386 MS
QTC INTERVAL: 399 MS
QTC INTERVAL: 405 MS
QTC INTERVAL: 425 MS
RBC # BLD AUTO: 3.52 MILLION/UL (ref 3.81–5.12)
RBC #/AREA URNS AUTO: ABNORMAL /HPF
RSV RNA RESP QL NAA+PROBE: NEGATIVE
SARS-COV-2 RNA RESP QL NAA+PROBE: NEGATIVE
SODIUM 24H UR-SCNC: 35 MOL/L
SODIUM SERPL-SCNC: 129 MMOL/L (ref 135–147)
SP GR UR STRIP.AUTO: 1.01 (ref 1–1.03)
T WAVE AXIS: 28 DEGREES
T WAVE AXIS: 32 DEGREES
T WAVE AXIS: 35 DEGREES
TRANS CELLS #/AREA URNS HPF: PRESENT /[HPF]
UROBILINOGEN UR QL STRIP.AUTO: 0.2 E.U./DL
VENTRICULAR RATE: 68 BPM
VENTRICULAR RATE: 69 BPM
VENTRICULAR RATE: 73 BPM
WBC # BLD AUTO: 10.56 THOUSAND/UL (ref 4.31–10.16)
WBC #/AREA URNS AUTO: ABNORMAL /HPF

## 2024-05-20 PROCEDURE — 93005 ELECTROCARDIOGRAM TRACING: CPT

## 2024-05-20 PROCEDURE — 82570 ASSAY OF URINE CREATININE: CPT | Performed by: STUDENT IN AN ORGANIZED HEALTH CARE EDUCATION/TRAINING PROGRAM

## 2024-05-20 PROCEDURE — 83935 ASSAY OF URINE OSMOLALITY: CPT | Performed by: STUDENT IN AN ORGANIZED HEALTH CARE EDUCATION/TRAINING PROGRAM

## 2024-05-20 PROCEDURE — 71045 X-RAY EXAM CHEST 1 VIEW: CPT

## 2024-05-20 PROCEDURE — 99213 OFFICE O/P EST LOW 20 MIN: CPT

## 2024-05-20 PROCEDURE — 74176 CT ABD & PELVIS W/O CONTRAST: CPT

## 2024-05-20 PROCEDURE — 96365 THER/PROPH/DIAG IV INF INIT: CPT

## 2024-05-20 PROCEDURE — 0241U HB NFCT DS VIR RESP RNA 4 TRGT: CPT | Performed by: EMERGENCY MEDICINE

## 2024-05-20 PROCEDURE — 96361 HYDRATE IV INFUSION ADD-ON: CPT

## 2024-05-20 PROCEDURE — 99285 EMERGENCY DEPT VISIT HI MDM: CPT | Performed by: EMERGENCY MEDICINE

## 2024-05-20 PROCEDURE — 85025 COMPLETE CBC W/AUTO DIFF WBC: CPT | Performed by: EMERGENCY MEDICINE

## 2024-05-20 PROCEDURE — 36415 COLL VENOUS BLD VENIPUNCTURE: CPT

## 2024-05-20 PROCEDURE — 93010 ELECTROCARDIOGRAM REPORT: CPT | Performed by: INTERNAL MEDICINE

## 2024-05-20 PROCEDURE — 81001 URINALYSIS AUTO W/SCOPE: CPT

## 2024-05-20 PROCEDURE — 84484 ASSAY OF TROPONIN QUANT: CPT | Performed by: EMERGENCY MEDICINE

## 2024-05-20 PROCEDURE — 99285 EMERGENCY DEPT VISIT HI MDM: CPT

## 2024-05-20 PROCEDURE — 99223 1ST HOSP IP/OBS HIGH 75: CPT | Performed by: STUDENT IN AN ORGANIZED HEALTH CARE EDUCATION/TRAINING PROGRAM

## 2024-05-20 PROCEDURE — 84300 ASSAY OF URINE SODIUM: CPT | Performed by: STUDENT IN AN ORGANIZED HEALTH CARE EDUCATION/TRAINING PROGRAM

## 2024-05-20 PROCEDURE — 80053 COMPREHEN METABOLIC PANEL: CPT | Performed by: EMERGENCY MEDICINE

## 2024-05-20 RX ORDER — ONDANSETRON 2 MG/ML
4 INJECTION INTRAMUSCULAR; INTRAVENOUS EVERY 6 HOURS PRN
Status: DISCONTINUED | OUTPATIENT
Start: 2024-05-20 | End: 2024-05-21 | Stop reason: HOSPADM

## 2024-05-20 RX ORDER — QUETIAPINE FUMARATE 25 MG/1
50 TABLET, FILM COATED ORAL
Status: DISCONTINUED | OUTPATIENT
Start: 2024-05-20 | End: 2024-05-21 | Stop reason: HOSPADM

## 2024-05-20 RX ORDER — AMOXICILLIN 250 MG/1
500 CAPSULE ORAL ONCE
Status: COMPLETED | OUTPATIENT
Start: 2024-05-20 | End: 2024-05-20

## 2024-05-20 RX ORDER — PANTOPRAZOLE SODIUM 40 MG/1
40 TABLET, DELAYED RELEASE ORAL
Status: DISCONTINUED | OUTPATIENT
Start: 2024-05-20 | End: 2024-05-21 | Stop reason: HOSPADM

## 2024-05-20 RX ORDER — ACETAMINOPHEN 10 MG/ML
1000 INJECTION, SOLUTION INTRAVENOUS ONCE
Status: COMPLETED | OUTPATIENT
Start: 2024-05-20 | End: 2024-05-20

## 2024-05-20 RX ORDER — POLYETHYLENE GLYCOL 3350 17 G/17G
17 POWDER, FOR SOLUTION ORAL DAILY PRN
Status: DISCONTINUED | OUTPATIENT
Start: 2024-05-20 | End: 2024-05-21 | Stop reason: HOSPADM

## 2024-05-20 RX ORDER — HYDROXYZINE HYDROCHLORIDE 25 MG/1
25 TABLET, FILM COATED ORAL 3 TIMES DAILY PRN
Status: DISCONTINUED | OUTPATIENT
Start: 2024-05-20 | End: 2024-05-21 | Stop reason: HOSPADM

## 2024-05-20 RX ORDER — ACETAMINOPHEN 325 MG/1
650 TABLET ORAL EVERY 6 HOURS PRN
Status: DISCONTINUED | OUTPATIENT
Start: 2024-05-20 | End: 2024-05-21

## 2024-05-20 RX ORDER — MAGNESIUM HYDROXIDE/ALUMINUM HYDROXICE/SIMETHICONE 120; 1200; 1200 MG/30ML; MG/30ML; MG/30ML
30 SUSPENSION ORAL EVERY 4 HOURS PRN
Status: DISCONTINUED | OUTPATIENT
Start: 2024-05-20 | End: 2024-05-21 | Stop reason: HOSPADM

## 2024-05-20 RX ORDER — SODIUM CHLORIDE, SODIUM GLUCONATE, SODIUM ACETATE, POTASSIUM CHLORIDE, MAGNESIUM CHLORIDE, SODIUM PHOSPHATE, DIBASIC, AND POTASSIUM PHOSPHATE .53; .5; .37; .037; .03; .012; .00082 G/100ML; G/100ML; G/100ML; G/100ML; G/100ML; G/100ML; G/100ML
125 INJECTION, SOLUTION INTRAVENOUS CONTINUOUS
Status: DISPENSED | OUTPATIENT
Start: 2024-05-20 | End: 2024-05-21

## 2024-05-20 RX ORDER — SERTRALINE HYDROCHLORIDE 100 MG/1
200 TABLET, FILM COATED ORAL DAILY
Status: DISCONTINUED | OUTPATIENT
Start: 2024-05-21 | End: 2024-05-21 | Stop reason: HOSPADM

## 2024-05-20 RX ORDER — MAGNESIUM HYDROXIDE/ALUMINUM HYDROXICE/SIMETHICONE 120; 1200; 1200 MG/30ML; MG/30ML; MG/30ML
30 SUSPENSION ORAL ONCE
Status: COMPLETED | OUTPATIENT
Start: 2024-05-20 | End: 2024-05-20

## 2024-05-20 RX ORDER — LANOLIN ALCOHOL/MO/W.PET/CERES
3 CREAM (GRAM) TOPICAL
Status: DISCONTINUED | OUTPATIENT
Start: 2024-05-20 | End: 2024-05-21 | Stop reason: HOSPADM

## 2024-05-20 RX ORDER — ALBUTEROL SULFATE 90 UG/1
2 AEROSOL, METERED RESPIRATORY (INHALATION) EVERY 4 HOURS PRN
Status: DISCONTINUED | OUTPATIENT
Start: 2024-05-20 | End: 2024-05-21 | Stop reason: HOSPADM

## 2024-05-20 RX ORDER — GABAPENTIN 300 MG/1
600 CAPSULE ORAL 3 TIMES DAILY
Status: DISCONTINUED | OUTPATIENT
Start: 2024-05-20 | End: 2024-05-21 | Stop reason: HOSPADM

## 2024-05-20 RX ORDER — BUSPIRONE HYDROCHLORIDE 5 MG/1
5 TABLET ORAL 2 TIMES DAILY
Status: DISCONTINUED | OUTPATIENT
Start: 2024-05-20 | End: 2024-05-21 | Stop reason: HOSPADM

## 2024-05-20 RX ADMIN — BUSPIRONE HYDROCHLORIDE 5 MG: 5 TABLET ORAL at 20:54

## 2024-05-20 RX ADMIN — GABAPENTIN 600 MG: 300 CAPSULE ORAL at 20:54

## 2024-05-20 RX ADMIN — ACETAMINOPHEN 1000 MG: 10 INJECTION INTRAVENOUS at 16:18

## 2024-05-20 RX ADMIN — SODIUM CHLORIDE, SODIUM GLUCONATE, SODIUM ACETATE, POTASSIUM CHLORIDE, MAGNESIUM CHLORIDE, SODIUM PHOSPHATE, DIBASIC, AND POTASSIUM PHOSPHATE 125 ML/HR: .53; .5; .37; .037; .03; .012; .00082 INJECTION, SOLUTION INTRAVENOUS at 20:43

## 2024-05-20 RX ADMIN — AMOXICILLIN 500 MG: 250 CAPSULE ORAL at 16:00

## 2024-05-20 RX ADMIN — SODIUM CHLORIDE 1000 ML: 0.9 INJECTION, SOLUTION INTRAVENOUS at 15:42

## 2024-05-20 RX ADMIN — PANTOPRAZOLE SODIUM 40 MG: 40 TABLET, DELAYED RELEASE ORAL at 20:54

## 2024-05-20 RX ADMIN — SODIUM CHLORIDE 1000 ML: 0.9 INJECTION, SOLUTION INTRAVENOUS at 15:45

## 2024-05-20 RX ADMIN — SODIUM CHLORIDE 1000 ML: 0.9 INJECTION, SOLUTION INTRAVENOUS at 15:36

## 2024-05-20 RX ADMIN — QUETIAPINE FUMARATE 50 MG: 25 TABLET ORAL at 20:54

## 2024-05-20 RX ADMIN — ALUMINUM HYDROXIDE, MAGNESIUM HYDROXIDE, DIMETHICONE 30 ML: 400; 400; 40 SUSPENSION ORAL at 19:16

## 2024-05-20 NOTE — ASSESSMENT & PLAN NOTE
Possibly due to dehydration    Recent Labs     05/20/24  1455 05/21/24  0608   SODIUM 129* 137     Results from last 7 days   Lab Units 05/20/24  1627   OSMO UR mmol/*   SODIUM UR  35

## 2024-05-20 NOTE — H&P
[facility]  H&P  Name: Melia Lira 44 y.o. female I MRN: [id number]  Unit/Bed#: ED-26 I Date of Admission: 5/20/2024   Date of Service: 5/20/2024 I Hospital Day: 0      Assessment & Plan   * JACKI (acute kidney injury) (HCC)  Assessment & Plan  44-year-old female who had a week long history of nausea and intractable vomiting which she suspected to be due to her recent red bull use.  She had dizziness, lightheadedness, and hypotension at the urgent care center earlier today.  She was given IV hydration after her blood work showed evidence of acute kidney injury.  CT abdomen and pelvis showing no acute abnormalities.  IV hydration  Urinary retention protocol  Renal ultrasound    Recent Labs     05/20/24  1455   BUN 46*   CREATININE 5.11*   EGFR 9       Results from last 7 days   Lab Units 05/20/24  1627   BLOOD UA  Small*   PROTEIN UA mg/dl 30 (1+)*         Cyclical vomiting syndrome  Assessment & Plan  Resolved last Saturday. Was vomiting the entire week.    GERD (gastroesophageal reflux disease)  Assessment & Plan  Continue PPI    Essential hypertension  Assessment & Plan  Due to JACKI hold lisinopril    Mild intermittent asthma without complication  Assessment & Plan  Not in acute exacerbation  Continue PRN albuterol    Neuropathy associated with polyneuropathy, organomegaly, endocrinopathy, monoclonal gammopathy, and skin changes syndrome (HCC)  Assessment & Plan  Continue Gabapentin    Anxiety  Assessment & Plan  Continue sertraline, buspar, and PRN hydroxyzine           VTE Prophylaxis: Heparin  / sequential compression device   Code Status: full code    Anticipated Length of Stay:  Patient will be admitted on an Inpatient basis with an anticipated length of stay of  > 2 midnights.   Justification for Hospital Stay: iv hydration, jacki    Chief Complaint:   dizziness    History of Present Illness:    Melia Lira is a 44 y.o. female who presents with dizziness.    Patient is a  44-year-old female with a history of GERD, cyclical vomiting syndrome, hypertension, asthma, anxiety, and neuropathy.  Patient reports that after she took red bull last Mother's Day she started developing intractable vomiting for almost an entire week.  This resolved last Saturday, however due to the amount of fluid losses she likely had as a result of this she started feeling dizzy and lightheaded which got worse earlier today.  She presented to the urgent care center and continued to complain of dizziness and lightheadedness.  Her blood pressure was in the 60s and patient felt weak.  Her knees buckled, but did not have any evidence of head trauma as a result of this.  She was brought into our ED for further evaluation.  She was given 2 L so far.  Laboratory work consistent with acute kidney injury.  CT abdomen and pelvis did not show any evidence of acute findings.    Review of Systems:    Review of Systems   Constitutional:  Positive for activity change, appetite change and fatigue.   Respiratory:  Negative for cough, shortness of breath and wheezing.    Gastrointestinal:  Positive for nausea and vomiting. Negative for abdominal pain.   Skin:  Negative for color change and pallor.   Neurological:  Positive for dizziness, weakness and light-headedness.   Psychiatric/Behavioral:  Negative for agitation and confusion.        Past Medical and Surgical History:     Past Medical History:   Diagnosis Date    Anxiety     Arthritis     OA  b/l knees    Asthma     Approx 2 years ago    Calculus of gallbladder without cholecystitis without obstruction 2022    Chronic kidney disease     Depression     GERD (gastroesophageal reflux disease)     In my 20s. Approx 15 years ago    Hx of bleeding disorder     dysmennorrhea-dx lap today 2016    Hypertension     Kidney stone     Obesity     Seasonal allergies        Past Surgical History:   Procedure Laterality Date     SECTION      CHOLECYSTECTOMY       COLONOSCOPY      DIAGNOSTIC LAPAROSCOPY      DILATION AND CURETTAGE OF UTERUS  2008    HERNIA REPAIR      NH COLONOSCOPY FLX DX W/COLLJ SPEC WHEN PFRMD N/A 11/07/2018    Procedure: EGD AND COLONOSCOPY;  Surgeon: Allan Duncan MD;  [facility];  Service: Gastroenterology    NH LAPAROSCOPY SURG CHOLECYSTECTOMY N/A 03/11/2022    Procedure: ROBOTIC LAPAROSCOPIC CHOLECYSTECTOMY;  Surgeon: Griffin Triplett DO;  [facility];  Service: General    NH LAPAROSCOPY W/RMVL ADNEXAL STRUCTURES Bilateral 08/12/2016    Procedure: CYSTECTOMY  OVARIAN;  Surgeon: C William Riedel, DO;  [facility];  Service: Gynecology    NH LAPS ABD PRTM&OMENTUM DX W/WO SPEC BR/WA SPX N/A 08/12/2016    Procedure: LAPAROSCOPY DIAGNOSTIC DAVID;  Surgeon: C William Riedel, DO;  [facility];  Service: Gynecology    NH RPR UMBILICAL HRNA 5 YRS/> REDUCIBLE N/A 03/11/2022    Procedure: UMBILICAL HERNIA REPAIR;  Surgeon: Griffin Triplett DO;  [facility];  Service: General    UPPER GASTROINTESTINAL ENDOSCOPY      WISDOM TOOTH EXTRACTION         Meds/Allergies:    Prior to Admission medications    Medication Sig Start Date End Date Taking? Authorizing Provider   albuterol (PROVENTIL HFA,VENTOLIN HFA) 90 mcg/act inhaler INHALE 2 PUFFS EVERY 6 HOURS AS NEEDED FOR WHEEZING 2/24/24  Yes MONICA Colbert   busPIRone (BUSPAR) 5 mg tablet TAKE 1 TABLET BY MOUTH TWICE A DAY 5/13/24  Yes MONICA Colbert   gabapentin (Neurontin) 600 MG tablet Take 1 tablet (600 mg total) by mouth 3 (three) times a day 4/3/24 7/2/24 Yes MONICA Colbert   hydrOXYzine pamoate (VISTARIL) 25 mg capsule TAKE 1 CAPSULE BY MOUTH 3 TIMES A DAY AS NEEDED FOR ANXIETY. 1/10/24  Yes MONICA Colbert   lisinopril (ZESTRIL) 20 mg tablet TAKE 1 TABLET BY MOUTH EVERY DAY 4/24/24  Yes MONICA Colbert   omeprazole (PriLOSEC) 40 MG capsule Take 1 capsule (40 mg total) by mouth daily 6/28/23  Yes Ministerio Rangel PA-C    QUEtiapine (SEROquel) 50 mg tablet Take 1 tablet (50 mg total) by mouth daily at bedtime 11/17/23  Yes MONICA Colbert   sertraline (ZOLOFT) 100 mg tablet TAKE 2 TABLETS BY MOUTH EVERY DAY 12/6/23  Yes MONICA Colbert   betamethasone valerate (LUXIQ) 0.12 % foam Apply topically daily  Patient not taking: Reported on 5/20/2024 5/11/22   Lucas Marr MD   Compro 25 MG suppository INSERT 1 SUPPOSITORY RECTALLY (25 MG TOTAL) EVERY 12 HOURS AS NEEDED FOR NAUSEA AND VOMITING 10/23/23   Historical Provider, MD   cyclobenzaprine (FLEXERIL) 5 mg tablet Take 1 tablet (5 mg total) by mouth 3 (three) times a day as needed for muscle spasms  Patient not taking: Reported on 5/20/2024 4/23/24   MONICA Colbert   fluticasone (FLONASE) 50 mcg/act nasal spray 1 spray into each nostril daily  Patient not taking: Reported on 4/3/2024 5/5/22   Pérez Olivas MD   LORazepam (Ativan) 0.5 mg tablet Take 2 tablets (1 mg total) by mouth daily as needed for anxiety  Patient not taking: Reported on 5/20/2024 12/20/23   MONICA Colbert   meloxicam (MOBIC) 15 mg tablet Take 1 tablet (15 mg total) by mouth daily as needed for moderate pain  Patient not taking: Reported on 5/20/2024 4/3/24   MONICA Colbert   metoclopramide (REGLAN) 5 mg tablet Take 1 tablet (5 mg total) by mouth 4 (four) times a day as needed (nausea)  Patient not taking: Reported on 5/20/2024 5/17/23   MONICA Colbert   nicotine (NICODERM CQ) 21 mg/24 hr TD 24 hr patch Place 1 patch on the skin over 24 hours every 24 hours  Patient not taking: Reported on 4/3/2024 11/17/23   MONICA Colbert   norgestimate-ethinyl estradiol (Ortho Tri-Cyclen Lo) 0.18/0.215/0.25 MG-25 MCG per tablet Take 1 tablet by mouth daily  Patient not taking: Reported on 5/20/2024 7/11/22 12/27/22  C William Riedel, DO   ondansetron (Zofran ODT) 4 mg disintegrating tablet Take 1 tablet (4 mg total) by mouth every 6 (six) hours as needed for nausea or  vomiting  Patient not taking: Reported on 2024   Prem Wilson MD   ondansetron (ZOFRAN) 4 mg tablet Take 1 tablet (4 mg total) by mouth every 8 (eight) hours as needed for nausea or vomiting for up to 9 days  Patient not taking: Reported on 2024  Terrence Kelley MD   prochlorperazine (COMPAZINE) 10 mg tablet Take 0.5 tablets (5 mg total) by mouth every 8 (eight) hours as needed for nausea or vomiting  Patient not taking: Reported on 2024   MONICA Barron   promethazine (PHENERGAN) 25 mg tablet Take 1 tablet (25 mg total) by mouth every 6 (six) hours as needed for nausea or vomiting  Patient not taking: Reported on 2024 3/25/22   Lucas Marr MD     I have reviewed home medications with patient personally.    Allergies:   Allergies   Allergen Reactions    Eggs Or Egg-Derived Products - Food Allergy Other (See Comments)     abd pain       Social History:     Marital Status: /Civil Union   Substance Use History:   Social History     Substance and Sexual Activity   Alcohol Use Never    Comment: social     Social History     Tobacco Use   Smoking Status Every Day    Current packs/day: 1.00    Average packs/day: 1 pack/day for 23.0 years (23.0 ttl pk-yrs)    Types: Cigarettes   Smokeless Tobacco Never     Social History     Substance and Sexual Activity   Drug Use Not Currently    Frequency: 7.0 times per week    Types: Marijuana    Comment: No longer using marijuana 2024       Family History:    Family History   Problem Relation Age of Onset    No Known Problems Sister     Autism Daughter     Lung cancer Maternal Grandmother     Cancer Maternal Grandmother         Deceassd 10 years    Diabetes Maternal Grandfather     Arthritis Maternal Grandfather          10 years    Aneurysm Paternal Grandmother     No Known Problems Sister        Physical Exam:     Vitals:   Blood Pressure: 112/55 (24 1849)  Pulse: 73 (24 1849)  Temperature: 98.3  °F (36.8 °C) (05/20/24 1436)  Temp Source: Oral (05/20/24 1436)  Respirations: 18 (05/20/24 1849)  SpO2: 99 % (05/20/24 1849)    Physical Exam  Vitals reviewed.   HENT:      Head: Normocephalic.      Nose: Nose normal.      Mouth/Throat:      Mouth: Mucous membranes are moist.   Eyes:      General: No scleral icterus.  Cardiovascular:      Rate and Rhythm: Normal rate and regular rhythm.   Pulmonary:      Effort: Pulmonary effort is normal. No respiratory distress.      Breath sounds: No wheezing.   Abdominal:      General: There is no distension.      Palpations: Abdomen is soft.      Tenderness: There is no abdominal tenderness.   Skin:     General: Skin is warm.   Neurological:      Mental Status: She is oriented to person, place, and time.   Psychiatric:         Mood and Affect: Mood normal.         Behavior: Behavior normal.       Additional Data:     Lab Results: I have personally reviewed pertinent reports.      Results from last 7 days   Lab Units 05/20/24  1455   WBC Thousand/uL 10.56*   HEMOGLOBIN g/dL 11.3*   HEMATOCRIT % 32.9*   PLATELETS Thousands/uL 231   SEGS PCT % 59   LYMPHO PCT % 27   MONO PCT % 13*   EOS PCT % 1     Results from last 7 days   Lab Units 05/20/24  1455   SODIUM mmol/L 129*   POTASSIUM mmol/L 3.7   CHLORIDE mmol/L 95*   CO2 mmol/L 23   BUN mg/dL 46*   CREATININE mg/dL 5.11*   ANION GAP mmol/L 11   CALCIUM mg/dL 9.6   ALBUMIN g/dL 4.1   TOTAL BILIRUBIN mg/dL 0.40   ALK PHOS U/L 108*   ALT U/L 56*   AST U/L 35   GLUCOSE RANDOM mg/dL 100                       Imaging: I have personally reviewed pertinent reports.      CT abdomen pelvis wo contrast   Final Result by Sapna Mcwilliams MD (05/20 1659)      Stable exam. No suspicious acute findings.      Stable small nonobstructing left lower pole calculus.      Stable small hiatal hernia.      Workstation performed: [id number]         XR chest 1 view portable    (Results Pending)   US kidney and bladder with pvr    (Results  Pending)       EKG, Pathology, and Other Studies Reviewed on Admission:   EKG: NSR, non specific st changes    Allscripts / Epic Records Reviewed: Yes     ** Please Note: This note has been constructed using a voice recognition system. **

## 2024-05-20 NOTE — LETTER
[facility]  [address] [facility] [address]  Dept: [phone]    May 21, 2024     Patient: Melia Lira   YOB: 1980   Date of Visit: 5/20/2024       To Whom it May Concern:    Melia Lira is under my professional care. She was seen in the hospital from 5/20/2024 to 05/21/24. She may return to work on 5/24/24 without limitations.    If you have any questions or concerns, please don't hesitate to call.         Sincerely,          Bisi Frederick PA-C

## 2024-05-20 NOTE — PROGRESS NOTES
[facility]        NAME: Melia Lira is a 44 y.o. female  : 1980    MRN: [id number]  DATE: May 20, 2024  TIME: 4:09 PM    Assessment and Plan   Hypotension, unspecified hypotension type [I95.9]  1. Hypotension, unspecified hypotension type          Patient presents for eval of dizziness and ear pain. While obtaining weight , patient's knees buckled. Taken to room and VS noted hypotension. EMS called,  EKG done-    Patient Instructions       ER with EMS    If tests have been performed at [facility], our office will contact you with results if changes need to be made to the care plan discussed with you at the visit.  You can review your full results on [facility].    Chief Complaint     Chief Complaint   Patient presents with    Dizziness     Pt states since Saturday she has been getting lightheaded, dizzy, knees buckle.  Pt feels like her ears are clogged.          History of Present Illness       Patient presents for eval of dizziness and ear pain. While obtaining weight , patient's knees buckled. Taken to room and VS noted hypotension. EMS called,  EKG done-    Dizziness  Associated symptoms include fatigue and nausea. Pertinent negatives include no chest pain.       Review of Systems   Review of Systems   Constitutional:  Positive for fatigue.   HENT:  Positive for ear pain.    Cardiovascular:  Negative for chest pain and palpitations.   Gastrointestinal:  Positive for nausea.   Neurological:  Positive for dizziness.   All other systems reviewed and are negative.        Current Medications     No current facility-administered medications for this visit.    Current Outpatient Medications:     albuterol (PROVENTIL HFA,VENTOLIN HFA) 90 mcg/act inhaler, INHALE 2 PUFFS EVERY 6 HOURS AS NEEDED FOR WHEEZING, Disp: 6.7 g, Rfl: 0    betamethasone valerate (LUXIQ) 0.12 % foam, Apply topically daily (Patient not taking: Reported on 2024), Disp: 100 g, Rfl: 1    busPIRone (BUSPAR) 5 mg tablet,  TAKE 1 TABLET BY MOUTH TWICE A DAY, Disp: 180 tablet, Rfl: 1    Compro 25 MG suppository, INSERT 1 SUPPOSITORY RECTALLY (25 MG TOTAL) EVERY 12 HOURS AS NEEDED FOR NAUSEA AND VOMITING, Disp: , Rfl:     gabapentin (Neurontin) 600 MG tablet, Take 1 tablet (600 mg total) by mouth 3 (three) times a day, Disp: 270 tablet, Rfl: 0    hydrOXYzine pamoate (VISTARIL) 25 mg capsule, TAKE 1 CAPSULE BY MOUTH 3 TIMES A DAY AS NEEDED FOR ANXIETY., Disp: 270 capsule, Rfl: 1    lisinopril (ZESTRIL) 20 mg tablet, TAKE 1 TABLET BY MOUTH EVERY DAY, Disp: 90 tablet, Rfl: 3    omeprazole (PriLOSEC) 40 MG capsule, Take 1 capsule (40 mg total) by mouth daily, Disp: 90 capsule, Rfl: 1    QUEtiapine (SEROquel) 50 mg tablet, Take 1 tablet (50 mg total) by mouth daily at bedtime, Disp: 90 tablet, Rfl: 1    sertraline (ZOLOFT) 100 mg tablet, TAKE 2 TABLETS BY MOUTH EVERY DAY, Disp: 180 tablet, Rfl: 1    cyclobenzaprine (FLEXERIL) 5 mg tablet, Take 1 tablet (5 mg total) by mouth 3 (three) times a day as needed for muscle spasms (Patient not taking: Reported on 5/20/2024), Disp: 30 tablet, Rfl: 1    fluticasone (FLONASE) 50 mcg/act nasal spray, 1 spray into each nostril daily (Patient not taking: Reported on 4/3/2024), Disp: 15.8 mL, Rfl: 0    LORazepam (Ativan) 0.5 mg tablet, Take 2 tablets (1 mg total) by mouth daily as needed for anxiety (Patient not taking: Reported on 5/20/2024), Disp: 60 tablet, Rfl: 0    meloxicam (MOBIC) 15 mg tablet, Take 1 tablet (15 mg total) by mouth daily as needed for moderate pain (Patient not taking: Reported on 5/20/2024), Disp: 30 tablet, Rfl: 3    metoclopramide (REGLAN) 5 mg tablet, Take 1 tablet (5 mg total) by mouth 4 (four) times a day as needed (nausea) (Patient not taking: Reported on 5/20/2024), Disp: 30 tablet, Rfl: 0    nicotine (NICODERM CQ) 21 mg/24 hr TD 24 hr patch, Place 1 patch on the skin over 24 hours every 24 hours (Patient not taking: Reported on 4/3/2024), Disp: 28 patch, Rfl: 3     norgestimate-ethinyl estradiol (Ortho Tri-Cyclen Lo) 0.18/0.215/0.25 MG-25 MCG per tablet, Take 1 tablet by mouth daily (Patient not taking: Reported on 5/20/2024), Disp: 84 tablet, Rfl: 3    ondansetron (Zofran ODT) 4 mg disintegrating tablet, Take 1 tablet (4 mg total) by mouth every 6 (six) hours as needed for nausea or vomiting (Patient not taking: Reported on 5/20/2024), Disp: 20 tablet, Rfl: 0    ondansetron (ZOFRAN) 4 mg tablet, Take 1 tablet (4 mg total) by mouth every 8 (eight) hours as needed for nausea or vomiting for up to 9 days (Patient not taking: Reported on 5/20/2024), Disp: 28 tablet, Rfl: 0    prochlorperazine (COMPAZINE) 10 mg tablet, Take 0.5 tablets (5 mg total) by mouth every 8 (eight) hours as needed for nausea or vomiting (Patient not taking: Reported on 5/20/2024), Disp: 3 tablet, Rfl: 0    promethazine (PHENERGAN) 25 mg tablet, Take 1 tablet (25 mg total) by mouth every 6 (six) hours as needed for nausea or vomiting (Patient not taking: Reported on 5/20/2024), Disp: 20 tablet, Rfl: 0    Facility-Administered Medications Ordered in Other Visits:     acetaminophen (Ofirmev) injection 1,000 mg, 1,000 mg, Intravenous, Once, Taj Hayden MD    [COMPLETED] sodium chloride 0.9 % bolus 1,000 mL, 1,000 mL, Intravenous, Once, Taj Hayden MD, Last Rate: 1,000 mL/hr at 05/20/24 1542, 1,000 mL at 05/20/24 1542    sodium chloride 0.9 % bolus 1,000 mL, 1,000 mL, Intravenous, Once, Taj Hayden MD, Last Rate: 1,000 mL/hr at 05/20/24 1545, 1,000 mL at 05/20/24 1545    Current Allergies     Allergies as of 05/20/2024 - Reviewed 05/20/2024   Allergen Reaction Noted    Eggs or egg-derived products - food allergy Other (See Comments) 08/12/2016            The following portions of the patient's history were reviewed and updated as appropriate: allergies, current medications, past family history, past medical history, past social history, past surgical history and problem list.     Past Medical History:    Diagnosis Date    Anxiety     Arthritis     OA  b/l knees    Asthma     Approx 2 years ago    Calculus of gallbladder without cholecystitis without obstruction 2022    Chronic kidney disease     Depression     GERD (gastroesophageal reflux disease)     In my 20s. Approx 15 years ago    Hx of bleeding disorder     dysmennorrhea-dx lap today 2016    Hypertension     Kidney stone     Obesity     Seasonal allergies        Past Surgical History:   Procedure Laterality Date     SECTION      CHOLECYSTECTOMY      COLONOSCOPY      DIAGNOSTIC LAPAROSCOPY      DILATION AND CURETTAGE OF UTERUS      HERNIA REPAIR      ME COLONOSCOPY FLX DX W/COLLJ SPEC WHEN PFRMD N/A 2018    Procedure: EGD AND COLONOSCOPY;  Surgeon: Allan Duncan MD;  [facility];  Service: Gastroenterology    ME LAPAROSCOPY SURG CHOLECYSTECTOMY N/A 2022    Procedure: ROBOTIC LAPAROSCOPIC CHOLECYSTECTOMY;  Surgeon: Griffin Triplett DO;  [facility];  Service: General    ME LAPAROSCOPY W/RMVL ADNEXAL STRUCTURES Bilateral 2016    Procedure: CYSTECTOMY  OVARIAN;  Surgeon: C William Riedel, DO;  [facility];  Service: Gynecology    ME LAPS ABD PRTM&OMENTUM DX W/WO SPEC BR/WA SPX N/A 2016    Procedure: LAPAROSCOPY DIAGNOSTIC DAVID;  Surgeon: C William Riedel, DO;  [facility];  Service: Gynecology    ME RPR UMBILICAL HRNA 5 YRS/> REDUCIBLE N/A 2022    Procedure: UMBILICAL HERNIA REPAIR;  Surgeon: Griffin Triplett DO;  [facility];  Service: General    UPPER GASTROINTESTINAL ENDOSCOPY      WISDOM TOOTH EXTRACTION         Family History   Problem Relation Age of Onset    No Known Problems Sister     Autism Daughter     Lung cancer Maternal Grandmother     Cancer Maternal Grandmother         Deceassd 10 years    Diabetes Maternal Grandfather     Arthritis Maternal Grandfather          10 years    Aneurysm Paternal Grandmother     No Known Problems Sister   "        Medications have been verified.        Objective   BP (!) 62/42 (BP Location: Right arm, Patient Position: Sitting)   Pulse 80   Temp (!) 97.1 °F (36.2 °C) (Tympanic)   Resp 18   Ht 4' 11.84\" (1.52 m)   Wt 75.4 kg (166 lb 3.2 oz)   LMP  (LMP Unknown)   SpO2 98%   BMI 32.63 kg/m²   No LMP recorded (lmp unknown). Patient is postmenopausal.       Physical Exam     Physical Exam  Vitals reviewed.   Constitutional:       Appearance: Normal appearance.   Cardiovascular:      Rate and Rhythm: Normal rate. Rhythm irregular.      Pulses: Normal pulses.      Heart sounds: Normal heart sounds.   Pulmonary:      Effort: Pulmonary effort is normal.   Lymphadenopathy:      Cervical: No cervical adenopathy.   Skin:     General: Skin is warm.   Neurological:      Mental Status: She is alert.      Motor: Weakness present.      Gait: Gait abnormal.                   "

## 2024-05-20 NOTE — ED PROVIDER NOTES
"History  Chief Complaint   Patient presents with    Dizziness     Pt was at urgent care and brought here via EMS due to persistent dizziness, leg \"Buckling/ weakness\", cyclic vomiting for the past week and R ear pain and swelling. Urgent care recommended she come to the ER. Per Ems pt has been slightly hypotensive. No meds PTA       History provided by:  Patient   used: No    Dizziness  Quality:  Lightheadedness  Severity:  Moderate  Onset quality:  Gradual  Duration:  2 days  Timing:  Intermittent  Progression:  Waxing and waning  Chronicity:  New  Context comment:  Vomiting for 1 week, URI symptoms, right ear pain  Relieved by:  Nothing  Worsened by:  Nothing  Ineffective treatments:  None tried  Associated symptoms: vomiting and weakness    Associated symptoms: no chest pain, no diarrhea, no headaches, no nausea and no shortness of breath    Associated symptoms comment:  Right ear pain, generalized weakness  Vomiting:     Quality:  Stomach contents    Number of occurrences:  Several    Severity:  Moderate    Duration:  1 week    Timing:  Intermittent    Progression:  Partially resolved  Weakness:     Severity:  Moderate    Duration:  1 day    Onset quality:  Gradual    Timing:  Intermittent    Progression:  Waxing and waning    Chronicity:  New  Risk factors comment:  History of anxiety, hypertension, GERD, cyclical vomiting      Prior to Admission Medications   Prescriptions Last Dose Informant Patient Reported? Taking?   Compro 25 MG suppository Unknown  Yes No   Sig: INSERT 1 SUPPOSITORY RECTALLY (25 MG TOTAL) EVERY 12 HOURS AS NEEDED FOR NAUSEA AND VOMITING   LORazepam (Ativan) 0.5 mg tablet Not Taking  No No   Sig: Take 2 tablets (1 mg total) by mouth daily as needed for anxiety   Patient not taking: Reported on 5/20/2024   QUEtiapine (SEROquel) 50 mg tablet 5/19/2024  No Yes   Sig: Take 1 tablet (50 mg total) by mouth daily at bedtime   albuterol (PROVENTIL HFA,VENTOLIN HFA) 90 mcg/act " inhaler Past Week  No Yes   Sig: INHALE 2 PUFFS EVERY 6 HOURS AS NEEDED FOR WHEEZING   betamethasone valerate (LUXIQ) 0.12 % foam Not Taking Self No No   Sig: Apply topically daily   Patient not taking: Reported on 2024   busPIRone (BUSPAR) 5 mg tablet 2024  No Yes   Sig: TAKE 1 TABLET BY MOUTH TWICE A DAY   cyclobenzaprine (FLEXERIL) 5 mg tablet Not Taking  No No   Sig: Take 1 tablet (5 mg total) by mouth 3 (three) times a day as needed for muscle spasms   Patient not taking: Reported on 2024   fluticasone (FLONASE) 50 mcg/act nasal spray Not Taking Self No No   Si spray into each nostril daily   Patient not taking: Reported on 4/3/2024   gabapentin (Neurontin) 600 MG tablet 2024  No Yes   Sig: Take 1 tablet (600 mg total) by mouth 3 (three) times a day   hydrOXYzine pamoate (VISTARIL) 25 mg capsule Past Week  No Yes   Sig: TAKE 1 CAPSULE BY MOUTH 3 TIMES A DAY AS NEEDED FOR ANXIETY.   lisinopril (ZESTRIL) 20 mg tablet 2024  No Yes   Sig: TAKE 1 TABLET BY MOUTH EVERY DAY   meloxicam (MOBIC) 15 mg tablet Not Taking  No No   Sig: Take 1 tablet (15 mg total) by mouth daily as needed for moderate pain   Patient not taking: Reported on 2024   metoclopramide (REGLAN) 5 mg tablet Not Taking  No No   Sig: Take 1 tablet (5 mg total) by mouth 4 (four) times a day as needed (nausea)   Patient not taking: Reported on 2024   nicotine (NICODERM CQ) 21 mg/24 hr TD 24 hr patch Not Taking  No No   Sig: Place 1 patch on the skin over 24 hours every 24 hours   Patient not taking: Reported on 4/3/2024   norgestimate-ethinyl estradiol (Ortho Tri-Cyclen Lo) 0.18/0.215/0.25 MG-25 MCG per tablet  Self No No   Sig: Take 1 tablet by mouth daily   Patient not taking: Reported on 2024   omeprazole (PriLOSEC) 40 MG capsule Past Month  No Yes   Sig: Take 1 capsule (40 mg total) by mouth daily   ondansetron (ZOFRAN) 4 mg tablet   No No   Sig: Take 1 tablet (4 mg total) by mouth every 8 (eight) hours as  needed for nausea or vomiting for up to 9 days   Patient not taking: Reported on 2024   ondansetron (Zofran ODT) 4 mg disintegrating tablet Not Taking Self No No   Sig: Take 1 tablet (4 mg total) by mouth every 6 (six) hours as needed for nausea or vomiting   Patient not taking: Reported on 2024   prochlorperazine (COMPAZINE) 10 mg tablet Not Taking Self No No   Sig: Take 0.5 tablets (5 mg total) by mouth every 8 (eight) hours as needed for nausea or vomiting   Patient not taking: Reported on 2024   promethazine (PHENERGAN) 25 mg tablet Not Taking Self No No   Sig: Take 1 tablet (25 mg total) by mouth every 6 (six) hours as needed for nausea or vomiting   Patient not taking: Reported on 2024   sertraline (ZOLOFT) 100 mg tablet 2024  No Yes   Sig: TAKE 2 TABLETS BY MOUTH EVERY DAY      Facility-Administered Medications: None       Past Medical History:   Diagnosis Date    Anxiety     Arthritis     OA  b/l knees    Asthma     Approx 2 years ago    Calculus of gallbladder without cholecystitis without obstruction 2022    Chronic kidney disease     Depression     GERD (gastroesophageal reflux disease)     In my 20s. Approx 15 years ago    Hx of bleeding disorder     dysmennorrhea-dx lap today 2016    Hypertension     Kidney stone     Obesity     Seasonal allergies        Past Surgical History:   Procedure Laterality Date     SECTION      CHOLECYSTECTOMY      COLONOSCOPY      DIAGNOSTIC LAPAROSCOPY      DILATION AND CURETTAGE OF UTERUS      HERNIA REPAIR      KY COLONOSCOPY FLX DX W/COLLJ SPEC WHEN PFRMD N/A 2018    Procedure: EGD AND COLONOSCOPY;  Surgeon: Allan Duncan MD;  Location: [facility];  Service: Gastroenterology    KY LAPAROSCOPY SURG CHOLECYSTECTOMY N/A 2022    Procedure: ROBOTIC LAPAROSCOPIC CHOLECYSTECTOMY;  Surgeon: Griffin Triplett DO;  [facility];  Service: General    KY LAPAROSCOPY W/RMVL ADNEXAL STRUCTURES Bilateral 2016     Procedure: CYSTECTOMY  OVARIAN;  Surgeon: C William Riedel, DO;  Location: [facility];  Service: Gynecology    OK LAPS ABD PRTM&OMENTUM DX W/WO SPEC BR/WA SPX N/A 2016    Procedure: LAPAROSCOPY DIAGNOSTIC DAVID;  Surgeon: C William Riedel, DO;  [facility];  Service: Gynecology    OK RPR UMBILICAL HRNA 5 YRS/> REDUCIBLE N/A 2022    Procedure: UMBILICAL HERNIA REPAIR;  Surgeon: Griffin Triplett DO;  [facility];  Service: General    UPPER GASTROINTESTINAL ENDOSCOPY      WISDOM TOOTH EXTRACTION         Family History   Problem Relation Age of Onset    No Known Problems Sister     Autism Daughter     Lung cancer Maternal Grandmother     Cancer Maternal Grandmother         Deceassd 10 years    Diabetes Maternal Grandfather     Arthritis Maternal Grandfather          10 years    Aneurysm Paternal Grandmother     No Known Problems Sister      I have reviewed and agree with the history as documented.    E-Cigarette/Vaping    E-Cigarette Use Never User      E-Cigarette/Vaping Substances    Nicotine No     THC No     CBD No     Flavoring No     Other No     Unknown No      Social History     Tobacco Use    Smoking status: Every Day     Current packs/day: 1.00     Average packs/day: 1 pack/day for 23.0 years (23.0 ttl pk-yrs)     Types: Cigarettes    Smokeless tobacco: Never   Vaping Use    Vaping status: Never Used   Substance Use Topics    Alcohol use: Never     Comment: social    Drug use: Not Currently     Frequency: 7.0 times per week     Types: Marijuana     Comment: No longer using marijuana 2024       Review of Systems   Constitutional:  Negative for chills and fever.   HENT:  Negative for facial swelling, sore throat and trouble swallowing.    Eyes:  Negative for pain and visual disturbance.   Respiratory:  Negative for cough, chest tightness and shortness of breath.    Cardiovascular:  Negative for chest pain and leg swelling.   Gastrointestinal:  Positive for vomiting.  Negative for abdominal pain, diarrhea and nausea.   Genitourinary:  Negative for dysuria and flank pain.   Musculoskeletal:  Negative for back pain, neck pain and neck stiffness.   Skin:  Negative for pallor and rash.   Allergic/Immunologic: Negative for environmental allergies and immunocompromised state.   Neurological:  Positive for dizziness and weakness. Negative for headaches.   Hematological:  Negative for adenopathy. Does not bruise/bleed easily.   Psychiatric/Behavioral:  Negative for agitation and behavioral problems.    All other systems reviewed and are negative.      Physical Exam  Physical Exam  Vitals and nursing note reviewed.   Constitutional:       General: She is not in acute distress.     Appearance: She is well-developed.   HENT:      Head: Normocephalic and atraumatic.   Eyes:      Extraocular Movements: Extraocular movements intact.   Cardiovascular:      Rate and Rhythm: Normal rate and regular rhythm.      Heart sounds: Normal heart sounds.   Pulmonary:      Effort: Pulmonary effort is normal.      Breath sounds: Normal breath sounds.   Abdominal:      Palpations: Abdomen is soft.      Tenderness: There is no abdominal tenderness. There is no guarding or rebound.   Musculoskeletal:         General: Normal range of motion.      Cervical back: Normal range of motion and neck supple.   Skin:     General: Skin is warm and dry.   Neurological:      General: No focal deficit present.      Mental Status: She is alert and oriented to person, place, and time.      Cranial Nerves: No cranial nerve deficit.      Motor: No weakness.   Psychiatric:         Mood and Affect: Mood normal.         Behavior: Behavior normal.         Vital Signs  ED Triage Vitals   Temperature Pulse Respirations Blood Pressure SpO2   05/20/24 1436 05/20/24 1436 05/20/24 1436 05/20/24 1436 05/20/24 1436   98.3 °F (36.8 °C) 68 18 94/55 98 %      Temp Source Heart Rate Source Patient Position - Orthostatic VS BP Location FiO2 (%)    05/20/24 1436 05/20/24 1436 05/20/24 1436 05/20/24 1436 --   Oral Monitor Lying Right arm       Pain Score       05/20/24 1849       3           Vitals:    05/20/24 2037 05/20/24 2309 05/21/24 0104 05/21/24 0700   BP: 97/58 95/56 121/69 109/73   Pulse: 69 75 64 70   Patient Position - Orthostatic VS: Sitting Sitting  Lying         Visual Acuity  Visual Acuity      Flowsheet Row Most Recent Value   L Pupil Size (mm) 3   R Pupil Size (mm) 3            ED Medications  Medications   multi-electrolyte (PLASMALYTE-A/ISOLYTE-S PH 7.4) IV solution (0 mL/hr Intravenous Stopped 5/21/24 0714)   sodium chloride 0.9 % bolus 1,000 mL (0 mL Intravenous Stopped 5/20/24 2006)   sodium chloride 0.9 % bolus 1,000 mL (0 mL Intravenous Stopped 5/20/24 2006)   acetaminophen (Ofirmev) injection 1,000 mg (0 mg Intravenous Stopped 5/20/24 1645)   amoxicillin (AMOXIL) capsule 500 mg (500 mg Oral Given 5/20/24 1600)   aluminum-magnesium hydroxide-simethicone (MAALOX) oral suspension 30 mL (30 mL Oral Given 5/20/24 1916)       Diagnostic Studies  Results Reviewed       Procedure Component Value Units Date/Time    Basic metabolic panel [[id number]]  (Abnormal) Collected: 05/21/24 0608    Lab Status: Final result Specimen: Blood from Arm, Right Updated: 05/21/24 0814     Sodium 137 mmol/L      Potassium 3.7 mmol/L      Chloride 104 mmol/L      CO2 27 mmol/L      ANION GAP 6 mmol/L      BUN 30 mg/dL      Creatinine 1.56 mg/dL      Glucose 97 mg/dL      Calcium 8.8 mg/dL      eGFR 39 ml/min/1.73sq m     Narrative:      National Kidney Disease Foundation guidelines for Chronic Kidney Disease (CKD):     Stage 1 with normal or high GFR (GFR > 90 mL/min/1.73 square meters)    Stage 2 Mild CKD (GFR = 60-89 mL/min/1.73 square meters)    Stage 3A Moderate CKD (GFR = 45-59 mL/min/1.73 square meters)    Stage 3B Moderate CKD (GFR = 30-44 mL/min/1.73 square meters)    Stage 4 Severe CKD (GFR = 15-29 mL/min/1.73 square meters)    Stage 5 End Stage CKD (GFR  <15 mL/min/1.73 square meters)  Note: GFR calculation is accurate only with a steady state creatinine    Magnesium [[id number]]  (Normal) Collected: 05/21/24 0608    Lab Status: Final result Specimen: Blood from Arm, Right Updated: 05/21/24 0814     Magnesium 2.4 mg/dL     CBC [[id number]]  (Abnormal) Collected: 05/21/24 0608    Lab Status: Final result Specimen: Blood from Arm, Right Updated: 05/21/24 0620     WBC 6.64 Thousand/uL      RBC 3.41 Million/uL      Hemoglobin 10.9 g/dL      Hematocrit 31.7 %      MCV 93 fL      MCH 32.0 pg      MCHC 34.4 g/dL      RDW 13.2 %      Platelets 233 Thousands/uL      MPV 9.2 fL     Osmolality, urine [[id number]]  (Abnormal) Collected: 05/20/24 1627    Lab Status: Final result Specimen: Urine Updated: 05/21/24 0140     Osmolality, Ur 232 mmol/KG     Sodium, urine, random [[id number]] Collected: 05/20/24 1627    Lab Status: Final result Specimen: Urine Updated: 05/20/24 2212     Sodium, Ur 35    Creatinine, urine, random [[id number]] Collected: 05/20/24 1627    Lab Status: Final result Specimen: Urine Updated: 05/20/24 2212     Creatinine, Ur 140.8 mg/dL     FLU/RSV/COVID - if FLU/RSV clinically relevant [[id number]]  (Normal) Collected: 05/20/24 1606    Lab Status: Final result Specimen: Nares from Nose Updated: 05/20/24 1712     SARS-CoV-2 Negative     INFLUENZA A PCR Negative     INFLUENZA B PCR Negative     RSV PCR Negative    Narrative:      FOR PEDIATRIC PATIENTS - copy/paste COVID Guidelines URL to browser: [email or web address]    SARS-CoV-2 assay is a Nucleic Acid Amplification assay intended for the  qualitative detection of nucleic acid from SARS-CoV-2 in nasopharyngeal  swabs. Results are for the presumptive identification of SARS-CoV-2 RNA.    Positive results are indicative of infection with SARS-CoV-2, the virus  causing COVID-19, but do not rule out bacterial infection or co-infection  with other viruses.  Laboratories within the [place] and its  territories are required to report all positive results to the appropriate  public health authorities. Negative results do not preclude SARS-CoV-2  infection and should not be used as the sole basis for treatment or other  patient management decisions. Negative results must be combined with  clinical observations, patient history, and epidemiological information.  This test has not been FDA cleared or approved.    This test has been authorized by FDA under an Emergency Use Authorization  (EUA). This test is only authorized for the duration of time the  declaration that circumstances exist justifying the authorization of the  emergency use of an in vitro diagnostic tests for detection of SARS-CoV-2  virus and/or diagnosis of COVID-19 infection under section 564(b)(1) of  the Act, 21 U.S.C. 360bbb-3(b)(1), unless the authorization is terminated  or revoked sooner. The test has been validated but independent review by FDA  and CLIA is pending.    Test performed using [employer] GeneXpert: This RT-PCR assay targets N2,  a region unique to SARS-CoV-2. A conserved region in the E-gene was chosen  for pan-Sarbecovirus detection which includes SARS-CoV-2.    According to CMS-2020-01-R, this platform meets the definition of high-throughput technology.    Urine Microscopic [[id number]]  (Abnormal) Collected: 05/20/24 1627    Lab Status: Final result Specimen: Urine Updated: 05/20/24 1649     RBC, UA 1-2 /hpf      WBC, UA 4-10 /hpf      Epithelial Cells Occasional /hpf      Bacteria, UA Moderate /hpf      MUCUS THREADS Moderate     Hyaline Casts, UA Innumerable /lpf      Transitional Epithelial Cells Present    Urine Macroscopic, POC [[id number]]  (Abnormal) Collected: 05/20/24 1627    Lab Status: Final result Specimen: Urine Updated: 05/20/24 1628     Color, UA Yellow     Clarity, UA Clear     pH, UA 5.5     Leukocytes, UA Negative     Nitrite, UA Negative     Protein, UA 30 (1+)  mg/dl      Glucose, UA Negative mg/dl      Ketones, UA Negative mg/dl      Urobilinogen, UA 0.2 E.U./dl      Bilirubin, UA Negative     Occult Blood, UA Small     Specific Matthews, UA 1.015    Narrative:      CLINITEK RESULT    HS Troponin 0hr (reflex protocol) [[id number]]  (Normal) Collected: 05/20/24 1455    Lab Status: Final result Specimen: Blood from Arm, Right Updated: 05/20/24 1529     hs TnI 0hr <2 ng/L     Comprehensive metabolic panel [[id number]]  (Abnormal) Collected: 05/20/24 1455    Lab Status: Final result Specimen: Blood from Arm, Right Updated: 05/20/24 1520     Sodium 129 mmol/L      Potassium 3.7 mmol/L      Chloride 95 mmol/L      CO2 23 mmol/L      ANION GAP 11 mmol/L      BUN 46 mg/dL      Creatinine 5.11 mg/dL      Glucose 100 mg/dL      Calcium 9.6 mg/dL      AST 35 U/L      ALT 56 U/L      Alkaline Phosphatase 108 U/L      Total Protein 7.6 g/dL      Albumin 4.1 g/dL      Total Bilirubin 0.40 mg/dL      eGFR 9 ml/min/1.73sq m     Narrative:      National Kidney Disease Foundation guidelines for Chronic Kidney Disease (CKD):     Stage 1 with normal or high GFR (GFR > 90 mL/min/1.73 square meters)    Stage 2 Mild CKD (GFR = 60-89 mL/min/1.73 square meters)    Stage 3A Moderate CKD (GFR = 45-59 mL/min/1.73 square meters)    Stage 3B Moderate CKD (GFR = 30-44 mL/min/1.73 square meters)    Stage 4 Severe CKD (GFR = 15-29 mL/min/1.73 square meters)    Stage 5 End Stage CKD (GFR <15 mL/min/1.73 square meters)  Note: GFR calculation is accurate only with a steady state creatinine    CBC and differential [[id number]]  (Abnormal) Collected: 05/20/24 1455    Lab Status: Final result Specimen: Blood from Arm, Right Updated: 05/20/24 1502     WBC 10.56 Thousand/uL      RBC 3.52 Million/uL      Hemoglobin 11.3 g/dL      Hematocrit 32.9 %      MCV 94 fL      MCH 32.1 pg      MCHC 34.3 g/dL      RDW 13.4 %      MPV 9.1 fL      Platelets 231 Thousands/uL      nRBC 0 /100 WBCs      Segmented % 59 %       Immature Grans % 0 %      Lymphocytes % 27 %      Monocytes % 13 %      Eosinophils Relative 1 %      Basophils Relative 0 %      Absolute Neutrophils 6.16 Thousands/µL      Absolute Immature Grans 0.04 Thousand/uL      Absolute Lymphocytes 2.83 Thousands/µL      Absolute Monocytes 1.34 Thousand/µL      Eosinophils Absolute 0.15 Thousand/µL      Basophils Absolute 0.04 Thousands/µL                    CT abdomen pelvis wo contrast   Final Result by Sapna Mcwilliams MD (05/20 1659)      Stable exam. No suspicious acute findings.      Stable small nonobstructing left lower pole calculus.      Stable small hiatal hernia.      Workstation performed: [id number]         XR chest 1 view portable   Final Result by Ben Chew MD (05/21 0817)      No acute cardiopulmonary disease.            Workstation performed: [id number]                    Procedures  ECG 12 Lead Documentation Only    Date/Time: 5/20/2024 4:09 PM    Performed by: Taj Hayden MD  Authorized by: Taj Hayden MD    Indications / Diagnosis:  Dizziness  ECG reviewed by me, the ED Provider: yes    Patient location:  ED  Interpretation:     Interpretation: normal    Rate:     ECG rate assessment: normal    Rhythm:     Rhythm: sinus rhythm    Ectopy:     Ectopy: none    QRS:     QRS axis:  Normal  Conduction:     Conduction: normal    ST segments:     ST segments:  Normal  T waves:     T waves: normal             ED Course  ED Course as of 05/21/24 1258   Mon May 20, 2024   1533 WBC(!): 10.56   1533 Hemoglobin(!): 11.3   1533 Platelet Count: 231   1533 Sodium(!): 129   1533 Potassium: 3.7   1533 BUN(!): 46   1534 hs TnI 0hr: <2  Labs reviewed, hyponatremia, JACKI noted, IVFs ordered   1612 XR chest 1 view portable  Chest x-ray independently reviewed, no significant acute abnormality noted.   1615 Case discussed with DARIANA Santamaria for admission, advised to get CT scan abdomen pelvis prior to admission.   1838 CT abdomen pelvis wo  contrast  IMPRESSION:     Stable exam. No suspicious acute findings.     Stable small nonobstructing left lower pole calculus.     Stable small hiatal hernia.     1840 Dr. Burks informed about CT, admitted to [facility].                               SBIRT 20yo+      Flowsheet Row Most Recent Value   Initial Alcohol Screen: US AUDIT-C     1. How often do you have a drink containing alcohol? 0 Filed at: 05/20/2024 1435   2. How many drinks containing alcohol do you have on a typical day you are drinking?  0 Filed at: 05/20/2024 1435   3a. Male UNDER 65: How often do you have five or more drinks on one occasion? 0 Filed at: 05/20/2024 1435   3b. FEMALE Any Age, or MALE 65+: How often do you have 4 or more drinks on one occassion? 0 Filed at: 05/20/2024 1435   Audit-C Score 0 Filed at: 05/20/2024 1435   ZULMA: How many times in the past year have you...    Used an illegal drug or used a prescription medication for non-medical reasons? Never Filed at: 05/20/2024 1435                      Medical Decision Making  Patient is a 44-year-old female, history of anxiety, depression, hypertension, cyclical vomiting, comes in with complaints of dizziness, generalized weakness, right ear pain, patient states that she has been vomiting for about 1 week, denies current or recent marijuana use, patient also reports URI symptoms with right ear pain, patient felt excessive weakness at work today, for which EMS was called to bring her to the ER, denies fever, headache, chest pain, dyspnea, abdominal pain, diarrhea.  On exam, patient is conscious, alert, vital signs are stable, lungs clear, heart sounds regular, abdomen soft nontender, nonfocal neuro exam, no peripheral edema.  Differential diagnosis: Vomiting, dizziness, urinary symptoms, right ear pain, otitis, dehydration, electrolyte derangement, will check labs, COVID flu, give IV fluids.    Problems Addressed:  JACKI (acute kidney injury) (HCC): acute illness or injury  Dehydration:  acute illness or injury  Dizziness: acute illness or injury  Hyponatremia: acute illness or injury  Otitis: acute illness or injury  Right ear pain: acute illness or injury  Vomiting: acute illness or injury    Amount and/or Complexity of Data Reviewed  Labs: ordered. Decision-making details documented in ED Course.  Radiology: ordered and independent interpretation performed. Decision-making details documented in ED Course.  ECG/medicine tests: ordered and independent interpretation performed. Decision-making details documented in ED Course.    Risk  OTC drugs.  Prescription drug management.  Decision regarding hospitalization.             Disposition  Final diagnoses:   Dizziness   Vomiting   Right ear pain   Otitis   JACKI (acute kidney injury) ([facility])   Dehydration   Hyponatremia     Time reflects when diagnosis was documented in both MDM as applicable and the Disposition within this note       Time User Action Codes Description Comment    5/20/2024  4:09 PM Alam, Tja Add [R42] Dizziness     5/20/2024  4:09 PM Alam, Taj Add [R11.10] Vomiting     5/20/2024  4:10 PM Alam, Taj Add [H92.01] Right ear pain     5/20/2024  4:10 PM Alam, Taj Add [H66.90] Otitis     5/20/2024  4:10 PM Alam, Taj Add [N17.9] JACKI (acute kidney injury) ([facility])     5/20/2024  4:10 PM Alam, Taj Add [E86.0] Dehydration     5/21/2024  9:12 AM Bisi Frederick Add [H66.90] Otitis media     5/21/2024 12:57 PM Alam, Taj Add [E87.1] Hyponatremia           ED Disposition       ED Disposition   Admit    Condition   Stable    Date/Time   Mon May 20, 2024 1840    Comment   Case was discussed with Dr. Burks and the patient's admission status was agreed to be Admission Status: inpatient status to the service of Dr. Burks .               Follow-up Information       Follow up With Specialties Details Why Contact Info    MONICA Colbert Nurse Practitioner Follow up follow up at your appointment tomorrow [id number]  [place]  [place] [address]  [phone]              Discharge Medication List as of 5/21/2024  9:37 AM        START taking these medications    Details   amoxicillin-clavulanate (AUGMENTIN) 500-125 mg per tablet Take 1 tablet by mouth every 12 (twelve) hours for 6 days, Starting Tue 5/21/2024, Until Mon 5/27/2024, Normal           CONTINUE these medications which have NOT CHANGED    Details   albuterol (PROVENTIL HFA,VENTOLIN HFA) 90 mcg/act inhaler INHALE 2 PUFFS EVERY 6 HOURS AS NEEDED FOR WHEEZING, Normal      busPIRone (BUSPAR) 5 mg tablet TAKE 1 TABLET BY MOUTH TWICE A DAY, Starting Mon 5/13/2024, Normal      gabapentin (Neurontin) 600 MG tablet Take 1 tablet (600 mg total) by mouth 3 (three) times a day, Starting Wed 4/3/2024, Until Tue 7/2/2024, Normal      hydrOXYzine pamoate (VISTARIL) 25 mg capsule TAKE 1 CAPSULE BY MOUTH 3 TIMES A DAY AS NEEDED FOR ANXIETY., Normal      omeprazole (PriLOSEC) 40 MG capsule Take 1 capsule (40 mg total) by mouth daily, Starting Wed 6/28/2023, Normal      QUEtiapine (SEROquel) 50 mg tablet Take 1 tablet (50 mg total) by mouth daily at bedtime, Starting Fri 11/17/2023, Normal      sertraline (ZOLOFT) 100 mg tablet TAKE 2 TABLETS BY MOUTH EVERY DAY, Normal           STOP taking these medications       lisinopril (ZESTRIL) 20 mg tablet Comments:   Reason for Stopping:         betamethasone valerate (LUXIQ) 0.12 % foam Comments:   Reason for Stopping:         Compro 25 MG suppository Comments:   Reason for Stopping:         cyclobenzaprine (FLEXERIL) 5 mg tablet Comments:   Reason for Stopping:         fluticasone (FLONASE) 50 mcg/act nasal spray Comments:   Reason for Stopping:         LORazepam (Ativan) 0.5 mg tablet Comments:   Reason for Stopping:         meloxicam (MOBIC) 15 mg tablet Comments:   Reason for Stopping:         metoclopramide (REGLAN) 5 mg tablet Comments:   Reason for Stopping:         nicotine (NICODERM CQ) 21 mg/24 hr TD 24 hr patch Comments:   Reason for  Stopping:         norgestimate-ethinyl estradiol (Ortho Tri-Cyclen Lo) 0.18/0.215/0.25 MG-25 MCG per tablet Comments:   Reason for Stopping:         ondansetron (Zofran ODT) 4 mg disintegrating tablet Comments:   Reason for Stopping:         ondansetron (ZOFRAN) 4 mg tablet Comments:   Reason for Stopping:         prochlorperazine (COMPAZINE) 10 mg tablet Comments:   Reason for Stopping:         promethazine (PHENERGAN) 25 mg tablet Comments:   Reason for Stopping:               Outpatient Discharge Orders   Ambulatory Referral to Otolaryngology   Standing Status: Future Standing Exp. Date: 05/21/25      Discharge Diet     Activity as tolerated     Call provider for:  extreme fatigue     Call provider for:  severe uncontrolled pain     Call provider for:  persistent nausea or vomiting       PDMP Review         Value Time User    PDMP Reviewed  Yes 12/20/2023  9:53 AM MONICA Colbert            ED Provider  Electronically Signed by             Taj Hayden MD  05/21/24 7129

## 2024-05-21 ENCOUNTER — TRANSITIONAL CARE MANAGEMENT (OUTPATIENT)
Dept: FAMILY MEDICINE CLINIC | Facility: CLINIC | Age: 44
End: 2024-05-21

## 2024-05-21 VITALS
OXYGEN SATURATION: 99 % | HEART RATE: 70 BPM | RESPIRATION RATE: 18 BRPM | DIASTOLIC BLOOD PRESSURE: 73 MMHG | SYSTOLIC BLOOD PRESSURE: 109 MMHG | TEMPERATURE: 97.9 F

## 2024-05-21 PROBLEM — H66.90 OTITIS MEDIA: Status: ACTIVE | Noted: 2024-05-21

## 2024-05-21 PROBLEM — R11.15 CYCLICAL VOMITING SYNDROME: Status: RESOLVED | Noted: 2018-10-25 | Resolved: 2024-05-21

## 2024-05-21 PROBLEM — E87.1 HYPONATREMIA: Status: RESOLVED | Noted: 2024-05-20 | Resolved: 2024-05-21

## 2024-05-21 LAB
ANION GAP SERPL CALCULATED.3IONS-SCNC: 6 MMOL/L (ref 4–13)
BUN SERPL-MCNC: 30 MG/DL (ref 5–25)
CALCIUM SERPL-MCNC: 8.8 MG/DL (ref 8.4–10.2)
CHLORIDE SERPL-SCNC: 104 MMOL/L (ref 96–108)
CO2 SERPL-SCNC: 27 MMOL/L (ref 21–32)
CREAT SERPL-MCNC: 1.56 MG/DL (ref 0.6–1.3)
ERYTHROCYTE [DISTWIDTH] IN BLOOD BY AUTOMATED COUNT: 13.2 % (ref 11.6–15.1)
GFR SERPL CREATININE-BSD FRML MDRD: 39 ML/MIN/1.73SQ M
GLUCOSE SERPL-MCNC: 97 MG/DL (ref 65–140)
HCT VFR BLD AUTO: 31.7 % (ref 34.8–46.1)
HGB BLD-MCNC: 10.9 G/DL (ref 11.5–15.4)
MAGNESIUM SERPL-MCNC: 2.4 MG/DL (ref 1.9–2.7)
MCH RBC QN AUTO: 32 PG (ref 26.8–34.3)
MCHC RBC AUTO-ENTMCNC: 34.4 G/DL (ref 31.4–37.4)
MCV RBC AUTO: 93 FL (ref 82–98)
OSMOLALITY UR: 232 MMOL/KG
PLATELET # BLD AUTO: 233 THOUSANDS/UL (ref 149–390)
PMV BLD AUTO: 9.2 FL (ref 8.9–12.7)
POTASSIUM SERPL-SCNC: 3.7 MMOL/L (ref 3.5–5.3)
RBC # BLD AUTO: 3.41 MILLION/UL (ref 3.81–5.12)
SODIUM SERPL-SCNC: 137 MMOL/L (ref 135–147)
WBC # BLD AUTO: 6.64 THOUSAND/UL (ref 4.31–10.16)

## 2024-05-21 PROCEDURE — 80048 BASIC METABOLIC PNL TOTAL CA: CPT | Performed by: STUDENT IN AN ORGANIZED HEALTH CARE EDUCATION/TRAINING PROGRAM

## 2024-05-21 PROCEDURE — 85027 COMPLETE CBC AUTOMATED: CPT | Performed by: STUDENT IN AN ORGANIZED HEALTH CARE EDUCATION/TRAINING PROGRAM

## 2024-05-21 PROCEDURE — 83735 ASSAY OF MAGNESIUM: CPT | Performed by: STUDENT IN AN ORGANIZED HEALTH CARE EDUCATION/TRAINING PROGRAM

## 2024-05-21 PROCEDURE — 99239 HOSP IP/OBS DSCHRG MGMT >30: CPT | Performed by: PHYSICIAN ASSISTANT

## 2024-05-21 RX ORDER — AMOXICILLIN AND CLAVULANATE POTASSIUM 500; 125 MG/1; MG/1
1 TABLET, FILM COATED ORAL EVERY 12 HOURS SCHEDULED
Status: DISCONTINUED | OUTPATIENT
Start: 2024-05-21 | End: 2024-05-21 | Stop reason: HOSPADM

## 2024-05-21 RX ORDER — FLUTICASONE PROPIONATE 50 MCG
1 SPRAY, SUSPENSION (ML) NASAL 2 TIMES DAILY PRN
Status: DISCONTINUED | OUTPATIENT
Start: 2024-05-21 | End: 2024-05-21 | Stop reason: HOSPADM

## 2024-05-21 RX ORDER — AMOXICILLIN AND CLAVULANATE POTASSIUM 500; 125 MG/1; MG/1
1 TABLET, FILM COATED ORAL EVERY 12 HOURS SCHEDULED
Status: DISCONTINUED | OUTPATIENT
Start: 2024-05-21 | End: 2024-05-21

## 2024-05-21 RX ORDER — ACETAMINOPHEN 325 MG/1
975 TABLET ORAL EVERY 8 HOURS PRN
Status: DISCONTINUED | OUTPATIENT
Start: 2024-05-21 | End: 2024-05-21 | Stop reason: HOSPADM

## 2024-05-21 RX ORDER — AMOXICILLIN AND CLAVULANATE POTASSIUM 500; 125 MG/1; MG/1
1 TABLET, FILM COATED ORAL EVERY 12 HOURS SCHEDULED
Qty: 12 TABLET | Refills: 0 | Status: SHIPPED | OUTPATIENT
Start: 2024-05-21 | End: 2024-05-27

## 2024-05-21 RX ADMIN — PANTOPRAZOLE SODIUM 40 MG: 40 TABLET, DELAYED RELEASE ORAL at 05:51

## 2024-05-21 RX ADMIN — HYDROXYZINE HYDROCHLORIDE 25 MG: 25 TABLET, FILM COATED ORAL at 02:46

## 2024-05-21 RX ADMIN — SERTRALINE 200 MG: 100 TABLET, FILM COATED ORAL at 08:32

## 2024-05-21 RX ADMIN — NICOTINE 7 MG/24 HR DAILY TRANSDERMAL PATCH 7 MG: at 01:04

## 2024-05-21 RX ADMIN — NICOTINE 7 MG/24 HR DAILY TRANSDERMAL PATCH 7 MG: at 08:33

## 2024-05-21 RX ADMIN — MELATONIN 3 MG: 3 TAB ORAL at 02:46

## 2024-05-21 RX ADMIN — AMOXICILLIN AND CLAVULANATE POTASSIUM 1 TABLET: 500; 125 TABLET, FILM COATED ORAL at 08:32

## 2024-05-21 RX ADMIN — GABAPENTIN 600 MG: 300 CAPSULE ORAL at 08:32

## 2024-05-21 RX ADMIN — BUSPIRONE HYDROCHLORIDE 5 MG: 5 TABLET ORAL at 08:32

## 2024-05-21 NOTE — PLAN OF CARE
Problem: PAIN - ADULT  Goal: Verbalizes/displays adequate comfort level or baseline comfort level  Description: Interventions:  - Encourage patient to monitor pain and request assistance  - Assess pain using appropriate pain scale  - Administer analgesics based on type and severity of pain and evaluate response  - Implement non-pharmacological measures as appropriate and evaluate response  - Consider cultural and social influences on pain and pain management  - Notify physician/advanced practitioner if interventions unsuccessful or patient reports new pain  Outcome: Progressing     Problem: INFECTION - ADULT  Goal: Absence or prevention of progression during hospitalization  Description: INTERVENTIONS:  - Assess and monitor for signs and symptoms of infection  - Monitor lab/diagnostic results  - Monitor all insertion sites, i.e. indwelling lines, tubes, and drains  - Monitor endotracheal if appropriate and nasal secretions for changes in amount and color  - [place] appropriate cooling/warming therapies per order  - Administer medications as ordered  - Instruct and encourage patient and family to use good hand hygiene technique  - Identify and instruct in appropriate isolation precautions for identified infection/condition  Outcome: Progressing  Goal: Absence of fever/infection during neutropenic period  Description: INTERVENTIONS:  - Monitor WBC    Outcome: Progressing     Problem: SAFETY ADULT  Goal: Patient will remain free of falls  Description: INTERVENTIONS:  - Educate patient/family on patient safety including physical limitations  - Instruct patient to call for assistance with activity   - Consult OT/PT to assist with strengthening/mobility   - Keep Call bell within reach  - Keep bed low and locked with side rails adjusted as appropriate  - Keep care items and personal belongings within reach  - Initiate and maintain comfort rounds  - Make Fall Risk Sign visible to staff    Problem: DISCHARGE  PLANNING  Goal: Discharge to home or other facility with appropriate resources  Description: INTERVENTIONS:  - Identify barriers to discharge w/patient and caregiver  - Arrange for needed discharge resources and transportation as appropriate  - Identify discharge learning needs (meds, wound care, etc.)  - Arrange for interpretive services to assist at discharge as needed  - Refer to Case Management Department for coordinating discharge planning if the patient needs post-hospital services based on physician/advanced practitioner order or complex needs related to functional status, cognitive ability, or social support system  Outcome: Progressing     Problem: Knowledge Deficit  Goal: Patient/family/caregiver demonstrates understanding of disease process, treatment plan, medications, and discharge instructions  Description: Complete learning assessment and assess knowledge base.  Interventions:  - Provide teaching at level of understanding  - Provide teaching via preferred learning methods  Outcome: Progressing   - Apply yellow socks and bracelet for high fall risk patients  - Consider moving patient to room near nurses station  Outcome: Progressing

## 2024-05-21 NOTE — NURSING NOTE
Patient caught by nurse vaping in her room. Nurse communicated to patient vaping is not allow in the hospital for safety reasons. Patient refused to hand over vaping device and threaten to leave AMA.  Charge nurse, slim provider, and nursing supervisor made aware. Patient belongings search by security. Pocket knife taken by security and patient's medication send to pharmacy.

## 2024-05-21 NOTE — UTILIZATION REVIEW
Notification of Unplanned, Urgent, or   Emergency Inpatient Admission   AUTHORIZATION REQUEST   [place] [address]  Tax ID: [id number]  NPI: [id number]  Place of Service: Acute Care Hospital  Admission Level of Care: Inpatient  Place of Service Code: 21     Attending Physician Information  Attending Name and NPI#: Fili MoyaagDo nicola [[id number]]  Phone: [phone]     Admission Information  Inpatient Admission Date/Time: 5/20/24  6:40 PM  Discharge Date/Time: No discharge date for patient encounter.  Admitting Diagnosis Code/Description:  Dehydration [E86.0]  Dizziness [R42]  Otitis [H66.90]  Vomiting [R11.10]  Right ear pain [H92.01]  JACKI (acute kidney injury) (HCC) [N17.9]     Utilization Review Contact  Jina Connor Utilization   Phone: [phone]  Fax: [phone]  Email: [email or web address]  Contact for approvals/pending authorizations, clinical reviews, and discharge.     Physician Advisory Services Contact  Medical Necessity Denial & Ztfm-ga-Iyza Discussion  Phone: [phone]  Fax: [phone]  Email: [email or web address]     DISCHARGE SUPPORT TEAM:  For Patients Discharge Needs & Updates  Phone: [phone] opt. 2 Fax: [phone]  Email: [email or web address]

## 2024-05-21 NOTE — PROGRESS NOTES
Discharge instructions reviewed with pt. Home meds from pharmacy, pocket knife from security and vape all returned to patient. No questions asked. IV & brandan removed. Pt walked downstairs with  and all belongings.

## 2024-05-21 NOTE — PLAN OF CARE
Problem: PAIN - ADULT  Goal: Verbalizes/displays adequate comfort level or baseline comfort level  Description: Interventions:  - Encourage patient to monitor pain and request assistance  - Assess pain using appropriate pain scale  - Administer analgesics based on type and severity of pain and evaluate response  - Implement non-pharmacological measures as appropriate and evaluate response  - Consider cultural and social influences on pain and pain management  - Notify physician/advanced practitioner if interventions unsuccessful or patient reports new pain  Outcome: Progressing     Problem: INFECTION - ADULT  Goal: Absence or prevention of progression during hospitalization  Description: INTERVENTIONS:  - Assess and monitor for signs and symptoms of infection  - Monitor lab/diagnostic results  - Monitor all insertion sites, i.e. indwelling lines, tubes, and drains  - Monitor endotracheal if appropriate and nasal secretions for changes in amount and color  - [place] appropriate cooling/warming therapies per order  - Administer medications as ordered  - Instruct and encourage patient and family to use good hand hygiene technique  - Identify and instruct in appropriate isolation precautions for identified infection/condition  Outcome: Progressing  Goal: Absence of fever/infection during neutropenic period  Description: INTERVENTIONS:  - Monitor WBC    Outcome: Progressing     Problem: SAFETY ADULT  Goal: Patient will remain free of falls  Description: INTERVENTIONS:  - Educate patient/family on patient safety including physical limitations  - Instruct patient to call for assistance with activity   - Consult OT/PT to assist with strengthening/mobility   - Keep Call bell within reach  - Keep bed low and locked with side rails adjusted as appropriate  - Keep care items and personal belongings within reach  - Initiate and maintain comfort rounds  - Make Fall Risk Sign visible to staff  - Offer Toileting every  Hours,  in advance of need  - Initiate/Maintain alarm  - Obtain necessary fall risk management equipment:   - Apply yellow socks and bracelet for high fall risk patients  - Consider moving patient to room near nurses station  Outcome: Progressing     Problem: DISCHARGE PLANNING  Goal: Discharge to home or other facility with appropriate resources  Description: INTERVENTIONS:  - Identify barriers to discharge w/patient and caregiver  - Arrange for needed discharge resources and transportation as appropriate  - Identify discharge learning needs (meds, wound care, etc.)  - Arrange for interpretive services to assist at discharge as needed  - Refer to Case Management Department for coordinating discharge planning if the patient needs post-hospital services based on physician/advanced practitioner order or complex needs related to functional status, cognitive ability, or social support system  Outcome: Progressing     Problem: Knowledge Deficit  Goal: Patient/family/caregiver demonstrates understanding of disease process, treatment plan, medications, and discharge instructions  Description: Complete learning assessment and assess knowledge base.  Interventions:  - Provide teaching at level of understanding  - Provide teaching via preferred learning methods  Outcome: Progressing

## 2024-05-21 NOTE — UTILIZATION REVIEW
"Initial Clinical Review    Admission: Date/Time/Statement:   Admission Orders (From admission, onward)       Ordered        05/20/24 1840  INPATIENT ADMISSION  Once                          Orders Placed This Encounter   Procedures    INPATIENT ADMISSION     Standing Status:   Standing     Number of Occurrences:   1     Order Specific Question:   Level of Care     Answer:   Med Surg [16]     Order Specific Question:   Estimated length of stay     Answer:   More than 2 Midnights     Order Specific Question:   Certification     Answer:   I certify that inpatient services are medically necessary for this patient for a duration of greater than two midnights. See H&P and MD Progress Notes for additional information about the patient's course of treatment.     ED Arrival Information       Expected   -    Arrival   5/20/2024 14:28    Acuity   Urgent              Means of arrival   Ambulance    Escorted by   [facility]    Service   Hospitalist    Admission type   Emergency              Arrival complaint   Dizziness             Chief Complaint   Patient presents with    Dizziness     Pt was at urgent care and brought here via EMS due to persistent dizziness, leg \"Buckling/ weakness\", cyclic vomiting for the past week and R ear pain and swelling. Urgent care recommended she come to the ER. Per Ems pt has been slightly hypotensive. No meds PTA       Initial Presentation: 44 y.o. female who presented by EMS to [facility] ED. Inpatient admission for evaluation and treatment of JACKI. PMHx: arthritis, asthma, CKD, GERD, HTN, psychiatric disorder. Presented w/ 1 week of n/v as well as dizziness and lightheadedness. Urgent care noted BP in 60s. On exam, unremarkable physically. Crt 5.11. Imaging unremarkable. Plan: IVF, monitor for urinary retention, check renal US, continue PTA meds, Trend labs, replete electrolytes as needed.     Anticipated Length of Stay/Certification Statement: Patient will be admitted on " an Inpatient basis with an anticipated length of stay of  > 2 midnights.   Justification for Hospital Stay: iv hydration, jacki       Date: 05/21/24   Day 2: JACKI Cr 5. This was thought to be secondary to dehydration from GI loss and ongoing lisinopril use with hypotension. Her renal function improved 1.5 overnight with IVF. She wanted to go home today. She should hold her lisinopril and repeat labs on Monday. She has a follow up appointment tomorrow w/ PCP. Of note, she is also being treated with Augmentin for OM and I sent a referral for follow up with ENT outpatient. She should continue good oral hydration. Avoid nsaids and nephrotoxins.  On exam, erythema R external ear.    ED Triage Vitals   Temperature Pulse Respirations Blood Pressure SpO2   05/20/24 1436 05/20/24 1436 05/20/24 1436 05/20/24 1436 05/20/24 1436   98.3 °F (36.8 °C) 68 18 94/55 98 %      Temp Source Heart Rate Source Patient Position - Orthostatic VS BP Location FiO2 (%)   05/20/24 1436 05/20/24 1436 05/20/24 1436 05/20/24 1436 --   Oral Monitor Lying Right arm       Pain Score       05/20/24 1849       3          Wt Readings from Last 1 Encounters:   05/20/24 75.4 kg (166 lb 3.2 oz)     Additional Vital Signs:   Date/Time Temp Pulse Resp BP MAP (mmHg) SpO2 O2 Device   05/21/24 07:00:22 97.9 °F (36.6 °C) 70 18 109/73 85 99 % None (Room air)   05/21/24 01:04:47 -- 64 -- 121/69 86 97 % --   05/20/24 23:09:12 97.6 °F (36.4 °C) 75 18 95/56 69 99 % None (Room air)   05/20/24 20:37:29 97.8 °F (36.6 °C) 69 18 97/58 71 97 % None (Room air)   05/20/24 1849 -- 73 18 112/55 -- 99 % None (Room air)   05/20/24 1600 -- 78 20 112/59 80 99 % None (Room air)     Pertinent Labs/Diagnostic Test Results:   CT abdomen pelvis wo contrast   Final Result by Sapna Mcwilliams MD (05/20 1589)      Stable exam. No suspicious acute findings.      Stable small nonobstructing left lower pole calculus.      Stable small hiatal hernia.      Workstation performed:  [id number]         XR chest 1 view portable   Final Result by Ben Chew MD (05/21 0817)      No acute cardiopulmonary disease.            Workstation performed: [id number]          kidney and bladder    (Results Pending)     Results from last 7 days   Lab Units 05/20/24  1606   SARS-COV-2  Negative     Results from last 7 days   Lab Units 05/21/24  0608 05/20/24  1455 05/16/24  0341   WBC Thousand/uL 6.64 10.56* 9.03   HEMOGLOBIN g/dL 10.9* 11.3* 13.5   HEMATOCRIT % 31.7* 32.9* 38.5   PLATELETS Thousands/uL 233 231 328   TOTAL NEUT ABS Thousands/µL  --  6.16 5.55         Results from last 7 days   Lab Units 05/21/24  0608 05/20/24  1455 05/16/24  0341   SODIUM mmol/L 137 129* 132*   POTASSIUM mmol/L 3.7 3.7 4.0   CHLORIDE mmol/L 104 95* 98   CO2 mmol/L 27 23 21   ANION GAP mmol/L 6 11 13   BUN mg/dL 30* 46* 36*   CREATININE mg/dL 1.56* 5.11* 1.54*   EGFR ml/min/1.73sq m 39 9 40   CALCIUM mg/dL 8.8 9.6 10.0   MAGNESIUM mg/dL 2.4  --   --      Results from last 7 days   Lab Units 05/20/24  1455 05/16/24  0341   AST U/L 35 17   ALT U/L 56* 16   ALK PHOS U/L 108* 116*   TOTAL PROTEIN g/dL 7.6 8.4   ALBUMIN g/dL 4.1 4.6   TOTAL BILIRUBIN mg/dL 0.40 0.55         Results from last 7 days   Lab Units 05/21/24  0608 05/20/24  1455 05/16/24  0341   GLUCOSE RANDOM mg/dL 97 100 143*     Results from last 7 days   Lab Units 05/20/24  1455   HS TNI 0HR ng/L <2     Results from last 7 days   Lab Units 05/16/24  0341   LIPASE u/L 55     Results from last 7 days   Lab Units 05/20/24  1627   OSMO UR mmol/*     Results from last 7 days   Lab Units 05/20/24  1627   CLARITY UA  Clear   COLOR UA  Yellow   SPEC GRAV UA  1.015   PH UA  5.5   GLUCOSE UA mg/dl Negative   KETONES UA mg/dl Negative   BLOOD UA  Small*   PROTEIN UA mg/dl 30 (1+)*   NITRITE UA  Negative   BILIRUBIN UA  Negative   UROBILINOGEN UA E.U./dl 0.2   LEUKOCYTES UA  Negative   WBC UA /hpf 4-10*   RBC UA /hpf 1-2   BACTERIA UA /hpf Moderate*   EPITHELIAL  CELLS WET PREP /hpf Occasional   MUCUS THREADS  Moderate*   SODIUM UR  35   CREATININE UR mg/dL 140.8     Results from last 7 days   Lab Units 05/20/24  1606   INFLUENZA A PCR  Negative   INFLUENZA B PCR  Negative   RSV PCR  Negative         ED Treatment:   Medication Administration from 05/20/2024 1428 to 05/20/2024 2032         Date/Time Order Dose Route Action     05/20/2024 1542 EDT sodium chloride 0.9 % bolus 1,000 mL 1,000 mL Intravenous New Bag     05/20/2024 1536 EDT sodium chloride 0.9 % bolus 1,000 mL 1,000 mL Intravenous New Bag     05/20/2024 1545 EDT sodium chloride 0.9 % bolus 1,000 mL 1,000 mL Intravenous New Bag     05/20/2024 1618 EDT acetaminophen (Ofirmev) injection 1,000 mg 1,000 mg Intravenous New Bag     05/20/2024 1600 EDT amoxicillin (AMOXIL) capsule 500 mg 500 mg Oral Given     05/20/2024 1916 EDT aluminum-magnesium hydroxide-simethicone (MAALOX) oral suspension 30 mL 30 mL Oral Given          Past Medical History:   Diagnosis Date    Anxiety     Arthritis     OA  b/l knees    Asthma     Approx 2 years ago    Calculus of gallbladder without cholecystitis without obstruction 02/23/2022    Chronic kidney disease     Depression     GERD (gastroesophageal reflux disease)     In my 20s. Approx 15 years ago    Hx of bleeding disorder     dysmennorrhea-dx lap today 8/12/2016    Hypertension     Kidney stone     Obesity     Seasonal allergies      Present on Admission:   Anxiety   Neuropathy associated with polyneuropathy, organomegaly, endocrinopathy, monoclonal gammopathy, and skin changes syndrome (HCC)   Mild intermittent asthma without complication   Essential hypertension   GERD (gastroesophageal reflux disease)   (Resolved) Cyclical vomiting syndrome   JACKI (acute kidney injury) ([facility])      Admitting Diagnosis: Dehydration [E86.0]  Dizziness [R42]  Otitis [H66.90]  Vomiting [R11.10]  Right ear pain [H92.01]  JACKI (acute kidney injury) (Former[facility]) [N17.9]  Age/Sex: 44 y.o. female  Admission  Orders:  Regular Diet.  Activity as tolerated.    Scheduled Medications:  amoxicillin-clavulanate, 1 tablet, Oral, Q12H NINI  busPIRone, 5 mg, Oral, BID  gabapentin, 600 mg, Oral, TID  nicotine, 7 mg, Transdermal, Daily  pantoprazole, 40 mg, Oral, Early Morning  QUEtiapine, 50 mg, Oral, HS  sertraline, 200 mg, Oral, Daily    Continuous IV Infusions:   multi-electrolyte, 125 mL/hr, Intravenous, Continuous; Start: 05/20/24 1915 End: 05/21/24 0642     PRN Meds:  acetaminophen, 975 mg, Oral, Q8H PRN  albuterol, 2 puff, Inhalation, Q4H PRN  aluminum-magnesium hydroxide-simethicone, 30 mL, Oral, Q4H PRN  fluticasone, 1 spray, Each Nare, BID PRN  hydrOXYzine HCL, 25 mg, Oral, TID PRN; 5/21 x1  melatonin, 3 mg, Oral, HS PRN; 5/21 x1  ondansetron, 4 mg, Intravenous, Q6H PRN  polyethylene glycol, 17 g, Oral, Daily PRN        Network Utilization Review Department  ATTENTION: Please call with any questions or concerns to [phone] and carefully listen to the prompts so that you are directed to the right person. All voicemails are confidential.   For Discharge needs, contact Care Management DC Support Team at [phone] opt. 2  Send all requests for admission clinical reviews, approved or denied determinations and any other requests to dedicated fax number below belonging to the campus where the patient is receiving treatment. List of dedicated fax numbers for the Facilities:  FACILITY NAME UR FAX NUMBER   ADMISSION DENIALS (Administrative/Medical Necessity) [phone]   DISCHARGE SUPPORT TEAM (NETWORK) [phone]   PARENT CHILD HEALTH (Maternity/NICU/Pediatrics) [phone]   General acute hospital [phone]   [facility] [phone]   [facility] [phone]   [facility] [phone]   [facility] [phone]   [facility] [phone]   [facility]  [phone]   [facility] [phone]   [facility] [phone]   [facility] [phone]   [facility] [phone]   [facility] [phone]

## 2024-05-21 NOTE — UTILIZATION REVIEW
NOTIFICATION OF ADMISSION DISCHARGE   This is a Notification of Discharge from [facility]. Please be advised that this patient has been discharge from our facility. Below you will find the admission and discharge date and time including the patient’s disposition.   UTILIZATION REVIEW CONTACT:  Jina Connor  Utilization   Network Utilization Review Department  Phone: [phone] x carefully listen to the prompts. All voicemails are confidential.  Email: [email or web address]     ADMISSION INFORMATION  PRESENTATION DATE: 5/20/2024  2:28 PM  OBERVATION ADMISSION DATE:   INPATIENT ADMISSION DATE: 5/20/24  6:40 PM   DISCHARGE DATE: 5/21/2024 10:29 AM   DISPOSITION:Home/Self Care    Network Utilization Review Department  ATTENTION: Please call with any questions or concerns to [phone] and carefully listen to the prompts so that you are directed to the right person. All voicemails are confidential.   For Discharge needs, contact Care Management DC Support Team at [phone] opt. 2  Send all requests for admission clinical reviews, approved or denied determinations and any other requests to dedicated fax number below belonging to the campus where the patient is receiving treatment. List of dedicated fax numbers for the Facilities:  FACILITY NAME UR FAX NUMBER   ADMISSION DENIALS (Administrative/Medical Necessity) [phone]   DISCHARGE SUPPORT TEAM ([facility]) [phone]   PARENT CHILD HEALTH (Maternity/NICU/Pediatrics) [phone]   [facility] [phone]   General acute hospital [phone]   Critical access hospital [phone]   [facility] [phone]   [facility] [phone]   [facility] [phone]   [facility] [phone]   [facility] [phone]   [facility]  [facility] [phone]   [facility] [phone]   [facility] [phone]   [facility] [phone]

## 2024-05-21 NOTE — ASSESSMENT & PLAN NOTE
Due to JACKI hold lisinopril until repeat lab work on Monday with pcp   Message sent to pcp office   Has an appointment tomorrow morning

## 2024-05-21 NOTE — DISCHARGE SUMMARY
[facility]  Discharge- Melia Lira 1980, 44 y.o. female MRN: [id number]  Unit/Bed#: E5 -01 Encounter: [id number]  Primary Care Provider: MONICA Colbert   Date and time admitted to hospital: 5/20/2024  2:28 PM    * JACKI (acute kidney injury) (HCC)  Assessment & Plan  44-year-old female who had a week long history of nausea and intractable vomiting which she suspected to be due to her recent red bull use.  She had dizziness, lightheadedness, and hypotension at the urgent care center earlier today.  She was given IV hydration after her blood work showed evidence of acute kidney injury.  CT abdomen and pelvis showing no acute abnormalities.  IV hydration overnight and renal function improved drastically   She is eager to go home today   Tolerating po intake, no N/V/D. No CP or SOB,. No lightheadedness, dizziness   Hold lisinopril until repeat labs on Monday with PCP   Has a pcp appointment tomorrow morning   Continue good oral hydration   Avoid nsaids and nephrotoxins     Recent Labs     05/20/24  1455 05/21/24  0608   BUN 46* 30*   CREATININE 5.11* 1.56*   EGFR 9 39       Results from last 7 days   Lab Units 05/20/24  1627   BLOOD UA  Small*   PROTEIN UA mg/dl 30 (1+)*   SODIUM UR  35   CREATININE UR mg/dL 140.8         Otitis media  Assessment & Plan  Complete antibitoic course with Augmentin   Referral to ENT outpatient     GERD (gastroesophageal reflux disease)  Assessment & Plan  Continue PPI    Essential hypertension  Assessment & Plan  Due to JACKI hold lisinopril until repeat lab work on Monday with pcp   Message sent to pcp office   Has an appointment tomorrow morning     Mild intermittent asthma without complication  Assessment & Plan  Not in acute exacerbation  Continue PRN albuterol    Neuropathy associated with polyneuropathy, organomegaly, endocrinopathy, monoclonal gammopathy, and skin changes syndrome (HCC)  Assessment & Plan  Continue  Gabapentin    Anxiety  Assessment & Plan  Continue sertraline, buspar, and PRN hydroxyzine    Hyponatremia-resolved as of 5/21/2024  Assessment & Plan  Possibly due to dehydration    Recent Labs     05/20/24  1455 05/21/24  0608   SODIUM 129* 137     Results from last 7 days   Lab Units 05/20/24  1627   OSMO UR mmol/*   SODIUM UR  35         Cyclical vomiting syndrome-resolved as of 5/21/2024  Assessment & Plan  Resolved last Saturday. Was vomiting the entire week.      Medical Problems       Resolved Problems  Date Reviewed: 5/21/2024            Resolved    Cyclical vomiting syndrome 5/21/2024     Resolved by  Bisi Frederick PA-C    Overview Signed 10/25/2018  2:20 PM by Ana Maria Raphael     Added automatically from request for surgery [id number]         Hyponatremia 5/21/2024     Resolved by  Bisi Frederick PA-C        Discharging Physician / Practitioner: Bisi Frederick PA-C  PCP: MONICA Colbert  Admission Date:   Admission Orders (From admission, onward)       Ordered        05/20/24 1840  INPATIENT ADMISSION  Once                          Discharge Date: 05/21/24    Consultations During Hospital Stay:  none    Procedures Performed:   none    Significant Findings / Test Results:   CT A/P   FINDINGS:     ABDOMEN     LOWER CHEST: Interval resolution of the groundglass opacities in both lung bases. Small hiatal hernia.     LIVER/BILIARY TREE: Unremarkable.     GALLBLADDER: Post cholecystectomy.     SPLEEN: Unremarkable.     PANCREAS: Unremarkable.     ADRENAL GLANDS: Unremarkable.     KIDNEYS/URETERS: nonobstructing 3 mm left lower pole calculus. Minimally increased size. No hydronephrosis.     STOMACH AND BOWEL: Unremarkable.     APPENDIX: No findings to suggest appendicitis.     ABDOMINOPELVIC CAVITY: No ascites. No pneumoperitoneum. No lymphadenopathy.     VESSELS: Unremarkable for patient's age.     PELVIS     REPRODUCTIVE ORGANS: Unremarkable for patient's age.     URINARY BLADDER: Unremarkable.      ABDOMINAL WALL/INGUINAL REGIONS: Unremarkable.     BONES: No acute fracture or suspicious osseous lesion.     IMPRESSION:     Stable exam. No suspicious acute findings.     Stable small nonobstructing left lower pole calculus.     Stable small hiatal hernia.      CXR  FINDINGS:     Clear lungs. No pneumothorax or pleural effusion. Mild eventration of the left hemidiaphragm.     Normal cardiomediastinal silhouette.     Bones are unremarkable for age.     Normal upper abdomen.     IMPRESSION:     No acute cardiopulmonary disease.       Incidental Findings:   none       Test Results Pending at Discharge (will require follow up):   BMP on m[place]      Outpatient Tests Requested:  PCP tomorrow appointment   Referral to ENT     Complications:      Reason for Admission: JACKI    Hospital Course:   Melia Lira is a 44 y.o. female patient who originally presented to the hospital on 5/20/2024 due to intractable nausea and vomiting over Mother's Day  all week that resolved but presented with lightheadedness and dizziness with significant JACKI Cr 5. This was thought to be secondary to dehydration from GI loss and ongoing lisinopril use with hypotension. Her renal function improved 1.5 overnight with IVF. She wanted to go home today. I asked her to hold her lisinopril and repeat labs on Monday. She has a follow up appointment tomorrow. Of note, she is also being treated with Augmentin for OM and I sent a referral for follow up with ENT outpatient. She should continue good oral hydration. Avoid nsaids and nephrotoxins.       Please see above list of diagnoses and related plan for additional information.     Condition at Discharge: stable    Discharge Day Visit / Exam:   Subjective:  patient is doing well. Wants to go home this morning. She has no N/V/abdo pain. No CP or SOB, no lighheadedness or dizziness. No urinary complaint.s she has right ear pain being treated for OM. No other ocmplaints. She has a pcp appoitnment tomorrow  morning  Vitals: Blood Pressure: 109/73 (05/21/24 0700)  Pulse: 70 (05/21/24 0700)  Temperature: 97.9 °F (36.6 °C) (05/21/24 0700)  Temp Source: Oral (05/21/24 0700)  Respirations: 18 (05/21/24 0700)  SpO2: 99 % (05/21/24 0700)  Exam:   Physical Exam  Vitals and nursing note reviewed.   Constitutional:       General: She is not in acute distress.     Appearance: She is not ill-appearing, toxic-appearing or diaphoretic.   HENT:      Ears:      Comments: Erythema right external ear, no tenderness occiptal area, no draiange   Cardiovascular:      Rate and Rhythm: Normal rate and regular rhythm.   Pulmonary:      Effort: Pulmonary effort is normal.      Breath sounds: Normal breath sounds.   Abdominal:      General: Bowel sounds are normal.      Palpations: Abdomen is soft.   Musculoskeletal:      Right lower leg: No edema.      Left lower leg: No edema.   Skin:     General: Skin is warm.   Neurological:      Mental Status: She is alert. Mental status is at baseline.   Psychiatric:         Mood and Affect: Mood normal.          Discussion with Family:  family at bedside .     Discharge instructions/Information to patient and family:   See after visit summary for information provided to patient and family.      Provisions for Follow-Up Care:  See after visit summary for information related to follow-up care and any pertinent home health orders.      Mobility at time of Discharge:   Basic Mobility Inpatient Raw Score: 24  JH-HLM Goal: 8: Walk 250 feet or more  JH-HLM Achieved: 8: Walk 250 feet ot more  HLM Goal achieved. Continue to encourage appropriate mobility.     Disposition:   Home    Planned Readmission: none     Discharge Statement:  I spent 40 minutes discharging the patient. This time was spent on the day of discharge. I had direct contact with the patient on the day of discharge. Greater than 50% of the total time was spent examining patient, answering all patient questions, arranging and discussing plan of  care with patient as well as directly providing post-discharge instructions.  Additional time then spent on discharge activities.    Discharge Medications:  See after visit summary for reconciled discharge medications provided to patient and/or family.      **Please Note: This note may have been constructed using a voice recognition system**

## 2024-05-22 ENCOUNTER — OFFICE VISIT (OUTPATIENT)
Dept: FAMILY MEDICINE CLINIC | Facility: CLINIC | Age: 44
End: 2024-05-22
Payer: COMMERCIAL

## 2024-05-22 VITALS
OXYGEN SATURATION: 97 % | RESPIRATION RATE: 16 BRPM | HEIGHT: 59 IN | DIASTOLIC BLOOD PRESSURE: 80 MMHG | TEMPERATURE: 98.4 F | WEIGHT: 171.2 LBS | HEART RATE: 82 BPM | SYSTOLIC BLOOD PRESSURE: 118 MMHG | BODY MASS INDEX: 34.51 KG/M2

## 2024-05-22 DIAGNOSIS — E87.1 HYPONATREMIA: ICD-10-CM

## 2024-05-22 DIAGNOSIS — I10 ESSENTIAL HYPERTENSION: ICD-10-CM

## 2024-05-22 DIAGNOSIS — H66.001 NON-RECURRENT ACUTE SUPPURATIVE OTITIS MEDIA OF RIGHT EAR WITHOUT SPONTANEOUS RUPTURE OF TYMPANIC MEMBRANE: ICD-10-CM

## 2024-05-22 DIAGNOSIS — F41.9 ANXIETY: ICD-10-CM

## 2024-05-22 DIAGNOSIS — R11.15 CYCLICAL VOMITING SYNDROME: ICD-10-CM

## 2024-05-22 DIAGNOSIS — Z00.00 HEALTHCARE MAINTENANCE: ICD-10-CM

## 2024-05-22 DIAGNOSIS — N17.9 AKI (ACUTE KIDNEY INJURY) (HCC): Primary | ICD-10-CM

## 2024-05-22 DIAGNOSIS — K21.9 GASTROESOPHAGEAL REFLUX DISEASE WITHOUT ESOPHAGITIS: ICD-10-CM

## 2024-05-22 PROCEDURE — 99396 PREV VISIT EST AGE 40-64: CPT | Performed by: NURSE PRACTITIONER

## 2024-05-22 PROCEDURE — 99496 TRANSJ CARE MGMT HIGH F2F 7D: CPT | Performed by: NURSE PRACTITIONER

## 2024-05-22 NOTE — ASSESSMENT & PLAN NOTE
- Admitted on 5/20 with JACKI in setting of dehydration secondary to intractable vomiting. Renal function improved after IV fluids.   - Lisinopril on hold. Repeat BMP this weekend.   - Encourage increased oral hydration.   - Avoid NSAIDs.   - Will continue to follow up.   
- Complete course of Augmentin that was prescribed inpatient.   - Recommend Tylenol for ear pain. Avoid NSAIDs due to recent JACKI.   
- Continue omeprazole 40 mg daily.   - Avoid triggering food and beverage.  - Strongly advised to follow back up with GI.   
- Had EGD and colonoscopy scheduled which patient canceled.   - Strongly encouraged to follow back up with GI.   
- Holding lisinopril due to recent JACKI. Repeat BMP this weekend.   - Her blood pressure is well controlled in the office today off lisinopril.  - Advised to monitor blood pressure at home and record readings.   - Recommend follow up in 1 month.   
- Reviewed immunizations and screenings.   - Due for blood work.   - Mammogram UTD.   - Encouraged to reschedule colonoscopy.   
- Sodium improved from 129 to 137.   - Will continue to monitor BMP.   
- Stable on Zoloft 200 mg daily and Buspar 5 mg BID.  Continue same.   - Continue Ativan as needed.  - Will continue to monitor.   
24-Nov-2020

## 2024-05-22 NOTE — PROGRESS NOTES
Transition of Care Visit  Name: Melia Lira      : 1980      MRN: [id number]  Encounter Provider: MONICA Colbert  Encounter Date: 2024   Encounter department: [facility]    Assessment & Plan   1. JACKI (acute kidney injury) (HCC)  Assessment & Plan:  - Admitted on  with JACKI in setting of dehydration secondary to intractable vomiting. Renal function improved after IV fluids.   - Lisinopril on hold. Repeat BMP this weekend.   - Encourage increased oral hydration.   - Avoid NSAIDs.   - Will continue to follow up.   Orders:  -     Basic metabolic panel; Future  2. Hyponatremia  Assessment & Plan:  - Sodium improved from 129 to 137.   - Will continue to monitor BMP.   3. Cyclical vomiting syndrome  Assessment & Plan:  - Had EGD and colonoscopy scheduled which patient canceled.   - Strongly encouraged to follow back up with GI.   4. Gastroesophageal reflux disease without esophagitis  Assessment & Plan:  - Continue omeprazole 40 mg daily.   - Avoid triggering food and beverage.  - Strongly advised to follow back up with GI.   5. Essential hypertension  Assessment & Plan:  - Holding lisinopril due to recent JACKI. Repeat BMP this weekend.   - Her blood pressure is well controlled in the office today off lisinopril.  - Advised to monitor blood pressure at home and record readings.   - Recommend follow up in 1 month.   6. Non-recurrent acute suppurative otitis media of right ear without spontaneous rupture of tympanic membrane  Assessment & Plan:  - Complete course of Augmentin that was prescribed inpatient.   - Recommend Tylenol for ear pain. Avoid NSAIDs due to recent JACKI.   7. Anxiety  Assessment & Plan:  - Stable on Zoloft 200 mg daily and Buspar 5 mg BID.  Continue same.   - Continue Ativan as needed.  - Will continue to monitor.   8. Healthcare maintenance  Assessment & Plan:  - Reviewed immunizations and screenings.   - Due for blood work.   - Mammogram UTD.   -  Encouraged to reschedule colonoscopy.        History of Present Illness   {Disappearing Hyperlinks I Encounters * My Last Note * Since Last Visit * History :26980}  Transitional Care Management Review:   Melia Lira is a 44 y.o. female here for TCM follow up.     During the TCM phone call patient stated:  TCM Call     Date and time call was made  5/21/2024 10:47 AM    Hospital care reviewed  Records reviewed    Patient was hospitialized at  [facility]    Date of Admission  05/20/24    Date of discharge  05/21/24    Diagnosis  JACKI (acute kidney injury) ([facility])    Disposition  Home    Were the patients medications reviewed and updated  No    Current Symptoms  Dizziness    Dizziness severity  Moderate    Quality Character  Lightheadedness    Cause  No known event      TCM Call     Post hospital issues  None    Should patient be enrolled in anticoag monitoring?  No    Scheduled for follow up?  Yes    Did you obtain your prescribed medications  Yes    Do you need help managing your prescriptions or medications  No    Is transportation to your appointment needed  No    I have advised the patient to call PCP with any new or worsening symptoms  Loni Ann RN    Are you recieving any outpatient services  No    Are you recieving home care services  No    Are you using any community resources  No    Current waiver services  No    Have you fallen in the last 12 months  No    Interperter language line needed  No    Comments  tcm scjed 2/2/24        Patient with PMH of HTN, GERD, cyclic vomiting syndrome, neuropathy, anxiety, and asthma presents to office today for hospital follow up. She initially presented to the ER on 5/16/2024 due to intractable nausea and vomiting over Mother's Day. Thought to be related to ingesting an energy drink. Cyclic vomiting had previously been triggered by marijuana use which she denies. Her vomiting resolved. She then experienced lightheadedness and dizziness. She presented to Urgent  Care on 5/20/2024 at which time her blood pressure was 62/42. EMS was called and she was transferred to the hospital where she had significant JACKI with creatinine of 5.11 and GFR of 9. She was hyponatremic with sodium of 129. She was given IV fluids. Renal function improved. She is holding her lisinopril until repeat BMP. Her blood pressure is well controlled in the office today off her lisinopril. Also of note, she was treated for right otitis media with Augmentin which she is still taking. She still complains of lightheadedness. Still having issues with vomiting. She was seen by GI several months ago who suggested EGD and colonoscopy which patient canceled because she didn't want to have procedure done. She is requesting a note excusing her from work until Monday. She denies any other concerns or complaints today.         Review of Systems   Constitutional:  Negative for fatigue and fever.   HENT:  Positive for ear pain (right). Negative for congestion, postnasal drip and trouble swallowing.    Eyes:  Negative for visual disturbance.   Respiratory:  Negative for cough and shortness of breath.    Cardiovascular:  Negative for chest pain and palpitations.   Gastrointestinal:  Positive for vomiting. Negative for abdominal pain and blood in stool.   Endocrine: Negative for cold intolerance and heat intolerance.   Genitourinary:  Negative for difficulty urinating, dysuria and frequency.   Musculoskeletal:  Negative for gait problem.   Skin:  Negative for rash.   Neurological:  Positive for light-headedness. Negative for dizziness, syncope and headaches.   Hematological:  Negative for adenopathy.   Psychiatric/Behavioral:  Negative for behavioral problems.      Objective   {Disappearing Hyperlinks   Review Vitals * Enter New Vitals * Results Review * Labs * Imaging * Cardiology * Procedures * Lung Cancer Screening :90500}  /80 (BP Location: Left arm, Patient Position: Sitting, Cuff Size: Large)   Pulse 82    "Temp 98.4 °F (36.9 °C) (Tympanic)   Resp 16   Ht 4' 11\" (1.499 m)   Wt 77.7 kg (171 lb 3.2 oz)   LMP  (LMP Unknown)   SpO2 97%   BMI 34.58 kg/m²     Physical Exam  Vitals and nursing note reviewed.   Constitutional:       General: She is not in acute distress.     Appearance: Normal appearance. She is well-developed. She is not ill-appearing.   HENT:      Head: Normocephalic and atraumatic.      Right Ear: External ear normal. Swelling and tenderness present. Tympanic membrane is erythematous and bulging.      Left Ear: Tympanic membrane, ear canal and external ear normal.      Nose: Nose normal.      Mouth/Throat:      Mouth: Mucous membranes are moist.   Eyes:      Conjunctiva/sclera: Conjunctivae normal.      Pupils: Pupils are equal, round, and reactive to light.   Cardiovascular:      Rate and Rhythm: Normal rate and regular rhythm.      Heart sounds: Normal heart sounds.   Pulmonary:      Effort: Pulmonary effort is normal.      Breath sounds: Normal breath sounds.   Abdominal:      General: Bowel sounds are normal.      Palpations: Abdomen is soft.   Musculoskeletal:         General: Normal range of motion.      Cervical back: Normal range of motion.   Lymphadenopathy:      Cervical: No cervical adenopathy.   Skin:     General: Skin is warm and dry.   Neurological:      Mental Status: She is alert and oriented to person, place, and time.   Psychiatric:         Mood and Affect: Mood normal.         Behavior: Behavior normal.       Medications have been reviewed by provider in current encounter    Administrative Statements {Disappearing Hyperlinks I  [facility] of [id number]}        "

## 2024-05-22 NOTE — LETTER
May 22, 2024     Patient: Melia Lira  YOB: 1980  Date of Visit: 5/22/2024      To Whom it May Concern:    Melia Lira is under my professional care. Melia was seen in my office on 5/22/2024. Melia may return to work on 5/27/2024 .    If you have any questions or concerns, please don't hesitate to call.         Sincerely,          MONICA Colbert        CC: No Recipients

## 2024-05-25 ENCOUNTER — APPOINTMENT (OUTPATIENT)
Dept: LAB | Facility: IMAGING CENTER | Age: 44
End: 2024-05-25
Payer: COMMERCIAL

## 2024-05-25 DIAGNOSIS — I10 ESSENTIAL HYPERTENSION: ICD-10-CM

## 2024-05-25 DIAGNOSIS — N17.9 AKI (ACUTE KIDNEY INJURY) (HCC): ICD-10-CM

## 2024-05-25 LAB
ALBUMIN SERPL BCP-MCNC: 3.6 G/DL (ref 3.5–5)
ALP SERPL-CCNC: 82 U/L (ref 34–104)
ALT SERPL W P-5'-P-CCNC: 23 U/L (ref 7–52)
ANION GAP SERPL CALCULATED.3IONS-SCNC: 6 MMOL/L (ref 4–13)
AST SERPL W P-5'-P-CCNC: 18 U/L (ref 13–39)
BASOPHILS # BLD AUTO: 0.03 THOUSANDS/ÂΜL (ref 0–0.1)
BASOPHILS NFR BLD AUTO: 1 % (ref 0–1)
BILIRUB SERPL-MCNC: 0.23 MG/DL (ref 0.2–1)
BUN SERPL-MCNC: 12 MG/DL (ref 5–25)
CALCIUM SERPL-MCNC: 9 MG/DL (ref 8.4–10.2)
CHLORIDE SERPL-SCNC: 103 MMOL/L (ref 96–108)
CHOLEST SERPL-MCNC: 156 MG/DL
CO2 SERPL-SCNC: 29 MMOL/L (ref 21–32)
CREAT SERPL-MCNC: 0.8 MG/DL (ref 0.6–1.3)
EOSINOPHIL # BLD AUTO: 0.26 THOUSAND/ÂΜL (ref 0–0.61)
EOSINOPHIL NFR BLD AUTO: 4 % (ref 0–6)
ERYTHROCYTE [DISTWIDTH] IN BLOOD BY AUTOMATED COUNT: 13.3 % (ref 11.6–15.1)
GFR SERPL CREATININE-BSD FRML MDRD: 88 ML/MIN/1.73SQ M
GLUCOSE P FAST SERPL-MCNC: 96 MG/DL (ref 65–99)
HCT VFR BLD AUTO: 35.8 % (ref 34.8–46.1)
HDLC SERPL-MCNC: 33 MG/DL
HGB BLD-MCNC: 11.8 G/DL (ref 11.5–15.4)
IMM GRANULOCYTES # BLD AUTO: 0.01 THOUSAND/UL (ref 0–0.2)
IMM GRANULOCYTES NFR BLD AUTO: 0 % (ref 0–2)
LDLC SERPL CALC-MCNC: 98 MG/DL (ref 0–100)
LYMPHOCYTES # BLD AUTO: 2.5 THOUSANDS/ÂΜL (ref 0.6–4.47)
LYMPHOCYTES NFR BLD AUTO: 42 % (ref 14–44)
MCH RBC QN AUTO: 32.2 PG (ref 26.8–34.3)
MCHC RBC AUTO-ENTMCNC: 33 G/DL (ref 31.4–37.4)
MCV RBC AUTO: 98 FL (ref 82–98)
MONOCYTES # BLD AUTO: 0.75 THOUSAND/ÂΜL (ref 0.17–1.22)
MONOCYTES NFR BLD AUTO: 13 % (ref 4–12)
NEUTROPHILS # BLD AUTO: 2.33 THOUSANDS/ÂΜL (ref 1.85–7.62)
NEUTS SEG NFR BLD AUTO: 40 % (ref 43–75)
NRBC BLD AUTO-RTO: 0 /100 WBCS
PLATELET # BLD AUTO: 254 THOUSANDS/UL (ref 149–390)
PMV BLD AUTO: 10.1 FL (ref 8.9–12.7)
POTASSIUM SERPL-SCNC: 4.6 MMOL/L (ref 3.5–5.3)
PROT SERPL-MCNC: 6.6 G/DL (ref 6.4–8.4)
RBC # BLD AUTO: 3.67 MILLION/UL (ref 3.81–5.12)
SODIUM SERPL-SCNC: 138 MMOL/L (ref 135–147)
TRIGL SERPL-MCNC: 126 MG/DL
TSH SERPL DL<=0.05 MIU/L-ACNC: 2.14 UIU/ML (ref 0.45–4.5)
WBC # BLD AUTO: 5.88 THOUSAND/UL (ref 4.31–10.16)

## 2024-05-25 PROCEDURE — 85025 COMPLETE CBC W/AUTO DIFF WBC: CPT

## 2024-05-25 PROCEDURE — 36415 COLL VENOUS BLD VENIPUNCTURE: CPT

## 2024-05-25 PROCEDURE — 80061 LIPID PANEL: CPT

## 2024-05-25 PROCEDURE — 84443 ASSAY THYROID STIM HORMONE: CPT

## 2024-05-25 PROCEDURE — 80053 COMPREHEN METABOLIC PANEL: CPT

## 2024-05-29 ENCOUNTER — TELEPHONE (OUTPATIENT)
Dept: FAMILY MEDICINE CLINIC | Facility: CLINIC | Age: 44
End: 2024-05-29

## 2024-05-29 NOTE — TELEPHONE ENCOUNTER
----- Message from MONICA Jolly sent at 5/29/2024 10:13 AM EDT -----  Labs are stable. Kidney function has greatly improved.

## 2024-06-10 DIAGNOSIS — F41.9 ANXIETY: ICD-10-CM

## 2024-06-10 DIAGNOSIS — G47.00 INSOMNIA, UNSPECIFIED TYPE: ICD-10-CM

## 2024-06-10 RX ORDER — SERTRALINE HYDROCHLORIDE 100 MG/1
TABLET, FILM COATED ORAL
Qty: 180 TABLET | Refills: 1 | Status: SHIPPED | OUTPATIENT
Start: 2024-06-10

## 2024-06-12 RX ORDER — QUETIAPINE FUMARATE 50 MG/1
50 TABLET, FILM COATED ORAL
Qty: 90 TABLET | Refills: 1 | Status: SHIPPED | OUTPATIENT
Start: 2024-06-12

## 2024-06-12 NOTE — TELEPHONE ENCOUNTER
Patient called to check the status of her refill for quetiapine. She was advised a refill was requested from the pharmacy on 06/10/24, with a follow up request on 06/12/24, and is pending approval. Patient verbalized understanding and is in agreement.

## 2024-06-18 ENCOUNTER — TELEPHONE (OUTPATIENT)
Age: 44
End: 2024-06-18

## 2024-06-18 DIAGNOSIS — G63 NEUROPATHY ASSOCIATED WITH POLYNEUROPATHY, ORGANOMEGALY, ENDOCRINOPATHY, MONOCLONAL GAMMOPATHY, AND SKIN CHANGES SYNDROME (HCC): ICD-10-CM

## 2024-06-18 DIAGNOSIS — E88.09 NEUROPATHY ASSOCIATED WITH POLYNEUROPATHY, ORGANOMEGALY, ENDOCRINOPATHY, MONOCLONAL GAMMOPATHY, AND SKIN CHANGES SYNDROME (HCC): ICD-10-CM

## 2024-06-18 DIAGNOSIS — F41.9 ANXIETY: ICD-10-CM

## 2024-06-18 NOTE — TELEPHONE ENCOUNTER
Patient stated she tried to refill 2 medications on My Chart; meloxicam and flexeril. It showed that both meds were discontinued and she doesn't know why. Both medications were not listed in patient's medication list. Warm transfer to office but was unable to connect.  Patient would like a call back to discuss this;[phone]

## 2024-06-19 DIAGNOSIS — M54.41 ACUTE BILATERAL LOW BACK PAIN WITH RIGHT-SIDED SCIATICA: Primary | ICD-10-CM

## 2024-06-19 RX ORDER — HYDROXYZINE PAMOATE 25 MG/1
CAPSULE ORAL
Qty: 270 CAPSULE | Refills: 1 | Status: SHIPPED | OUTPATIENT
Start: 2024-06-19

## 2024-06-19 RX ORDER — GABAPENTIN 600 MG/1
600 TABLET ORAL 3 TIMES DAILY
Qty: 270 TABLET | Refills: 1 | Status: SHIPPED | OUTPATIENT
Start: 2024-06-19

## 2024-06-19 RX ORDER — CYCLOBENZAPRINE HCL 5 MG
5 TABLET ORAL 3 TIMES DAILY PRN
Qty: 30 TABLET | Refills: 2 | Status: SHIPPED | OUTPATIENT
Start: 2024-06-19

## 2024-06-19 RX ORDER — MELOXICAM 15 MG/1
15 TABLET ORAL DAILY PRN
Qty: 30 TABLET | Refills: 3 | Status: SHIPPED | OUTPATIENT
Start: 2024-06-19

## 2024-06-19 NOTE — TELEPHONE ENCOUNTER
Pt was on meloxicam 15 mg and flexeril 5 mg and stopped on 4/3/24.  She is requesting refills.  Per note she was referred to ortho for her low back pain  Please advise

## 2024-06-21 PROBLEM — Z00.00 HEALTHCARE MAINTENANCE: Status: RESOLVED | Noted: 2024-05-22 | Resolved: 2024-06-21

## 2024-08-29 ENCOUNTER — HOSPITAL ENCOUNTER (EMERGENCY)
Facility: HOSPITAL | Age: 44
Discharge: HOME | End: 2024-08-29
Attending: EMERGENCY MEDICINE | Admitting: EMERGENCY MEDICINE
Payer: COMMERCIAL

## 2024-08-29 ENCOUNTER — OFFICE VISIT (OUTPATIENT)
Dept: URGENT CARE | Age: 44
End: 2024-08-29
Payer: COMMERCIAL

## 2024-08-29 ENCOUNTER — APPOINTMENT (EMERGENCY)
Dept: CT IMAGING | Facility: HOSPITAL | Age: 44
End: 2024-08-29
Payer: COMMERCIAL

## 2024-08-29 VITALS
DIASTOLIC BLOOD PRESSURE: 98 MMHG | BODY MASS INDEX: 35.28 KG/M2 | SYSTOLIC BLOOD PRESSURE: 153 MMHG | RESPIRATION RATE: 18 BRPM | HEART RATE: 88 BPM | TEMPERATURE: 98.2 F | OXYGEN SATURATION: 96 % | HEIGHT: 59 IN | WEIGHT: 175 LBS

## 2024-08-29 VITALS
HEART RATE: 95 BPM | BODY MASS INDEX: 39.36 KG/M2 | DIASTOLIC BLOOD PRESSURE: 79 MMHG | RESPIRATION RATE: 21 BRPM | WEIGHT: 194.89 LBS | OXYGEN SATURATION: 100 % | SYSTOLIC BLOOD PRESSURE: 148 MMHG | TEMPERATURE: 97.9 F

## 2024-08-29 DIAGNOSIS — R11.2 NAUSEA AND VOMITING, UNSPECIFIED VOMITING TYPE: ICD-10-CM

## 2024-08-29 DIAGNOSIS — R10.9 ABDOMINAL PAIN: Primary | ICD-10-CM

## 2024-08-29 DIAGNOSIS — R10.10 PAIN OF UPPER ABDOMEN: Primary | ICD-10-CM

## 2024-08-29 DIAGNOSIS — E86.0 DEHYDRATION: ICD-10-CM

## 2024-08-29 DIAGNOSIS — K52.9 GASTROENTERITIS: ICD-10-CM

## 2024-08-29 DIAGNOSIS — E87.1 HYPONATREMIA: ICD-10-CM

## 2024-08-29 LAB
ALBUMIN SERPL BCG-MCNC: 4.8 G/DL (ref 3.5–5)
ALP SERPL-CCNC: 132 U/L (ref 34–104)
ALT SERPL W P-5'-P-CCNC: 15 U/L (ref 7–52)
ANION GAP SERPL CALCULATED.3IONS-SCNC: 14 MMOL/L (ref 4–13)
AST SERPL W P-5'-P-CCNC: 21 U/L (ref 13–39)
ATRIAL RATE: 71 BPM
BACTERIA UR QL AUTO: ABNORMAL /HPF
BASOPHILS # BLD AUTO: 0.02 THOUSANDS/ÂΜL (ref 0–0.1)
BASOPHILS NFR BLD AUTO: 0 % (ref 0–1)
BILIRUB SERPL-MCNC: 0.64 MG/DL (ref 0.2–1)
BILIRUB UR QL STRIP: NEGATIVE
BUN SERPL-MCNC: 22 MG/DL (ref 5–25)
CALCIUM SERPL-MCNC: 10.6 MG/DL (ref 8.4–10.2)
CHLORIDE SERPL-SCNC: 99 MMOL/L (ref 96–108)
CLARITY UR: CLEAR
CO2 SERPL-SCNC: 17 MMOL/L (ref 21–32)
COLOR UR: YELLOW
CREAT SERPL-MCNC: 1.05 MG/DL (ref 0.6–1.3)
EOSINOPHIL # BLD AUTO: 0.01 THOUSAND/ÂΜL (ref 0–0.61)
EOSINOPHIL NFR BLD AUTO: 0 % (ref 0–6)
ERYTHROCYTE [DISTWIDTH] IN BLOOD BY AUTOMATED COUNT: 13.1 % (ref 11.6–15.1)
EXT PREGNANCY TEST URINE: NEGATIVE
EXT. CONTROL: NORMAL
GFR SERPL CREATININE-BSD FRML MDRD: 63 ML/MIN/1.73SQ M
GLUCOSE SERPL-MCNC: 132 MG/DL (ref 65–140)
GLUCOSE UR STRIP-MCNC: NEGATIVE MG/DL
GRAN CASTS #/AREA URNS LPF: ABNORMAL /[LPF]
HCT VFR BLD AUTO: 40.2 % (ref 34.8–46.1)
HGB BLD-MCNC: 14.3 G/DL (ref 11.5–15.4)
HGB UR QL STRIP.AUTO: NEGATIVE
HYALINE CASTS #/AREA URNS LPF: ABNORMAL /LPF
IMM GRANULOCYTES # BLD AUTO: 0.07 THOUSAND/UL (ref 0–0.2)
IMM GRANULOCYTES NFR BLD AUTO: 1 % (ref 0–2)
KETONES UR STRIP-MCNC: ABNORMAL MG/DL
LEUKOCYTE ESTERASE UR QL STRIP: ABNORMAL
LIPASE SERPL-CCNC: 50 U/L (ref 11–82)
LYMPHOCYTES # BLD AUTO: 1.61 THOUSANDS/ÂΜL (ref 0.6–4.47)
LYMPHOCYTES NFR BLD AUTO: 15 % (ref 14–44)
MCH RBC QN AUTO: 31.6 PG (ref 26.8–34.3)
MCHC RBC AUTO-ENTMCNC: 35.6 G/DL (ref 31.4–37.4)
MCV RBC AUTO: 89 FL (ref 82–98)
MONOCYTES # BLD AUTO: 0.95 THOUSAND/ÂΜL (ref 0.17–1.22)
MONOCYTES NFR BLD AUTO: 9 % (ref 4–12)
MUCOUS THREADS UR QL AUTO: ABNORMAL
NEUTROPHILS # BLD AUTO: 8.12 THOUSANDS/ÂΜL (ref 1.85–7.62)
NEUTS SEG NFR BLD AUTO: 75 % (ref 43–75)
NITRITE UR QL STRIP: NEGATIVE
NON-SQ EPI CELLS URNS QL MICRO: ABNORMAL /HPF
NRBC BLD AUTO-RTO: 0 /100 WBCS
P AXIS: 66 DEGREES
PH UR STRIP.AUTO: 6 [PH] (ref 4.5–8)
PLATELET # BLD AUTO: 255 THOUSANDS/UL (ref 149–390)
PMV BLD AUTO: 10.1 FL (ref 8.9–12.7)
POTASSIUM SERPL-SCNC: 3.7 MMOL/L (ref 3.5–5.3)
PR INTERVAL: 138 MS
PROT SERPL-MCNC: 8.6 G/DL (ref 6.4–8.4)
PROT UR STRIP-MCNC: ABNORMAL MG/DL
QRS AXIS: 62 DEGREES
QRSD INTERVAL: 82 MS
QT INTERVAL: 412 MS
QTC INTERVAL: 447 MS
RBC # BLD AUTO: 4.53 MILLION/UL (ref 3.81–5.12)
RBC #/AREA URNS AUTO: ABNORMAL /HPF
SODIUM SERPL-SCNC: 130 MMOL/L (ref 135–147)
SP GR UR STRIP.AUTO: >=1.03 (ref 1–1.03)
T WAVE AXIS: 48 DEGREES
UROBILINOGEN UR QL STRIP.AUTO: 0.2 E.U./DL
VENTRICULAR RATE: 71 BPM
WBC # BLD AUTO: 10.78 THOUSAND/UL (ref 4.31–10.16)
WBC #/AREA URNS AUTO: ABNORMAL /HPF

## 2024-08-29 PROCEDURE — 96365 THER/PROPH/DIAG IV INF INIT: CPT

## 2024-08-29 PROCEDURE — 99285 EMERGENCY DEPT VISIT HI MDM: CPT | Performed by: EMERGENCY MEDICINE

## 2024-08-29 PROCEDURE — 80053 COMPREHEN METABOLIC PANEL: CPT | Performed by: EMERGENCY MEDICINE

## 2024-08-29 PROCEDURE — 36415 COLL VENOUS BLD VENIPUNCTURE: CPT | Performed by: EMERGENCY MEDICINE

## 2024-08-29 PROCEDURE — 81025 URINE PREGNANCY TEST: CPT | Performed by: EMERGENCY MEDICINE

## 2024-08-29 PROCEDURE — 85025 COMPLETE CBC W/AUTO DIFF WBC: CPT | Performed by: EMERGENCY MEDICINE

## 2024-08-29 PROCEDURE — 81001 URINALYSIS AUTO W/SCOPE: CPT

## 2024-08-29 PROCEDURE — 83690 ASSAY OF LIPASE: CPT | Performed by: EMERGENCY MEDICINE

## 2024-08-29 PROCEDURE — 99284 EMERGENCY DEPT VISIT MOD MDM: CPT

## 2024-08-29 PROCEDURE — 96375 TX/PRO/DX INJ NEW DRUG ADDON: CPT

## 2024-08-29 PROCEDURE — 99213 OFFICE O/P EST LOW 20 MIN: CPT | Performed by: PREVENTIVE MEDICINE

## 2024-08-29 PROCEDURE — 87086 URINE CULTURE/COLONY COUNT: CPT

## 2024-08-29 PROCEDURE — 93010 ELECTROCARDIOGRAM REPORT: CPT | Performed by: INTERNAL MEDICINE

## 2024-08-29 PROCEDURE — 96366 THER/PROPH/DIAG IV INF ADDON: CPT

## 2024-08-29 PROCEDURE — 74177 CT ABD & PELVIS W/CONTRAST: CPT

## 2024-08-29 PROCEDURE — 93005 ELECTROCARDIOGRAM TRACING: CPT

## 2024-08-29 RX ORDER — SODIUM CHLORIDE, SODIUM GLUCONATE, SODIUM ACETATE, POTASSIUM CHLORIDE, MAGNESIUM CHLORIDE, SODIUM PHOSPHATE, DIBASIC, AND POTASSIUM PHOSPHATE .53; .5; .37; .037; .03; .012; .00082 G/100ML; G/100ML; G/100ML; G/100ML; G/100ML; G/100ML; G/100ML
2000 INJECTION, SOLUTION INTRAVENOUS ONCE
Status: COMPLETED | OUTPATIENT
Start: 2024-08-29 | End: 2024-08-29

## 2024-08-29 RX ORDER — ONDANSETRON 4 MG/1
4 TABLET, ORALLY DISINTEGRATING ORAL EVERY 6 HOURS PRN
Qty: 10 TABLET | Refills: 0 | Status: SHIPPED | OUTPATIENT
Start: 2024-08-29

## 2024-08-29 RX ORDER — DICYCLOMINE HCL 20 MG
20 TABLET ORAL ONCE
Status: COMPLETED | OUTPATIENT
Start: 2024-08-29 | End: 2024-08-29

## 2024-08-29 RX ORDER — METOCLOPRAMIDE HYDROCHLORIDE 5 MG/ML
10 INJECTION INTRAMUSCULAR; INTRAVENOUS ONCE
Status: COMPLETED | OUTPATIENT
Start: 2024-08-29 | End: 2024-08-29

## 2024-08-29 RX ORDER — KETOROLAC TROMETHAMINE 30 MG/ML
15 INJECTION, SOLUTION INTRAMUSCULAR; INTRAVENOUS ONCE
Status: COMPLETED | OUTPATIENT
Start: 2024-08-29 | End: 2024-08-29

## 2024-08-29 RX ORDER — FAMOTIDINE 10 MG/ML
20 INJECTION, SOLUTION INTRAVENOUS ONCE
Status: COMPLETED | OUTPATIENT
Start: 2024-08-29 | End: 2024-08-29

## 2024-08-29 RX ORDER — ONDANSETRON 2 MG/ML
4 INJECTION INTRAMUSCULAR; INTRAVENOUS ONCE
Status: COMPLETED | OUTPATIENT
Start: 2024-08-29 | End: 2024-08-29

## 2024-08-29 RX ADMIN — METOCLOPRAMIDE 10 MG: 5 INJECTION, SOLUTION INTRAMUSCULAR; INTRAVENOUS at 11:58

## 2024-08-29 RX ADMIN — IOHEXOL 100 ML: 350 INJECTION, SOLUTION INTRAVENOUS at 11:47

## 2024-08-29 RX ADMIN — FAMOTIDINE 20 MG: 10 INJECTION, SOLUTION INTRAVENOUS at 09:42

## 2024-08-29 RX ADMIN — DICYCLOMINE HYDROCHLORIDE 20 MG: 20 TABLET ORAL at 11:58

## 2024-08-29 RX ADMIN — KETOROLAC TROMETHAMINE 15 MG: 30 INJECTION, SOLUTION INTRAMUSCULAR; INTRAVENOUS at 09:36

## 2024-08-29 RX ADMIN — SODIUM CHLORIDE, SODIUM GLUCONATE, SODIUM ACETATE, POTASSIUM CHLORIDE, MAGNESIUM CHLORIDE, SODIUM PHOSPHATE, DIBASIC, AND POTASSIUM PHOSPHATE 2000 ML: .53; .5; .37; .037; .03; .012; .00082 INJECTION, SOLUTION INTRAVENOUS at 09:43

## 2024-08-29 RX ADMIN — ONDANSETRON 4 MG: 2 INJECTION INTRAMUSCULAR; INTRAVENOUS at 09:36

## 2024-08-29 NOTE — ED PROVIDER NOTES
History  Chief Complaint   Patient presents with    Abdominal Pain     Pt arrives via EMS from urgent care with c/o upper abdominal pain since Tuesday. Pt reports n/v/d and constant abd cramping.      A 43 yo female with pmhx of asthma, CKD, Depression and HTN; presents with epigastric abdominal pain, vomiting and diarrhea.  Patient states she initially started with nonbloody diarrhea four days ago.  Diarrhea has since resolved.  Nausea and nonbloody nonbilious vomiting began two days ago.  She has been unable to eat or drink without recurrent vomiting.  She reports subjective fevers and chills.  She has not taken anything for her symptoms.  She denies sick contacts.  Patient otherwise denies chest pain, shortness of breath, dysuria, peripheral edema and rashes.  Pt has prior cholecystectomy and diagnostic lap with DAVID and ovarian cystectomy.      History provided by:  Patient and medical records      Prior to Admission Medications   Prescriptions Last Dose Informant Patient Reported? Taking?   QUEtiapine (SEROquel) 50 mg tablet   No No   Sig: TAKE 1 TABLET BY MOUTH DAILY AT BEDTIME   albuterol (PROVENTIL HFA,VENTOLIN HFA) 90 mcg/act inhaler   No No   Sig: INHALE 2 PUFFS EVERY 6 HOURS AS NEEDED FOR WHEEZING   busPIRone (BUSPAR) 5 mg tablet   No No   Sig: TAKE 1 TABLET BY MOUTH TWICE A DAY   cyclobenzaprine (FLEXERIL) 5 mg tablet   No No   Sig: Take 1 tablet (5 mg total) by mouth 3 (three) times a day as needed for muscle spasms   gabapentin (NEURONTIN) 600 MG tablet   No No   Sig: TAKE 1 TABLET BY MOUTH THREE TIMES A DAY   hydrOXYzine pamoate (VISTARIL) 25 mg capsule   No No   Sig: TAKE 1 CAPSULE BY MOUTH 3 TIMES A DAY AS NEEDED FOR ANXIETY   meloxicam (MOBIC) 15 mg tablet   No No   Sig: Take 1 tablet (15 mg total) by mouth daily as needed for moderate pain   omeprazole (PriLOSEC) 40 MG capsule   No No   Sig: Take 1 capsule (40 mg total) by mouth daily   sertraline (ZOLOFT) 100 mg tablet   No No   Sig: TAKE 2  TABLETS BY MOUTH EVERY DAY      Facility-Administered Medications: None       Past Medical History:   Diagnosis Date    Anxiety     Arthritis     OA  b/l knees    Asthma     Approx 2 years ago    Calculus of gallbladder without cholecystitis without obstruction 2022    Chronic kidney disease     Depression     GERD (gastroesophageal reflux disease)     In my 20s. Approx 15 years ago    Hx of bleeding disorder     dysmennorrhea-dx lap today 2016    Hypertension     Kidney stone     Obesity     Seasonal allergies        Past Surgical History:   Procedure Laterality Date     SECTION      CHOLECYSTECTOMY      COLONOSCOPY      DIAGNOSTIC LAPAROSCOPY      DILATION AND CURETTAGE OF UTERUS      HERNIA REPAIR      CA COLONOSCOPY FLX DX W/COLLJ SPEC WHEN PFRMD N/A 2018    Procedure: EGD AND COLONOSCOPY;  Surgeon: Allan Duncan MD;  [facility];  Service: Gastroenterology    CA LAPAROSCOPY SURG CHOLECYSTECTOMY N/A 2022    Procedure: ROBOTIC LAPAROSCOPIC CHOLECYSTECTOMY;  Surgeon: Griffin Triplett DO;  [facility];  Service: General    CA LAPAROSCOPY W/RMVL ADNEXAL STRUCTURES Bilateral 2016    Procedure: CYSTECTOMY  OVARIAN;  Surgeon: C William Riedel, DO;  [facility];  Service: Gynecology    CA LAPS ABD PRTM&OMENTUM DX W/WO SPEC BR/WA SPX N/A 2016    Procedure: LAPAROSCOPY DIAGNOSTIC DAVID;  Surgeon: C William Riedel, DO;  [facility];  Service: Gynecology    CA RPR UMBILICAL HRNA 5 YRS/> REDUCIBLE N/A 2022    Procedure: UMBILICAL HERNIA REPAIR;  Surgeon: Griffin Triplett DO;  [facility];  Service: General    UPPER GASTROINTESTINAL ENDOSCOPY      WISDOM TOOTH EXTRACTION         Family History   Problem Relation Age of Onset    No Known Problems Sister     Autism Daughter     Lung cancer Maternal Grandmother     Cancer Maternal Grandmother         Deceassd 10 years    Diabetes Maternal Grandfather     Arthritis Maternal Grandfather           10 years    Aneurysm Paternal Grandmother     No Known Problems Sister      I have reviewed and agree with the history as documented.    E-Cigarette/Vaping    E-Cigarette Use Never User      E-Cigarette/Vaping Substances    Nicotine No     THC No     CBD No     Flavoring No     Other No     Unknown No      Social History     Tobacco Use    Smoking status: Every Day     Current packs/day: 1.00     Average packs/day: 1 pack/day for 23.0 years (23.0 ttl pk-yrs)     Types: Cigarettes    Smokeless tobacco: Never   Vaping Use    Vaping status: Never Used   Substance Use Topics    Alcohol use: Never     Comment: social    Drug use: Not Currently     Frequency: 7.0 times per week     Types: Marijuana     Comment: No longer using marijuana 2024       Review of Systems   Constitutional:  Positive for chills and fever.   Gastrointestinal:  Positive for abdominal pain, diarrhea, nausea and vomiting.   All other systems reviewed and are negative.      Physical Exam  Physical Exam  General Appearance: alert and oriented, appears uncomfortable  Skin:  Warm, dry, intact.  No cyanosis  HEENT: Atraumatic, normocephalic.  No eye drainage.  Normal hearing.  Dry mucous membranes.    Neck: Supple, trachea midline  Cardiac: RRR; no murmurs, rub, gallops.  No pedal edema, 2+ pulses  Pulmonary: lungs CTAB; no wheezes, rales, rhonchi  Gastrointestinal: abdomen soft, epigastric tenderness, nondistended; no guarding or rebound tenderness; good bowel sounds, no mass or bruits  Extremities:  No deformities.  No calf tenderness, no clubbing  Neuro:  no focal motor or sensory deficits, CN 2-12 grossly intact  Psych:  Normal mood and affect, normal judgement and insight      Vital Signs  ED Triage Vitals [24 0904]   Temperature Pulse Respirations Blood Pressure SpO2   97.9 °F (36.6 °C) 70 20 159/87 100 %      Temp Source Heart Rate Source Patient Position - Orthostatic VS BP Location FiO2 (%)   Oral Monitor Lying Right  arm --      Pain Score       10 - Worst Possible Pain           Vitals:    08/29/24 0904 08/29/24 1200   BP: 159/87 148/79   Pulse: 70 95   Patient Position - Orthostatic VS: Lying Sitting         Visual Acuity      ED Medications  Medications   ondansetron (ZOFRAN) injection 4 mg (4 mg Intravenous Given 8/29/24 0936)   ketorolac (TORADOL) injection 15 mg (15 mg Intravenous Given 8/29/24 0936)   Famotidine (PF) (PEPCID) injection 20 mg (20 mg Intravenous Given 8/29/24 0942)   multi-electrolyte (ISOLYTE-S PH 7.4) bolus 2,000 mL (0 mL Intravenous Stopped 8/29/24 1308)   iohexol (OMNIPAQUE) 350 MG/ML injection (SINGLE-DOSE) 100 mL (100 mL Intravenous Given 8/29/24 1147)   metoclopramide (REGLAN) injection 10 mg (10 mg Intravenous Given 8/29/24 1158)   dicyclomine (BENTYL) tablet 20 mg (20 mg Oral Given 8/29/24 1158)       Diagnostic Studies  Results Reviewed       Procedure Component Value Units Date/Time    Urine Microscopic [[id number]]  (Abnormal) Collected: 08/29/24 1127    Lab Status: Final result Specimen: Urine, Clean Catch Updated: 08/29/24 1155     RBC, UA 2-4 /hpf      WBC, UA 10-20 /hpf      Epithelial Cells Occasional /hpf      Bacteria, UA Moderate /hpf      MUCUS THREADS Moderate     Hyaline Casts, UA 0-3 /lpf      Granular Casts, UA 0-3    Urine culture [[id number]] Collected: 08/29/24 1127    Lab Status: In process Specimen: Urine, Clean Catch Updated: 08/29/24 1155    POCT pregnancy, urine [[id number]]  (Normal) Resulted: 08/29/24 1129    Lab Status: Final result Updated: 08/29/24 1129     EXT Preg Test, Ur Negative     Control Valid    Urine Macroscopic, POC [[id number]]  (Abnormal) Collected: 08/29/24 1127    Lab Status: Final result Specimen: Urine Updated: 08/29/24 1129     Color, UA Yellow     Clarity, UA Clear     pH, UA 6.0     Leukocytes, UA Trace     Nitrite, UA Negative     Protein, UA 30 (1+) mg/dl      Glucose, UA Negative mg/dl      Ketones, UA Trace mg/dl      Urobilinogen, UA 0.2 E.U./dl       Bilirubin, UA Negative     Occult Blood, UA Negative     Specific Gravity, UA >=1.030    Narrative:      CLINITEK RESULT    Comprehensive metabolic panel [[id number]]  (Abnormal) Collected: 08/29/24 0927    Lab Status: Final result Specimen: Blood from Arm, Right Updated: 08/29/24 1030     Sodium 130 mmol/L      Potassium 3.7 mmol/L      Chloride 99 mmol/L      CO2 17 mmol/L      ANION GAP 14 mmol/L      BUN 22 mg/dL      Creatinine 1.05 mg/dL      Glucose 132 mg/dL      Calcium 10.6 mg/dL      AST 21 U/L      ALT 15 U/L      Alkaline Phosphatase 132 U/L      Total Protein 8.6 g/dL      Albumin 4.8 g/dL      Total Bilirubin 0.64 mg/dL      eGFR 63 ml/min/1.73sq m     Narrative:      National Kidney Disease Foundation guidelines for Chronic Kidney Disease (CKD):     Stage 1 with normal or high GFR (GFR > 90 mL/min/1.73 square meters)    Stage 2 Mild CKD (GFR = 60-89 mL/min/1.73 square meters)    Stage 3A Moderate CKD (GFR = 45-59 mL/min/1.73 square meters)    Stage 3B Moderate CKD (GFR = 30-44 mL/min/1.73 square meters)    Stage 4 Severe CKD (GFR = 15-29 mL/min/1.73 square meters)    Stage 5 End Stage CKD (GFR <15 mL/min/1.73 square meters)  Note: GFR calculation is accurate only with a steady state creatinine    Lipase [[id number]]  (Normal) Collected: 08/29/24 0927    Lab Status: Final result Specimen: Blood from Arm, Right Updated: 08/29/24 1030     Lipase 50 u/L     CBC and differential [[id number]]  (Abnormal) Collected: 08/29/24 0927    Lab Status: Final result Specimen: Blood from Arm, Right Updated: 08/29/24 0933     WBC 10.78 Thousand/uL      RBC 4.53 Million/uL      Hemoglobin 14.3 g/dL      Hematocrit 40.2 %      MCV 89 fL      MCH 31.6 pg      MCHC 35.6 g/dL      RDW 13.1 %      MPV 10.1 fL      Platelets 255 Thousands/uL      nRBC 0 /100 WBCs      Segmented % 75 %      Immature Grans % 1 %      Lymphocytes % 15 %      Monocytes % 9 %      Eosinophils Relative 0 %      Basophils Relative 0 %      Absolute  Neutrophils 8.12 Thousands/µL      Absolute Immature Grans 0.07 Thousand/uL      Absolute Lymphocytes 1.61 Thousands/µL      Absolute Monocytes 0.95 Thousand/µL      Eosinophils Absolute 0.01 Thousand/µL      Basophils Absolute 0.02 Thousands/µL                    CT abdomen pelvis with contrast   Final Result by Doug Wilde MD (08/29 1231)      No acute intra-abdominal findings.   Stable nonobstructing left lower pole calculus.   Recurrent mild bibasilar patchy groundglass opacity.      Workstation performed: [id number]                    Procedures  Procedures  ECG 12 Lead Documentation  Date/Time: today/date: 8/29/2024  Performed by: Tory Howell    ECG reviewed by me, the ED Provider: yes    Patient location:  ED   Previous ECG:  Compared to current, no change   Rate:  71  ECG rate assessment: normal    Rhythm: sinus rhythm    Ectopy:  none    QRS axis:  Normal  Intervals: normal   Q waves: None   ST segments:  Normal  T waves: normal      Impression: Normal EKG       ED Course  ED Course as of 08/29/24 1334   Thu Aug 29, 2024   1042 Sodium(!): 130  Mild hyponatremia   1042 ANION GAP(!): 14   1042 Comprehensive metabolic panel(!)  Findings consistent with hypovolemia/dehydration.  Pt receiving 2L IVF.   1042 Remainder of labs WNL   1159 Urine Microscopic(!)  10-20 WBC, Moderate bacteria.  Pt without UTI symptoms, will allow culture to result   1237 CT abdomen pelvis with contrast  1.) No acute intra-abdominal findings.  2.) Stable nonobstructing left lower pole calculus.  3.) Recurrent mild bibasilar patchy groundglass opacity.   1255 Pt updated on results, she reports ongoing symptoms however is asking to leave.  She hasn't received all the IVF yet, and does not wish to stay for completion.  Will proceed with discharge home, recommend continued symptomatic treatment and increased PO hydration.  Pt in agreement.                                 SBIRT 20yo+      Flowsheet Row Most Recent Value   Initial  Alcohol Screen: US AUDIT-C     1. How often do you have a drink containing alcohol? 0 Filed at: 08/29/2024 0901   2. How many drinks containing alcohol do you have on a typical day you are drinking?  0 Filed at: 08/29/2024 0901   3b. FEMALE Any Age, or MALE 65+: How often do you have 4 or more drinks on one occassion? 0 Filed at: 08/29/2024 0901   Audit-C Score 0 Filed at: 08/29/2024 0901   ZULMA: How many times in the past year have you...    Used an illegal drug or used a prescription medication for non-medical reasons? Never Filed at: 08/29/2024 0901                      Medical Decision Making  A 44-year-old female presents with epigastric abdominal pain, vomiting and diarrhea which has progressed over the past four days.  Patient appears uncomfortable with dry mucous membranes.  Suspect symptoms may be due to dehydration/hypovolemia and viral illness.  Will check labs to evaluate for electrolyte abnormality, JACKI, anemia, significant leukocytosis, pancreatitis, hepatitis and biliary etiology.  Will check CTAP to evaluate for intra-abdominal pathology.  Will treat symptomatically.    Amount and/or Complexity of Data Reviewed  Labs: ordered. Decision-making details documented in ED Course.  Radiology: ordered. Decision-making details documented in ED Course.    Risk  Prescription drug management.                 Disposition  Final diagnoses:   Abdominal pain   Gastroenteritis   Hyponatremia   Dehydration     Time reflects when diagnosis was documented in both MDM as applicable and the Disposition within this note       Time User Action Codes Description Comment    8/29/2024 12:56 PM Tory Howell Add [R10.9] Abdominal pain     8/29/2024 12:56 PM Tory Howell Add [K52.9] Gastroenteritis     8/29/2024 12:56 PM Tory Howell Add [E87.1] Hyponatremia     8/29/2024 12:56 PM Tory Howell Add [E86.0] Dehydration           ED Disposition       ED Disposition   Discharge    Condition   Stable    Date/Time   Thu Aug  29, 2024 1256    Comment   Melia Lira discharge to home/self care.                   Follow-up Information       Follow up With Specialties Details Why Contact Info Additional Information    MONICA Colbert Nurse Practitioner Schedule an appointment as soon as possible for a visit  For re-evaluation [id number] [place] [id number] [phone]       [facility] Emergency Department Emergency Medicine Go to  If symptoms worsen [address] [facility] [address] [phone] [facility] Emergency Department, [address], [place], [address]            Discharge Medication List as of 8/29/2024 12:57 PM        START taking these medications    Details   ondansetron (ZOFRAN-ODT) 4 mg disintegrating tablet Take 1 tablet (4 mg total) by mouth every 6 (six) hours as needed for nausea or vomiting, Starting u 8/29/2024, Normal           CONTINUE these medications which have NOT CHANGED    Details   albuterol (PROVENTIL HFA,VENTOLIN HFA) 90 mcg/act inhaler INHALE 2 PUFFS EVERY 6 HOURS AS NEEDED FOR WHEEZING, Normal      busPIRone (BUSPAR) 5 mg tablet TAKE 1 TABLET BY MOUTH TWICE A DAY, Starting Mon 5/13/2024, Normal      cyclobenzaprine (FLEXERIL) 5 mg tablet Take 1 tablet (5 mg total) by mouth 3 (three) times a day as needed for muscle spasms, Starting Wed 6/19/2024, Normal      gabapentin (NEURONTIN) 600 MG tablet TAKE 1 TABLET BY MOUTH THREE TIMES A DAY, Starting Wed 6/19/2024, Normal      hydrOXYzine pamoate (VISTARIL) 25 mg capsule TAKE 1 CAPSULE BY MOUTH 3 TIMES A DAY AS NEEDED FOR ANXIETY, Normal      meloxicam (MOBIC) 15 mg tablet Take 1 tablet (15 mg total) by mouth daily as needed for moderate pain, Starting Wed 6/19/2024, Normal      omeprazole (PriLOSEC) 40 MG capsule Take 1 capsule (40 mg total) by mouth daily, Starting Wed 6/28/2023, Normal      QUEtiapine (SEROquel) 50 mg tablet TAKE 1 TABLET BY MOUTH DAILY AT BEDTIME,  Starting Wed 6/12/2024, Normal      sertraline (ZOLOFT) 100 mg tablet TAKE 2 TABLETS BY MOUTH EVERY DAY, Normal             No discharge procedures on file.    PDMP Review         Value Time User    PDMP Reviewed  Yes 12/20/2023  9:53 AM MONICA Colbert            ED Provider  Electronically Signed by             Tory Howell DO  08/29/24 9558

## 2024-08-29 NOTE — PROGRESS NOTES
[facility]        NAME: Melia Lira is a 44 y.o. female  : 1980    MRN: [id number]  DATE: 2024  TIME: 8:25 AM    Assessment and Plan   Pain of upper abdomen [R10.10]  1. Pain of upper abdomen  Transfer to other facility      2. Nausea and vomiting, unspecified vomiting type  Transfer to other facility        Patient will go to the emergency department via EMS for further workup.    Patient Instructions       Follow up with PCP in 3-5 days.  Proceed to  ER if symptoms worsen.    If tests have been performed at [facility], our office will contact you with results if changes need to be made to the care plan discussed with you at the visit.  You can review your full results on [facility].    Chief Complaint     Chief Complaint   Patient presents with    Vomiting    Diarrhea    Chills     Patient presents with complaints of vomiting, diarrhea, chills, fatigue, abdominal pain across the top of her stomach.          History of Present Illness       44-year-old female presents for 3 days of bilateral upper quadrant abdominal pain, nausea, vomiting, diarrhea.  Patient admits yesterday she had approximately 10 episodes of nonbloody diarrhea and 40 episodes of nonbloody/nonbilious emesis.  Patient denies any drug or alcohol use.  Cholecystectomy 2 years ago.  Patient admits she feels so dehydrated she is unable to talk and her lips are dry.    Vomiting   Associated symptoms include abdominal pain, chills, diarrhea and a fever.   Diarrhea   Associated symptoms include abdominal pain, chills, a fever and vomiting.       Review of Systems   Review of Systems   Constitutional:  Positive for chills and fever.   Gastrointestinal:  Positive for abdominal pain, diarrhea and vomiting.         Current Medications       Current Outpatient Medications:     albuterol (PROVENTIL HFA,VENTOLIN HFA) 90 mcg/act inhaler, INHALE 2 PUFFS EVERY 6 HOURS AS NEEDED FOR WHEEZING, Disp: 6.7 g, Rfl: 0    busPIRone  (BUSPAR) 5 mg tablet, TAKE 1 TABLET BY MOUTH TWICE A DAY, Disp: 180 tablet, Rfl: 1    cyclobenzaprine (FLEXERIL) 5 mg tablet, Take 1 tablet (5 mg total) by mouth 3 (three) times a day as needed for muscle spasms, Disp: 30 tablet, Rfl: 2    gabapentin (NEURONTIN) 600 MG tablet, TAKE 1 TABLET BY MOUTH THREE TIMES A DAY, Disp: 270 tablet, Rfl: 1    hydrOXYzine pamoate (VISTARIL) 25 mg capsule, TAKE 1 CAPSULE BY MOUTH 3 TIMES A DAY AS NEEDED FOR ANXIETY, Disp: 270 capsule, Rfl: 1    meloxicam (MOBIC) 15 mg tablet, Take 1 tablet (15 mg total) by mouth daily as needed for moderate pain, Disp: 30 tablet, Rfl: 3    omeprazole (PriLOSEC) 40 MG capsule, Take 1 capsule (40 mg total) by mouth daily, Disp: 90 capsule, Rfl: 1    QUEtiapine (SEROquel) 50 mg tablet, TAKE 1 TABLET BY MOUTH DAILY AT BEDTIME, Disp: 90 tablet, Rfl: 1    sertraline (ZOLOFT) 100 mg tablet, TAKE 2 TABLETS BY MOUTH EVERY DAY, Disp: 180 tablet, Rfl: 1    Current Allergies     Allergies as of 2024 - Reviewed 2024   Allergen Reaction Noted    Eggs or egg-derived products - food allergy Other (See Comments) 2016            The following portions of the patient's history were reviewed and updated as appropriate: allergies, current medications, past family history, past medical history, past social history, past surgical history and problem list.     Past Medical History:   Diagnosis Date    Anxiety     Arthritis     OA  b/l knees    Asthma     Approx 2 years ago    Calculus of gallbladder without cholecystitis without obstruction 2022    Chronic kidney disease     Depression     GERD (gastroesophageal reflux disease)     In my 20s. Approx 15 years ago    Hx of bleeding disorder     dysmennorrhea-dx lap today 2016    Hypertension     Kidney stone     Obesity     Seasonal allergies        Past Surgical History:   Procedure Laterality Date     SECTION      CHOLECYSTECTOMY      COLONOSCOPY      DIAGNOSTIC LAPAROSCOPY       "DILATION AND CURETTAGE OF UTERUS  2008    HERNIA REPAIR      CO COLONOSCOPY FLX DX W/COLLJ SPEC WHEN PFRMD N/A 2018    Procedure: EGD AND COLONOSCOPY;  Surgeon: Allan Duncan MD;  [facility];  Service: Gastroenterology    CO LAPAROSCOPY SURG CHOLECYSTECTOMY N/A 2022    Procedure: ROBOTIC LAPAROSCOPIC CHOLECYSTECTOMY;  Surgeon: Griffin Triplett DO;  [facility];  Service: General    CO LAPAROSCOPY W/RMVL ADNEXAL STRUCTURES Bilateral 2016    Procedure: CYSTECTOMY  OVARIAN;  Surgeon: C William Riedel, DO;  [facility];  Service: Gynecology    CO LAPS ABD PRTM&OMENTUM DX W/WO SPEC BR/WA SPX N/A 2016    Procedure: LAPAROSCOPY DIAGNOSTIC DAVID;  Surgeon: C William Riedel, DO;  [facility];  Service: Gynecology    CO RPR UMBILICAL HRNA 5 YRS/> REDUCIBLE N/A 2022    Procedure: UMBILICAL HERNIA REPAIR;  Surgeon: Griffin Triplett DO;  [facility];  Service: General    UPPER GASTROINTESTINAL ENDOSCOPY      WISDOM TOOTH EXTRACTION         Family History   Problem Relation Age of Onset    No Known Problems Sister     Autism Daughter     Lung cancer Maternal Grandmother     Cancer Maternal Grandmother         Deceassd 10 years    Diabetes Maternal Grandfather     Arthritis Maternal Grandfather          10 years    Aneurysm Paternal Grandmother     No Known Problems Sister          Medications have been verified.        Objective   /98   Pulse 88   Temp 98.2 °F (36.8 °C) (Tympanic)   Resp 18   Ht 4' 11\" (1.499 m)   Wt 79.4 kg (175 lb)   LMP  (LMP Unknown)   SpO2 96%   BMI 35.35 kg/m²   No LMP recorded (lmp unknown). Patient is postmenopausal.       Physical Exam     Physical Exam  Vitals and nursing note reviewed.   Constitutional:       Appearance: She is ill-appearing. She is not diaphoretic.   HENT:      Head: Normocephalic and atraumatic.   Eyes:      Conjunctiva/sclera: Conjunctivae normal.   Cardiovascular:      Rate and Rhythm: " Normal rate and regular rhythm.   Pulmonary:      Effort: Pulmonary effort is normal.   Abdominal:      Tenderness: There is abdominal tenderness in the right upper quadrant and left upper quadrant. Negative signs include Nevarez's sign.   Skin:     Capillary Refill: Capillary refill takes less than 2 seconds.   Neurological:      Mental Status: She is alert.   Psychiatric:         Mood and Affect: Mood normal.         Behavior: Behavior normal.

## 2024-08-31 LAB — BACTERIA UR CULT: NORMAL

## 2024-09-03 ENCOUNTER — TELEPHONE (OUTPATIENT)
Dept: FAMILY MEDICINE CLINIC | Facility: CLINIC | Age: 44
End: 2024-09-03

## 2024-09-03 ENCOUNTER — OFFICE VISIT (OUTPATIENT)
Dept: FAMILY MEDICINE CLINIC | Facility: CLINIC | Age: 44
End: 2024-09-03
Payer: COMMERCIAL

## 2024-09-03 VITALS
HEART RATE: 82 BPM | RESPIRATION RATE: 16 BRPM | OXYGEN SATURATION: 99 % | HEIGHT: 59 IN | BODY MASS INDEX: 34.92 KG/M2 | SYSTOLIC BLOOD PRESSURE: 136 MMHG | DIASTOLIC BLOOD PRESSURE: 84 MMHG | WEIGHT: 173.2 LBS | TEMPERATURE: 98.2 F

## 2024-09-03 DIAGNOSIS — R10.9 ABDOMINAL PAIN, UNSPECIFIED ABDOMINAL LOCATION: Primary | ICD-10-CM

## 2024-09-03 DIAGNOSIS — R11.2 NAUSEA AND VOMITING, UNSPECIFIED VOMITING TYPE: ICD-10-CM

## 2024-09-03 DIAGNOSIS — K21.9 GASTROESOPHAGEAL REFLUX DISEASE WITHOUT ESOPHAGITIS: ICD-10-CM

## 2024-09-03 DIAGNOSIS — F41.9 ANXIETY: ICD-10-CM

## 2024-09-03 DIAGNOSIS — I10 ESSENTIAL HYPERTENSION: ICD-10-CM

## 2024-09-03 PROCEDURE — 3075F SYST BP GE 130 - 139MM HG: CPT | Performed by: NURSE PRACTITIONER

## 2024-09-03 PROCEDURE — 3079F DIAST BP 80-89 MM HG: CPT | Performed by: NURSE PRACTITIONER

## 2024-09-03 PROCEDURE — 99214 OFFICE O/P EST MOD 30 MIN: CPT | Performed by: NURSE PRACTITIONER

## 2024-09-03 RX ORDER — DICYCLOMINE HYDROCHLORIDE 10 MG/1
10 CAPSULE ORAL
Qty: 120 CAPSULE | Refills: 0 | Status: SHIPPED | OUTPATIENT
Start: 2024-09-03 | End: 2024-10-03

## 2024-09-03 RX ORDER — ONDANSETRON 4 MG/1
8 TABLET, ORALLY DISINTEGRATING ORAL EVERY 8 HOURS PRN
Qty: 30 TABLET | Refills: 0 | Status: SHIPPED | OUTPATIENT
Start: 2024-09-03

## 2024-09-03 RX ORDER — LISINOPRIL 10 MG/1
10 TABLET ORAL DAILY
Qty: 90 TABLET | Refills: 1 | Status: SHIPPED | OUTPATIENT
Start: 2024-09-03

## 2024-09-03 RX ORDER — OMEPRAZOLE 40 MG/1
40 CAPSULE, DELAYED RELEASE ORAL 2 TIMES DAILY
Qty: 180 CAPSULE | Refills: 0 | Status: SHIPPED | OUTPATIENT
Start: 2024-09-03 | End: 2024-12-02

## 2024-09-03 RX ORDER — LORAZEPAM 1 MG/1
1 TABLET ORAL DAILY PRN
Qty: 30 TABLET | Refills: 0 | Status: SHIPPED | OUTPATIENT
Start: 2024-09-03

## 2024-09-03 NOTE — LETTER
September 3, 2024     Patient: Melia Lira  YOB: 1980  Date of Visit: 9/3/2024      To Whom it May Concern:    Melia Lira is under my professional care. Melia was seen in my office on 9/3/2024. Melia may return to work on 9/6/2024.  Please excuse her 8/30/2024 - 9/5/2024.    If you have any questions or concerns, please don't hesitate to call.         Sincerely,          MONICA Colbert        CC: No Recipients

## 2024-09-03 NOTE — ASSESSMENT & PLAN NOTE
- Will increase omeprazole 40 mg BID.   - Avoid triggering food and beverage.  - Strongly advised to follow up with GI.

## 2024-09-03 NOTE — ASSESSMENT & PLAN NOTE
- Not well controlled.  - Had recent ER visit on 8/29. CT of abdomen and pelvis showed no acute findings.   - Will increase omeprazole to 40 mg BID.   - Prescription sent for Bentyl.  - Zofran PRN for nausea/vomiting.  - Increase oral hydration.  - She was strongly encouraged to follow up with GI. Had been seen last in June of 2023. At that time they had recommend EGD and colonoscopy which have not been scheduled. She was provided a new referral to GI.   - Advised to proceed back to ER with decreased oral intake and continued nausea/vomiting.

## 2024-09-03 NOTE — TELEPHONE ENCOUNTER
Patient just walked out of the office after an appointment and said she forgot to ask you for xanax.  Said she needs to be able to sleep. Was near tears when checking out

## 2024-09-03 NOTE — ASSESSMENT & PLAN NOTE
- Not well controlled.  - Will restart lisinopril 10 mg daily. Discussed side effects.  - Monitor blood pressure at home.   - Will follow up in 6 weeks.

## 2024-09-03 NOTE — PROGRESS NOTES
Ambulatory Visit  Name: Melia Lira      : 1980      MRN: [id number]  Encounter Provider: MONICA Colbert  Encounter Date: 9/3/2024   Encounter department: [facility]    Assessment & Plan   1. Abdominal pain, unspecified abdominal location  Assessment & Plan:  - Not well controlled.  - Had recent ER visit on . CT of abdomen and pelvis showed no acute findings.   - Will increase omeprazole to 40 mg BID.   - Prescription sent for Bentyl.  - Zofran PRN for nausea/vomiting.  - Increase oral hydration.  - She was strongly encouraged to follow up with GI. Had been seen last in 2023. At that time they had recommend EGD and colonoscopy which have not been scheduled. She was provided a new referral to GI.   - Advised to proceed back to ER with decreased oral intake and continued nausea/vomiting.   Orders:  -     ondansetron (ZOFRAN-ODT) 4 mg disintegrating tablet; Take 2 tablets (8 mg total) by mouth every 8 (eight) hours as needed for nausea or vomiting  -     Ambulatory Referral to Gastroenterology; Future  -     omeprazole (PriLOSEC) 40 MG capsule; Take 1 capsule (40 mg total) by mouth 2 (two) times a day  -     dicyclomine (BENTYL) 10 mg capsule; Take 1 capsule (10 mg total) by mouth 4 (four) times a day (before meals and at bedtime)  2. Gastroesophageal reflux disease without esophagitis  Assessment & Plan:  - Will increase omeprazole 40 mg BID.   - Avoid triggering food and beverage.  - Strongly advised to follow up with GI.   Orders:  -     Ambulatory Referral to Gastroenterology; Future  -     omeprazole (PriLOSEC) 40 MG capsule; Take 1 capsule (40 mg total) by mouth 2 (two) times a day  3. Nausea and vomiting, unspecified vomiting type  -     ondansetron (ZOFRAN-ODT) 4 mg disintegrating tablet; Take 2 tablets (8 mg total) by mouth every 8 (eight) hours as needed for nausea or vomiting  4. Essential hypertension  Assessment & Plan:  - Not well controlled.  -  Will restart lisinopril 10 mg daily. Discussed side effects.  - Monitor blood pressure at home.   - Will follow up in 6 weeks.   Orders:  -     lisinopril (ZESTRIL) 10 mg tablet; Take 1 tablet (10 mg total) by mouth daily  5. Anxiety  Assessment & Plan:  - Stable on Zoloft 200 mg daily and Buspar 5 mg BID.  Continue same.   - Continue Ativan as needed.  - Will continue to monitor.   Orders:  -     LORazepam (ATIVAN) 1 mg tablet; Take 1 tablet (1 mg total) by mouth daily as needed for anxiety       History of Present Illness     Patient with PMH of HTN, GERD, cyclic vomiting syndrome, neuropathy, anxiety, and asthma presents to office today for ER follow up. She was seen on 8/29 with complaints of abdominal pain, diarrhea, nausea, and vomiting. CT of abdomen and pelvis showed no acute findings. She was given IVF for hyponatremia/hypovolemia. Her pain was controlled and she was discharged home. She states that she is still having difficulty eating and drinking. She continues to have abdominal pain. She has had multiple hospital visits for nausea, vomiting, and abdominal pain. Has history of cannabis hyperemesis syndrome but states she hasn't been smoking marijuana. Saw GI in June of 2023. They had recommend EGD and colonoscopy which she never scheduled. She has been urged to follow up with GI multiple times since. She is requesting a new referral. Her blood pressure has also been elevated. She complains of headaches. Her lisinopril was held back in May due to JACKI. She denies any other concerns or complaints today.           Review of Systems   Constitutional:  Negative for fatigue and fever.   HENT:  Negative for trouble swallowing.    Eyes:  Negative for visual disturbance.   Respiratory:  Negative for cough and shortness of breath.    Cardiovascular:  Negative for chest pain and palpitations.   Gastrointestinal:  Positive for abdominal pain, diarrhea, nausea and vomiting. Negative for blood in stool.   Endocrine:  Negative for cold intolerance and heat intolerance.   Genitourinary:  Negative for difficulty urinating and dysuria.   Musculoskeletal:  Negative for gait problem.   Skin:  Negative for rash.   Neurological:  Negative for dizziness, syncope and headaches.   Hematological:  Negative for adenopathy.   Psychiatric/Behavioral:  Negative for behavioral problems.      Past Medical History:   Diagnosis Date   • Anxiety    • Arthritis     OA  b/l knees   • Asthma     Approx 2 years ago   • Calculus of gallbladder without cholecystitis without obstruction 2022   • Chronic kidney disease    • Depression    • GERD (gastroesophageal reflux disease)     In my 20s. Approx 15 years ago   • Hx of bleeding disorder     dysmennorrhea-dx lap today 2016   • Hypertension    • Kidney stone    • Obesity    • Seasonal allergies      Past Surgical History:   Procedure Laterality Date   •  SECTION     • CHOLECYSTECTOMY     • COLONOSCOPY     • DIAGNOSTIC LAPAROSCOPY     • DILATION AND CURETTAGE OF UTERUS     • HERNIA REPAIR     • NY COLONOSCOPY FLX DX W/COLLJ SPEC WHEN PFRMD N/A 2018    Procedure: EGD AND COLONOSCOPY;  Surgeon: Allan Duncan MD;  Location: [facility];  Service: Gastroenterology   • NY LAPAROSCOPY SURG CHOLECYSTECTOMY N/A 2022    Procedure: ROBOTIC LAPAROSCOPIC CHOLECYSTECTOMY;  Surgeon: Griffin Triplett DO;  Location: [facility];  Service: General   • NY LAPAROSCOPY W/RMVL ADNEXAL STRUCTURES Bilateral 2016    Procedure: CYSTECTOMY  OVARIAN;  Surgeon: C William Riedel, DO;  Location: [facility];  Service: Gynecology   • NY LAPS ABD PRTM&OMENTUM DX W/WO SPEC BR/WA SPX N/A 2016    Procedure: LAPAROSCOPY DIAGNOSTIC DAVID;  Surgeon: C William Riedel, DO;  Location: [facility];  Service: Gynecology   • NY RPR UMBILICAL HRNA 5 YRS/> REDUCIBLE N/A 2022    Procedure: UMBILICAL HERNIA REPAIR;  Surgeon: Griffin Triplett DO;  [facility];  Service: General   • UPPER  GASTROINTESTINAL ENDOSCOPY     • WISDOM TOOTH EXTRACTION       Family History   Problem Relation Age of Onset   • No Known Problems Sister    • Autism Daughter    • Lung cancer Maternal Grandmother    • Cancer Maternal Grandmother         Deceassd 10 years   • Diabetes Maternal Grandfather    • Arthritis Maternal Grandfather          10 years   • Aneurysm Paternal Grandmother    • No Known Problems Sister      Social History     Tobacco Use   • Smoking status: Every Day     Current packs/day: 1.00     Average packs/day: 1 pack/day for 23.0 years (23.0 ttl pk-yrs)     Types: Cigarettes   • Smokeless tobacco: Never   Vaping Use   • Vaping status: Never Used   Substance and Sexual Activity   • Alcohol use: Never     Comment: social   • Drug use: Not Currently     Frequency: 7.0 times per week     Types: Marijuana     Comment: No longer using marijuana 2024   • Sexual activity: Not Currently     Partners: Male     Birth control/protection: Abstinence     Current Outpatient Medications on File Prior to Visit   Medication Sig   • albuterol (PROVENTIL HFA,VENTOLIN HFA) 90 mcg/act inhaler INHALE 2 PUFFS EVERY 6 HOURS AS NEEDED FOR WHEEZING   • busPIRone (BUSPAR) 5 mg tablet TAKE 1 TABLET BY MOUTH TWICE A DAY   • cyclobenzaprine (FLEXERIL) 5 mg tablet Take 1 tablet (5 mg total) by mouth 3 (three) times a day as needed for muscle spasms   • gabapentin (NEURONTIN) 600 MG tablet TAKE 1 TABLET BY MOUTH THREE TIMES A DAY   • hydrOXYzine pamoate (VISTARIL) 25 mg capsule TAKE 1 CAPSULE BY MOUTH 3 TIMES A DAY AS NEEDED FOR ANXIETY   • meloxicam (MOBIC) 15 mg tablet Take 1 tablet (15 mg total) by mouth daily as needed for moderate pain   • QUEtiapine (SEROquel) 50 mg tablet TAKE 1 TABLET BY MOUTH DAILY AT BEDTIME   • sertraline (ZOLOFT) 100 mg tablet TAKE 2 TABLETS BY MOUTH EVERY DAY   • [DISCONTINUED] omeprazole (PriLOSEC) 40 MG capsule Take 1 capsule (40 mg total) by mouth daily   • [DISCONTINUED] ondansetron  "(ZOFRAN-ODT) 4 mg disintegrating tablet Take 1 tablet (4 mg total) by mouth every 6 (six) hours as needed for nausea or vomiting     Allergies   Allergen Reactions   • Eggs Or Egg-Derived Products - Food Allergy Other (See Comments)     abd pain     Immunization History   Administered Date(s) Administered   • Pneumococcal Polysaccharide PPV23 11/29/2019     Objective     /84 (BP Location: Left arm, Patient Position: Sitting, Cuff Size: Large)   Pulse 82   Temp 98.2 °F (36.8 °C) (Tympanic)   Resp 16   Ht 4' 11\" (1.499 m)   Wt 78.6 kg (173 lb 3.2 oz)   LMP  (LMP Unknown)   SpO2 99%   BMI 34.98 kg/m²     Physical Exam  Vitals and nursing note reviewed.   Constitutional:       Appearance: Normal appearance. She is well-developed.   HENT:      Head: Normocephalic and atraumatic.      Right Ear: External ear normal.      Left Ear: External ear normal.   Eyes:      Conjunctiva/sclera: Conjunctivae normal.   Cardiovascular:      Rate and Rhythm: Normal rate and regular rhythm.      Heart sounds: Normal heart sounds.   Pulmonary:      Effort: Pulmonary effort is normal.      Breath sounds: Normal breath sounds.   Abdominal:      General: Bowel sounds are normal.      Palpations: Abdomen is soft.      Tenderness: There is generalized abdominal tenderness.   Musculoskeletal:         General: Normal range of motion.      Cervical back: Normal range of motion.   Skin:     General: Skin is warm and dry.   Neurological:      Mental Status: She is alert and oriented to person, place, and time.   Psychiatric:         Mood and Affect: Affect is tearful.         Behavior: Behavior is cooperative.         "

## 2024-09-06 ENCOUNTER — TELEPHONE (OUTPATIENT)
Dept: FAMILY MEDICINE CLINIC | Facility: CLINIC | Age: 44
End: 2024-09-06

## 2024-09-24 ENCOUNTER — HOSPITAL ENCOUNTER (EMERGENCY)
Facility: HOSPITAL | Age: 44
Discharge: HOME | End: 2024-09-24
Payer: COMMERCIAL

## 2024-09-24 VITALS
BODY MASS INDEX: 36.2 KG/M2 | DIASTOLIC BLOOD PRESSURE: 83 MMHG | WEIGHT: 179.23 LBS | OXYGEN SATURATION: 97 % | TEMPERATURE: 97.8 F | HEART RATE: 82 BPM | RESPIRATION RATE: 16 BRPM | SYSTOLIC BLOOD PRESSURE: 161 MMHG

## 2024-09-24 DIAGNOSIS — R11.2 NAUSEA AND VOMITING: Primary | ICD-10-CM

## 2024-09-24 DIAGNOSIS — R10.13 EPIGASTRIC ABDOMINAL PAIN: ICD-10-CM

## 2024-09-24 LAB
ALBUMIN SERPL BCG-MCNC: 4.5 G/DL (ref 3.5–5)
ALP SERPL-CCNC: 87 U/L (ref 34–104)
ALT SERPL W P-5'-P-CCNC: 15 U/L (ref 7–52)
ANION GAP SERPL CALCULATED.3IONS-SCNC: 12 MMOL/L (ref 4–13)
AST SERPL W P-5'-P-CCNC: 15 U/L (ref 13–39)
BILIRUB SERPL-MCNC: 0.51 MG/DL (ref 0.2–1)
BUN SERPL-MCNC: 17 MG/DL (ref 5–25)
CALCIUM SERPL-MCNC: 9.9 MG/DL (ref 8.4–10.2)
CHLORIDE SERPL-SCNC: 104 MMOL/L (ref 96–108)
CO2 SERPL-SCNC: 19 MMOL/L (ref 21–32)
CREAT SERPL-MCNC: 0.89 MG/DL (ref 0.6–1.3)
GFR SERPL CREATININE-BSD FRML MDRD: 77 ML/MIN/1.73SQ M
GLUCOSE SERPL-MCNC: 174 MG/DL (ref 65–140)
LIPASE SERPL-CCNC: 64 U/L (ref 11–82)
POTASSIUM SERPL-SCNC: 3.8 MMOL/L (ref 3.5–5.3)
PROT SERPL-MCNC: 7.9 G/DL (ref 6.4–8.4)
SODIUM SERPL-SCNC: 135 MMOL/L (ref 135–147)

## 2024-09-24 PROCEDURE — 96361 HYDRATE IV INFUSION ADD-ON: CPT

## 2024-09-24 PROCEDURE — 83690 ASSAY OF LIPASE: CPT

## 2024-09-24 PROCEDURE — 96375 TX/PRO/DX INJ NEW DRUG ADDON: CPT

## 2024-09-24 PROCEDURE — 80053 COMPREHEN METABOLIC PANEL: CPT

## 2024-09-24 PROCEDURE — 36415 COLL VENOUS BLD VENIPUNCTURE: CPT

## 2024-09-24 PROCEDURE — 99284 EMERGENCY DEPT VISIT MOD MDM: CPT

## 2024-09-24 PROCEDURE — 99285 EMERGENCY DEPT VISIT HI MDM: CPT

## 2024-09-24 PROCEDURE — 96374 THER/PROPH/DIAG INJ IV PUSH: CPT

## 2024-09-24 RX ORDER — KETOROLAC TROMETHAMINE 30 MG/ML
1 INJECTION, SOLUTION INTRAMUSCULAR; INTRAVENOUS ONCE
Status: COMPLETED | OUTPATIENT
Start: 2024-09-24 | End: 2024-09-24

## 2024-09-24 RX ORDER — METOCLOPRAMIDE HYDROCHLORIDE 5 MG/ML
10 INJECTION INTRAMUSCULAR; INTRAVENOUS ONCE
Status: COMPLETED | OUTPATIENT
Start: 2024-09-24 | End: 2024-09-24

## 2024-09-24 RX ORDER — DIPHENHYDRAMINE HYDROCHLORIDE 50 MG/ML
25 INJECTION INTRAMUSCULAR; INTRAVENOUS ONCE
Status: COMPLETED | OUTPATIENT
Start: 2024-09-24 | End: 2024-09-24

## 2024-09-24 RX ORDER — DROPERIDOL 2.5 MG/ML
1 INJECTION, SOLUTION INTRAMUSCULAR; INTRAVENOUS ONCE
Status: COMPLETED | OUTPATIENT
Start: 2024-09-24 | End: 2024-09-24

## 2024-09-24 RX ORDER — ONDANSETRON 4 MG/1
4 TABLET, FILM COATED ORAL EVERY 6 HOURS
Qty: 15 TABLET | Refills: 0 | Status: SHIPPED | OUTPATIENT
Start: 2024-09-24

## 2024-09-24 RX ORDER — HYDROMORPHONE HCL/PF 1 MG/ML
0.5 SYRINGE (ML) INJECTION ONCE
Status: COMPLETED | OUTPATIENT
Start: 2024-09-24 | End: 2024-09-24

## 2024-09-24 RX ORDER — FAMOTIDINE 10 MG/ML
20 INJECTION, SOLUTION INTRAVENOUS ONCE
Status: COMPLETED | OUTPATIENT
Start: 2024-09-24 | End: 2024-09-24

## 2024-09-24 RX ORDER — FAMOTIDINE 20 MG/1
20 TABLET, FILM COATED ORAL 2 TIMES DAILY
Qty: 30 TABLET | Refills: 0 | Status: SHIPPED | OUTPATIENT
Start: 2024-09-24

## 2024-09-24 RX ADMIN — SODIUM CHLORIDE 1000 ML: 0.9 INJECTION, SOLUTION INTRAVENOUS at 01:30

## 2024-09-24 RX ADMIN — HYDROMORPHONE HYDROCHLORIDE 0.5 MG: 1 INJECTION, SOLUTION INTRAMUSCULAR; INTRAVENOUS; SUBCUTANEOUS at 03:16

## 2024-09-24 RX ADMIN — METOCLOPRAMIDE 10 MG: 5 INJECTION, SOLUTION INTRAMUSCULAR; INTRAVENOUS at 03:16

## 2024-09-24 RX ADMIN — DIPHENHYDRAMINE HYDROCHLORIDE 25 MG: 50 INJECTION, SOLUTION INTRAMUSCULAR; INTRAVENOUS at 03:16

## 2024-09-24 RX ADMIN — FAMOTIDINE 20 MG: 10 INJECTION, SOLUTION INTRAVENOUS at 01:28

## 2024-09-24 NOTE — ED PROVIDER NOTES
1. Nausea and vomiting    2. Epigastric abdominal pain      ED Disposition       ED Disposition   Discharge    Condition   Stable    Date/Time   Tue Sep 24, 2024  4:38 AM    Comment   Melia Lira discharge to home/self care.                   Assessment & Plan       Medical Decision Making  44-year-old female presents to ED for evaluation of nausea, vomiting, epigastric pain as seen in HPI.  On physical examination patient vital signs stable.  Mildly hypertensive at 175/89.  Afebrile.  No tachycardia.  Nonhypoxic.  Alert and responding to questions appropriately.  No murmur.  Normal breath sounds.  Nontoxic-appearing.  Does have some mild discomfort with palpation of epigastric region.  Otherwise unremarkable abdominal examination.  Given that patient has had multiple negative CT scans in the past couple months and symptoms today are similar to previous visits with reassuring CT scans, will hold off on imaging at this time.  Will obtain CMP, lipase.  IV fluids.  Pepcid.  Reevaluation and consider other diagnostic modalities as needed.    Lipase WNL.  CMP without electrolyte abnormality.  No evidence of JACKI.    Still having nausea, epigastric discomfort after Pepcid.  Provided small dose of Dilaudid for pain.  Reglan and Benadryl for nausea.    Patient feels better after second round of medications.  Passes p.o. challenge.    Plan to discharge patient with referral to GI.  Discussed with patient that it was recommended previously by GI to have EGD, colonoscopy performed.  The studies did not occur.  Strongly feel that patient be able to obtain more information about the cause of her symptoms from the studies.  Providing prescription for Zofran, Pepcid.  Encouraged fluids, nutrition in the coming days.  Return to ED for new or worsening symptoms.  Patient agreeable to plan.  Patient discharged.     Prior to discharge, discharge instructions were discussed with patient at bedside. Patient was provided both verbal  "and written instructions. Patient is understanding of the discharge instructions and is agreeable to plan of care. Return precautions were discussed with patient bedside, patient verbalized understanding of signs and symptoms that would necessitate return to the ED. All questions were answered. Patient was comfortable with the plan of care and discharged to home.    Portions of this chart may have been written with voice recognition software.  Occasional grammatical errors, wrong word or \"sound a like\" substitutions may have occurred due to software limitations.  Please read carefully and use context to recognize where substitutions have occurred.     Amount and/or Complexity of Data Reviewed  Labs: ordered.    Risk  Prescription drug management.                     Medications   ketorolac (FOR EMS ONLY) (TORADOL) injection 30 mg (0 mg Does not apply Given to EMS 9/24/24 0052)   droperidol (FOR EMS ONLY) (INAPSINE) 2.5 mg/mL injection 2.5 mg (0 mg Does not apply Given to EMS 9/24/24 0053)   sodium chloride 0.9 % bolus 1,000 mL (0 mL Intravenous Stopped 9/24/24 0230)   Famotidine (PF) (PEPCID) injection 20 mg (20 mg Intravenous Given 9/24/24 0128)   HYDROmorphone (DILAUDID) injection 0.5 mg (0.5 mg Intravenous Given 9/24/24 0316)   metoclopramide (REGLAN) injection 10 mg (10 mg Intravenous Given 9/24/24 0316)   diphenhydrAMINE (BENADRYL) injection 25 mg (25 mg Intravenous Given 9/24/24 0316)       History of Present Illness       44-year-old female with history of asthma, GERD, cannabis hyperemesis syndrome, cyclical vomiting syndrome, hyponatremia presents to ED for evaluation of epigastric pain, nausea, vomiting.  Patient states that starting around 10 AM yesterday morning she began experiencing the symptoms.  She was unable to eat or drink throughout the day due to persistent vomiting.  Has been seen in the ED for similar symptoms previously.  Patient states that the current symptoms feels similar to previous " episodes.  Most recently evaluated at [place] [facility] on August 29, 2024 for abdominal pain, nausea, vomiting, diarrhea.  Had a reassuring CT scan.  Prior to this she had a CT scan on May 20, 2024 without abnormality.  Was being evaluated for similar symptoms at that time as well.  Has history of cholecystectomy.  Patient previously used cannabis however she states that she quit around a year ago due to being told that it was cannabis hyperemesis syndrome causing her recurrent nausea, vomiting, epigastric pain.  Denies any recent change in medications.  Denies any dysuria, fever, hematuria, increased urinary frequency, flank pain, shortness of breath, chest pain, numbness and tingling of extremities, seizure, syncope.  Was given dose of Toradol, droperidol by EMS en route to hospital.  States that this caused a mild improvement of symptoms.             Review of Systems   Constitutional:  Positive for chills. Negative for diaphoresis and fever.   HENT:  Negative for congestion and sore throat.    Respiratory:  Negative for cough, shortness of breath, wheezing and stridor.    Cardiovascular:  Negative for chest pain.   Gastrointestinal:  Positive for abdominal pain, nausea and vomiting. Negative for anal bleeding, blood in stool, constipation and diarrhea.   Genitourinary:  Negative for dysuria, frequency and hematuria.           Objective     ED Triage Vitals   Temperature Pulse Blood Pressure Respirations SpO2 Patient Position - Orthostatic VS   09/24/24 0049 09/24/24 0049 09/24/24 0049 09/24/24 0049 09/24/24 0049 09/24/24 0049   97.8 °F (36.6 °C) 81 (!) 175/89 20 99 % Lying      Temp Source Heart Rate Source BP Location FiO2 (%) Pain Score    09/24/24 0049 09/24/24 0049 09/24/24 0049 -- 09/24/24 0316    Oral Monitor Right arm  5        Physical Exam  Vitals and nursing note reviewed.   Constitutional:       General: She is not in acute distress.     Appearance: She is well-developed. She is not ill-appearing,  toxic-appearing or diaphoretic.   HENT:      Head: Normocephalic and atraumatic.   Eyes:      Conjunctiva/sclera: Conjunctivae normal.   Cardiovascular:      Rate and Rhythm: Normal rate and regular rhythm.      Heart sounds: Normal heart sounds. No murmur heard.     No gallop.   Pulmonary:      Effort: Pulmonary effort is normal. No respiratory distress.      Breath sounds: Normal breath sounds. No stridor. No wheezing, rhonchi or rales.   Abdominal:      Palpations: Abdomen is soft.      Tenderness: There is abdominal tenderness in the epigastric area. There is no right CVA tenderness, left CVA tenderness, guarding or rebound. Negative signs include Nevarez's sign, Rovsing's sign and McBurney's sign.      Hernia: No hernia is present.   Musculoskeletal:         General: No swelling.      Cervical back: Neck supple.   Skin:     General: Skin is warm and dry.      Capillary Refill: Capillary refill takes less than 2 seconds.   Neurological:      Mental Status: She is alert.   Psychiatric:         Mood and Affect: Mood normal.         Labs Reviewed   COMPREHENSIVE METABOLIC PANEL - Abnormal       Result Value    Sodium 135      Potassium 3.8      Chloride 104      CO2 19 (*)     ANION GAP 12      BUN 17      Creatinine 0.89      Glucose 174 (*)     Calcium 9.9      AST 15      ALT 15      Alkaline Phosphatase 87      Total Protein 7.9      Albumin 4.5      Total Bilirubin 0.51      eGFR 77      Narrative:     National Kidney Disease Foundation guidelines for Chronic Kidney Disease (CKD):     Stage 1 with normal or high GFR (GFR > 90 mL/min/1.73 square meters)    Stage 2 Mild CKD (GFR = 60-89 mL/min/1.73 square meters)    Stage 3A Moderate CKD (GFR = 45-59 mL/min/1.73 square meters)    Stage 3B Moderate CKD (GFR = 30-44 mL/min/1.73 square meters)    Stage 4 Severe CKD (GFR = 15-29 mL/min/1.73 square meters)    Stage 5 End Stage CKD (GFR <15 mL/min/1.73 square meters)  Note: GFR calculation is accurate only with a  steady state creatinine   LIPASE - Normal    Lipase 64       No orders to display       Procedures    ED Medication and Procedure Management   Prior to Admission Medications   Prescriptions Last Dose Informant Patient Reported? Taking?   LORazepam (ATIVAN) 1 mg tablet   No No   Sig: Take 1 tablet (1 mg total) by mouth daily as needed for anxiety   QUEtiapine (SEROquel) 50 mg tablet   No No   Sig: TAKE 1 TABLET BY MOUTH DAILY AT BEDTIME   albuterol (PROVENTIL HFA,VENTOLIN HFA) 90 mcg/act inhaler   No No   Sig: INHALE 2 PUFFS EVERY 6 HOURS AS NEEDED FOR WHEEZING   busPIRone (BUSPAR) 5 mg tablet   No No   Sig: TAKE 1 TABLET BY MOUTH TWICE A DAY   cyclobenzaprine (FLEXERIL) 5 mg tablet   No No   Sig: Take 1 tablet (5 mg total) by mouth 3 (three) times a day as needed for muscle spasms   dicyclomine (BENTYL) 10 mg capsule   No No   Sig: Take 1 capsule (10 mg total) by mouth 4 (four) times a day (before meals and at bedtime)   gabapentin (NEURONTIN) 600 MG tablet   No No   Sig: TAKE 1 TABLET BY MOUTH THREE TIMES A DAY   hydrOXYzine pamoate (VISTARIL) 25 mg capsule   No No   Sig: TAKE 1 CAPSULE BY MOUTH 3 TIMES A DAY AS NEEDED FOR ANXIETY   lisinopril (ZESTRIL) 10 mg tablet   No No   Sig: Take 1 tablet (10 mg total) by mouth daily   meloxicam (MOBIC) 15 mg tablet   No No   Sig: Take 1 tablet (15 mg total) by mouth daily as needed for moderate pain   omeprazole (PriLOSEC) 40 MG capsule   No No   Sig: Take 1 capsule (40 mg total) by mouth 2 (two) times a day   ondansetron (ZOFRAN-ODT) 4 mg disintegrating tablet   No No   Sig: Take 2 tablets (8 mg total) by mouth every 8 (eight) hours as needed for nausea or vomiting   sertraline (ZOLOFT) 100 mg tablet   No No   Sig: TAKE 2 TABLETS BY MOUTH EVERY DAY      Facility-Administered Medications: None     Discharge Medication List as of 9/24/2024  4:46 AM        START taking these medications    Details   famotidine (PEPCID) 20 mg tablet Take 1 tablet (20 mg total) by mouth 2 (two)  times a day, Starting Tue 9/24/2024, Normal      ondansetron (ZOFRAN) 4 mg tablet Take 1 tablet (4 mg total) by mouth every 6 (six) hours, Starting Tue 9/24/2024, Normal           CONTINUE these medications which have NOT CHANGED    Details   albuterol (PROVENTIL HFA,VENTOLIN HFA) 90 mcg/act inhaler INHALE 2 PUFFS EVERY 6 HOURS AS NEEDED FOR WHEEZING, Normal      busPIRone (BUSPAR) 5 mg tablet TAKE 1 TABLET BY MOUTH TWICE A DAY, Starting Mon 5/13/2024, Normal      cyclobenzaprine (FLEXERIL) 5 mg tablet Take 1 tablet (5 mg total) by mouth 3 (three) times a day as needed for muscle spasms, Starting Wed 6/19/2024, Normal      dicyclomine (BENTYL) 10 mg capsule Take 1 capsule (10 mg total) by mouth 4 (four) times a day (before meals and at bedtime), Starting Tue 9/3/2024, Until Thu 10/3/2024, Normal      gabapentin (NEURONTIN) 600 MG tablet TAKE 1 TABLET BY MOUTH THREE TIMES A DAY, Starting Wed 6/19/2024, Normal      hydrOXYzine pamoate (VISTARIL) 25 mg capsule TAKE 1 CAPSULE BY MOUTH 3 TIMES A DAY AS NEEDED FOR ANXIETY, Normal      lisinopril (ZESTRIL) 10 mg tablet Take 1 tablet (10 mg total) by mouth daily, Starting Tue 9/3/2024, Normal      LORazepam (ATIVAN) 1 mg tablet Take 1 tablet (1 mg total) by mouth daily as needed for anxiety, Starting Tue 9/3/2024, Normal      meloxicam (MOBIC) 15 mg tablet Take 1 tablet (15 mg total) by mouth daily as needed for moderate pain, Starting Wed 6/19/2024, Normal      omeprazole (PriLOSEC) 40 MG capsule Take 1 capsule (40 mg total) by mouth 2 (two) times a day, Starting Tue 9/3/2024, Until Mon 12/2/2024, Normal      ondansetron (ZOFRAN-ODT) 4 mg disintegrating tablet Take 2 tablets (8 mg total) by mouth every 8 (eight) hours as needed for nausea or vomiting, Starting Tue 9/3/2024, Normal      QUEtiapine (SEROquel) 50 mg tablet TAKE 1 TABLET BY MOUTH DAILY AT BEDTIME, Starting Wed 6/12/2024, Normal      sertraline (ZOLOFT) 100 mg tablet TAKE 2 TABLETS BY MOUTH EVERY DAY, Normal                 Fausto Razo PA-C  09/24/24 0604

## 2024-09-24 NOTE — DISCHARGE INSTRUCTIONS
Today I provided prescription for Pepcid, Zofran.  The Pepcid is a antiacid.  The Zofran is for nausea and vomiting.  Provided urgent GI referral.  Please make an appointment with GI for further evaluation of recurrent epigastric pain, nausea and vomiting.  Make sure to stay hydrated with long durations of vomiting.  Follow-up with primary care provider as needed.  Return to ED for new or worsening symptoms.

## 2024-09-25 ENCOUNTER — TELEPHONE (OUTPATIENT)
Age: 44
End: 2024-09-25

## 2024-09-25 DIAGNOSIS — R10.9 ABDOMINAL PAIN, UNSPECIFIED ABDOMINAL LOCATION: ICD-10-CM

## 2024-09-25 RX ORDER — DICYCLOMINE HYDROCHLORIDE 10 MG/1
CAPSULE ORAL
Qty: 360 CAPSULE | Refills: 1 | Status: SHIPPED | OUTPATIENT
Start: 2024-09-25

## 2024-09-25 NOTE — TELEPHONE ENCOUNTER
Pt called to schedule hospital follow up appointment. Call was transferred to the nurse to assist.

## 2024-09-25 NOTE — TELEPHONE ENCOUNTER
Last OV 6/20/23 Ministerio Rangel PA-C for Abnormal CT scan and GERD    Multiple ED visits in past year for nausea, vomiting, and epigastric abdominal pain. Never completed EGD and Colonoscopy. Pt scheduled with [facility] 10/15/24.

## 2024-09-29 DIAGNOSIS — F41.9 ANXIETY: ICD-10-CM

## 2024-09-29 DIAGNOSIS — G47.00 INSOMNIA, UNSPECIFIED TYPE: ICD-10-CM

## 2024-09-30 RX ORDER — BUSPIRONE HYDROCHLORIDE 5 MG/1
5 TABLET ORAL 2 TIMES DAILY
Qty: 180 TABLET | Refills: 1 | Status: SHIPPED | OUTPATIENT
Start: 2024-09-30

## 2024-10-01 RX ORDER — QUETIAPINE FUMARATE 50 MG/1
50 TABLET, FILM COATED ORAL
Qty: 90 TABLET | Refills: 1 | Status: SHIPPED | OUTPATIENT
Start: 2024-10-01

## 2024-10-15 ENCOUNTER — OFFICE VISIT (OUTPATIENT)
Dept: GASTROENTEROLOGY | Facility: AMBULARY SURGERY CENTER | Age: 44
End: 2024-10-15
Payer: COMMERCIAL

## 2024-10-15 ENCOUNTER — TELEPHONE (OUTPATIENT)
Dept: GASTROENTEROLOGY | Facility: AMBULARY SURGERY CENTER | Age: 44
End: 2024-10-15

## 2024-10-15 VITALS
SYSTOLIC BLOOD PRESSURE: 128 MMHG | HEART RATE: 63 BPM | BODY MASS INDEX: 34.83 KG/M2 | WEIGHT: 177.4 LBS | HEIGHT: 60 IN | OXYGEN SATURATION: 100 % | DIASTOLIC BLOOD PRESSURE: 74 MMHG

## 2024-10-15 DIAGNOSIS — R11.2 NAUSEA AND VOMITING, UNSPECIFIED VOMITING TYPE: Primary | ICD-10-CM

## 2024-10-15 DIAGNOSIS — R11.2 NAUSEA AND VOMITING: ICD-10-CM

## 2024-10-15 DIAGNOSIS — Z12.11 SCREENING FOR COLON CANCER: ICD-10-CM

## 2024-10-15 DIAGNOSIS — K21.9 GASTROESOPHAGEAL REFLUX DISEASE, UNSPECIFIED WHETHER ESOPHAGITIS PRESENT: ICD-10-CM

## 2024-10-15 DIAGNOSIS — R10.13 EPIGASTRIC ABDOMINAL PAIN: ICD-10-CM

## 2024-10-15 PROCEDURE — 99214 OFFICE O/P EST MOD 30 MIN: CPT | Performed by: PHYSICIAN ASSISTANT

## 2024-10-15 NOTE — ASSESSMENT & PLAN NOTE
"Longstanding intermittent nausea and vomiting, may represent cyclic vomiting, she has reportedly been abstinent from marijuana for about a year and a half at this point so doubt for cannabis hyperemesis at this point.  Also had normal gastric emptying study in the past.  Rule out underlying peptic ulcer disease, gastritis and/or H. pylori infection    -Plan for EGD at the same time as colonoscopy    -Procedures were explained in detail to the patient at this time including associated risks and benefits, risks including but not limited to infection, perforation and bleeding    -May use combined PPI and H2 blocker therapy, see \"GERD\"    -Patient says she also has Phenergan available at home as needed for nausea, may continue to use this as needed patient reports ongoing persistent symptoms of acid reflux heartburn and indigestion occurring on a daily basis  Orders:    EGD; Future    "

## 2024-10-15 NOTE — TELEPHONE ENCOUNTER
Scheduled date of EGD/colonoscopy (as of today): 12/11/24  Physician performing EGD/colonoscopy: Dr Duncan  Location of EGD/colonoscopy: [facility]  Desired bowel prep reviewed with patient: Miralax w/ dul given at appt   Instructions reviewed with patient by: tomer  Clearances:  n/a

## 2024-10-15 NOTE — PROGRESS NOTES
"Ambulatory Visit  Name: Melia Lira      : 1980      MRN: [id number]  Encounter Provider: Ministerio Rangel PA-C  Encounter Date: 10/15/2024   Encounter department: [facility] [place]    Assessment & Plan  Nausea and vomiting, unspecified vomiting type  Longstanding intermittent nausea and vomiting, may represent cyclic vomiting, she has reportedly been abstinent from marijuana for about a year and a half at this point so doubt for cannabis hyperemesis at this point.  Also had normal gastric emptying study in the past.  Rule out underlying peptic ulcer disease, gastritis and/or H. pylori infection    -Plan for EGD at the same time as colonoscopy    -Procedures were explained in detail to the patient at this time including associated risks and benefits, risks including but not limited to infection, perforation and bleeding    -May use combined PPI and H2 blocker therapy, see \"GERD\"    -Patient says she also has Phenergan available at home as needed for nausea, may continue to use this as needed patient reports ongoing persistent symptoms of acid reflux heartburn and indigestion occurring on a daily basis  Orders:    EGD; Future    Gastroesophageal reflux disease, unspecified whether esophagitis present  Patient reports ongoing symptoms of acid reflux, heartburn and indigestion on a daily basis, has not had EGD since 2018 and does report ongoing NSAID use (meloxicam), rule out underlying hiatal hernia, Dodson's or dysplasia    -Again we will plan for EGD at the same time as colonoscopy    -Advised patient that she can use omeprazole and Pepcid together; can use omeprazole twice daily and Pepcid at bedtime    -Advised patient regarding dietary and lifestyle modification strategies for the mitigation of GERD, in her case would also include decreasing caffeine intake, she says she does drink a lot of iced tea and periodically energy drinks  Orders:    EGD; Future    Screening for " colon cancer  Patient is 44 years old and has never had an adequately prepped colonoscopy, rule out adenomatous polyp or early malignancy.  She had very poor prep on last attempted colonoscopy, she reports she was not able to drink the prep    -Again we will plan for colonoscopy at the same time as EGD; procedures were explained to the patient at this time    -Instructions provided for colonoscopy prep; will utilize MiraLAX prep for better palatability  Orders:    Colonoscopy; Future      History of Present Illness     Melia Lira is a 44 y.o. female with history of anxiety/depression, arthritis who presents for follow-up, I had seen her in the office last year for follow-up of chronic GERD, recurrent vomiting episodes, her last EGD in 2018 it also showed severe esophagitis for which follow-up EGD had been recommended and not yet performed.  Last colonoscopy had also been noted with a very poor prep, patient reported she was not able to drink the prep.    Patient says at this time she still has been experiencing episodes about once every month with epigastric pain, and vomiting.  Denies any hematemesis.  She also says that every day she experiences problems with acid reflux, heartburn and indigestion.  She was taking omeprazole regularly for a while and she says she recently switched to Pepcid which seems to be helping with heartburn but she is still experiencing nausea frequently.  She says she drinks iced tea regularly, sometimes energy drinks.  She also takes meloxicam regularly.  She reports her bowel habits are otherwise regular and denies any rectal bleeding or melena.  Does smoke tobacco.  Last CT scan of her abdomen and pelvis done in August showed no obvious acute pathology, she has been seen in the emergency room several times this year with symptoms of nausea, vomiting, sometimes with diarrhea.      Review of Systems   Constitutional:  Negative for activity change, appetite change, chills, fatigue,  fever and unexpected weight change.   HENT:  Negative for congestion, nosebleeds, rhinorrhea, sore throat and trouble swallowing.    Eyes:  Negative for pain, itching and visual disturbance.   Respiratory:  Negative for cough, shortness of breath and wheezing.    Cardiovascular:  Negative for chest pain, palpitations and leg swelling.   Gastrointestinal: Negative.    Endocrine: Negative for cold intolerance, heat intolerance and polyuria.   Genitourinary:  Negative for difficulty urinating, dysuria, flank pain and hematuria.   Musculoskeletal:  Negative for arthralgias, back pain, myalgias and neck pain.   Skin:  Negative for color change, pallor and rash.   Neurological:  Negative for dizziness, speech difficulty, weakness, numbness and headaches.   Hematological:  Does not bruise/bleed easily.   Psychiatric/Behavioral:  Negative for dysphoric mood and sleep disturbance. The patient is not nervous/anxious.    All other systems reviewed and are negative.    Past Medical History   Past Medical History:   Diagnosis Date    Anxiety     Arthritis     OA  b/l knees    Asthma     Approx 2 years ago    Calculus of gallbladder without cholecystitis without obstruction 2022    Chronic kidney disease     Depression     GERD (gastroesophageal reflux disease)     In my 20s. Approx 15 years ago    Hx of bleeding disorder     dysmennorrhea-dx lap today 2016    Hypertension     Kidney stone     Obesity     Seasonal allergies      Past Surgical History:   Procedure Laterality Date     SECTION      CHOLECYSTECTOMY      COLONOSCOPY      DIAGNOSTIC LAPAROSCOPY      DILATION AND CURETTAGE OF UTERUS      HERNIA REPAIR      IN COLONOSCOPY FLX DX W/COLLJ SPEC WHEN PFRMD N/A 2018    Procedure: EGD AND COLONOSCOPY;  Surgeon: Allan Duncan MD;  [facility];  Service: Gastroenterology    IN LAPAROSCOPY SURG CHOLECYSTECTOMY N/A 2022    Procedure: ROBOTIC LAPAROSCOPIC CHOLECYSTECTOMY;  Surgeon:  Griffin Triplett DO;  [facility];  Service: General    TX LAPAROSCOPY W/RMVL ADNEXAL STRUCTURES Bilateral 2016    Procedure: CYSTECTOMY  OVARIAN;  Surgeon: C William Riedel, DO;  [facility];  Service: Gynecology    TX LAPS ABD PRTM&OMENTUM DX W/WO SPEC BR/WA SPX N/A 2016    Procedure: LAPAROSCOPY DIAGNOSTIC DAVID;  Surgeon: C William Riedel, DO;  [facility];  Service: Gynecology    TX RPR UMBILICAL HRNA 5 YRS/> REDUCIBLE N/A 2022    Procedure: UMBILICAL HERNIA REPAIR;  Surgeon: Griffin Triplett DO;  [facility];  Service: General    UPPER GASTROINTESTINAL ENDOSCOPY      WISDOM TOOTH EXTRACTION       Family History   Problem Relation Age of Onset    No Known Problems Sister     Autism Daughter     Lung cancer Maternal Grandmother     Cancer Maternal Grandmother         Deceassd 10 years    Diabetes Maternal Grandfather     Arthritis Maternal Grandfather          10 years    Aneurysm Paternal Grandmother     No Known Problems Sister      Current Outpatient Medications on File Prior to Visit   Medication Sig Dispense Refill    albuterol (PROVENTIL HFA,VENTOLIN HFA) 90 mcg/act inhaler INHALE 2 PUFFS EVERY 6 HOURS AS NEEDED FOR WHEEZING 6.7 g 0    busPIRone (BUSPAR) 5 mg tablet TAKE 1 TABLET BY MOUTH TWICE A  tablet 1    cyclobenzaprine (FLEXERIL) 5 mg tablet Take 1 tablet (5 mg total) by mouth 3 (three) times a day as needed for muscle spasms 30 tablet 2    dicyclomine (BENTYL) 10 mg capsule TAKE 1 CAPSULE BY MOUTH 4 TIMES A DAY (BEFORE MEALS AND AT BEDTIME) 360 capsule 1    famotidine (PEPCID) 20 mg tablet Take 1 tablet (20 mg total) by mouth 2 (two) times a day 30 tablet 0    gabapentin (NEURONTIN) 600 MG tablet TAKE 1 TABLET BY MOUTH THREE TIMES A  tablet 1    hydrOXYzine pamoate (VISTARIL) 25 mg capsule TAKE 1 CAPSULE BY MOUTH 3 TIMES A DAY AS NEEDED FOR ANXIETY 270 capsule 1    lisinopril (ZESTRIL) 10 mg tablet Take 1 tablet (10 mg total) by  mouth daily 90 tablet 1    LORazepam (ATIVAN) 1 mg tablet Take 1 tablet (1 mg total) by mouth daily as needed for anxiety 30 tablet 0    meloxicam (MOBIC) 15 mg tablet Take 1 tablet (15 mg total) by mouth daily as needed for moderate pain 30 tablet 3    omeprazole (PriLOSEC) 40 MG capsule Take 1 capsule (40 mg total) by mouth 2 (two) times a day 180 capsule 0    ondansetron (ZOFRAN) 4 mg tablet Take 1 tablet (4 mg total) by mouth every 6 (six) hours 15 tablet 0    ondansetron (ZOFRAN-ODT) 4 mg disintegrating tablet Take 2 tablets (8 mg total) by mouth every 8 (eight) hours as needed for nausea or vomiting 30 tablet 0    QUEtiapine (SEROquel) 50 mg tablet TAKE 1 TABLET BY MOUTH DAILY AT BEDTIME 90 tablet 1    sertraline (ZOLOFT) 100 mg tablet TAKE 2 TABLETS BY MOUTH EVERY  tablet 1     No current facility-administered medications on file prior to visit.     Allergies   Allergen Reactions    Eggs Or Egg-Derived Products - Food Allergy Other (See Comments)     abd pain          Objective     /74 (BP Location: Left arm, Patient Position: Sitting, Cuff Size: Standard)   Pulse 63   Ht 5' (1.524 m)   Wt 80.5 kg (177 lb 6.4 oz)   LMP  (LMP Unknown)   SpO2 100%   BMI 34.65 kg/m²     Physical Exam  Constitutional:       General: She is not in acute distress.     Appearance: She is well-developed. She is not diaphoretic.   HENT:      Head: Normocephalic and atraumatic.   Eyes:      Conjunctiva/sclera: Conjunctivae normal.      Pupils: Pupils are equal, round, and reactive to light.   Cardiovascular:      Rate and Rhythm: Normal rate and regular rhythm.      Heart sounds: Normal heart sounds. No murmur heard.     No friction rub. No gallop.   Pulmonary:      Effort: Pulmonary effort is normal. No respiratory distress.      Breath sounds: Normal breath sounds. No stridor. No wheezing or rales.   Abdominal:      General: Bowel sounds are normal. There is no distension.      Palpations: Abdomen is soft. There is  no mass.      Tenderness: There is no abdominal tenderness. There is no guarding or rebound.   Musculoskeletal:         General: No tenderness. Normal range of motion.      Cervical back: Normal range of motion and neck supple.   Skin:     General: Skin is warm and dry.      Coloration: Skin is not pale.      Findings: No erythema or rash.   Neurological:      Mental Status: She is alert and oriented to person, place, and time.   Psychiatric:         Behavior: Behavior normal.

## 2024-10-15 NOTE — ASSESSMENT & PLAN NOTE
Patient reports ongoing symptoms of acid reflux, heartburn and indigestion on a daily basis, has not had EGD since 2018 and does report ongoing NSAID use (meloxicam), rule out underlying hiatal hernia, Dodson's or dysplasia    -Again we will plan for EGD at the same time as colonoscopy    -Advised patient that she can use omeprazole and Pepcid together; can use omeprazole twice daily and Pepcid at bedtime    -Advised patient regarding dietary and lifestyle modification strategies for the mitigation of GERD, in her case would also include decreasing caffeine intake, she says she does drink a lot of iced tea and periodically energy drinks  Orders:    EGD; Future

## 2024-10-16 ENCOUNTER — OFFICE VISIT (OUTPATIENT)
Dept: FAMILY MEDICINE CLINIC | Facility: CLINIC | Age: 44
End: 2024-10-16
Payer: COMMERCIAL

## 2024-10-16 VITALS
RESPIRATION RATE: 16 BRPM | DIASTOLIC BLOOD PRESSURE: 80 MMHG | OXYGEN SATURATION: 98 % | TEMPERATURE: 98.1 F | BODY MASS INDEX: 34.36 KG/M2 | SYSTOLIC BLOOD PRESSURE: 124 MMHG | HEART RATE: 63 BPM | WEIGHT: 175 LBS | HEIGHT: 60 IN

## 2024-10-16 DIAGNOSIS — R11.2 NAUSEA AND VOMITING, UNSPECIFIED VOMITING TYPE: ICD-10-CM

## 2024-10-16 DIAGNOSIS — K21.9 GASTROESOPHAGEAL REFLUX DISEASE WITHOUT ESOPHAGITIS: ICD-10-CM

## 2024-10-16 DIAGNOSIS — J45.20 MILD INTERMITTENT ASTHMA WITHOUT COMPLICATION: ICD-10-CM

## 2024-10-16 DIAGNOSIS — J01.00 ACUTE NON-RECURRENT MAXILLARY SINUSITIS: ICD-10-CM

## 2024-10-16 DIAGNOSIS — I10 ESSENTIAL HYPERTENSION: Primary | ICD-10-CM

## 2024-10-16 PROCEDURE — 99214 OFFICE O/P EST MOD 30 MIN: CPT | Performed by: NURSE PRACTITIONER

## 2024-10-16 RX ORDER — ALBUTEROL SULFATE 90 UG/1
2 INHALANT RESPIRATORY (INHALATION) EVERY 6 HOURS PRN
Qty: 6.7 G | Refills: 0 | Status: SHIPPED | OUTPATIENT
Start: 2024-10-16

## 2024-10-16 RX ORDER — AZITHROMYCIN 250 MG/1
TABLET, FILM COATED ORAL
Qty: 6 TABLET | Refills: 0 | Status: SHIPPED | OUTPATIENT
Start: 2024-10-16 | End: 2024-10-20

## 2024-10-16 NOTE — ASSESSMENT & PLAN NOTE
- Improving.   - Continue omeprazole 40 mg twice daily. Can take Pepcid at bedtime as needed.  - She is scheduled for EGD and colonoscopy on 12/11.   - Follow up with GI.

## 2024-10-16 NOTE — ASSESSMENT & PLAN NOTE
- Improved and now well controlled.  - Continue lisinopril 10 mg daily.   - Will continue to monitor.

## 2024-10-16 NOTE — PROGRESS NOTES
Ambulatory Visit  Name: Melia Lira      : 1980      MRN: [id number]  Encounter Provider: MONICA Colbert  Encounter Date: 10/16/2024   Encounter department: [facility]    Assessment & Plan  Essential hypertension  - Improved and now well controlled.  - Continue lisinopril 10 mg daily.   - Will continue to monitor.        Gastroesophageal reflux disease without esophagitis  - Improving.   - Continue omeprazole 40 mg twice daily. Can take Pepcid at bedtime as needed.  - She is scheduled for EGD and colonoscopy on .   - Follow up with GI.        Nausea and vomiting, unspecified vomiting type  - Plan same as above.       Acute non-recurrent maxillary sinusitis  - Prescription sent for Dmitry. Advised of side effects  - Recommend Flonase.  - Increase oral hydration and use humidifier.   - Contact office if no improvement.   Orders:    azithromycin (Zithromax) 250 mg tablet; Take 2 tablets (500 mg total) by mouth daily for 1 day, THEN 1 tablet (250 mg total) daily for 4 days.    Mild intermittent asthma without complication  - Stable.  - Continue albuterol as needed.  - Will continue to monitor.   Orders:    albuterol (PROVENTIL HFA,VENTOLIN HFA) 90 mcg/act inhaler; Inhale 2 puffs every 6 (six) hours as needed for wheezing         History of Present Illness     Patient with PMH of HTN, GERD, cyclic vomiting syndrome, neuropathy, anxiety, and asthma presents to office today for follow up. She was seen last in the office after another bout of her chronic nausea and vomiting. Her omeprazole was increased to twice daily. Her blood pressure was elevated during that visit and been prior to her appointment as well. She was restarted on her lisinopril. Her blood pressure is well controlled in the office today. Reports well controlled readings at home as well. She saw GI yesterday. Is scheduled for EGD and colonoscopy on . Feels her symptoms have improved since increased  frequent of her omeprazole. She has complaints today of congestion and sinus pain/pressure for the past 3 weeks. Denies any other concerns or complaints today.        Review of Systems   Constitutional:  Negative for fatigue and fever.   HENT:  Positive for congestion, sinus pressure and sinus pain. Negative for trouble swallowing.    Eyes:  Negative for visual disturbance.   Respiratory:  Negative for cough and shortness of breath.    Cardiovascular:  Negative for chest pain and palpitations.   Gastrointestinal:  Negative for abdominal pain and blood in stool.   Endocrine: Negative for cold intolerance and heat intolerance.   Genitourinary:  Negative for difficulty urinating and dysuria.   Musculoskeletal:  Negative for gait problem.   Skin:  Negative for rash.   Neurological:  Negative for dizziness, syncope and headaches.   Hematological:  Negative for adenopathy.   Psychiatric/Behavioral:  Negative for behavioral problems.      Past Medical History:   Diagnosis Date    Anxiety     Arthritis     OA  b/l knees    Asthma     Approx 2 years ago    Calculus of gallbladder without cholecystitis without obstruction 2022    Chronic kidney disease     Depression     GERD (gastroesophageal reflux disease)     In my 20s. Approx 15 years ago    Hx of bleeding disorder     dysmennorrhea-dx lap today 2016    Hypertension     Kidney stone     Obesity     Seasonal allergies      Past Surgical History:   Procedure Laterality Date     SECTION      CHOLECYSTECTOMY      COLONOSCOPY      DIAGNOSTIC LAPAROSCOPY      DILATION AND CURETTAGE OF UTERUS      HERNIA REPAIR      ND COLONOSCOPY FLX DX W/COLLJ SPEC WHEN PFRMD N/A 2018    Procedure: EGD AND COLONOSCOPY;  Surgeon: Allan Duncan MD;  [facility];  Service: Gastroenterology    ND LAPAROSCOPY SURG CHOLECYSTECTOMY N/A 2022    Procedure: ROBOTIC LAPAROSCOPIC CHOLECYSTECTOMY;  Surgeon: Griffin Triplett DO;  [facility];  Service:  General    NM LAPAROSCOPY W/RMVL ADNEXAL STRUCTURES Bilateral 2016    Procedure: CYSTECTOMY  OVARIAN;  Surgeon: C William Riedel, DO;  [facility];  Service: Gynecology    NM LAPS ABD PRTM&OMENTUM DX W/WO SPEC BR/WA SPX N/A 2016    Procedure: LAPAROSCOPY DIAGNOSTIC DAVID;  Surgeon: C William Riedel, DO;  [facility];  Service: Gynecology    NM RPR UMBILICAL HRNA 5 YRS/> REDUCIBLE N/A 2022    Procedure: UMBILICAL HERNIA REPAIR;  Surgeon: Griffin Triplett DO;  [facility];  Service: General    UPPER GASTROINTESTINAL ENDOSCOPY      WISDOM TOOTH EXTRACTION       Family History   Problem Relation Age of Onset    No Known Problems Sister     Autism Daughter     Lung cancer Maternal Grandmother     Cancer Maternal Grandmother         Deceassd 10 years    Diabetes Maternal Grandfather     Arthritis Maternal Grandfather          10 years    Aneurysm Paternal Grandmother     No Known Problems Sister      Social History     Tobacco Use    Smoking status: Every Day     Current packs/day: 1.00     Average packs/day: 1 pack/day for 23.0 years (23.0 ttl pk-yrs)     Types: Cigarettes    Smokeless tobacco: Never   Vaping Use    Vaping status: Never Used   Substance and Sexual Activity    Alcohol use: Never     Comment: social    Drug use: Not Currently     Frequency: 7.0 times per week     Types: Marijuana     Comment: No longer using marijuana 2024    Sexual activity: Not Currently     Partners: Male     Birth control/protection: Abstinence     Current Outpatient Medications on File Prior to Visit   Medication Sig    busPIRone (BUSPAR) 5 mg tablet TAKE 1 TABLET BY MOUTH TWICE A DAY    cyclobenzaprine (FLEXERIL) 5 mg tablet Take 1 tablet (5 mg total) by mouth 3 (three) times a day as needed for muscle spasms    dicyclomine (BENTYL) 10 mg capsule TAKE 1 CAPSULE BY MOUTH 4 TIMES A DAY (BEFORE MEALS AND AT BEDTIME)    famotidine (PEPCID) 20 mg tablet Take 1 tablet (20 mg total) by  mouth 2 (two) times a day    gabapentin (NEURONTIN) 600 MG tablet TAKE 1 TABLET BY MOUTH THREE TIMES A DAY    hydrOXYzine pamoate (VISTARIL) 25 mg capsule TAKE 1 CAPSULE BY MOUTH 3 TIMES A DAY AS NEEDED FOR ANXIETY    lisinopril (ZESTRIL) 10 mg tablet Take 1 tablet (10 mg total) by mouth daily    LORazepam (ATIVAN) 1 mg tablet Take 1 tablet (1 mg total) by mouth daily as needed for anxiety    meloxicam (MOBIC) 15 mg tablet Take 1 tablet (15 mg total) by mouth daily as needed for moderate pain    omeprazole (PriLOSEC) 40 MG capsule Take 1 capsule (40 mg total) by mouth 2 (two) times a day    ondansetron (ZOFRAN) 4 mg tablet Take 1 tablet (4 mg total) by mouth every 6 (six) hours    ondansetron (ZOFRAN-ODT) 4 mg disintegrating tablet Take 2 tablets (8 mg total) by mouth every 8 (eight) hours as needed for nausea or vomiting    QUEtiapine (SEROquel) 50 mg tablet TAKE 1 TABLET BY MOUTH DAILY AT BEDTIME    sertraline (ZOLOFT) 100 mg tablet TAKE 2 TABLETS BY MOUTH EVERY DAY    [DISCONTINUED] albuterol (PROVENTIL HFA,VENTOLIN HFA) 90 mcg/act inhaler INHALE 2 PUFFS EVERY 6 HOURS AS NEEDED FOR WHEEZING     Allergies   Allergen Reactions    Eggs Or Egg-Derived Products - Food Allergy Other (See Comments)     abd pain     Immunization History   Administered Date(s) Administered    Pneumococcal Polysaccharide PPV23 11/29/2019     Objective     /80 (BP Location: Left arm, Patient Position: Sitting, Cuff Size: Large)   Pulse 63   Temp 98.1 °F (36.7 °C) (Tympanic)   Resp 16   Ht 5' (1.524 m)   Wt 79.4 kg (175 lb)   LMP  (LMP Unknown)   SpO2 98%   BMI 34.18 kg/m²     Physical Exam  Vitals and nursing note reviewed.   Constitutional:       Appearance: Normal appearance. She is well-developed.   HENT:      Head: Normocephalic and atraumatic.      Right Ear: External ear normal.      Left Ear: External ear normal.      Nose: Congestion present.   Eyes:      Conjunctiva/sclera: Conjunctivae normal.   Cardiovascular:       Rate and Rhythm: Normal rate and regular rhythm.      Heart sounds: Normal heart sounds.   Pulmonary:      Effort: Pulmonary effort is normal.      Breath sounds: Normal breath sounds.   Musculoskeletal:         General: Normal range of motion.      Cervical back: Normal range of motion.   Skin:     General: Skin is warm and dry.   Neurological:      Mental Status: She is alert and oriented to person, place, and time.   Psychiatric:         Mood and Affect: Mood normal.         Behavior: Behavior normal.

## 2024-10-16 NOTE — ASSESSMENT & PLAN NOTE
- Stable.  - Continue albuterol as needed.  - Will continue to monitor.   Orders:    albuterol (PROVENTIL HFA,VENTOLIN HFA) 90 mcg/act inhaler; Inhale 2 puffs every 6 (six) hours as needed for wheezing

## 2024-10-26 DIAGNOSIS — M54.41 ACUTE BILATERAL LOW BACK PAIN WITH RIGHT-SIDED SCIATICA: ICD-10-CM

## 2024-10-29 RX ORDER — MELOXICAM 15 MG/1
15 TABLET ORAL DAILY PRN
Qty: 30 TABLET | Refills: 3 | Status: SHIPPED | OUTPATIENT
Start: 2024-10-29

## 2024-11-29 DIAGNOSIS — K21.9 GASTROESOPHAGEAL REFLUX DISEASE WITHOUT ESOPHAGITIS: ICD-10-CM

## 2024-11-29 DIAGNOSIS — R10.9 ABDOMINAL PAIN, UNSPECIFIED ABDOMINAL LOCATION: ICD-10-CM

## 2024-11-29 RX ORDER — OMEPRAZOLE 40 MG/1
40 CAPSULE, DELAYED RELEASE ORAL 2 TIMES DAILY
Qty: 180 CAPSULE | Refills: 1 | Status: SHIPPED | OUTPATIENT
Start: 2024-11-29

## 2024-12-03 ENCOUNTER — HOSPITAL ENCOUNTER (EMERGENCY)
Facility: HOSPITAL | Age: 44
Discharge: HOME | End: 2024-12-03
Attending: EMERGENCY MEDICINE
Payer: COMMERCIAL

## 2024-12-03 VITALS
SYSTOLIC BLOOD PRESSURE: 149 MMHG | BODY MASS INDEX: 34.23 KG/M2 | OXYGEN SATURATION: 97 % | TEMPERATURE: 97.7 F | HEART RATE: 79 BPM | WEIGHT: 175.27 LBS | DIASTOLIC BLOOD PRESSURE: 87 MMHG | RESPIRATION RATE: 19 BRPM

## 2024-12-03 DIAGNOSIS — R11.2 NAUSEA AND VOMITING: Primary | ICD-10-CM

## 2024-12-03 LAB
ALBUMIN SERPL BCG-MCNC: 4.4 G/DL (ref 3.5–5)
ALP SERPL-CCNC: 100 U/L (ref 34–104)
ALT SERPL W P-5'-P-CCNC: 13 U/L (ref 7–52)
ANION GAP SERPL CALCULATED.3IONS-SCNC: 10 MMOL/L (ref 4–13)
AST SERPL W P-5'-P-CCNC: 16 U/L (ref 13–39)
BASOPHILS # BLD AUTO: 0.02 THOUSANDS/ΜL (ref 0–0.1)
BASOPHILS NFR BLD AUTO: 0 % (ref 0–1)
BILIRUB SERPL-MCNC: 0.63 MG/DL (ref 0.2–1)
BUN SERPL-MCNC: 18 MG/DL (ref 5–25)
CALCIUM SERPL-MCNC: 10 MG/DL (ref 8.4–10.2)
CHLORIDE SERPL-SCNC: 103 MMOL/L (ref 96–108)
CO2 SERPL-SCNC: 22 MMOL/L (ref 21–32)
CREAT SERPL-MCNC: 0.9 MG/DL (ref 0.6–1.3)
EOSINOPHIL # BLD AUTO: 0.06 THOUSAND/ΜL (ref 0–0.61)
EOSINOPHIL NFR BLD AUTO: 1 % (ref 0–6)
ERYTHROCYTE [DISTWIDTH] IN BLOOD BY AUTOMATED COUNT: 14.6 % (ref 11.6–15.1)
GFR SERPL CREATININE-BSD FRML MDRD: 76 ML/MIN/1.73SQ M
GLUCOSE SERPL-MCNC: 149 MG/DL (ref 65–140)
HCT VFR BLD AUTO: 36.6 % (ref 34.8–46.1)
HGB BLD-MCNC: 12.9 G/DL (ref 11.5–15.4)
IMM GRANULOCYTES # BLD AUTO: 0.03 THOUSAND/UL (ref 0–0.2)
IMM GRANULOCYTES NFR BLD AUTO: 0 % (ref 0–2)
LIPASE SERPL-CCNC: 42 U/L (ref 11–82)
LYMPHOCYTES # BLD AUTO: 1.72 THOUSANDS/ΜL (ref 0.6–4.47)
LYMPHOCYTES NFR BLD AUTO: 18 % (ref 14–44)
MCH RBC QN AUTO: 32 PG (ref 26.8–34.3)
MCHC RBC AUTO-ENTMCNC: 35.2 G/DL (ref 31.4–37.4)
MCV RBC AUTO: 91 FL (ref 82–98)
MONOCYTES # BLD AUTO: 0.58 THOUSAND/ΜL (ref 0.17–1.22)
MONOCYTES NFR BLD AUTO: 6 % (ref 4–12)
NEUTROPHILS # BLD AUTO: 6.94 THOUSANDS/ΜL (ref 1.85–7.62)
NEUTS SEG NFR BLD AUTO: 75 % (ref 43–75)
NRBC BLD AUTO-RTO: 0 /100 WBCS
PLATELET # BLD AUTO: 260 THOUSANDS/UL (ref 149–390)
PMV BLD AUTO: 9.9 FL (ref 8.9–12.7)
POTASSIUM SERPL-SCNC: 3.7 MMOL/L (ref 3.5–5.3)
PROT SERPL-MCNC: 7.8 G/DL (ref 6.4–8.4)
RBC # BLD AUTO: 4.03 MILLION/UL (ref 3.81–5.12)
SODIUM SERPL-SCNC: 135 MMOL/L (ref 135–147)
WBC # BLD AUTO: 9.35 THOUSAND/UL (ref 4.31–10.16)

## 2024-12-03 PROCEDURE — 99284 EMERGENCY DEPT VISIT MOD MDM: CPT

## 2024-12-03 PROCEDURE — 80053 COMPREHEN METABOLIC PANEL: CPT

## 2024-12-03 PROCEDURE — 85025 COMPLETE CBC W/AUTO DIFF WBC: CPT

## 2024-12-03 PROCEDURE — 96365 THER/PROPH/DIAG IV INF INIT: CPT

## 2024-12-03 PROCEDURE — 36415 COLL VENOUS BLD VENIPUNCTURE: CPT

## 2024-12-03 PROCEDURE — 83690 ASSAY OF LIPASE: CPT

## 2024-12-03 PROCEDURE — 99285 EMERGENCY DEPT VISIT HI MDM: CPT | Performed by: EMERGENCY MEDICINE

## 2024-12-03 PROCEDURE — 96375 TX/PRO/DX INJ NEW DRUG ADDON: CPT

## 2024-12-03 RX ORDER — FAMOTIDINE 20 MG/1
20 TABLET, FILM COATED ORAL ONCE
Status: COMPLETED | OUTPATIENT
Start: 2024-12-03 | End: 2024-12-03

## 2024-12-03 RX ORDER — MAGNESIUM HYDROXIDE/ALUMINUM HYDROXICE/SIMETHICONE 120; 1200; 1200 MG/30ML; MG/30ML; MG/30ML
30 SUSPENSION ORAL ONCE
Status: COMPLETED | OUTPATIENT
Start: 2024-12-03 | End: 2024-12-03

## 2024-12-03 RX ORDER — KETOROLAC TROMETHAMINE 30 MG/ML
15 INJECTION, SOLUTION INTRAMUSCULAR; INTRAVENOUS ONCE
Status: COMPLETED | OUTPATIENT
Start: 2024-12-03 | End: 2024-12-03

## 2024-12-03 RX ORDER — ONDANSETRON 2 MG/ML
4 INJECTION INTRAMUSCULAR; INTRAVENOUS ONCE
Status: COMPLETED | OUTPATIENT
Start: 2024-12-03 | End: 2024-12-03

## 2024-12-03 RX ORDER — DROPERIDOL 2.5 MG/ML
0.62 INJECTION, SOLUTION INTRAMUSCULAR; INTRAVENOUS ONCE
Status: COMPLETED | OUTPATIENT
Start: 2024-12-03 | End: 2024-12-03

## 2024-12-03 RX ORDER — SODIUM CHLORIDE, SODIUM GLUCONATE, SODIUM ACETATE, POTASSIUM CHLORIDE, MAGNESIUM CHLORIDE, SODIUM PHOSPHATE, DIBASIC, AND POTASSIUM PHOSPHATE .53; .5; .37; .037; .03; .012; .00082 G/100ML; G/100ML; G/100ML; G/100ML; G/100ML; G/100ML; G/100ML
1000 INJECTION, SOLUTION INTRAVENOUS ONCE
Status: COMPLETED | OUTPATIENT
Start: 2024-12-03 | End: 2024-12-03

## 2024-12-03 RX ADMIN — ONDANSETRON 4 MG: 2 INJECTION INTRAMUSCULAR; INTRAVENOUS at 10:27

## 2024-12-03 RX ADMIN — KETOROLAC TROMETHAMINE 15 MG: 30 INJECTION, SOLUTION INTRAMUSCULAR; INTRAVENOUS at 08:44

## 2024-12-03 RX ADMIN — FAMOTIDINE 20 MG: 20 TABLET, FILM COATED ORAL at 08:44

## 2024-12-03 RX ADMIN — SODIUM CHLORIDE, SODIUM GLUCONATE, SODIUM ACETATE, POTASSIUM CHLORIDE, MAGNESIUM CHLORIDE, SODIUM PHOSPHATE, DIBASIC, AND POTASSIUM PHOSPHATE 1000 ML: .53; .5; .37; .037; .03; .012; .00082 INJECTION, SOLUTION INTRAVENOUS at 08:43

## 2024-12-03 RX ADMIN — DROPERIDOL 0.62 MG: 2.5 INJECTION, SOLUTION INTRAMUSCULAR; INTRAVENOUS at 08:43

## 2024-12-03 RX ADMIN — ALUMINUM HYDROXIDE, MAGNESIUM HYDROXIDE, AND DIMETHICONE 30 ML: 200; 20; 200 SUSPENSION ORAL at 08:44

## 2024-12-03 NOTE — DISCHARGE INSTRUCTIONS
Please follow-up with primary care provider.  Make sure you go to your GI appointment that is scheduled in the near future.  Please return to the ED with new or worsening symptoms-see attached.  You can continue using the Zofran you have previously prescribed for you for nausea and vomiting.

## 2024-12-03 NOTE — ED PROVIDER NOTES
Time reflects when diagnosis was documented in both MDM as applicable and the Disposition within this note       Time User Action Codes Description Comment    12/3/2024 11:14 AM Jarvis Mccullough Add [R11.2] Nausea and vomiting           ED Disposition       ED Disposition   Discharge    Condition   Stable    Date/Time   Tue Dec 3, 2024 11:14 AM    Comment   Melia LALA Pankaj discharge to home/self care.                   Assessment & Plan       Medical Decision Making  Patient is a 44-year-old female presenting for abdominal pain and vomiting.    Differential includes but not limited to chronic cyclic vomiting, viral gastritis, pancreatitis, dehydration, electrolyte abnormality.  Will treat symptomatically and check labs.  Will reassess for need of CT scan after-previous CT scans for similar presentations have been negative.  Patient had significant relief of symptoms and labs without acute concern.    Patient cleared for discharge with PCP follow-up and return precaution.    Amount and/or Complexity of Data Reviewed  Labs: ordered.    Risk  OTC drugs.  Prescription drug management.        ED Course as of 12/04/24 1612   Tue Dec 03, 2024   1006 Patient states she has return of nausea, will give Zofran and p.o. challenge       Medications   droperidol (INAPSINE) injection 0.625 mg (0.625 mg Intravenous Given 12/3/24 0843)   ketorolac (TORADOL) injection 15 mg (15 mg Intravenous Given 12/3/24 0844)   famotidine (PEPCID) tablet 20 mg (20 mg Oral Given 12/3/24 0844)   aluminum-magnesium hydroxide-simethicone (MAALOX) oral suspension 30 mL (30 mL Oral Given 12/3/24 0844)   multi-electrolyte (ISOLYTE-S PH 7.4) bolus 1,000 mL (0 mL Intravenous Stopped 12/3/24 0943)   ondansetron (ZOFRAN) injection 4 mg (4 mg Intravenous Given 12/3/24 1027)       ED Risk Strat Scores                           SBIRT 22yo+      Flowsheet Row Most Recent Value   Initial Alcohol Screen: US AUDIT-C     1. How often do you have a drink containing  alcohol? 0 Filed at: 2024   2. How many drinks containing alcohol do you have on a typical day you are drinking?  0 Filed at: 2024   3b. FEMALE Any Age, or MALE 65+: How often do you have 4 or more drinks on one occassion? 0 Filed at: 2024   Audit-C Score 0 Filed at: 2024   ZULMA: How many times in the past year have you...    Used an illegal drug or used a prescription medication for non-medical reasons? Never Filed at: 2024                            History of Present Illness       Chief Complaint   Patient presents with    Vomiting     Pt reports cyclic vomiting, this episode started yesterday afternoon while working. Generalized pain in abdomen        Past Medical History:   Diagnosis Date    Anxiety     Arthritis     OA  b/l knees    Asthma     Approx 2 years ago    Calculus of gallbladder without cholecystitis without obstruction 2022    Chronic kidney disease     Depression     GERD (gastroesophageal reflux disease)     In my 20s. Approx 15 years ago    Hx of bleeding disorder     dysmennorrhea-dx lap today 2016    Hypertension     Kidney stone     Obesity     Seasonal allergies       Past Surgical History:   Procedure Laterality Date     SECTION      CHOLECYSTECTOMY      COLONOSCOPY      DIAGNOSTIC LAPAROSCOPY      DILATION AND CURETTAGE OF UTERUS      HERNIA REPAIR      MN COLONOSCOPY FLX DX W/COLLJ SPEC WHEN PFRMD N/A 2018    Procedure: EGD AND COLONOSCOPY;  Surgeon: Allan Duncan MD;  Location: [facility];  Service: Gastroenterology    MN LAPAROSCOPY SURG CHOLECYSTECTOMY N/A 2022    Procedure: ROBOTIC LAPAROSCOPIC CHOLECYSTECTOMY;  Surgeon: Griffin Triplett DO;  Location: [facility];  Service: General    MN LAPAROSCOPY W/RMVL ADNEXAL STRUCTURES Bilateral 2016    Procedure: CYSTECTOMY  OVARIAN;  Surgeon: C William Riedel, DO;  Location: [facility];  Service: Gynecology    MN LAPS ABD PRTM&OMENTUM DX W/WO SPEC  "BR/WA SPX N/A 2016    Procedure: LAPAROSCOPY DIAGNOSTIC DAVID;  Surgeon: C William Riedel, DO;  Location: [facility];  Service: Gynecology    MA RPR UMBILICAL HRNA 5 YRS/> REDUCIBLE N/A 2022    Procedure: UMBILICAL HERNIA REPAIR;  Surgeon: Griffin Triplett DO;  [facility];  Service: General    UPPER GASTROINTESTINAL ENDOSCOPY      WISDOM TOOTH EXTRACTION        Family History   Problem Relation Age of Onset    No Known Problems Sister     Autism Daughter     Lung cancer Maternal Grandmother     Cancer Maternal Grandmother         Deceassd 10 years    Diabetes Maternal Grandfather     Arthritis Maternal Grandfather          10 years    Aneurysm Paternal Grandmother     No Known Problems Sister       Social History     Tobacco Use    Smoking status: Every Day     Current packs/day: 1.00     Average packs/day: 1 pack/day for 23.0 years (23.0 ttl pk-yrs)     Types: Cigarettes    Smokeless tobacco: Never   Vaping Use    Vaping status: Never Used   Substance Use Topics    Alcohol use: Never     Comment: social    Drug use: Not Currently     Frequency: 7.0 times per week     Types: Marijuana     Comment: No longer using marijuana 2024      E-Cigarette/Vaping    E-Cigarette Use Never User       E-Cigarette/Vaping Substances    Nicotine No     THC No     CBD No     Flavoring No     Other No     Unknown No       I have reviewed and agree with the history as documented.     Patient is a 44-year-old female with past medical history of cyclic vomiting, anxiety, hypertension, GERD presenting for abdominal pain and vomiting.  Patient states that this is similar to her previous abdominal pain and vomiting.  She states that she has vomited up to 50 times and is not able to keep anything down.  She tried treating her symptoms with a \"dissolvable pill \" but nothing else.  She denies marijuana use in the last year and a half.  She states she has not urinated in a while but believes this is because she is " dehydrated.  She denies headache, lightheadedness, dizziness, chest pain, shortness of breath, dysuria, hematuria, diarrhea, constipation, numbness, tingling, or weakness.        Review of Systems        Objective       ED Triage Vitals   Temperature Pulse Blood Pressure Respirations SpO2 Patient Position - Orthostatic VS   12/03/24 0818 12/03/24 0815 12/03/24 0815 12/03/24 0818 12/03/24 0815 12/03/24 0815   97.7 °F (36.5 °C) 78 131/95 22 98 % Lying      Temp Source Heart Rate Source BP Location FiO2 (%) Pain Score    12/03/24 0818 12/03/24 0818 12/03/24 0818 -- 12/03/24 0815    Oral Monitor Left arm  10 - Worst Possible Pain      Vitals      Date and Time Temp Pulse SpO2 Resp BP Pain Score FACES Pain Rating User   12/03/24 1030 -- 79 97 % 19 149/87 -- -- RD   12/03/24 1000 -- 81 98 % 20 162/79 No Pain -- RD   12/03/24 0930 -- 80 98 % 19 153/80 -- -- RD   12/03/24 0900 -- 78 99 % 20 163/85 -- -- RD   12/03/24 0844 -- -- -- -- -- 10 - Worst Possible Pain -- RD   12/03/24 0830 -- 78 97 % -- 168/99 -- -- BM   12/03/24 0818 97.7 °F (36.5 °C) 80 97 % 22 131/95 10 - Worst Possible Pain -- RD   12/03/24 0815 -- 78 98 % -- 131/95 10 - Worst Possible Pain -- JK            Physical Exam  Vitals and nursing note reviewed.   Constitutional:       General: She is not in acute distress.     Appearance: Normal appearance. She is not ill-appearing, toxic-appearing or diaphoretic.      Comments: Moaning during exam, rolling around in bed.  Will stop when instructed    HENT:      Head: Normocephalic and atraumatic.      Mouth/Throat:      Mouth: Mucous membranes are dry.   Eyes:      Extraocular Movements: Extraocular movements intact.      Pupils: Pupils are equal, round, and reactive to light.   Cardiovascular:      Rate and Rhythm: Normal rate and regular rhythm.      Heart sounds: Normal heart sounds.   Pulmonary:      Effort: Pulmonary effort is normal. No respiratory distress.      Breath sounds: Normal breath sounds.    Abdominal:      General: Abdomen is flat.      Palpations: Abdomen is soft.      Tenderness: There is abdominal tenderness (Diffuse, worse in epigastric area). There is no right CVA tenderness or left CVA tenderness.   Musculoskeletal:         General: Normal range of motion.      Cervical back: Normal range of motion and neck supple.   Skin:     General: Skin is warm and dry.      Capillary Refill: Capillary refill takes 2 to 3 seconds.   Neurological:      General: No focal deficit present.      Mental Status: She is alert and oriented to person, place, and time.      Cranial Nerves: No cranial nerve deficit.      Sensory: No sensory deficit.      Motor: No weakness.         Results Reviewed       Procedure Component Value Units Date/Time    Comprehensive metabolic panel [[id number]]  (Abnormal) Collected: 12/03/24 0841    Lab Status: Final result Specimen: Blood from Arm, Right Updated: 12/03/24 0908     Sodium 135 mmol/L      Potassium 3.7 mmol/L      Chloride 103 mmol/L      CO2 22 mmol/L      ANION GAP 10 mmol/L      BUN 18 mg/dL      Creatinine 0.90 mg/dL      Glucose 149 mg/dL      Calcium 10.0 mg/dL      AST 16 U/L      ALT 13 U/L      Alkaline Phosphatase 100 U/L      Total Protein 7.8 g/dL      Albumin 4.4 g/dL      Total Bilirubin 0.63 mg/dL      eGFR 76 ml/min/1.73sq m     Narrative:      [employer] guidelines for Chronic Kidney Disease (CKD):     Stage 1 with normal or high GFR (GFR > 90 mL/min/1.73 square meters)    Stage 2 Mild CKD (GFR = 60-89 mL/min/1.73 square meters)    Stage 3A Moderate CKD (GFR = 45-59 mL/min/1.73 square meters)    Stage 3B Moderate CKD (GFR = 30-44 mL/min/1.73 square meters)    Stage 4 Severe CKD (GFR = 15-29 mL/min/1.73 square meters)    Stage 5 End Stage CKD (GFR <15 mL/min/1.73 square meters)  Note: GFR calculation is accurate only with a steady state creatinine    Lipase [[id number]]  (Normal) Collected: 12/03/24 0841    Lab Status: Final result  Specimen: Blood from Arm, Right Updated: 12/03/24 0908     Lipase 42 u/L     CBC and differential [[id number]] Collected: 12/03/24 0841    Lab Status: Final result Specimen: Blood from Arm, Right Updated: 12/03/24 0852     WBC 9.35 Thousand/uL      RBC 4.03 Million/uL      Hemoglobin 12.9 g/dL      Hematocrit 36.6 %      MCV 91 fL      MCH 32.0 pg      MCHC 35.2 g/dL      RDW 14.6 %      MPV 9.9 fL      Platelets 260 Thousands/uL      nRBC 0 /100 WBCs      Segmented % 75 %      Immature Grans % 0 %      Lymphocytes % 18 %      Monocytes % 6 %      Eosinophils Relative 1 %      Basophils Relative 0 %      Absolute Neutrophils 6.94 Thousands/µL      Absolute Immature Grans 0.03 Thousand/uL      Absolute Lymphocytes 1.72 Thousands/µL      Absolute Monocytes 0.58 Thousand/µL      Eosinophils Absolute 0.06 Thousand/µL      Basophils Absolute 0.02 Thousands/µL             No orders to display       Procedures    ED Medication and Procedure Management   Prior to Admission Medications   Prescriptions Last Dose Informant Patient Reported? Taking?   LORazepam (ATIVAN) 1 mg tablet  Self No No   Sig: Take 1 tablet (1 mg total) by mouth daily as needed for anxiety   QUEtiapine (SEROquel) 50 mg tablet  Self No No   Sig: TAKE 1 TABLET BY MOUTH DAILY AT BEDTIME   albuterol (PROVENTIL HFA,VENTOLIN HFA) 90 mcg/act inhaler   No No   Sig: Inhale 2 puffs every 6 (six) hours as needed for wheezing   busPIRone (BUSPAR) 5 mg tablet  Self No No   Sig: TAKE 1 TABLET BY MOUTH TWICE A DAY   cyclobenzaprine (FLEXERIL) 5 mg tablet  Self No No   Sig: Take 1 tablet (5 mg total) by mouth 3 (three) times a day as needed for muscle spasms   dicyclomine (BENTYL) 10 mg capsule  Self No No   Sig: TAKE 1 CAPSULE BY MOUTH 4 TIMES A DAY (BEFORE MEALS AND AT BEDTIME)   famotidine (PEPCID) 20 mg tablet  Self No No   Sig: Take 1 tablet (20 mg total) by mouth 2 (two) times a day   gabapentin (NEURONTIN) 600 MG tablet  Self No No   Sig: TAKE 1 TABLET BY MOUTH  THREE TIMES A DAY   hydrOXYzine pamoate (VISTARIL) 25 mg capsule  Self No No   Sig: TAKE 1 CAPSULE BY MOUTH 3 TIMES A DAY AS NEEDED FOR ANXIETY   lisinopril (ZESTRIL) 10 mg tablet  Self No No   Sig: Take 1 tablet (10 mg total) by mouth daily   meloxicam (MOBIC) 15 mg tablet   No No   Sig: TAKE 1 TABLET BY MOUTH DAILY AS NEEDED FOR MODERATE PAIN.   omeprazole (PriLOSEC) 40 MG capsule   No No   Sig: TAKE 1 CAPSULE BY MOUTH TWICE A DAY   ondansetron (ZOFRAN) 4 mg tablet  Self No No   Sig: Take 1 tablet (4 mg total) by mouth every 6 (six) hours   ondansetron (ZOFRAN-ODT) 4 mg disintegrating tablet  Self No No   Sig: Take 2 tablets (8 mg total) by mouth every 8 (eight) hours as needed for nausea or vomiting   sertraline (ZOLOFT) 100 mg tablet  Self No No   Sig: TAKE 2 TABLETS BY MOUTH EVERY DAY      Facility-Administered Medications: None     Discharge Medication List as of 12/3/2024 11:15 AM        CONTINUE these medications which have NOT CHANGED    Details   albuterol (PROVENTIL HFA,VENTOLIN HFA) 90 mcg/act inhaler Inhale 2 puffs every 6 (six) hours as needed for wheezing, Starting Wed 10/16/2024, Normal      busPIRone (BUSPAR) 5 mg tablet TAKE 1 TABLET BY MOUTH TWICE A DAY, Starting Mon 9/30/2024, Normal      cyclobenzaprine (FLEXERIL) 5 mg tablet Take 1 tablet (5 mg total) by mouth 3 (three) times a day as needed for muscle spasms, Starting Wed 6/19/2024, Normal      dicyclomine (BENTYL) 10 mg capsule TAKE 1 CAPSULE BY MOUTH 4 TIMES A DAY (BEFORE MEALS AND AT BEDTIME), Normal      famotidine (PEPCID) 20 mg tablet Take 1 tablet (20 mg total) by mouth 2 (two) times a day, Starting Tue 9/24/2024, Normal      gabapentin (NEURONTIN) 600 MG tablet TAKE 1 TABLET BY MOUTH THREE TIMES A DAY, Starting Wed 6/19/2024, Normal      hydrOXYzine pamoate (VISTARIL) 25 mg capsule TAKE 1 CAPSULE BY MOUTH 3 TIMES A DAY AS NEEDED FOR ANXIETY, Normal      lisinopril (ZESTRIL) 10 mg tablet Take 1 tablet (10 mg total) by mouth daily,  Starting Tue 9/3/2024, Normal      LORazepam (ATIVAN) 1 mg tablet Take 1 tablet (1 mg total) by mouth daily as needed for anxiety, Starting Tue 9/3/2024, Normal      meloxicam (MOBIC) 15 mg tablet TAKE 1 TABLET BY MOUTH DAILY AS NEEDED FOR MODERATE PAIN., Starting Tue 10/29/2024, Normal      omeprazole (PriLOSEC) 40 MG capsule TAKE 1 CAPSULE BY MOUTH TWICE A DAY, Starting Fri 11/29/2024, Normal      ondansetron (ZOFRAN) 4 mg tablet Take 1 tablet (4 mg total) by mouth every 6 (six) hours, Starting Tue 9/24/2024, Normal      ondansetron (ZOFRAN-ODT) 4 mg disintegrating tablet Take 2 tablets (8 mg total) by mouth every 8 (eight) hours as needed for nausea or vomiting, Starting Tue 9/3/2024, Normal      QUEtiapine (SEROquel) 50 mg tablet TAKE 1 TABLET BY MOUTH DAILY AT BEDTIME, Starting Tue 10/1/2024, Normal      sertraline (ZOLOFT) 100 mg tablet TAKE 2 TABLETS BY MOUTH EVERY DAY, Normal           No discharge procedures on file.  ED SEPSIS DOCUMENTATION   Time reflects when diagnosis was documented in both MDM as applicable and the Disposition within this note       Time User Action Codes Description Comment    12/3/2024 11:14 AM Jarvis Mccullough Add [R11.2] Nausea and vomiting                  Jarvis Mccullough MD  12/04/24 7510

## 2024-12-03 NOTE — ED ATTENDING ATTESTATION
"12/3/2024  I, Mel Deleon DO, saw and evaluated the patient. I have discussed the patient with the resident/non-physician practitioner and agree with the resident's/non-physician practitioner's findings, Plan of Care, and MDM as documented in the resident's/non-physician practitioner's note, except where noted. All available labs and Radiology studies were reviewed.  I was present for key portions of any procedure(s) performed by the resident/non-physician practitioner and I was immediately available to provide assistance.       At this point I agree with the current assessment done in the Emergency Department.  I have conducted an independent evaluation of this patient a history and physical is as follows:    45 yo F presenting with N/V/abdominal pain since yesterday.    Reports same as prior episodes, occurs about every month. Has seen GI for this and has another appointment and EGD/colonoscopy scheduled for next week.   Denies marijuana     Per independent review of external records:   - 10/15/24 GI- Nausea and vomiting, unspecified vomiting type  Longstanding intermittent nausea and vomiting, may represent cyclic vomiting, she has reportedly been abstinent from marijuana for about a year and a half at this point so doubt for cannabis hyperemesis at this point.  Also had normal gastric emptying study in the past.  Rule out underlying peptic ulcer disease, gastritis and/or H. pylori infection  -Plan for EGD at the same time as colonoscopy  -May use combined PPI and H2 blocker therapy, see \"GERD\"  -Patient says she also has Phenergan available at home as needed for nausea, may continue to use this as needed patient reports ongoing persistent symptoms of acid reflux heartburn and indigestion occurring on a daily basis    MDM: 45 yo F with N/V/epigastric discomfort- will treat sx, IVF hydration, CBC to assess for leukocytosis/anemia, CMP to assess for elevated LFTs/JACKI/electrolyte abn, lipase to r/o " pancreatitis, if workup unrevealing/tolerates po- f/u GI next week    ED Course  ED Course as of 12/03/24 1531   Tue Dec 03, 2024   0904 Pt reports feeling better, pain/nausea improved, reports feeling anxious         Critical Care Time  Procedures

## 2024-12-05 ENCOUNTER — TELEPHONE (OUTPATIENT)
Dept: GASTROENTEROLOGY | Facility: CLINIC | Age: 44
End: 2024-12-05

## 2024-12-05 NOTE — TELEPHONE ENCOUNTER
Spoke with pt confirming procedure for 12/11 with Dr. Duncan. She has her  and her prep instructions. She will be called day prior with arrival time.

## 2024-12-09 ENCOUNTER — HOSPITAL ENCOUNTER (EMERGENCY)
Facility: HOSPITAL | Age: 44
Discharge: HOME | End: 2024-12-09
Attending: EMERGENCY MEDICINE
Payer: COMMERCIAL

## 2024-12-09 ENCOUNTER — RESULTS FOLLOW-UP (OUTPATIENT)
Dept: EMERGENCY DEPT | Facility: HOSPITAL | Age: 44
End: 2024-12-09

## 2024-12-09 VITALS
OXYGEN SATURATION: 98 % | BODY MASS INDEX: 32.72 KG/M2 | RESPIRATION RATE: 20 BRPM | TEMPERATURE: 97.8 F | WEIGHT: 167.55 LBS | HEART RATE: 73 BPM | SYSTOLIC BLOOD PRESSURE: 155 MMHG | DIASTOLIC BLOOD PRESSURE: 90 MMHG

## 2024-12-09 DIAGNOSIS — R11.2 NAUSEA AND VOMITING: Primary | ICD-10-CM

## 2024-12-09 DIAGNOSIS — R10.84 GENERALIZED ABDOMINAL PAIN: ICD-10-CM

## 2024-12-09 LAB
ALBUMIN SERPL BCG-MCNC: 4.4 G/DL (ref 3.5–5)
ALP SERPL-CCNC: 94 U/L (ref 34–104)
ALT SERPL W P-5'-P-CCNC: 25 U/L (ref 7–52)
ANION GAP SERPL CALCULATED.3IONS-SCNC: 10 MMOL/L (ref 4–13)
AST SERPL W P-5'-P-CCNC: 20 U/L (ref 13–39)
BASOPHILS # BLD AUTO: 0.03 THOUSANDS/ÂΜL (ref 0–0.1)
BASOPHILS NFR BLD AUTO: 0 % (ref 0–1)
BILIRUB SERPL-MCNC: 0.82 MG/DL (ref 0.2–1)
BUN SERPL-MCNC: 11 MG/DL (ref 5–25)
CALCIUM SERPL-MCNC: 9.8 MG/DL (ref 8.4–10.2)
CHLORIDE SERPL-SCNC: 93 MMOL/L (ref 96–108)
CO2 SERPL-SCNC: 27 MMOL/L (ref 21–32)
CREAT SERPL-MCNC: 0.93 MG/DL (ref 0.6–1.3)
EOSINOPHIL # BLD AUTO: 0.04 THOUSAND/ÂΜL (ref 0–0.61)
EOSINOPHIL NFR BLD AUTO: 0 % (ref 0–6)
ERYTHROCYTE [DISTWIDTH] IN BLOOD BY AUTOMATED COUNT: 14 % (ref 11.6–15.1)
GFR SERPL CREATININE-BSD FRML MDRD: 73 ML/MIN/1.73SQ M
GLUCOSE SERPL-MCNC: 129 MG/DL (ref 65–140)
HCT VFR BLD AUTO: 36.2 % (ref 34.8–46.1)
HGB BLD-MCNC: 12.6 G/DL (ref 11.5–15.4)
IMM GRANULOCYTES # BLD AUTO: 0.07 THOUSAND/UL (ref 0–0.2)
IMM GRANULOCYTES NFR BLD AUTO: 1 % (ref 0–2)
LIPASE SERPL-CCNC: 37 U/L (ref 11–82)
LYMPHOCYTES # BLD AUTO: 3.01 THOUSANDS/ÂΜL (ref 0.6–4.47)
LYMPHOCYTES NFR BLD AUTO: 23 % (ref 14–44)
MCH RBC QN AUTO: 32.8 PG (ref 26.8–34.3)
MCHC RBC AUTO-ENTMCNC: 34.8 G/DL (ref 31.4–37.4)
MCV RBC AUTO: 94 FL (ref 82–98)
MONOCYTES # BLD AUTO: 1.51 THOUSAND/ÂΜL (ref 0.17–1.22)
MONOCYTES NFR BLD AUTO: 12 % (ref 4–12)
NEUTROPHILS # BLD AUTO: 8.41 THOUSANDS/ÂΜL (ref 1.85–7.62)
NEUTS SEG NFR BLD AUTO: 64 % (ref 43–75)
NRBC BLD AUTO-RTO: 0 /100 WBCS
PLATELET # BLD AUTO: 300 THOUSANDS/UL (ref 149–390)
PMV BLD AUTO: 9.9 FL (ref 8.9–12.7)
POTASSIUM SERPL-SCNC: 2.9 MMOL/L (ref 3.5–5.3)
PROT SERPL-MCNC: 7.6 G/DL (ref 6.4–8.4)
RBC # BLD AUTO: 3.84 MILLION/UL (ref 3.81–5.12)
SODIUM SERPL-SCNC: 130 MMOL/L (ref 135–147)
WBC # BLD AUTO: 13.07 THOUSAND/UL (ref 4.31–10.16)

## 2024-12-09 PROCEDURE — 83690 ASSAY OF LIPASE: CPT

## 2024-12-09 PROCEDURE — 99284 EMERGENCY DEPT VISIT MOD MDM: CPT

## 2024-12-09 PROCEDURE — 96375 TX/PRO/DX INJ NEW DRUG ADDON: CPT

## 2024-12-09 PROCEDURE — 96374 THER/PROPH/DIAG INJ IV PUSH: CPT

## 2024-12-09 PROCEDURE — 36415 COLL VENOUS BLD VENIPUNCTURE: CPT

## 2024-12-09 PROCEDURE — 80053 COMPREHEN METABOLIC PANEL: CPT

## 2024-12-09 PROCEDURE — 85025 COMPLETE CBC W/AUTO DIFF WBC: CPT

## 2024-12-09 RX ORDER — MAGNESIUM HYDROXIDE/ALUMINUM HYDROXICE/SIMETHICONE 120; 1200; 1200 MG/30ML; MG/30ML; MG/30ML
30 SUSPENSION ORAL ONCE
Status: COMPLETED | OUTPATIENT
Start: 2024-12-09 | End: 2024-12-09

## 2024-12-09 RX ORDER — ONDANSETRON 2 MG/ML
4 INJECTION INTRAMUSCULAR; INTRAVENOUS ONCE
Status: COMPLETED | OUTPATIENT
Start: 2024-12-09 | End: 2024-12-09

## 2024-12-09 RX ORDER — KETOROLAC TROMETHAMINE 30 MG/ML
15 INJECTION, SOLUTION INTRAMUSCULAR; INTRAVENOUS ONCE
Status: COMPLETED | OUTPATIENT
Start: 2024-12-09 | End: 2024-12-09

## 2024-12-09 RX ORDER — FAMOTIDINE 20 MG/1
20 TABLET, FILM COATED ORAL 2 TIMES DAILY
Qty: 30 TABLET | Refills: 0 | Status: SHIPPED | OUTPATIENT
Start: 2024-12-09

## 2024-12-09 RX ORDER — ONDANSETRON 4 MG/1
4 TABLET, FILM COATED ORAL EVERY 6 HOURS
Qty: 12 TABLET | Refills: 0 | Status: SHIPPED | OUTPATIENT
Start: 2024-12-09

## 2024-12-09 RX ORDER — FAMOTIDINE 10 MG/ML
20 INJECTION, SOLUTION INTRAVENOUS ONCE
Status: COMPLETED | OUTPATIENT
Start: 2024-12-09 | End: 2024-12-09

## 2024-12-09 RX ORDER — DROPERIDOL 2.5 MG/ML
0.62 INJECTION, SOLUTION INTRAMUSCULAR; INTRAVENOUS ONCE
Status: COMPLETED | OUTPATIENT
Start: 2024-12-09 | End: 2024-12-09

## 2024-12-09 RX ORDER — ALUMINUM HYDROXIDE, MAGNESIUM HYDROXIDE, SIMETHICONE 400; 400; 40 MG/10ML; MG/10ML; MG/10ML
30 SUSPENSION ORAL
Qty: 3600 ML | Refills: 0 | Status: SHIPPED | OUTPATIENT
Start: 2024-12-09

## 2024-12-09 RX ADMIN — KETOROLAC TROMETHAMINE 15 MG: 30 INJECTION, SOLUTION INTRAMUSCULAR; INTRAVENOUS at 05:18

## 2024-12-09 RX ADMIN — DROPERIDOL 0.62 MG: 2.5 INJECTION, SOLUTION INTRAMUSCULAR; INTRAVENOUS at 05:18

## 2024-12-09 RX ADMIN — ALUMINUM HYDROXIDE, MAGNESIUM HYDROXIDE, AND SIMETHICONE 30 ML: 200; 200; 20 SUSPENSION ORAL at 05:17

## 2024-12-09 RX ADMIN — SODIUM CHLORIDE 1000 ML: 0.9 INJECTION, SOLUTION INTRAVENOUS at 05:24

## 2024-12-09 RX ADMIN — FAMOTIDINE 20 MG: 10 INJECTION, SOLUTION INTRAVENOUS at 05:18

## 2024-12-09 RX ADMIN — ONDANSETRON 4 MG: 2 INJECTION INTRAMUSCULAR; INTRAVENOUS at 05:18

## 2024-12-09 NOTE — ED PROVIDER NOTES
Time reflects when diagnosis was documented in both MDM as applicable and the Disposition within this note       Time User Action Codes Description Comment    12/9/2024  5:40 AM Wallace Marcus [R11.2] Nausea and vomiting     12/9/2024  5:40 AM Wallace Marcus [R10.84] Generalized abdominal pain           ED Disposition       ED Disposition   Left from Room after Provider Exam    Condition   --    Date/Time   Mon Dec 9, 2024  5:48 AM    Comment   --             Assessment & Plan       Medical Decision Making  Patient is a 44-year-old female with past medical history of anxiety and depression, dysthymic disorder, cyclical vomiting syndrome presented to ED with multiple episodes of vomiting that started 12/2. The patient is states that she has been having upper abdominal pain, vomiting since Monday. Patient went to [facility] for vomiting but a left after being evaluated by provider.  Patient came in yesterday asking for Haldol and fentanyl.  Physical exam shows normal active bowel sounds with epigastric tenderness.  DDx including but not limited to: appendicitis, gastroenteritis, gastritis, PUD, GERD, gastroparesis, hepatitis, pancreatitis, colitis, enteritis, diverticulitis, food poisoning, mesenteric adenitis, epiploic appendagitis, mesenteric panniculitis, mesenteric ischemia, IBD, IBS, ileus, bowel obstruction, volvulus, internal hernia, AAA, cholecystitis, biliary colic, choledocholithiasis, perforated viscus, tumor, splenic etiology, constipation, pelvic pathology, renal colic, pyelonephritis, UTI, drug-seeking, malingering; doubt cardiac etiology. Patient given IV fluids, famotidine, Maalox, Toradol, Zofran, and droperidol, for abdominal pain and nausea vomiting as seen in the chart worked in the past charts.  CBC, CMP, lipase collected.  Nurse notified me that patient wanted ibuprofen as she had to leave.  Tried to work on AMA paperwork for patient leaving prior to evaluation.  Patient fled to ED  after IV removed telling nurse that she could not wait any longer in the chair to go home right now.  Patient left home in stable condition at this time.  CBC, showed leukocytosis at 13 which is downtrending from labs performed at [facility] yesterday.  Although CMP did show hypokalemia 2.9, as well as hypochloremia at 93.    Amount and/or Complexity of Data Reviewed  Labs: ordered.    Risk  OTC drugs.  Prescription drug management.             Medications   Famotidine (PF) (PEPCID) injection 20 mg (20 mg Intravenous Given 12/9/24 0518)   aluminum-magnesium hydroxide-simethicone (MAALOX) oral suspension 30 mL (30 mL Oral Given 12/9/24 0517)   ketorolac (TORADOL) injection 15 mg (15 mg Intravenous Given 12/9/24 0518)   ondansetron (ZOFRAN) injection 4 mg (4 mg Intravenous Given 12/9/24 0518)   droperidol (INAPSINE) injection 0.625 mg (0.625 mg Intravenous Given 12/9/24 0518)   sodium chloride 0.9 % bolus 1,000 mL (0 mL Intravenous Stopped 12/9/24 0541)       ED Risk Strat Scores                           SBIRT 22yo+      Flowsheet Row Most Recent Value   Initial Alcohol Screen: US AUDIT-C     1. How often do you have a drink containing alcohol? 0 Filed at: 12/09/2024 0436   2. How many drinks containing alcohol do you have on a typical day you are drinking?  0 Filed at: 12/09/2024 0436   3b. FEMALE Any Age, or MALE 65+: How often do you have 4 or more drinks on one occassion? 0 Filed at: 12/09/2024 0436   Audit-C Score 0 Filed at: 12/09/2024 0436   ZULMA: How many times in the past year have you...    Used an illegal drug or used a prescription medication for non-medical reasons? Never Filed at: 12/09/2024 0436                            History of Present Illness       Chief Complaint   Patient presents with    Vomiting     Patient c/o of nausea and vomiting for the last week.  Patient states upper abdominal pain currently and is has not had any relief since she was seen here last Tuesday.        Past Medical History:    Diagnosis Date    Anxiety     Arthritis     OA  b/l knees    Asthma     Approx 2 years ago    Calculus of gallbladder without cholecystitis without obstruction 2022    Chronic kidney disease     Depression     GERD (gastroesophageal reflux disease)     In my 20s. Approx 15 years ago    Hx of bleeding disorder     dysmennorrhea-dx lap today 2016    Hypertension     Kidney stone     Obesity     Seasonal allergies       Past Surgical History:   Procedure Laterality Date     SECTION      CHOLECYSTECTOMY      COLONOSCOPY      DIAGNOSTIC LAPAROSCOPY      DILATION AND CURETTAGE OF UTERUS      HERNIA REPAIR      AK COLONOSCOPY FLX DX W/COLLJ SPEC WHEN PFRMD N/A 2018    Procedure: EGD AND COLONOSCOPY;  Surgeon: Allan Duncan MD;  [facility];  Service: Gastroenterology    AK LAPAROSCOPY SURG CHOLECYSTECTOMY N/A 2022    Procedure: ROBOTIC LAPAROSCOPIC CHOLECYSTECTOMY;  Surgeon: Griffin Triplett DO;  [facility];  Service: General    AK LAPAROSCOPY W/RMVL ADNEXAL STRUCTURES Bilateral 2016    Procedure: CYSTECTOMY  OVARIAN;  Surgeon: C William Riedel, DO;  [facility];  Service: Gynecology    AK LAPS ABD PRTM&OMENTUM DX W/WO SPEC BR/WA SPX N/A 2016    Procedure: LAPAROSCOPY DIAGNOSTIC DAVID;  Surgeon: C William Riedel, DO;  [facility];  Service: Gynecology    AK RPR UMBILICAL HRNA 5 YRS/> REDUCIBLE N/A 2022    Procedure: UMBILICAL HERNIA REPAIR;  Surgeon: Griffin Triplett DO;  [facility];  Service: General    UPPER GASTROINTESTINAL ENDOSCOPY      WISDOM TOOTH EXTRACTION        Family History   Problem Relation Age of Onset    No Known Problems Sister     Autism Daughter     Lung cancer Maternal Grandmother     Cancer Maternal Grandmother         Deceassd 10 years    Diabetes Maternal Grandfather     Arthritis Maternal Grandfather          10 years    Aneurysm Paternal Grandmother     No Known Problems Sister       Social  History     Tobacco Use    Smoking status: Every Day     Current packs/day: 1.00     Average packs/day: 1 pack/day for 23.0 years (23.0 ttl pk-yrs)     Types: Cigarettes    Smokeless tobacco: Never   Vaping Use    Vaping status: Never Used   Substance Use Topics    Alcohol use: Never     Comment: social    Drug use: Not Currently     Frequency: 7.0 times per week     Types: Marijuana     Comment: No longer using marijuana 5/16/2024      E-Cigarette/Vaping    E-Cigarette Use Never User       E-Cigarette/Vaping Substances    Nicotine No     THC No     CBD No     Flavoring No     Other No     Unknown No       I have reviewed and agree with the history as documented.     Patient is a 44-year-old female with past medical history of anxiety and depression, dysthymic disorder, cyclical vomiting syndrome presented to ED with multiple episodes of vomiting that started 12/2. The patient is states that she has been having upper abdominal pain, vomiting since Monday. Patient went to [facility] for vomiting but a left after being evaluated by provider.  Patient came in yesterday asking for Haldol and fentanyl. Complains of upper abdominal pain that she states the pain is not that bad but states that she cannot keep anything down.. She took ondansetron for vomiting which did not help.  Patient states that her mouth is extremely dry. Denies any chest pain, shortness of breath, palpitations, headache, loss of consciousness, urinary frequency urgency or burning micturition      Vomiting  Associated symptoms: abdominal pain    Associated symptoms: no arthralgias, no chills, no cough, no diarrhea, no fever, no headaches and no sore throat        Review of Systems   Constitutional:  Negative for appetite change, chills, diaphoresis and fever.   HENT:  Negative for congestion, dental problem, ear discharge, ear pain, postnasal drip, sinus pressure, sinus pain, sneezing and sore throat.    Eyes:  Negative for pain, discharge,  itching and visual disturbance.   Respiratory:  Negative for cough, choking, chest tightness, shortness of breath, wheezing and stridor.    Cardiovascular:  Negative for chest pain and palpitations.   Gastrointestinal:  Positive for abdominal pain and vomiting. Negative for constipation, diarrhea and nausea.   Genitourinary:  Negative for dysuria and hematuria.   Musculoskeletal:  Negative for arthralgias and back pain.   Skin:  Negative for color change and rash.   Neurological:  Negative for dizziness, seizures, syncope, weakness, numbness and headaches.   All other systems reviewed and are negative.          Objective       ED Triage Vitals   Temperature Pulse Blood Pressure Respirations SpO2 Patient Position - Orthostatic VS   12/09/24 0434 12/09/24 0434 12/09/24 0434 12/09/24 0434 12/09/24 0434 12/09/24 0527   97.8 °F (36.6 °C) 95 146/79 20 100 % Sitting      Temp Source Heart Rate Source BP Location FiO2 (%) Pain Score    12/09/24 0434 12/09/24 0527 12/09/24 0527 -- 12/09/24 0518    Oral Monitor Right arm  10 - Worst Possible Pain      Vitals      Date and Time Temp Pulse SpO2 Resp BP Pain Score FACES Pain Rating User   12/09/24 0527 -- 73 98 % 20 155/90 10 - Worst Possible Pain -- [facility]   12/09/24 0518 -- -- -- -- -- 10 - Worst Possible Pain -- [facility]   12/09/24 0434 97.8 °F (36.6 °C) 95 100 % 20 146/79 -- -- ZC            Physical Exam  Vitals and nursing note reviewed.   Constitutional:       General: She is not in acute distress.     Appearance: Normal appearance. She is not ill-appearing or toxic-appearing.   HENT:      Head: Normocephalic and atraumatic.      Right Ear: Tympanic membrane normal. There is no impacted cerumen.      Left Ear: Tympanic membrane normal. There is no impacted cerumen.      Nose: No congestion or rhinorrhea.      Mouth/Throat:      Mouth: Mucous membranes are moist.      Pharynx: Oropharynx is clear. No oropharyngeal exudate or posterior oropharyngeal erythema.   Eyes:      General:          Right eye: No discharge.         Left eye: No discharge.   Cardiovascular:      Rate and Rhythm: Normal rate and regular rhythm.      Heart sounds: No murmur heard.     No friction rub. No gallop.   Pulmonary:      Effort: No respiratory distress.      Breath sounds: No stridor. No wheezing, rhonchi or rales.   Chest:      Chest wall: No tenderness.   Abdominal:      General: There is no distension or abdominal bruit. There are no signs of injury.      Palpations: There is no shifting dullness, fluid wave, hepatomegaly, splenomegaly, mass or pulsatile mass.      Tenderness: There is abdominal tenderness in the epigastric area. There is no right CVA tenderness, left CVA tenderness, guarding or rebound. Negative signs include Nevarez's sign, Rovsing's sign, McBurney's sign, psoas sign and obturator sign.      Hernia: No hernia is present.   Musculoskeletal:      Cervical back: No rigidity or tenderness.   Lymphadenopathy:      Cervical: No cervical adenopathy.   Skin:     General: Skin is warm.      Capillary Refill: Capillary refill takes less than 2 seconds.      Coloration: Skin is not jaundiced or pale.      Findings: No bruising, erythema, lesion or rash.   Neurological:      General: No focal deficit present.      Mental Status: She is alert and oriented to person, place, and time.         Results Reviewed       Procedure Component Value Units Date/Time    Comprehensive metabolic panel [[id number]]  (Abnormal) Collected: 12/09/24 0517    Lab Status: Final result Specimen: Blood from Arm, Right Updated: 12/09/24 0551     Sodium 130 mmol/L      Potassium 2.9 mmol/L      Chloride 93 mmol/L      CO2 27 mmol/L      ANION GAP 10 mmol/L      BUN 11 mg/dL      Creatinine 0.93 mg/dL      Glucose 129 mg/dL      Calcium 9.8 mg/dL      AST 20 U/L      ALT 25 U/L      Alkaline Phosphatase 94 U/L      Total Protein 7.6 g/dL      Albumin 4.4 g/dL      Total Bilirubin 0.82 mg/dL      eGFR 73 ml/min/1.73sq m     Narrative:       National Kidney Disease Foundation guidelines for Chronic Kidney Disease (CKD):     Stage 1 with normal or high GFR (GFR > 90 mL/min/1.73 square meters)    Stage 2 Mild CKD (GFR = 60-89 mL/min/1.73 square meters)    Stage 3A Moderate CKD (GFR = 45-59 mL/min/1.73 square meters)    Stage 3B Moderate CKD (GFR = 30-44 mL/min/1.73 square meters)    Stage 4 Severe CKD (GFR = 15-29 mL/min/1.73 square meters)    Stage 5 End Stage CKD (GFR <15 mL/min/1.73 square meters)  Note: GFR calculation is accurate only with a steady state creatinine    Lipase [[id number]]  (Normal) Collected: 12/09/24 0517    Lab Status: Final result Specimen: Blood from Arm, Right Updated: 12/09/24 0551     Lipase 37 u/L     CBC and differential [[id number]]  (Abnormal) Collected: 12/09/24 0517    Lab Status: Final result Specimen: Blood from Arm, Right Updated: 12/09/24 0534     WBC 13.07 Thousand/uL      RBC 3.84 Million/uL      Hemoglobin 12.6 g/dL      Hematocrit 36.2 %      MCV 94 fL      MCH 32.8 pg      MCHC 34.8 g/dL      RDW 14.0 %      MPV 9.9 fL      Platelets 300 Thousands/uL      nRBC 0 /100 WBCs      Segmented % 64 %      Immature Grans % 1 %      Lymphocytes % 23 %      Monocytes % 12 %      Eosinophils Relative 0 %      Basophils Relative 0 %      Absolute Neutrophils 8.41 Thousands/µL      Absolute Immature Grans 0.07 Thousand/uL      Absolute Lymphocytes 3.01 Thousands/µL      Absolute Monocytes 1.51 Thousand/µL      Eosinophils Absolute 0.04 Thousand/µL      Basophils Absolute 0.03 Thousands/µL     UA w Reflex to Microscopic w Reflex to Culture [[id number]]     Lab Status: No result Specimen: Urine             No orders to display       Procedures    ED Medication and Procedure Management   Prior to Admission Medications   Prescriptions Last Dose Informant Patient Reported? Taking?   LORazepam (ATIVAN) 1 mg tablet  Self No No   Sig: Take 1 tablet (1 mg total) by mouth daily as needed for anxiety   QUEtiapine (SEROquel) 50 mg  tablet  Self No No   Sig: TAKE 1 TABLET BY MOUTH DAILY AT BEDTIME   albuterol (PROVENTIL HFA,VENTOLIN HFA) 90 mcg/act inhaler   No No   Sig: Inhale 2 puffs every 6 (six) hours as needed for wheezing   busPIRone (BUSPAR) 5 mg tablet  Self No No   Sig: TAKE 1 TABLET BY MOUTH TWICE A DAY   cyclobenzaprine (FLEXERIL) 5 mg tablet  Self No No   Sig: Take 1 tablet (5 mg total) by mouth 3 (three) times a day as needed for muscle spasms   dicyclomine (BENTYL) 10 mg capsule  Self No No   Sig: TAKE 1 CAPSULE BY MOUTH 4 TIMES A DAY (BEFORE MEALS AND AT BEDTIME)   famotidine (PEPCID) 20 mg tablet  Self No No   Sig: Take 1 tablet (20 mg total) by mouth 2 (two) times a day   gabapentin (NEURONTIN) 600 MG tablet  Self No No   Sig: TAKE 1 TABLET BY MOUTH THREE TIMES A DAY   hydrOXYzine pamoate (VISTARIL) 25 mg capsule  Self No No   Sig: TAKE 1 CAPSULE BY MOUTH 3 TIMES A DAY AS NEEDED FOR ANXIETY   lisinopril (ZESTRIL) 10 mg tablet  Self No No   Sig: Take 1 tablet (10 mg total) by mouth daily   meloxicam (MOBIC) 15 mg tablet   No No   Sig: TAKE 1 TABLET BY MOUTH DAILY AS NEEDED FOR MODERATE PAIN.   omeprazole (PriLOSEC) 40 MG capsule   No No   Sig: TAKE 1 CAPSULE BY MOUTH TWICE A DAY   ondansetron (ZOFRAN) 4 mg tablet  Self No No   Sig: Take 1 tablet (4 mg total) by mouth every 6 (six) hours   ondansetron (ZOFRAN-ODT) 4 mg disintegrating tablet  Self No No   Sig: Take 2 tablets (8 mg total) by mouth every 8 (eight) hours as needed for nausea or vomiting   sertraline (ZOLOFT) 100 mg tablet  Self No No   Sig: TAKE 2 TABLETS BY MOUTH EVERY DAY      Facility-Administered Medications: None     Discharge Medication List as of 12/9/2024  5:41 AM        CONTINUE these medications which have NOT CHANGED    Details   albuterol (PROVENTIL HFA,VENTOLIN HFA) 90 mcg/act inhaler Inhale 2 puffs every 6 (six) hours as needed for wheezing, Starting Wed 10/16/2024, Normal      busPIRone (BUSPAR) 5 mg tablet TAKE 1 TABLET BY MOUTH TWICE A DAY, Starting  Mon 9/30/2024, Normal      cyclobenzaprine (FLEXERIL) 5 mg tablet Take 1 tablet (5 mg total) by mouth 3 (three) times a day as needed for muscle spasms, Starting Wed 6/19/2024, Normal      dicyclomine (BENTYL) 10 mg capsule TAKE 1 CAPSULE BY MOUTH 4 TIMES A DAY (BEFORE MEALS AND AT BEDTIME), Normal      famotidine (PEPCID) 20 mg tablet Take 1 tablet (20 mg total) by mouth 2 (two) times a day, Starting Tue 9/24/2024, Normal      gabapentin (NEURONTIN) 600 MG tablet TAKE 1 TABLET BY MOUTH THREE TIMES A DAY, Starting Wed 6/19/2024, Normal      hydrOXYzine pamoate (VISTARIL) 25 mg capsule TAKE 1 CAPSULE BY MOUTH 3 TIMES A DAY AS NEEDED FOR ANXIETY, Normal      lisinopril (ZESTRIL) 10 mg tablet Take 1 tablet (10 mg total) by mouth daily, Starting Tue 9/3/2024, Normal      LORazepam (ATIVAN) 1 mg tablet Take 1 tablet (1 mg total) by mouth daily as needed for anxiety, Starting Tue 9/3/2024, Normal      meloxicam (MOBIC) 15 mg tablet TAKE 1 TABLET BY MOUTH DAILY AS NEEDED FOR MODERATE PAIN., Starting Tue 10/29/2024, Normal      omeprazole (PriLOSEC) 40 MG capsule TAKE 1 CAPSULE BY MOUTH TWICE A DAY, Starting Fri 11/29/2024, Normal      ondansetron (ZOFRAN) 4 mg tablet Take 1 tablet (4 mg total) by mouth every 6 (six) hours, Starting Tue 9/24/2024, Normal      ondansetron (ZOFRAN-ODT) 4 mg disintegrating tablet Take 2 tablets (8 mg total) by mouth every 8 (eight) hours as needed for nausea or vomiting, Starting Tue 9/3/2024, Normal      QUEtiapine (SEROquel) 50 mg tablet TAKE 1 TABLET BY MOUTH DAILY AT BEDTIME, Starting Tue 10/1/2024, Normal      sertraline (ZOLOFT) 100 mg tablet TAKE 2 TABLETS BY MOUTH EVERY DAY, Normal           No discharge procedures on file.  ED SEPSIS DOCUMENTATION   Time reflects when diagnosis was documented in both MDM as applicable and the Disposition within this note       Time User Action Codes Description Comment    12/9/2024  5:40 AM Wallace Marcus Add [R11.2] Nausea and vomiting     12/9/2024   5:40 AM Wallace Marcus Add [R10.84] Generalized abdominal pain                  Wallace Marcus PA-C  12/09/24 0656       Wallace Marcus PA-C  12/09/24 0658

## 2024-12-09 NOTE — DISCHARGE INSTRUCTIONS
Take medication as prescribed  Follow-up with PCP in outpatient setting for recurrent abdominal pain and nausea and vomiting.  Return to emergency department increasing fever, bodies, chills, abdominal pain that is worse than baseline, inability to keep any food down, or bloody vomiting or diarrhea.

## 2024-12-11 ENCOUNTER — ANESTHESIA EVENT (OUTPATIENT)
Dept: GASTROENTEROLOGY | Facility: AMBULARY SURGERY CENTER | Age: 44
End: 2024-12-11
Payer: COMMERCIAL

## 2024-12-11 ENCOUNTER — HOSPITAL ENCOUNTER (OUTPATIENT)
Dept: GASTROENTEROLOGY | Facility: AMBULARY SURGERY CENTER | Age: 44
Setting detail: OUTPATIENT SURGERY
Discharge: HOME | End: 2024-12-11
Payer: COMMERCIAL

## 2024-12-11 ENCOUNTER — TELEPHONE (OUTPATIENT)
Age: 44
End: 2024-12-11

## 2024-12-11 VITALS
RESPIRATION RATE: 19 BRPM | DIASTOLIC BLOOD PRESSURE: 95 MMHG | TEMPERATURE: 97.5 F | OXYGEN SATURATION: 100 % | HEART RATE: 62 BPM | HEIGHT: 60 IN | SYSTOLIC BLOOD PRESSURE: 157 MMHG | BODY MASS INDEX: 31.41 KG/M2 | WEIGHT: 160 LBS

## 2024-12-11 DIAGNOSIS — K21.9 GASTROESOPHAGEAL REFLUX DISEASE, UNSPECIFIED WHETHER ESOPHAGITIS PRESENT: ICD-10-CM

## 2024-12-11 DIAGNOSIS — F41.9 ANXIETY: ICD-10-CM

## 2024-12-11 DIAGNOSIS — R11.2 NAUSEA AND VOMITING, UNSPECIFIED VOMITING TYPE: ICD-10-CM

## 2024-12-11 DIAGNOSIS — Z12.11 SCREENING FOR COLON CANCER: ICD-10-CM

## 2024-12-11 PROCEDURE — 88305 TISSUE EXAM BY PATHOLOGIST: CPT | Performed by: PATHOLOGY

## 2024-12-11 PROCEDURE — G0121 COLON CA SCRN NOT HI RSK IND: HCPCS | Performed by: INTERNAL MEDICINE

## 2024-12-11 PROCEDURE — 43239 EGD BIOPSY SINGLE/MULTIPLE: CPT | Performed by: INTERNAL MEDICINE

## 2024-12-11 RX ORDER — PROPOFOL 10 MG/ML
INJECTION, EMULSION INTRAVENOUS AS NEEDED
Status: DISCONTINUED | OUTPATIENT
Start: 2024-12-11 | End: 2024-12-11

## 2024-12-11 RX ORDER — FAMOTIDINE 40 MG/1
40 TABLET, FILM COATED ORAL
Qty: 30 TABLET | Refills: 6 | Status: SHIPPED | OUTPATIENT
Start: 2024-12-11

## 2024-12-11 RX ORDER — LIDOCAINE HYDROCHLORIDE 20 MG/ML
INJECTION, SOLUTION EPIDURAL; INFILTRATION; INTRACAUDAL; PERINEURAL AS NEEDED
Status: DISCONTINUED | OUTPATIENT
Start: 2024-12-11 | End: 2024-12-11

## 2024-12-11 RX ORDER — SERTRALINE HYDROCHLORIDE 100 MG/1
200 TABLET, FILM COATED ORAL DAILY
Qty: 180 TABLET | Refills: 1 | Status: SHIPPED | OUTPATIENT
Start: 2024-12-11

## 2024-12-11 RX ORDER — SODIUM CHLORIDE, SODIUM LACTATE, POTASSIUM CHLORIDE, CALCIUM CHLORIDE 600; 310; 30; 20 MG/100ML; MG/100ML; MG/100ML; MG/100ML
INJECTION, SOLUTION INTRAVENOUS CONTINUOUS PRN
Status: DISCONTINUED | OUTPATIENT
Start: 2024-12-11 | End: 2024-12-11

## 2024-12-11 RX ORDER — SUCRALFATE ORAL 1 G/10ML
1 SUSPENSION ORAL 4 TIMES DAILY
Qty: 1200 ML | Refills: 1 | Status: SHIPPED | OUTPATIENT
Start: 2024-12-11

## 2024-12-11 RX ORDER — MIDAZOLAM HYDROCHLORIDE 2 MG/2ML
INJECTION, SOLUTION INTRAMUSCULAR; INTRAVENOUS AS NEEDED
Status: DISCONTINUED | OUTPATIENT
Start: 2024-12-11 | End: 2024-12-11

## 2024-12-11 RX ORDER — FENTANYL CITRATE 50 UG/ML
INJECTION, SOLUTION INTRAMUSCULAR; INTRAVENOUS AS NEEDED
Status: DISCONTINUED | OUTPATIENT
Start: 2024-12-11 | End: 2024-12-11

## 2024-12-11 RX ADMIN — FENTANYL CITRATE 50 MCG: 50 INJECTION INTRAMUSCULAR; INTRAVENOUS at 15:35

## 2024-12-11 RX ADMIN — PROPOFOL 110 MG: 10 INJECTION, EMULSION INTRAVENOUS at 15:31

## 2024-12-11 RX ADMIN — FENTANYL CITRATE 50 MCG: 50 INJECTION INTRAMUSCULAR; INTRAVENOUS at 15:27

## 2024-12-11 RX ADMIN — PROPOFOL 110 MCG/KG/MIN: 10 INJECTION, EMULSION INTRAVENOUS at 15:36

## 2024-12-11 RX ADMIN — MIDAZOLAM 2 MG: 1 INJECTION INTRAMUSCULAR; INTRAVENOUS at 14:34

## 2024-12-11 RX ADMIN — LIDOCAINE HYDROCHLORIDE 100 MG: 20 INJECTION, SOLUTION EPIDURAL; INFILTRATION; INTRACAUDAL at 15:31

## 2024-12-11 RX ADMIN — SODIUM CHLORIDE, SODIUM LACTATE, POTASSIUM CHLORIDE, AND CALCIUM CHLORIDE: .6; .31; .03; .02 INJECTION, SOLUTION INTRAVENOUS at 14:49

## 2024-12-11 NOTE — ANESTHESIA PREPROCEDURE EVALUATION
Procedure:  COLONOSCOPY  EGD    Relevant Problems   CARDIO   (+) Essential hypertension   (+) Primary hypertension   (+) Varicose veins of both legs with edema      GI/HEPATIC   (+) GERD (gastroesophageal reflux disease)      /RENAL   (+) JACKI (acute kidney injury) (HCC)      MUSCULOSKELETAL   (+) Acute bilateral low back pain with right-sided sciatica   (+) Acute right-sided low back pain with right-sided sciatica      NEURO/PSYCH   (+) Anxiety   (+) Left hand paresthesia   (+) Mild episode of recurrent major depressive disorder (HCC)      PULMONARY   (+) Mild intermittent asthma without complication      Daily smoker    Physical Exam    Airway    Mallampati score: II  TM Distance: >3 FB  Neck ROM: full     Dental    lower dentures and upper dentures    Cardiovascular  Cardiovascular exam normal    Pulmonary  Pulmonary exam normal     Other Findings  post-pubertal.      Anesthesia Plan  ASA Score- 2     Anesthesia Type- IV sedation with anesthesia with ASA Monitors.         Additional Monitors:     Airway Plan:            Plan Factors-    Chart reviewed. EKG reviewed.  Existing labs reviewed. Patient summary reviewed.                  Induction- intravenous.    Postoperative Plan-         Informed Consent- Anesthetic plan and risks discussed with patient.  I personally reviewed this patient with the CRNA. Discussed and agreed on the Anesthesia Plan with the CRNA..

## 2024-12-11 NOTE — H&P
History and Physical - [facility]  Melia Lira 44 y.o. female MRN: [id number]    HPI: Melia Lira is a 44 y.o. year old female who presents with GERD, N,V, screening      Review of Systems    Historical Information   Past Medical History:   Diagnosis Date    Anxiety     Arthritis     OA  b/l knees    Asthma     Approx 2 years ago    Calculus of gallbladder without cholecystitis without obstruction 2022    Chronic kidney disease     Depression     GERD (gastroesophageal reflux disease)     In my 20s. Approx 15 years ago    Hx of bleeding disorder     dysmennorrhea-dx lap today 2016    Hypertension     Kidney stone     Obesity     Seasonal allergies      Past Surgical History:   Procedure Laterality Date     SECTION      CHOLECYSTECTOMY      COLONOSCOPY      DIAGNOSTIC LAPAROSCOPY      DILATION AND CURETTAGE OF UTERUS      HERNIA REPAIR      MS COLONOSCOPY FLX DX W/COLLJ SPEC WHEN PFRMD N/A 2018    Procedure: EGD AND COLONOSCOPY;  Surgeon: Allan Duncan MD;  [facility];  Service: Gastroenterology    MS LAPAROSCOPY SURG CHOLECYSTECTOMY N/A 2022    Procedure: ROBOTIC LAPAROSCOPIC CHOLECYSTECTOMY;  Surgeon: Griffin Triplett DO;  [facility];  Service: General    MS LAPAROSCOPY W/RMVL ADNEXAL STRUCTURES Bilateral 2016    Procedure: CYSTECTOMY  OVARIAN;  Surgeon: C William Riedel, DO;  [facility];  Service: Gynecology    MS LAPS ABD PRTM&OMENTUM DX W/WO SPEC BR/WA SPX N/A 2016    Procedure: LAPAROSCOPY DIAGNOSTIC DAVID;  Surgeon: C William Riedel, DO;  [facility];  Service: Gynecology    MS RPR UMBILICAL HRNA 5 YRS/> REDUCIBLE N/A 2022    Procedure: UMBILICAL HERNIA REPAIR;  Surgeon: Griffin Triplett DO;  [facility];  Service: General    UPPER GASTROINTESTINAL ENDOSCOPY      WISDOM TOOTH EXTRACTION       Social History   Social History     Substance and Sexual Activity   Alcohol Use Never     Comment: social     Social History     Substance and Sexual Activity   Drug Use Not Currently    Frequency: 7.0 times per week    Types: Marijuana    Comment: No longer using marijuana 2024     Social History     Tobacco Use   Smoking Status Every Day    Current packs/day: 1.00    Average packs/day: 1 pack/day for 23.0 years (23.0 ttl pk-yrs)    Types: Cigarettes   Smokeless Tobacco Never     Family History   Problem Relation Age of Onset    No Known Problems Sister     Autism Daughter     Lung cancer Maternal Grandmother     Cancer Maternal Grandmother         Deceassd 10 years    Diabetes Maternal Grandfather     Arthritis Maternal Grandfather          10 years    Aneurysm Paternal Grandmother     No Known Problems Sister        Meds/Allergies     Not in a hospital admission.    Allergies   Allergen Reactions    Eggs Or Egg-Derived Products - Food Allergy Other (See Comments)     abd pain       Objective     BP (!) 174/89   Pulse 65   Temp (!) 96.9 °F (36.1 °C) (Temporal)   Resp 16   Ht 5' (1.524 m)   Wt 72.6 kg (160 lb)   LMP  (LMP Unknown)   SpO2 99%   BMI 31.25 kg/m²       PHYSICAL EXAM    Gen: NAD  CV: RRR  CHEST: Clear  ABD: soft, NT/ND  EXT: no edema  Neuro: AAO      ASSESSMENT/PLAN:  This is a 44 y.o. year old female here for GERD, N,V, screening    PLAN:   Procedure: egd/colonoscopy

## 2024-12-11 NOTE — ANESTHESIA POSTPROCEDURE EVALUATION
Post-Op Assessment Note    CV Status:  Stable  Pain Score: 0    Pain management: adequate       Mental Status:  Awake and alert   Hydration Status:  Stable   PONV Controlled:  None   Airway Patency:  Patent and adequate     Post Op Vitals Reviewed: Yes    No anethesia notable event occurred.    Staff: CRNA, Anesthesiologist           Last Filed PACU Vitals:  Vitals Value Taken Time   Temp     Pulse 59    /65    Resp 16    SpO2 98        Modified Ruth Ann:  Activity: 2 (12/11/2024  2:10 PM)  Respiration: 2 (12/11/2024  2:10 PM)  Circulation: 2 (12/11/2024  2:10 PM)  Consciousness: 2 (12/11/2024  2:10 PM)  Oxygen Saturation: 2 (12/11/2024  2:10 PM)  Modified Ruth Ann Score: 10 (12/11/2024  2:10 PM)

## 2024-12-11 NOTE — TELEPHONE ENCOUNTER
Pt wanted to advise pcp she is going to stop taking the zoloft and other medications that were prescribed to her for anxiety. She states she feels taking all these may be making it worse. She is asking if there is anything Chela would suggest she take to help with the anxiety but only for her to take maybe at night only when needed. Please advise, thank you.

## 2024-12-12 NOTE — TELEPHONE ENCOUNTER
Please see pt message. Pt stopping all current anxiety medication, but is asking for something she can take at night/ as needed.

## 2024-12-12 NOTE — TELEPHONE ENCOUNTER
Patient called back and was frustrated she had not had a call back yet.  She stated that she would like a call back by the end of the day about the medication.    Thank you!

## 2024-12-12 NOTE — TELEPHONE ENCOUNTER
Please notify patient she should not stop her Zoloft abruptly due to side effects. Although this is likely not making her anxiety worse she needs to taper off if she wants to stop it. I really recommend office visit so we can discuss how to taper her dose and discuss other alternative medications for anxiety. She also has prescription for Ativan that she can take as needed.

## 2024-12-13 DIAGNOSIS — F41.9 ANXIETY: ICD-10-CM

## 2024-12-13 PROCEDURE — 88305 TISSUE EXAM BY PATHOLOGIST: CPT | Performed by: PATHOLOGY

## 2024-12-13 RX ORDER — LORAZEPAM 1 MG/1
1 TABLET ORAL DAILY PRN
Qty: 30 TABLET | Refills: 0 | Status: SHIPPED | OUTPATIENT
Start: 2024-12-13

## 2024-12-13 NOTE — TELEPHONE ENCOUNTER
I read this message to the patient in its entirety. She said she had an upper colonoscopy two weeks ago. She has been off of meds for 2 weeks and ever since her systems have been cleared out she has not been having the stomach pains as she has. She stated we do not care about how she feels. I assured her we do care about how she feels. Pt did not accept my reassurance. Pt states we have two options, either rx her xanax or she will pick another doctor. Pt disconnected. The office may call her back.

## 2024-12-15 ENCOUNTER — RESULTS FOLLOW-UP (OUTPATIENT)
Dept: GASTROENTEROLOGY | Facility: AMBULARY SURGERY CENTER | Age: 44
End: 2024-12-15

## 2024-12-16 ENCOUNTER — PREP FOR PROCEDURE (OUTPATIENT)
Dept: GASTROENTEROLOGY | Facility: CLINIC | Age: 44
End: 2024-12-16

## 2024-12-16 DIAGNOSIS — K21.00 GASTROESOPHAGEAL REFLUX DISEASE WITH ESOPHAGITIS, UNSPECIFIED WHETHER HEMORRHAGE: Primary | ICD-10-CM

## 2024-12-16 NOTE — TELEPHONE ENCOUNTER
----- Message from Allan Duncan MD sent at 12/15/2024  9:26 PM EST -----  The biopsies from your stomach were negative for H. pylori.    As we discussed you have severe acid reflux damage, please take the pantoprazole twice daily.    You should have a repeat upper endoscopy in 3 months time to make sure that the esophagus has healed.    You did not prep well for your colonoscopy and should have a repeat colonoscopy in 1 year.    Please call with any questions or concerns.

## 2024-12-16 NOTE — TELEPHONE ENCOUNTER
Left message for pt to call and schedule. Will call again in 1 wk if she doesn't return call to schedule.

## 2024-12-16 NOTE — TELEPHONE ENCOUNTER
Call pt to schedule repeat EGD in 3 months at [facility] with Dr. Duncan. Order in chart and due early March.

## 2024-12-19 ENCOUNTER — TELEPHONE (OUTPATIENT)
Age: 44
End: 2024-12-19

## 2024-12-19 NOTE — TELEPHONE ENCOUNTER
Patients GI provider:  Dr. CHERY    Number to return call: [phone]    Reason for call: Pt calling office requesting a return to work note. Patients states she has been off work for 3 weeks and is need of a release note to go back Monday. Patient states she also has short term disability paperwork that must be filled out. She will be stopping in office today to drop off.

## 2024-12-19 NOTE — LETTER
[facility]  [address] [address]  [phone]  Dept: [phone]    December 20, 2024     Patient: Melia Lira   YOB: 1980           To Whom it May Concern:    Melia Lira is under the professional care of this office. She may return to school on 12/23/24 without limitations.    If you have any questions or concerns, please don't hesitate to call.         Sincerely,          MONICA Mendenhall

## 2024-12-19 NOTE — TELEPHONE ENCOUNTER
Called and spoke to patient; awaiting paperwork but in the meantime she needs a work note stating she stating she is able/released to go back to work by Monday.      This will be her third week out of work she would need it to cover her from the 3rd of December until this coming Mondays date 12/23/24    Please advise thank you!

## 2024-12-20 NOTE — TELEPHONE ENCOUNTER
Patient calling calling in regards to return to work note for this coming Monday. Per providers message, a note can be given for the day of the EGD/Colonoscopy as she was not seen in the office within the time frame. Patient states all she needs is a note to go to work and that she is not going to ask again. States that she should not have to beg to return to work and is asking who will be paying her  bills. Did inform patient that another message can be sent back to provider. Patient is requesting to speak to someone in the office. Call warm transferred to office clinical team.

## 2024-12-20 NOTE — TELEPHONE ENCOUNTER
Patient called and stated she needs this done by today, if not she is not able to go back to work, she was seen in the office 10/15 please review she needs this done asap thank you

## 2024-12-20 NOTE — TELEPHONE ENCOUNTER
Spoke to patient; she states she needs a note just stating she can go back to work on 12/23/24 is this possible?    Please advise thank you!

## 2024-12-23 ENCOUNTER — DOCUMENTATION (OUTPATIENT)
Dept: GASTROENTEROLOGY | Facility: CLINIC | Age: 44
End: 2024-12-23

## 2024-12-23 NOTE — TELEPHONE ENCOUNTER
Procedure: EGD  Scheduled date of procedure (as of today):03/19/25  Physician performing procedure:Dr. Duncan  Location of procedure:[facility]  Instructions reviewed with patient by: ti  Clearances: n/a

## 2025-01-04 DIAGNOSIS — K21.9 GASTROESOPHAGEAL REFLUX DISEASE, UNSPECIFIED WHETHER ESOPHAGITIS PRESENT: ICD-10-CM

## 2025-01-06 RX ORDER — FAMOTIDINE 40 MG/1
40 TABLET, FILM COATED ORAL
Qty: 90 TABLET | Refills: 1 | Status: SHIPPED | OUTPATIENT
Start: 2025-01-06

## 2025-01-06 RX ORDER — SUCRALFATE ORAL 1 G/10ML
SUSPENSION ORAL
Qty: 3600 ML | Refills: 1 | Status: SHIPPED | OUTPATIENT
Start: 2025-01-06

## 2025-01-10 DIAGNOSIS — F41.9 ANXIETY: ICD-10-CM

## 2025-01-11 RX ORDER — HYDROXYZINE PAMOATE 25 MG/1
CAPSULE ORAL
Qty: 270 CAPSULE | Refills: 1 | Status: SHIPPED | OUTPATIENT
Start: 2025-01-11

## 2025-01-15 ENCOUNTER — PATIENT MESSAGE (OUTPATIENT)
Dept: FAMILY MEDICINE CLINIC | Facility: CLINIC | Age: 45
End: 2025-01-15

## 2025-01-15 DIAGNOSIS — M54.41 ACUTE BILATERAL LOW BACK PAIN WITH RIGHT-SIDED SCIATICA: Primary | ICD-10-CM

## 2025-01-20 ENCOUNTER — CONSULT (OUTPATIENT)
Dept: PAIN MEDICINE | Facility: CLINIC | Age: 45
End: 2025-01-20
Payer: COMMERCIAL

## 2025-01-20 VITALS — WEIGHT: 160 LBS | BODY MASS INDEX: 31.41 KG/M2 | HEIGHT: 60 IN

## 2025-01-20 DIAGNOSIS — M54.16 LUMBAR RADICULOPATHY: Primary | ICD-10-CM

## 2025-01-20 DIAGNOSIS — M46.1 SACROILIITIS, NOT ELSEWHERE CLASSIFIED (HCC): ICD-10-CM

## 2025-01-20 DIAGNOSIS — M54.41 ACUTE BILATERAL LOW BACK PAIN WITH RIGHT-SIDED SCIATICA: ICD-10-CM

## 2025-01-20 DIAGNOSIS — M79.18 MYOFASCIAL PAIN SYNDROME: ICD-10-CM

## 2025-01-20 PROCEDURE — 99204 OFFICE O/P NEW MOD 45 MIN: CPT | Performed by: ANESTHESIOLOGY

## 2025-01-20 NOTE — PROGRESS NOTES
Assessment:  1. Lumbar radiculopathy    2. Acute bilateral low back pain with right-sided sciatica    3. Myofascial pain syndrome    4. Sacroiliitis, not elsewhere classified (HCC)      Patient presenting with chronic back pain for greater than 15 years, worsening over the past several months.    Pain is multifactorial with possible components of lumbar radicular pain, lumbar spondylosis, sacroiliac joint dysfunction, myofascial pain accompanied by pain >7/10 on the pain scale with inability to participate in IADLs for >6 weeks. Patient has participated with chiropractic therapy.  Patient has tried Tylenol, topical lidocaine, NSAIDs, muscle relaxants, gabapentin with limited benefit.    Denies any bowel or bladder incontinence, saddle anesthesia.    In regards to the patient's pathology, we discussed the various treatment options including chiropractic treatment, medication management, activity modifications, interventional spine procedures.  Given that patient has not had any benefit with conservative treatments, I think patient would benefit from targeted interventional treatment modalities.    Independently reviewed and interpreted recent lumbar Xrs which showed no significant osseous abnormalities.    Plan:    Patient does fulfill the criteria for sacroiliac joint injection at this time with the following:  -Moderate to severe low back pain over the anatomical location of the bilateral sacroiliac joint below L5,  -Pain duration of at least 3 months,  -No untreated radiculopathy,  -3 positive provocative maneuvers noted on exam: Roxanne, compression, distraction test  -Inadequate response to conservative methods for 4 weeks    The SI joint injection will be performed under fluoroscopic guidance, and subsequent therapeutic sacroiliac joint injections will be done if the diagnostic injections provide at least 75% sustained relief for the duration of the local anesthetic or anti-inflammatory .    Will obtian lumbar  MRI given radicular pain component (RLE) with >1 year history of chronic radicular symptoms, worsening.    PT referral placed for additional physical treatment modalities.    Reviewed PCP office notes.    Reviewed hemoglobin A1c, renal function, CBC and/or PT/INR prior to discussing/offering interventional modalities.    [place] Prescription Drug Monitoring Program report was reviewed and was appropriate     My impressions and treatment recommendations were discussed in detail with the patient who verbalized understanding and had no further questions.  Discharge instructions were provided. I personally saw and examined the patient and I agree with the above discussed plan of care.    Orders Placed This Encounter   Procedures    FL spine and pain procedure     Standing Status:   Future     Expected Date:   1/20/2025     Expiration Date:   1/20/2029     Reason for Exam::   bilateral SIJ injections     Is the patient pregnant?:   No     Anticoagulant hold needed?:   no    MRI lumbar spine wo contrast     Standing Status:   Future     Expected Date:   1/20/2025     Expiration Date:   1/20/2029     Scheduling Instructions:      There is no preparation for this test. Please leave your jewelry and valuables at home, wedding rings are the exception. All patients will be required to change into a hospital gown and pants.  Street clothes are not permitted in the MRI.  Magnetic nail polish must be removed prior to arrival for your test. Please bring your insurance cards, a form of photo ID and a list of your medications with you. Arrive 15 minutes prior to your appointment time in order to register. Please bring any prior CT or MRI studies of this area that were not performed at a [facility].            To schedule this appointment, please contact Central Scheduling at [phone].            Prior to your appointment, please make sure you complete the MRI Screening Form when you e-Check in for your  appointment. This will be available starting 7 days before your appointment in [employer]. You may receive an e-mail with an activation code if you do not have a [employer] account. If you do not have access to a device, we will complete your screening at your appointment.     Reason for Exam:   lumbar radicular pain     Is the patient pregnant?:   No     What is the patient's sedation requirement?:   No Sedation     Does the patient need medication for Claustrophobia? If yes, order medication at this point.:   No     Does the patient wear a life vest, have an implanted cardiac device, a stimulation device, a sleep apnea stimulator, or a breast tissue expansion device?:   No     Release to patient through [employer]:   Immediate    Ambulatory referral to Physical Therapy     Standing Status:   Future     Expiration Date:   1/20/2026     Referral Priority:   Routine     Referral Type:   Physical Therapy     Referral Reason:   Specialty Services Required     Requested Specialty:   Physical Therapy     Number of Visits Requested:   1     Expiration Date:   1/20/2026     No orders of the defined types were placed in this encounter.      History of Present Illness:  Melia Lira is a 44 y.o. female presenting for consultation at [facility] for exam and evaluation of chronic back and R radiating leg pain for greater than 15 years, worsening over the past several months. Pain started without any precipitating injury or trauma. Over the past month, the intensity of pain has been Severe. Pain is currently 10/10. Pain does interfere with age appropriate activities of daily living. Pain is constant, with no typical pattern throughout the day. Pain is described as burning, cramping, sharp. Patient endorses weakness in the lower extremities. Assistance device used: None.    Worsening factors noted: n/a.   Improving factors noted: n/a.    Treatments tried:   Heat/ice: yes  PT: no  Chiropractic therapy:  yes  Injections: no   Previous spine surgery: No    Anticoagulation: no    Medications tried:   Topical lidocaine, NSAIDs, gabapentin, Flexeril    I have personally reviewed and/or updated the patient's past medical history, past surgical history, family history, social history, current medications, allergies, and vital signs today.     Review of Systems   Constitutional:  Negative for chills and fever.   HENT:  Negative for ear pain and sore throat.    Eyes:  Negative for pain and visual disturbance.   Respiratory:  Negative for cough and shortness of breath.    Cardiovascular:  Negative for chest pain and palpitations.   Gastrointestinal:  Negative for abdominal pain and vomiting.   Genitourinary:  Negative for dysuria and hematuria.   Musculoskeletal:  Positive for back pain, gait problem and myalgias. Negative for arthralgias.   Skin:  Negative for color change and rash.   Neurological:  Positive for weakness and numbness (right leg). Negative for seizures and syncope.   All other systems reviewed and are negative.      Patient Active Problem List   Diagnosis    Anxiety    Neuropathy associated with polyneuropathy, organomegaly, endocrinopathy, monoclonal gammopathy, and skin changes syndrome (HCC)    Mild intermittent asthma without complication    Cystic disease of ovaries    Essential hypertension    GERD (gastroesophageal reflux disease)    Encounter for screening mammogram for malignant neoplasm of breast    Chronic pain of both knees    Dysmenorrhea    Varicose veins of both legs with edema    Insomnia    Tobacco use    Mild episode of recurrent major depressive disorder (HCC)    Marijuana use, continuous    Leukocytosis    Hypokalemia    Menopausal syndrome (hot flashes)    Secondary amenorrhea    Peripheral neuropathy    Right leg pain    Endometriosis determined by laparoscopy    Primary hypertension    History of colonoscopy    Left hand paresthesia    Hyperglycemia    [facility] ([facility])     Colitis    Tobacco abuse    Cannabinoid hyperemesis syndrome    Nausea    Abnormal CT scan, colon    Acute right-sided low back pain with right-sided sciatica    Suicide ideation    Abdominal pain    Screening mammogram, encounter for    Acute bilateral low back pain with right-sided sciatica    Otitis media    Cyclical vomiting syndrome    Hyponatremia    Nausea and vomiting       Past Medical History:   Diagnosis Date    Anxiety     Arthritis     OA  b/l knees    Asthma     Approx 2 years ago    Calculus of gallbladder without cholecystitis without obstruction 2022    Chronic kidney disease     Depression     GERD (gastroesophageal reflux disease)     In my 20s. Approx 15 years ago    Hx of bleeding disorder     dysmennorrhea-dx lap today 2016    Hypertension     Kidney stone     Obesity     Seasonal allergies        Past Surgical History:   Procedure Laterality Date     SECTION      CHOLECYSTECTOMY      COLONOSCOPY      DIAGNOSTIC LAPAROSCOPY      DILATION AND CURETTAGE OF UTERUS      HERNIA REPAIR      DC COLONOSCOPY FLX DX W/COLLJ SPEC WHEN PFRMD N/A 2018    Procedure: EGD AND COLONOSCOPY;  Surgeon: Allan Duncan MD;  Location: [facility];  Service: Gastroenterology    DC LAPAROSCOPY SURG CHOLECYSTECTOMY N/A 2022    Procedure: ROBOTIC LAPAROSCOPIC CHOLECYSTECTOMY;  Surgeon: Griffin Triplett DO;  [facility];  Service: General    DC LAPAROSCOPY W/RMVL ADNEXAL STRUCTURES Bilateral 2016    Procedure: CYSTECTOMY  OVARIAN;  Surgeon: C William Riedel, DO;  Location[facility];  Service: Gynecology    DC LAPS ABD PRTM&OMENTUM DX W/WO SPEC BR/WA SPX N/A 2016    Procedure: LAPAROSCOPY DIAGNOSTIC DAVID;  Surgeon: C William Riedel, DO;  [facility];  Service: Gynecology    DC RPR UMBILICAL HRNA 5 YRS/> REDUCIBLE N/A 2022    Procedure: UMBILICAL HERNIA REPAIR;  Surgeon: Griffin Triplett DO;  [facility];  Service: General    UPPER  GASTROINTESTINAL ENDOSCOPY      WISDOM TOOTH EXTRACTION         Family History   Problem Relation Age of Onset    No Known Problems Sister     Autism Daughter     Lung cancer Maternal Grandmother     Cancer Maternal Grandmother         Deceassd 10 years    Diabetes Maternal Grandfather     Arthritis Maternal Grandfather          10 years    Aneurysm Paternal Grandmother     No Known Problems Sister        Social History     Occupational History    Not on file   Tobacco Use    Smoking status: Every Day     Current packs/day: 1.00     Average packs/day: 1 pack/day for 23.0 years (23.0 ttl pk-yrs)     Types: Cigarettes    Smokeless tobacco: Never   Vaping Use    Vaping status: Never Used   Substance and Sexual Activity    Alcohol use: Never     Comment: social    Drug use: Not Currently     Frequency: 7.0 times per week     Types: Marijuana     Comment: No longer using marijuana 2024    Sexual activity: Not Currently     Partners: Male     Birth control/protection: Abstinence       Current Outpatient Medications on File Prior to Visit   Medication Sig    albuterol (PROVENTIL HFA,VENTOLIN HFA) 90 mcg/act inhaler Inhale 2 puffs every 6 (six) hours as needed for wheezing (Patient not taking: Reported on 2025)    aluminum-magnesium hydroxide-simethicone (MAALOX) 200-200-20 MG/5ML SUSP Take 30 mL by mouth 4 (four) times a day (before meals and at bedtime)    busPIRone (BUSPAR) 5 mg tablet TAKE 1 TABLET BY MOUTH TWICE A DAY (Patient not taking: Reported on 2025)    cyclobenzaprine (FLEXERIL) 5 mg tablet Take 1 tablet (5 mg total) by mouth 3 (three) times a day as needed for muscle spasms    dicyclomine (BENTYL) 10 mg capsule TAKE 1 CAPSULE BY MOUTH 4 TIMES A DAY (BEFORE MEALS AND AT BEDTIME) (Patient not taking: Reported on 2025)    famotidine (PEPCID) 20 mg tablet Take 1 tablet (20 mg total) by mouth 2 (two) times a day (Patient not taking: Reported on 2025)    famotidine (PEPCID) 20 mg  tablet Take 1 tablet (20 mg total) by mouth 2 (two) times a day (Patient not taking: Reported on 1/20/2025)    famotidine (PEPCID) 40 MG tablet TAKE 1 TABLET BY MOUTH DAILY AT BEDTIME (Patient not taking: Reported on 1/20/2025)    gabapentin (NEURONTIN) 600 MG tablet TAKE 1 TABLET BY MOUTH THREE TIMES A DAY    hydrOXYzine pamoate (VISTARIL) 25 mg capsule TAKE 1 CAPSULE BY MOUTH 3 TIMES A DAY AS NEEDED FOR ANXIETY (Patient not taking: Reported on 1/20/2025)    lisinopril (ZESTRIL) 10 mg tablet Take 1 tablet (10 mg total) by mouth daily    LORazepam (ATIVAN) 1 mg tablet Take 1 tablet (1 mg total) by mouth daily as needed for anxiety (Patient not taking: Reported on 1/20/2025)    meloxicam (MOBIC) 15 mg tablet TAKE 1 TABLET BY MOUTH DAILY AS NEEDED FOR MODERATE PAIN. (Patient not taking: Reported on 1/20/2025)    omeprazole (PriLOSEC) 40 MG capsule TAKE 1 CAPSULE BY MOUTH TWICE A DAY    ondansetron (ZOFRAN) 4 mg tablet Take 1 tablet (4 mg total) by mouth every 6 (six) hours (Patient not taking: Reported on 1/20/2025)    ondansetron (ZOFRAN) 4 mg tablet Take 1 tablet (4 mg total) by mouth every 6 (six) hours (Patient not taking: Reported on 1/20/2025)    ondansetron (ZOFRAN-ODT) 4 mg disintegrating tablet Take 2 tablets (8 mg total) by mouth every 8 (eight) hours as needed for nausea or vomiting (Patient not taking: Reported on 1/20/2025)    QUEtiapine (SEROquel) 50 mg tablet TAKE 1 TABLET BY MOUTH DAILY AT BEDTIME (Patient not taking: Reported on 1/20/2025)    sertraline (ZOLOFT) 100 mg tablet TAKE 2 TABLETS BY MOUTH EVERY DAY (Patient not taking: Reported on 1/20/2025)    sucralfate (CARAFATE) 1 g/10 mL suspension TAKE 10 ML (1 G TOTAL) BY MOUTH 4 TIMES A DAY     No current facility-administered medications on file prior to visit.       Allergies   Allergen Reactions    Eggs Or Egg-Derived Products - Food Allergy Other (See Comments)     abd pain       Physical Exam:    Ht 5' (1.524 m)   Wt 72.6 kg (160 lb)   LMP   (LMP Unknown)   BMI 31.25 kg/m²     Constitutional:normal, well developed, well nourished, alert, in no distress and non-toxic and no overt pain behavior.  Eyes:anicteric  HEENT:grossly intact  Neck:supple, symmetric, trachea midline and no masses   Pulmonary:even and unlabored  Cardiovascular:No edema or pitting edema present  Skin:Normal without rashes or lesions and well hydrated  Psychiatric:Mood and affect appropriate  Neurologic: Motor function is grossly intact with no focal neurologic deficits   Musculoskeletal: Posterior pelvic pain provocation. +Bilateral Fili's test. +Bilateral SI compression tests. +Bilateral SI distraction tests.    Imaging    FINDINGS:     There are 5 non rib bearing lumbar vertebral bodies.      There is no evidence of acute fracture or destructive osseous lesion.     Alignment is unremarkable.      No significant lumbar degenerative change noted.     The pedicles appear intact.     Soft tissues are unremarkable.     IMPRESSION:        Normal examination.

## 2025-01-30 ENCOUNTER — HOSPITAL ENCOUNTER (OUTPATIENT)
Dept: RADIOLOGY | Facility: MEDICAL CENTER | Age: 45
End: 2025-01-30
Payer: COMMERCIAL

## 2025-01-30 VITALS
TEMPERATURE: 97.4 F | RESPIRATION RATE: 18 BRPM | DIASTOLIC BLOOD PRESSURE: 83 MMHG | SYSTOLIC BLOOD PRESSURE: 127 MMHG | OXYGEN SATURATION: 100 % | HEART RATE: 50 BPM

## 2025-01-30 DIAGNOSIS — M46.1 SACROILIITIS, NOT ELSEWHERE CLASSIFIED (HCC): ICD-10-CM

## 2025-01-30 PROCEDURE — 27096 INJECT SACROILIAC JOINT: CPT | Performed by: ANESTHESIOLOGY

## 2025-01-30 RX ORDER — METHYLPREDNISOLONE ACETATE 40 MG/ML
80 INJECTION, SUSPENSION INTRA-ARTICULAR; INTRALESIONAL; INTRAMUSCULAR; PARENTERAL; SOFT TISSUE ONCE
Status: COMPLETED | OUTPATIENT
Start: 2025-01-30 | End: 2025-01-30

## 2025-01-30 RX ORDER — LIDOCAINE HYDROCHLORIDE 10 MG/ML
5 INJECTION, SOLUTION EPIDURAL; INFILTRATION; INTRACAUDAL; PERINEURAL ONCE
Status: COMPLETED | OUTPATIENT
Start: 2025-01-30 | End: 2025-01-30

## 2025-01-30 RX ORDER — ROPIVACAINE HYDROCHLORIDE 2 MG/ML
6 INJECTION, SOLUTION EPIDURAL; INFILTRATION; PERINEURAL ONCE
Status: COMPLETED | OUTPATIENT
Start: 2025-01-30 | End: 2025-01-30

## 2025-01-30 RX ADMIN — ROPIVACAINE HYDROCHLORIDE 6 ML: 2 INJECTION, SOLUTION EPIDURAL; INFILTRATION at 09:38

## 2025-01-30 RX ADMIN — IOHEXOL 1 ML: 300 INJECTION, SOLUTION INTRAVENOUS at 09:38

## 2025-01-30 RX ADMIN — LIDOCAINE HYDROCHLORIDE 5 ML: 10 INJECTION, SOLUTION EPIDURAL; INFILTRATION; INTRACAUDAL at 09:37

## 2025-01-30 RX ADMIN — METHYLPREDNISOLONE ACETATE 80 MG: 40 INJECTION, SUSPENSION INTRA-ARTICULAR; INTRALESIONAL; INTRAMUSCULAR; SOFT TISSUE at 09:38

## 2025-01-30 NOTE — DISCHARGE INSTR - LAB

## 2025-01-30 NOTE — H&P
History of Present Illness: The patient is a 44 y.o. female who presents with complaints of back pain    Past Medical History:   Diagnosis Date    Anxiety     Arthritis     OA  b/l knees    Asthma     Approx 2 years ago    Calculus of gallbladder without cholecystitis without obstruction 2022    Chronic kidney disease     Depression     GERD (gastroesophageal reflux disease)     In my 20s. Approx 15 years ago    Hx of bleeding disorder     dysmennorrhea-dx lap today 2016    Hypertension     Kidney stone     Obesity     Seasonal allergies        Past Surgical History:   Procedure Laterality Date     SECTION      CHOLECYSTECTOMY      COLONOSCOPY      DIAGNOSTIC LAPAROSCOPY      DILATION AND CURETTAGE OF UTERUS      HERNIA REPAIR      MN COLONOSCOPY FLX DX W/COLLJ SPEC WHEN PFRMD N/A 2018    Procedure: EGD AND COLONOSCOPY;  Surgeon: Allan Duncan MD;  Location: [facility];  Service: Gastroenterology    MN LAPAROSCOPY SURG CHOLECYSTECTOMY N/A 2022    Procedure: ROBOTIC LAPAROSCOPIC CHOLECYSTECTOMY;  Surgeon: Griffin Triplett DO;  Location: [facility];  Service: General    MN LAPAROSCOPY W/RMVL ADNEXAL STRUCTURES Bilateral 2016    Procedure: CYSTECTOMY  OVARIAN;  Surgeon: C William Riedel, DO;  Location: [facility];  Service: Gynecology    MN LAPS ABD PRTM&OMENTUM DX W/WO SPEC BR/WA SPX N/A 2016    Procedure: LAPAROSCOPY DIAGNOSTIC DAVID;  Surgeon: C William Riedel, DO;  Location: [facility];  Service: Gynecology    MN RPR UMBILICAL HRNA 5 YRS/> REDUCIBLE N/A 2022    Procedure: UMBILICAL HERNIA REPAIR;  Surgeon: Griffin Triplett DO;  [facility];  Service: General    UPPER GASTROINTESTINAL ENDOSCOPY      WISDOM TOOTH EXTRACTION           Current Outpatient Medications:     albuterol (PROVENTIL HFA,VENTOLIN HFA) 90 mcg/act inhaler, Inhale 2 puffs every 6 (six) hours as needed for wheezing (Patient not taking: Reported on 2025), Disp: 6.7 g, Rfl: 0     aluminum-magnesium hydroxide-simethicone (MAALOX) 200-200-20 MG/5ML SUSP, Take 30 mL by mouth 4 (four) times a day (before meals and at bedtime), Disp: 3600 mL, Rfl: 0    busPIRone (BUSPAR) 5 mg tablet, TAKE 1 TABLET BY MOUTH TWICE A DAY (Patient not taking: Reported on 1/20/2025), Disp: 180 tablet, Rfl: 1    cyclobenzaprine (FLEXERIL) 5 mg tablet, Take 1 tablet (5 mg total) by mouth 3 (three) times a day as needed for muscle spasms, Disp: 30 tablet, Rfl: 2    dicyclomine (BENTYL) 10 mg capsule, TAKE 1 CAPSULE BY MOUTH 4 TIMES A DAY (BEFORE MEALS AND AT BEDTIME) (Patient not taking: Reported on 1/20/2025), Disp: 360 capsule, Rfl: 1    famotidine (PEPCID) 20 mg tablet, Take 1 tablet (20 mg total) by mouth 2 (two) times a day (Patient not taking: Reported on 1/20/2025), Disp: 30 tablet, Rfl: 0    famotidine (PEPCID) 20 mg tablet, Take 1 tablet (20 mg total) by mouth 2 (two) times a day (Patient not taking: Reported on 1/20/2025), Disp: 30 tablet, Rfl: 0    famotidine (PEPCID) 40 MG tablet, TAKE 1 TABLET BY MOUTH DAILY AT BEDTIME (Patient not taking: Reported on 1/20/2025), Disp: 90 tablet, Rfl: 1    gabapentin (NEURONTIN) 600 MG tablet, TAKE 1 TABLET BY MOUTH THREE TIMES A DAY, Disp: 270 tablet, Rfl: 1    hydrOXYzine pamoate (VISTARIL) 25 mg capsule, TAKE 1 CAPSULE BY MOUTH 3 TIMES A DAY AS NEEDED FOR ANXIETY (Patient not taking: Reported on 1/20/2025), Disp: 270 capsule, Rfl: 1    lisinopril (ZESTRIL) 10 mg tablet, Take 1 tablet (10 mg total) by mouth daily, Disp: 90 tablet, Rfl: 1    LORazepam (ATIVAN) 1 mg tablet, Take 1 tablet (1 mg total) by mouth daily as needed for anxiety (Patient not taking: Reported on 1/20/2025), Disp: 30 tablet, Rfl: 0    meloxicam (MOBIC) 15 mg tablet, TAKE 1 TABLET BY MOUTH DAILY AS NEEDED FOR MODERATE PAIN. (Patient not taking: Reported on 1/20/2025), Disp: 30 tablet, Rfl: 3    omeprazole (PriLOSEC) 40 MG capsule, TAKE 1 CAPSULE BY MOUTH TWICE A DAY, Disp: 180 capsule, Rfl: 1     ondansetron (ZOFRAN) 4 mg tablet, Take 1 tablet (4 mg total) by mouth every 6 (six) hours (Patient not taking: Reported on 1/20/2025), Disp: 15 tablet, Rfl: 0    ondansetron (ZOFRAN) 4 mg tablet, Take 1 tablet (4 mg total) by mouth every 6 (six) hours (Patient not taking: Reported on 1/20/2025), Disp: 12 tablet, Rfl: 0    ondansetron (ZOFRAN-ODT) 4 mg disintegrating tablet, Take 2 tablets (8 mg total) by mouth every 8 (eight) hours as needed for nausea or vomiting (Patient not taking: Reported on 1/20/2025), Disp: 30 tablet, Rfl: 0    QUEtiapine (SEROquel) 50 mg tablet, TAKE 1 TABLET BY MOUTH DAILY AT BEDTIME (Patient not taking: Reported on 1/20/2025), Disp: 90 tablet, Rfl: 1    sertraline (ZOLOFT) 100 mg tablet, TAKE 2 TABLETS BY MOUTH EVERY DAY (Patient not taking: Reported on 1/20/2025), Disp: 180 tablet, Rfl: 1    sucralfate (CARAFATE) 1 g/10 mL suspension, TAKE 10 ML (1 G TOTAL) BY MOUTH 4 TIMES A DAY, Disp: 3600 mL, Rfl: 1    Current Facility-Administered Medications:     iohexol (OMNIPAQUE) 300 mg/mL injection 1 mL, 1 mL, Intra-articular, Once, Will Raisa Berumen MD    lidocaine (PF) (XYLOCAINE-MPF) 1 % injection 5 mL, 5 mL, Infiltration, Once, Will Raisa Berumen MD    methylPREDNISolone acetate (DEPO-MEDROL) injection 80 mg, 80 mg, Intra-articular, Once, Will Raisa Berumen MD    ropivacaine (NAROPIN) injection 6 mL, 6 mL, Intra-articular, Once, Will Raisa Berumen MD    Allergies   Allergen Reactions    Eggs Or Egg-Derived Products - Food Allergy Other (See Comments)     abd pain       Physical Exam:   Vitals:    01/30/25 0925   BP: 110/70   Pulse: (!) 50   Resp: 18   Temp: (!) 97.4 °F (36.3 °C)   SpO2: 100%     General: Awake, Alert, Oriented x 3, Mood and affect appropriate  Respiratory: Respirations even and unlabored  Cardiovascular: Peripheral pulses intact; no edema  Musculoskeletal Exam: back pain    ASA Score: 2    Patient/Chart Verification  Patient ID Verified: Verbal  ID Band Applied: No  Consents  Confirmed: To be obtained in the Procedural area  Interval H&P(within 24 hr) Complete (required for Outpatients and Surgery Admit only): To be obtained in the Procedural area  Allergies Reviewed: Yes  Anticoag/NSAID held?: NA  Currently on antibiotics?: No  Pregnancy denied?: Yes    Assessment:   1. Sacroiliitis, not elsewhere classified (HCC)        Plan: bilateral SIJ injections

## 2025-02-13 ENCOUNTER — TELEPHONE (OUTPATIENT)
Dept: PAIN MEDICINE | Facility: CLINIC | Age: 45
End: 2025-02-13

## 2025-02-13 NOTE — TELEPHONE ENCOUNTER
Caller: Patient   Doctor/office: Dr Berumen  CB#: [phone]     % of improvement: Does not have pain down right leg anymore so this is a plus. She is still having the back pain and is wearing pain patches daily. She is also taking otc medications as well       Pain Scale (1-10): 10

## 2025-02-13 NOTE — TELEPHONE ENCOUNTER
Caller: Patient     Doctor: Dr. PERRY    Reason for call: Returning missed call  Transferred to RN    Call back#:

## 2025-02-13 NOTE — TELEPHONE ENCOUNTER
The prior authorization request for this study has been denied. The insurance determined the following:     (X) Location -  Patient needs to use free standing facility   will need to call her insurance for recommendation that insurance will cover    Notify central scheduling by calling [phone] to cancel appointment  scheduled for 2/19

## 2025-02-13 NOTE — TELEPHONE ENCOUNTER
S/w pt, states someone had already called her and cancelled her appt with [facility] and she is scheduled at [facility] next wednesday

## 2025-02-14 NOTE — TELEPHONE ENCOUNTER
Caller: [facility]    Doctor: Dr. lynne    Reason for call: please fax mri order to [phone]    Call back#: [phone]

## 2025-02-26 DIAGNOSIS — I10 ESSENTIAL HYPERTENSION: ICD-10-CM

## 2025-02-26 RX ORDER — LISINOPRIL 10 MG/1
10 TABLET ORAL DAILY
Qty: 90 TABLET | Refills: 1 | Status: SHIPPED | OUTPATIENT
Start: 2025-02-26

## 2025-02-28 ENCOUNTER — APPOINTMENT (OUTPATIENT)
Dept: URGENT CARE | Age: 45
End: 2025-02-28

## 2025-03-21 ENCOUNTER — OCCMED (OUTPATIENT)
Dept: URGENT CARE | Facility: CLINIC | Age: 45
End: 2025-03-21

## 2025-03-21 DIAGNOSIS — Z02.89 ENCOUNTER FOR PHYSICAL EXAMINATION RELATED TO EMPLOYMENT: Primary | ICD-10-CM

## 2025-04-02 DIAGNOSIS — R10.9 ABDOMINAL PAIN, UNSPECIFIED ABDOMINAL LOCATION: ICD-10-CM

## 2025-04-03 RX ORDER — DICYCLOMINE HYDROCHLORIDE 10 MG/1
CAPSULE ORAL
Qty: 360 CAPSULE | Refills: 1 | Status: SHIPPED | OUTPATIENT
Start: 2025-04-03

## 2025-06-12 NOTE — ED NOTES
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   Tele: n/a.  /GI: incontinent, external catheter in place. Last BM 6/9.   Pulmonary: LS diminished.  Pain: denies.     Drains/Lines: PIV SL  Skin: bilateral knee abrasions with mepilex in place, blister on coccyx with mepilex  Activity: Assist x1-2 with GB/W.  Diet: NPO since 0000    Therapies recs: TCU  Discharge: pending    Aggression Stoplight Tool: green    End of shift summary: plan for T4-T5 laminectomy and resection today at 1545, CHG bath completed this shift                      Pt stated she is still unable to provide urine specimen; pt aware that one is needed       Davion Cantu, THANH  04/30/22 [id number]

## 2025-07-17 ENCOUNTER — APPOINTMENT (OUTPATIENT)
Dept: URGENT CARE | Age: 45
End: 2025-07-17

## (undated) DEVICE — CHLORAPREP HI-LITE 26ML ORANGE

## (undated) DEVICE — ASTOUND STANDARD SURGICAL GOWN, XL: Brand: CONVERTORS

## (undated) DEVICE — HEM-O-LOK CLIP CARTRIDGE MED/LARGE DA VINCI SI/XI

## (undated) DEVICE — TISSUE RETRIEVAL SYSTEM: Brand: INZII RETRIEVAL SYSTEM

## (undated) DEVICE — VIAL DECANTER

## (undated) DEVICE — DRAPE EQUIPMENT RF WAND

## (undated) DEVICE — DRAPE SHEET THREE QUARTER

## (undated) DEVICE — SUT MONOCRYL 4-0 PS-2 27 IN Y426H

## (undated) DEVICE — HEAVY DUTY TABLE COVER: Brand: CONVERTORS

## (undated) DEVICE — GLOVE SRG BIOGEL ORTHOPEDIC 8

## (undated) DEVICE — MAYO STAND COVER: Brand: CONVERTORS

## (undated) DEVICE — ADHESIVE SKIN HIGH VISCOSITY EXOFIN 1ML

## (undated) DEVICE — NEEDLE 22 G X 1 1/2 SAFETY

## (undated) DEVICE — PAD GROUNDING ADULT

## (undated) DEVICE — PERMANENT CAUTERY HOOK: Brand: ENDOWRIST

## (undated) DEVICE — INTENDED FOR TISSUE SEPARATION, AND OTHER PROCEDURES THAT REQUIRE A SHARP SURGICAL BLADE TO PUNCTURE OR CUT.: Brand: BARD-PARKER SAFETY BLADES SIZE 11, STERILE

## (undated) DEVICE — COLUMN DRAPE

## (undated) DEVICE — VISUALIZATION SYSTEM: Brand: CLEARIFY

## (undated) DEVICE — BLADELESS OBTURATOR: Brand: WECK VISTA

## (undated) DEVICE — PLUMEPEN PRO 10FT

## (undated) DEVICE — PACK PBDS LAP CHOLE RF

## (undated) DEVICE — CANNULA SEAL

## (undated) DEVICE — INTENDED FOR TISSUE SEPARATION, AND OTHER PROCEDURES THAT REQUIRE A SHARP SURGICAL BLADE TO PUNCTURE OR CUT.: Brand: BARD-PARKER SAFETY BLADES SIZE 15, STERILE

## (undated) DEVICE — BETHLEHEM UNIVERSAL MINOR GEN: Brand: CARDINAL HEALTH

## (undated) DEVICE — PROGRASP FORCEPS: Brand: ENDOWRIST

## (undated) DEVICE — TOWEL SURG XR DETECT GREEN STRL RFD

## (undated) DEVICE — SUT VICRYL 0 UR-6 27 IN J603H

## (undated) DEVICE — ARM DRAPE

## (undated) DEVICE — BULB SYRINGE,IRRIGATION WITH PROTECTIVE CAP: Brand: DOVER

## (undated) DEVICE — TUBING SMOKE EVAC W/FILTRATION DEVICE PLUMEPORT ACTIV

## (undated) DEVICE — MEDIUM-LARGE CLIP APPLIER: Brand: ENDOWRIST

## (undated) DEVICE — TROCAR: Brand: KII FIOS FIRST ENTRY